# Patient Record
Sex: MALE | Race: WHITE | NOT HISPANIC OR LATINO | URBAN - METROPOLITAN AREA
[De-identification: names, ages, dates, MRNs, and addresses within clinical notes are randomized per-mention and may not be internally consistent; named-entity substitution may affect disease eponyms.]

---

## 2017-01-30 ENCOUNTER — GENERIC CONVERSION - ENCOUNTER (OUTPATIENT)
Dept: OTHER | Facility: OTHER | Age: 67
End: 2017-01-30

## 2017-02-02 ENCOUNTER — LAB CONVERSION - ENCOUNTER (OUTPATIENT)
Dept: OTHER | Facility: OTHER | Age: 67
End: 2017-02-02

## 2017-02-02 ENCOUNTER — GENERIC CONVERSION - ENCOUNTER (OUTPATIENT)
Dept: OTHER | Facility: OTHER | Age: 67
End: 2017-02-02

## 2017-02-02 LAB
A/G RATIO (HISTORICAL): 2 (CALC) (ref 1–2.5)
ALBUMIN SERPL BCP-MCNC: 4.6 G/DL (ref 3.6–5.1)
ALP SERPL-CCNC: 60 U/L (ref 40–115)
ALT SERPL W P-5'-P-CCNC: 31 U/L (ref 9–46)
AST SERPL W P-5'-P-CCNC: 27 U/L (ref 10–35)
BILIRUB SERPL-MCNC: 1.1 MG/DL (ref 0.2–1.2)
BUN SERPL-MCNC: 20 MG/DL (ref 7–25)
BUN/CREA RATIO (HISTORICAL): ABNORMAL (CALC) (ref 6–22)
CALCIUM (ADJUSTED FOR ALBUMIN) (HISTORICAL): 9.2 MG/DL (CALC) (ref 8.6–10.2)
CALCIUM SERPL-MCNC: 9.3 MG/DL (ref 8.6–10.3)
CHLORIDE SERPL-SCNC: 100 MMOL/L (ref 98–110)
CHOLEST SERPL-MCNC: 189 MG/DL (ref 125–200)
CHOLEST/HDLC SERPL: 4.2 (CALC)
CO2 SERPL-SCNC: 27 MMOL/L (ref 20–31)
CREAT SERPL-MCNC: 1.03 MG/DL (ref 0.7–1.25)
EGFR AFRICAN AMERICAN (HISTORICAL): 87 ML/MIN/1.73M2
EGFR-AMERICAN CALC (HISTORICAL): 75 ML/MIN/1.73M2
EST. AVERAGE GLUCOSE BLD GHB EST-MCNC: 12.4 (CALC)
EST. AVERAGE GLUCOSE BLD GHB EST-MCNC: 223 (CALC)
GAMMA GLOBULIN (HISTORICAL): 2.3 G/DL (CALC) (ref 1.9–3.7)
GLUCOSE (HISTORICAL): 135 MG/DL (ref 65–99)
HBA1C MFR BLD HPLC: 9.4 % OF TOTAL HGB
HDLC SERPL-MCNC: 45 MG/DL
LDL CHOLESTEROL (HISTORICAL): 104 MG/DL (CALC)
MAGNESIUM, UR (HISTORICAL): 0.6 MG/DL
NON-HDL-CHOL (CHOL-HDL) (HISTORICAL): 144 MG/DL (CALC)
POTASSIUM SERPL-SCNC: 4.1 MMOL/L (ref 3.5–5.3)
SODIUM SERPL-SCNC: 137 MMOL/L (ref 135–146)
TOTAL PROTEIN (HISTORICAL): 6.9 G/DL (ref 6.1–8.1)
TRIGL SERPL-MCNC: 201 MG/DL

## 2017-02-15 ENCOUNTER — ALLSCRIPTS OFFICE VISIT (OUTPATIENT)
Dept: OTHER | Facility: OTHER | Age: 67
End: 2017-02-15

## 2017-02-25 ENCOUNTER — ALLSCRIPTS OFFICE VISIT (OUTPATIENT)
Dept: OTHER | Facility: OTHER | Age: 67
End: 2017-02-25

## 2017-05-05 ENCOUNTER — ALLSCRIPTS OFFICE VISIT (OUTPATIENT)
Dept: OTHER | Facility: OTHER | Age: 67
End: 2017-05-05

## 2017-06-16 ENCOUNTER — ALLSCRIPTS OFFICE VISIT (OUTPATIENT)
Dept: OTHER | Facility: OTHER | Age: 67
End: 2017-06-16

## 2017-09-06 ENCOUNTER — GENERIC CONVERSION - ENCOUNTER (OUTPATIENT)
Dept: OTHER | Facility: OTHER | Age: 67
End: 2017-09-06

## 2018-01-12 NOTE — RESULT NOTES
Verified Results  (Q) COMPREHENSIVE METABOLIC PNL W/ADJUSTED CALCIUM [id number] 09:02AM Shanthi Gavin     Test Name Result Flag Reference   GLUCOSE 135 mg/dL H 65-99   Fasting reference interval   UREA NITROGEN (BUN) 20 mg/dL  7-25   CREATININE 1 03 mg/dL  0 70-1 25   For patients >52years of age, the reference limit  for Creatinine is approximately 13% higher for people  identified as -American  eGFR NON-AFR  AMERICAN 75 mL/min/1 73m2  > OR = 60   eGFR AFRICAN AMERICAN 87 mL/min/1 73m2  > OR = 60   BUN/CREATININE RATIO   5-75   NOT APPLICABLE (calc)   SODIUM 137 mmol/L  135-146   POTASSIUM 4 1 mmol/L  3 5-5 3   CHLORIDE 100 mmol/L     CARBON DIOXIDE 27 mmol/L  20-31   CALCIUM 9 3 mg/dL  8 6-10 3   CALCIUM (ADJUSTED FOR$ALBUMIN) 9 2 mg/dL (calc)  8 6-10 2   PROTEIN, TOTAL 6 9 g/dL  6 1-8 1   ALBUMIN 4 6 g/dL  3 6-5 1   GLOBULIN 2 3 g/dL (calc)  1 9-3 7   ALBUMIN/GLOBULIN RATIO 2 0 (calc)  1 0-2 5   BILIRUBIN, TOTAL 1 1 mg/dL  0 2-1 2   ALKALINE PHOSPHATASE 60 U/L     AST 27 U/L  10-35   ALT 31 U/L  9-46     (Q) HEMOGLOBIN A1c WITH eAG [id number] 09:02AM Shanthi Gavin   REPORT COMMENT:  FASTING:YES     Test Name Result Flag Reference   HEMOGLOBIN A1c 9 4 % of total Hgb H <5 7   According to ADA guidelines, hemoglobin A1c <7 0%  represents optimal control in non-pregnant diabetic  patients  Different metrics may apply to specific  patient populations  Standards of Medical Care in    Diabetes Care  2013;36:s11-s66     For the purpose of screening for the presence of  diabetes  <5 7%       Consistent with the absence of diabetes  5 7-6 4%    Consistent with increased risk for diabetes              (prediabetes)  >or=6 5%    Consistent with diabetes     This assay result is consistent with diabetes  mellitus  Currently, no consensus exists for use of hemoglobin  A1c for diagnosis of diabetes for children     eAG (mg/dL) 223 (calc)     eAG (mmol/L) 12 4 (calc)       (Q) MICROALBUMIN, RANDOM URINE (W/O CREATININE) [id number] 09:02AM Danutaeleanor Medinaing     Test Name Result Flag Reference   MICROALBUMIN 0 6 mg/dL     Reference Range  Not established   ADY See Below     The ADA defines abnormalities in albumin  excretion as follows:     Category         Result (mcg/mg creatinine)     Normal                    <30  Microalbuminuria            Clinical albuminuria   > OR = 300     The ADA recommends that at least two of three  specimens collected within a 3-6 month period be  abnormal before considering a patient to be  within a diagnostic category  (1) LIPID PANEL, FASTING [id number] 09:02AM [employer]     Test Name Result Flag Reference   CHOLESTEROL, TOTAL 189 mg/dL  125-200   HDL CHOLESTEROL 45 mg/dL  > OR = 40   TRIGLICERIDES 294 mg/dL H <150   LDL-CHOLESTEROL 104 mg/dL (calc)  <130   Desirable range <100 mg/dL for patients with CHD or  diabetes and <70 mg/dL for diabetic patients with  known heart disease  CHOL/HDLC RATIO 4 2 (calc)  < OR = 5 0   NON HDL CHOLESTEROL 144 mg/dL (calc)     Target for non-HDL cholesterol is 30 mg/dL higher than   LDL cholesterol target  Discussion/Summary   sugar is uncontrolled   see me to go over

## 2018-01-13 VITALS
WEIGHT: 247 LBS | TEMPERATURE: 97.3 F | DIASTOLIC BLOOD PRESSURE: 80 MMHG | OXYGEN SATURATION: 94 % | SYSTOLIC BLOOD PRESSURE: 126 MMHG | HEIGHT: 73 IN | HEART RATE: 80 BPM | BODY MASS INDEX: 32.74 KG/M2

## 2018-01-13 VITALS
HEART RATE: 75 BPM | SYSTOLIC BLOOD PRESSURE: 118 MMHG | WEIGHT: 248.5 LBS | DIASTOLIC BLOOD PRESSURE: 80 MMHG | OXYGEN SATURATION: 96 % | BODY MASS INDEX: 32.93 KG/M2 | HEIGHT: 73 IN | TEMPERATURE: 98.3 F

## 2018-01-14 VITALS
WEIGHT: 258.44 LBS | HEART RATE: 84 BPM | BODY MASS INDEX: 34.25 KG/M2 | TEMPERATURE: 98 F | HEIGHT: 73 IN | OXYGEN SATURATION: 96 % | SYSTOLIC BLOOD PRESSURE: 130 MMHG | DIASTOLIC BLOOD PRESSURE: 90 MMHG

## 2018-01-14 VITALS
DIASTOLIC BLOOD PRESSURE: 92 MMHG | BODY MASS INDEX: 34.52 KG/M2 | SYSTOLIC BLOOD PRESSURE: 140 MMHG | HEART RATE: 77 BPM | HEIGHT: 73 IN | WEIGHT: 260.5 LBS | TEMPERATURE: 97 F

## 2018-04-01 DIAGNOSIS — I10 ESSENTIAL HYPERTENSION: Primary | ICD-10-CM

## 2018-04-01 RX ORDER — VALSARTAN AND HYDROCHLOROTHIAZIDE 160; 12.5 MG/1; MG/1
TABLET, FILM COATED ORAL
Qty: 30 TABLET | Refills: 5 | Status: SHIPPED | OUTPATIENT
Start: 2018-04-01 | End: 2018-10-05

## 2018-06-09 RX ORDER — PROMETHAZINE HYDROCHLORIDE AND CODEINE PHOSPHATE 6.25; 1 MG/5ML; MG/5ML
SOLUTION ORAL
Qty: 240 ML | Refills: 1 | OUTPATIENT
Start: 2018-06-09

## 2018-08-03 ENCOUNTER — TELEPHONE (OUTPATIENT)
Dept: FAMILY MEDICINE CLINIC | Facility: CLINIC | Age: 68
End: 2018-08-03

## 2018-08-03 DIAGNOSIS — I10 ESSENTIAL HYPERTENSION: Primary | ICD-10-CM

## 2018-08-03 RX ORDER — OLMESARTAN MEDOXOMIL AND HYDROCHLOROTHIAZIDE 20/12.5 20; 12.5 MG/1; MG/1
1 TABLET ORAL DAILY
Qty: 90 TABLET | Refills: 3 | Status: SHIPPED | OUTPATIENT
Start: 2018-08-03 | End: 2019-08-23 | Stop reason: SDUPTHER

## 2018-10-05 ENCOUNTER — OFFICE VISIT (OUTPATIENT)
Dept: FAMILY MEDICINE CLINIC | Facility: CLINIC | Age: 68
End: 2018-10-05
Payer: COMMERCIAL

## 2018-10-05 VITALS
BODY MASS INDEX: 33.54 KG/M2 | WEIGHT: 254.25 LBS | SYSTOLIC BLOOD PRESSURE: 178 MMHG | TEMPERATURE: 98.8 F | HEART RATE: 76 BPM | OXYGEN SATURATION: 98 % | DIASTOLIC BLOOD PRESSURE: 100 MMHG

## 2018-10-05 DIAGNOSIS — I10 BENIGN ESSENTIAL HYPERTENSION: ICD-10-CM

## 2018-10-05 DIAGNOSIS — E11.65 UNCONTROLLED TYPE 2 DIABETES MELLITUS WITH HYPERGLYCEMIA (HCC): ICD-10-CM

## 2018-10-05 DIAGNOSIS — E78.2 MIXED HYPERLIPIDEMIA: ICD-10-CM

## 2018-10-05 DIAGNOSIS — J20.9 ACUTE BRONCHITIS, UNSPECIFIED ORGANISM: Primary | ICD-10-CM

## 2018-10-05 DIAGNOSIS — Z12.5 SCREENING PSA (PROSTATE SPECIFIC ANTIGEN): ICD-10-CM

## 2018-10-05 PROBLEM — IMO0002 DIABETES MELLITUS TYPE II, UNCONTROLLED: Status: ACTIVE | Noted: 2017-02-15

## 2018-10-05 PROCEDURE — 99214 OFFICE O/P EST MOD 30 MIN: CPT | Performed by: FAMILY MEDICINE

## 2018-10-05 RX ORDER — PROMETHAZINE HYDROCHLORIDE AND CODEINE PHOSPHATE 6.25; 1 MG/5ML; MG/5ML
5 SYRUP ORAL EVERY 4 HOURS PRN
Qty: 120 ML | Refills: 0 | Status: SHIPPED | OUTPATIENT
Start: 2018-10-05 | End: 2019-02-13 | Stop reason: ALTCHOICE

## 2018-10-05 RX ORDER — ALBUTEROL SULFATE 90 UG/1
2 AEROSOL, METERED RESPIRATORY (INHALATION) EVERY 6 HOURS PRN
Qty: 8.5 G | Refills: 0 | Status: SHIPPED | OUTPATIENT
Start: 2018-10-05 | End: 2019-06-05 | Stop reason: ALTCHOICE

## 2018-10-05 RX ORDER — AZITHROMYCIN 250 MG/1
TABLET, FILM COATED ORAL
Qty: 6 TABLET | Refills: 0 | Status: SHIPPED | OUTPATIENT
Start: 2018-10-05 | End: 2018-10-09

## 2018-10-05 NOTE — ASSESSMENT & PLAN NOTE
Severely uncontrolled today but he admits to being under lot of stress and he has been sick without sleep for three days  His prior blood pressures were very well controlled  I advised this is yet another reason for his follow-up to be sure that the blood pressure has come down and that the new medication is working as it should

## 2018-10-05 NOTE — PATIENT INSTRUCTIONS
Acute Bronchitis   WHAT YOU NEED TO KNOW:   Acute bronchitis is swelling and irritation in the air passages of your lungs  This irritation may cause you to cough or have other breathing problems  Acute bronchitis often starts because of another illness, such as a cold or the flu  The illness spreads from your nose and throat to your windpipe and airways  Bronchitis is often called a chest cold  Acute bronchitis lasts about 3 to 6 weeks and is usually not a serious illness  Your cough can last for several weeks  DISCHARGE INSTRUCTIONS:   Return to the emergency department if:   · You cough up blood  · Your lips or fingernails turn blue  · You feel like you are not getting enough air when you breathe  Contact your healthcare provider if:   · You have a fever  · Your breathing problems do not go away or get worse  · Your cough does not get better within 4 weeks  · You have questions or concerns about your condition or care  Self-care:   · Get more rest   Rest helps your body to heal  Slowly start to do more each day  Rest when you feel it is needed  · Avoid irritants in the air  Avoid chemicals, fumes, and dust  Wear a face mask if you must work around dust or fumes  Stay inside on days when air pollution levels are high  If you have allergies, stay inside when pollen counts are high  Do not use aerosol products, such as spray-on deodorant, bug spray, and hair spray  · Do not smoke or be around others who smoke  Nicotine and other chemicals in cigarettes and cigars damages the cilia that move mucus out of your lungs  Ask your healthcare provider for information if you currently smoke and need help to quit  E-cigarettes or smokeless tobacco still contain nicotine  Talk to your healthcare provider before you use these products  · Drink liquids as directed  Liquids help keep your air passages moist and help you cough up mucus   You may need to drink more liquids when you have acute bronchitis  Ask how much liquid to drink each day and which liquids are best for you  · Use a humidifier or vaporizer  Use a cool mist humidifier or a vaporizer to increase air moisture in your home  This may make it easier for you to breathe and help decrease your cough  Decrease risk for acute bronchitis:   · Get the vaccinations you need  Ask your healthcare provider if you should get vaccinated against the flu or pneumonia  · Prevent the spread of germs  You can decrease your risk of acute bronchitis and other illnesses by doing the following:     [facility] your hands often with soap and water  Carry germ-killing hand lotion or gel with you  You can use the lotion or gel to clean your hands when soap and water are not available  ¨ Do not touch your eyes, nose, or mouth unless you have washed your hands first     ¨ Always cover your mouth when you cough to prevent the spread of germs  It is best to cough into a tissue or your shirt sleeve instead of into your hand  Ask those around you cover their mouths when they cough  ¨ Try to avoid people who have a cold or the flu  If you are sick, stay away from others as much as possible  Medicines: Your healthcare provider may  give you any of the following:  · Ibuprofen or acetaminophen  are medicines that help lower your fever  They are available without a doctor's order  Ask your healthcare provider which medicine is right for you  Ask how much to take and how often to take it  Follow directions  These medicines can cause stomach bleeding if not taken correctly  Ibuprofen can cause kidney damage  Do not take ibuprofen if you have kidney disease, an ulcer, or allergies to aspirin  Acetaminophen can cause liver damage  Do not take more than 4,000 milligrams in 24 hours  · Decongestants  help loosen mucus in your lungs and make it easier to cough up  This can help you breathe easier  · Cough suppressants  decrease your urge to cough   If your cough produces mucus, do not take a cough suppressant unless your healthcare provider tells you to  Your healthcare provider may suggest that you take a cough suppressant at night so you can rest     · Inhalers  may be given  Your healthcare provider may give you one or more inhalers to help you breathe easier and cough less  An inhaler gives your medicine to open your airways  Ask your healthcare provider to show you how to use your inhaler correctly  · Take your medicine as directed  Contact your healthcare provider if you think your medicine is not helping or if you have side effects  Tell him of her if you are allergic to any medicine  Keep a list of the medicines, vitamins, and herbs you take  Include the amounts, and when and why you take them  Bring the list or the pill bottles to follow-up visits  Carry your medicine list with you in case of an emergency  Follow up with your healthcare provider as directed:  Write down questions you have so you will remember to ask them during your follow-up visits  © 2017 [address] [employer] Information is for End User's use only and may not be sold, redistributed or otherwise used for commercial purposes  All illustrations and images included in CareNotes® are the copyrighted property of [employer]  or Ady Rodgers  The above information is an  only  It is not intended as medical advice for individual conditions or treatments  Talk to your doctor, nurse or pharmacist before following any medical regimen to see if it is safe and effective for you

## 2018-10-05 NOTE — ASSESSMENT & PLAN NOTE
The patient's last A1c was 9 4 in February of 2017  He has absolutely no care for his diabetes since then  He needs a full set of labs in a diabetes follow-up  He is going to schedule today with Dr Dionna Galindo for the next available appointment and I advised he should have his labs done about a week before that appointment  For now he is taking 2000 mg of metformin daily  He will likely need more medication if the A1c is still high  We briefly discussed this today

## 2018-10-05 NOTE — PROGRESS NOTES
Subjective:   Chief Complaint   Patient presents with    Cough     PT IS HERE FOR BRONCITIS SX'S X 3 NIGHTS   PT UNABLE TO SLEEP WITH COUGHING  RX FOR INHALER  Patient ID: Charlie Arias is a 79 y o  male  The pt is here today with self diagnosed bronchitis  He has been sick for 3 days   He gets this every year  His cough is productive of a lot of phlegm  He has not felt fevers  He denies n/v    He uses a neti pot regularly  He had pneumonia many years ago and ever since gets this bad bronchitis at least once a year    He forgot to take his meds the past 2 days and also found out today that he might be losing his job  He has not yet told his wife this  He has been working from home while sick the past few days  He is under tremendous amount of stress  He notes his blood pressure is elevated  The last 2 times he was here, both over a year ago, though, his blood pressure was normal  His blood pressure medication was recently changed from valsartan-HCTZ to olmesartan-HCTZ because of the recall of valsartan            The following portions of the patient's history were reviewed and updated as appropriate: allergies, current medications, past family history, past medical history, past social history, past surgical history and problem list     Review of Systems      Objective:  Vitals:    10/05/18 1354   BP: (!) 178/100   Pulse: 76   Temp: 98 8 °F (37 1 °C)   SpO2: 98%   Weight: 115 kg (254 lb 4 oz)      Physical Exam   Constitutional: He is oriented to person, place, and time  He appears well-developed and well-nourished  No distress  Pulmonary/Chest: Effort normal and breath sounds normal  No respiratory distress  He has no wheezes  Decreased breath sounds with some rhonchi at the right lung base   Musculoskeletal: Normal range of motion  Neurological: He is alert and oriented to person, place, and time  No cranial nerve deficit  Coordination normal    Skin: Skin is warm and dry  No rash noted   He is not diaphoretic  No erythema  Psychiatric: He has a normal mood and affect  His behavior is normal  Judgment and thought content normal          Assessment/Plan:    Diabetes mellitus type II, uncontrolled ([address] )  The patient's last A1c was 9 4 in February of 2017  He has absolutely no care for his diabetes since then  He needs a full set of labs in a diabetes follow-up  He is going to schedule today with Dr Waqar Delarosa for the next available appointment and I advised he should have his labs done about a week before that appointment  For now he is taking 2000 mg of metformin daily  He will likely need more medication if the A1c is still high  We briefly discussed this today  Benign essential hypertension  Severely uncontrolled today but he admits to being under lot of stress and he has been sick without sleep for three days  His prior blood pressures were very well controlled  I advised this is yet another reason for his follow-up to be sure that the blood pressure has come down and that the new medication is working as it should  Hyperlipidemia  Well controlled in February of 2017 without medication  Will recheck with upcoming labs  Diagnoses and all orders for this visit:    Acute bronchitis, unspecified organism  -     azithromycin (ZITHROMAX) 250 mg tablet; Take 2 tablets today then 1 tablet daily x 4 days  -     promethazine-codeine (PHENERGAN WITH CODEINE) 6 25-10 mg/5 mL syrup; Take 5 mL by mouth every 4 (four) hours as needed for cough  -     albuterol (PROAIR HFA) 90 mcg/act inhaler; Inhale 2 puffs every 6 (six) hours as needed for wheezing  -     Spacer/Aero Chamber Mouthpiece MISC; by Does not apply route as needed (cough/wheeze)    I think the patient may very well have a viral bronchitis but given the findings on his lung exam I am going to put him on a Z-Gurmeet in case he has community-acquired pneumonia      Uncontrolled type 2 diabetes mellitus with hyperglycemia ([facility] [address] )  -     Hemoglobin A1c (w/out EAG); Future  -     Microalbumin,Urine; Future  -     Hemoglobin A1c (w/out EAG)    Mixed hyperlipidemia  -     TSH, 3rd generation with Free T4 reflex; Future  -     Lipid Panel with Direct LDL reflex; Future  -     TSH, 3rd generation with Free T4 reflex  -     Lipid Panel with Direct LDL reflex    Benign essential hypertension  -     Comprehensive metabolic panel; Future  -     CBC and differential; Future  -     Comprehensive metabolic panel  -     CBC and differential    Screening PSA (prostate specific antigen)  -     PSA, ultrasensitive;  Future  -     PSA, ultrasensitive    Other orders  -     METFORMIN HCL PO; Take by mouth

## 2018-11-09 LAB
ALBUMIN SERPL-MCNC: 4.7 G/DL (ref 3.6–5.1)
ALBUMIN/GLOB SERPL: 1.7 (CALC) (ref 1–2.5)
ALP SERPL-CCNC: 66 U/L (ref 40–115)
ALT SERPL-CCNC: 40 U/L (ref 9–46)
AST SERPL-CCNC: 24 U/L (ref 10–35)
BASOPHILS # BLD AUTO: 69 CELLS/UL (ref 0–200)
BASOPHILS NFR BLD AUTO: 1.4 %
BILIRUB SERPL-MCNC: 0.9 MG/DL (ref 0.2–1.2)
BUN SERPL-MCNC: 17 MG/DL (ref 7–25)
BUN/CREAT SERPL: ABNORMAL (CALC) (ref 6–22)
CALCIUM SERPL-MCNC: 9.9 MG/DL (ref 8.6–10.3)
CHLORIDE SERPL-SCNC: 99 MMOL/L (ref 98–110)
CHOLEST SERPL-MCNC: 184 MG/DL
CHOLEST/HDLC SERPL: 4.5 (CALC)
CO2 SERPL-SCNC: 31 MMOL/L (ref 20–32)
CREAT SERPL-MCNC: 1.13 MG/DL (ref 0.7–1.25)
EOSINOPHIL # BLD AUTO: 172 CELLS/UL (ref 15–500)
EOSINOPHIL NFR BLD AUTO: 3.5 %
ERYTHROCYTE [DISTWIDTH] IN BLOOD BY AUTOMATED COUNT: 13.1 % (ref 11–15)
GLOBULIN SER CALC-MCNC: 2.8 G/DL (CALC) (ref 1.9–3.7)
GLUCOSE SERPL-MCNC: 126 MG/DL (ref 65–99)
HBA1C MFR BLD: 6.6 % OF TOTAL HGB
HCT VFR BLD AUTO: 45.6 % (ref 38.5–50)
HDLC SERPL-MCNC: 41 MG/DL
HGB BLD-MCNC: 15.9 G/DL (ref 13.2–17.1)
LDLC SERPL CALC-MCNC: 112 MG/DL (CALC)
LYMPHOCYTES # BLD AUTO: 1372 CELLS/UL (ref 850–3900)
LYMPHOCYTES NFR BLD AUTO: 28 %
MCH RBC QN AUTO: 33.1 PG (ref 27–33)
MCHC RBC AUTO-ENTMCNC: 34.9 G/DL (ref 32–36)
MCV RBC AUTO: 95 FL (ref 80–100)
MONOCYTES # BLD AUTO: 407 CELLS/UL (ref 200–950)
MONOCYTES NFR BLD AUTO: 8.3 %
NEUTROPHILS # BLD AUTO: 2881 CELLS/UL (ref 1500–7800)
NEUTROPHILS NFR BLD AUTO: 58.8 %
NONHDLC SERPL-MCNC: 143 MG/DL (CALC)
PLATELET # BLD AUTO: 147 THOUSAND/UL (ref 140–400)
PMV BLD REES-ECKER: 13.2 FL (ref 7.5–12.5)
POTASSIUM SERPL-SCNC: 4.7 MMOL/L (ref 3.5–5.3)
PROT SERPL-MCNC: 7.5 G/DL (ref 6.1–8.1)
RBC # BLD AUTO: 4.8 MILLION/UL (ref 4.2–5.8)
SL AMB EGFR AFRICAN AMERICAN: 78 ML/MIN/1.73M2
SL AMB EGFR NON AFRICAN AMERICAN: 67 ML/MIN/1.73M2
SODIUM SERPL-SCNC: 138 MMOL/L (ref 135–146)
TRIGL SERPL-MCNC: 190 MG/DL
TSH SERPL-ACNC: 2.16 MIU/L (ref 0.4–4.5)
WBC # BLD AUTO: 4.9 THOUSAND/UL (ref 3.8–10.8)

## 2018-11-09 PROCEDURE — 3044F HG A1C LEVEL LT 7.0%: CPT | Performed by: FAMILY MEDICINE

## 2018-11-11 LAB — PSA SERPL DL<=0.01 NG/ML-MCNC: 2.28 NG/ML

## 2018-11-14 ENCOUNTER — OFFICE VISIT (OUTPATIENT)
Dept: FAMILY MEDICINE CLINIC | Facility: CLINIC | Age: 68
End: 2018-11-14
Payer: COMMERCIAL

## 2018-11-14 VITALS
HEIGHT: 75 IN | SYSTOLIC BLOOD PRESSURE: 120 MMHG | HEART RATE: 82 BPM | OXYGEN SATURATION: 97 % | TEMPERATURE: 98.4 F | WEIGHT: 240 LBS | BODY MASS INDEX: 29.84 KG/M2 | DIASTOLIC BLOOD PRESSURE: 86 MMHG

## 2018-11-14 DIAGNOSIS — E78.2 MIXED HYPERLIPIDEMIA: Primary | ICD-10-CM

## 2018-11-14 DIAGNOSIS — E11.42 DIABETIC POLYNEUROPATHY ASSOCIATED WITH TYPE 2 DIABETES MELLITUS (HCC): ICD-10-CM

## 2018-11-14 DIAGNOSIS — E11.65 UNCONTROLLED TYPE 2 DIABETES MELLITUS WITH HYPERGLYCEMIA (HCC): ICD-10-CM

## 2018-11-14 PROCEDURE — 3008F BODY MASS INDEX DOCD: CPT | Performed by: FAMILY MEDICINE

## 2018-11-14 PROCEDURE — 99213 OFFICE O/P EST LOW 20 MIN: CPT | Performed by: FAMILY MEDICINE

## 2018-11-14 PROCEDURE — 1160F RVW MEDS BY RX/DR IN RCRD: CPT | Performed by: FAMILY MEDICINE

## 2018-11-14 PROCEDURE — 1036F TOBACCO NON-USER: CPT | Performed by: FAMILY MEDICINE

## 2018-11-14 PROCEDURE — 1101F PT FALLS ASSESS-DOCD LE1/YR: CPT | Performed by: FAMILY MEDICINE

## 2018-11-14 RX ORDER — ATORVASTATIN CALCIUM 20 MG/1
20 TABLET, FILM COATED ORAL DAILY
Qty: 90 TABLET | Refills: 1 | Status: SHIPPED | OUTPATIENT
Start: 2018-11-14 | End: 2019-02-13 | Stop reason: SDDI

## 2018-11-14 NOTE — PROGRESS NOTES
Subjective:   Chief Complaint   Patient presents with    Diabetes     pt here for diabetic check up and to go over lab test results, pt advised to schedule for eye exam        Patient ID: Mariely Wayne is a 79 y o  male  Patient is here for follow-up  He was seen several weeks ago with an uncontrolled blood pressure  At the time has not been compliant with his diet or his medications  Since starting his medication and going back on his metformin he is feeling much better  His weight is down  He is beginning to start an exercise program   He had lab work and wants to review that      Diabetes   Pertinent negatives for hypoglycemia include no dizziness, headaches, seizures, speech difficulty or tremors  Pertinent negatives for diabetes include no chest pain, no fatigue, no polydipsia and no polyphagia  The following portions of the patient's history were reviewed and updated as appropriate: allergies, current medications, past family history, past medical history, past social history, past surgical history and problem list     Review of Systems   Constitutional: Negative for activity change, appetite change, chills, diaphoresis, fatigue and unexpected weight change  HENT: Negative for congestion, ear discharge, ear pain, hearing loss, nosebleeds and rhinorrhea  Eyes: Negative for pain, redness, itching and visual disturbance  Respiratory: Negative for cough, choking, chest tightness and shortness of breath  Cardiovascular: Negative for chest pain and leg swelling  Gastrointestinal: Negative for abdominal pain, blood in stool, constipation, diarrhea and nausea  Endocrine: Negative for cold intolerance, polydipsia and polyphagia  Genitourinary: Negative for dysuria, frequency, hematuria and urgency  Musculoskeletal: Negative for arthralgias, back pain, gait problem, joint swelling, neck pain and neck stiffness  Skin: Negative for color change and rash     Allergic/Immunologic: Negative for environmental allergies and food allergies  Neurological: Negative for dizziness, tremors, seizures, speech difficulty, numbness and headaches  Hematological: Negative for adenopathy  Does not bruise/bleed easily  Psychiatric/Behavioral: Negative for behavioral problems, dysphoric mood, hallucinations and self-injury  Objective:  Vitals:    11/14/18 0930   BP: 120/86   Pulse: 82   Temp: 98 4 °F (36 9 °C)   SpO2: 97%   Weight: 109 kg (240 lb)   Height: 6' 3" (1 905 m)      Physical Exam   Constitutional: He is oriented to person, place, and time  He appears well-developed and well-nourished  No distress  HENT:   Head: Normocephalic and atraumatic  Right Ear: External ear normal    Left Ear: External ear normal    Nose: Nose normal    Mouth/Throat: Oropharynx is clear and moist  No oropharyngeal exudate  Eyes: Pupils are equal, round, and reactive to light  Conjunctivae and EOM are normal  Right eye exhibits no discharge  Left eye exhibits no discharge  No scleral icterus  Neck: Normal range of motion  Neck supple  No thyromegaly present  Cardiovascular: Normal rate, regular rhythm and normal heart sounds  Pulses are no weak pulses  No murmur heard  Pulses:       Dorsalis pedis pulses are 1+ on the right side, and 1+ on the left side  Posterior tibial pulses are 1+ on the right side, and 1+ on the left side  Pulmonary/Chest: Effort normal and breath sounds normal  He has no wheezes  He has no rales  Abdominal: Soft  Bowel sounds are normal  He exhibits no mass  There is no tenderness  There is no guarding  Musculoskeletal: Normal range of motion  He exhibits no edema or tenderness  Feet:   Right Foot:   Skin Integrity: Negative for ulcer, skin breakdown, erythema, warmth, callus or dry skin  Left Foot:   Skin Integrity: Negative for ulcer, skin breakdown, erythema, warmth, callus or dry skin  Lymphadenopathy:     He has no cervical adenopathy     Neurological: He is alert and oriented to person, place, and time  He has normal reflexes  Skin: Skin is warm and dry  He is not diaphoretic  Psychiatric: He has a normal mood and affect  Judgment and thought content normal        Patient's shoes and socks removed  Right Foot/Ankle   Right Foot Inspection  Skin Exam: skin normal and skin intact no dry skin, no warmth, no callus, no erythema, no maceration, no abnormal color, no pre-ulcer, no ulcer and no callus                          Toe Exam: ROM and strength within normal limits  Sensory   Vibration: diminished  Proprioception: diminished   Monofilament testing: diminished  Vascular  Capillary refills: < 3 seconds  The right DP pulse is 1+  The right PT pulse is 1+  Left Foot/Ankle  Left Foot Inspection  Skin Exam: skin normal and skin intactno dry skin, no warmth, no erythema, no maceration, normal color, no pre-ulcer, no ulcer and no callus                         Toe Exam: ROM and strength within normal limits                   Sensory   Vibration: diminished  Proprioception: diminished  Monofilament: diminished  Vascular  Capillary refills: < 3 seconds  The left DP pulse is 1+  The left PT pulse is 1+  Assign Risk Category:  No deformity present; Loss of protective sensation; No weak pulses       Risk: 1    Assessment/Plan:    No problem-specific Assessment & Plan notes found for this encounter  Diagnoses and all orders for this visit:    Mixed hyperlipidemia  -     atorvastatin (LIPITOR) 20 mg tablet; Take 1 tablet (20 mg total) by mouth daily    Uncontrolled type 2 diabetes mellitus with hyperglycemia (HCC)        We will begin Lipitor 20 mg after showing the patient the Nikolai risk score which exceeded 25%  We will see him again in 3 months and repeat his lipids and A1c at that time  He should continue his current dose of his other medications  He reminded to see the eye doctor

## 2018-11-14 NOTE — PATIENT INSTRUCTIONS

## 2019-01-21 DIAGNOSIS — E11.9 TYPE 2 DIABETES MELLITUS WITHOUT COMPLICATION, WITHOUT LONG-TERM CURRENT USE OF INSULIN (HCC): Primary | ICD-10-CM

## 2019-02-07 ENCOUNTER — TELEPHONE (OUTPATIENT)
Dept: FAMILY MEDICINE CLINIC | Facility: CLINIC | Age: 69
End: 2019-02-07

## 2019-02-07 DIAGNOSIS — Z13.29 SCREENING FOR THYROID DISORDER: ICD-10-CM

## 2019-02-07 DIAGNOSIS — Z13.29 SCREENING FOR ENDOCRINE DISORDER: ICD-10-CM

## 2019-02-07 DIAGNOSIS — Z13.220 SCREENING FOR LIPID DISORDERS: ICD-10-CM

## 2019-02-07 DIAGNOSIS — Z12.5 SCREENING FOR PROSTATE CANCER: ICD-10-CM

## 2019-02-07 DIAGNOSIS — Z13.0 SCREENING FOR IRON DEFICIENCY ANEMIA: Primary | ICD-10-CM

## 2019-02-09 LAB
ALBUMIN SERPL-MCNC: 4.4 G/DL (ref 3.6–5.1)
ALBUMIN/GLOB SERPL: 1.6 (CALC) (ref 1–2.5)
ALP SERPL-CCNC: 57 U/L (ref 40–115)
ALT SERPL-CCNC: 31 U/L (ref 9–46)
AST SERPL-CCNC: 26 U/L (ref 10–35)
BASOPHILS # BLD AUTO: 80 CELLS/UL (ref 0–200)
BASOPHILS NFR BLD AUTO: 1.5 %
BILIRUB SERPL-MCNC: 0.7 MG/DL (ref 0.2–1.2)
BUN SERPL-MCNC: 16 MG/DL (ref 7–25)
BUN/CREAT SERPL: ABNORMAL (CALC) (ref 6–22)
CALCIUM SERPL-MCNC: 9.9 MG/DL (ref 8.6–10.3)
CHLORIDE SERPL-SCNC: 101 MMOL/L (ref 98–110)
CHOLEST SERPL-MCNC: 188 MG/DL
CHOLEST/HDLC SERPL: 3.7 (CALC)
CO2 SERPL-SCNC: 30 MMOL/L (ref 20–32)
CREAT SERPL-MCNC: 1 MG/DL (ref 0.7–1.25)
EOSINOPHIL # BLD AUTO: 127 CELLS/UL (ref 15–500)
EOSINOPHIL NFR BLD AUTO: 2.4 %
ERYTHROCYTE [DISTWIDTH] IN BLOOD BY AUTOMATED COUNT: 13.2 % (ref 11–15)
GLOBULIN SER CALC-MCNC: 2.7 G/DL (CALC) (ref 1.9–3.7)
GLUCOSE SERPL-MCNC: 117 MG/DL (ref 65–99)
HCT VFR BLD AUTO: 44 % (ref 38.5–50)
HDLC SERPL-MCNC: 51 MG/DL
HGB BLD-MCNC: 15.2 G/DL (ref 13.2–17.1)
LDLC SERPL CALC-MCNC: 109 MG/DL (CALC)
LYMPHOCYTES # BLD AUTO: 1818 CELLS/UL (ref 850–3900)
LYMPHOCYTES NFR BLD AUTO: 34.3 %
MCH RBC QN AUTO: 33.5 PG (ref 27–33)
MCHC RBC AUTO-ENTMCNC: 34.5 G/DL (ref 32–36)
MCV RBC AUTO: 96.9 FL (ref 80–100)
MONOCYTES # BLD AUTO: 429 CELLS/UL (ref 200–950)
MONOCYTES NFR BLD AUTO: 8.1 %
NEUTROPHILS # BLD AUTO: 2846 CELLS/UL (ref 1500–7800)
NEUTROPHILS NFR BLD AUTO: 53.7 %
NONHDLC SERPL-MCNC: 137 MG/DL (CALC)
PLATELET # BLD AUTO: 172 THOUSAND/UL (ref 140–400)
PMV BLD REES-ECKER: 13.1 FL (ref 7.5–12.5)
POTASSIUM SERPL-SCNC: 4.6 MMOL/L (ref 3.5–5.3)
PROT SERPL-MCNC: 7.1 G/DL (ref 6.1–8.1)
PSA SERPL-MCNC: 2 NG/ML
RBC # BLD AUTO: 4.54 MILLION/UL (ref 4.2–5.8)
SL AMB EGFR AFRICAN AMERICAN: 89 ML/MIN/1.73M2
SL AMB EGFR NON AFRICAN AMERICAN: 77 ML/MIN/1.73M2
SODIUM SERPL-SCNC: 139 MMOL/L (ref 135–146)
TRIGL SERPL-MCNC: 166 MG/DL
TSH SERPL-ACNC: 1.69 MIU/L (ref 0.4–4.5)
WBC # BLD AUTO: 5.3 THOUSAND/UL (ref 3.8–10.8)

## 2019-02-13 ENCOUNTER — OFFICE VISIT (OUTPATIENT)
Dept: FAMILY MEDICINE CLINIC | Facility: CLINIC | Age: 69
End: 2019-02-13
Payer: COMMERCIAL

## 2019-02-13 VITALS
DIASTOLIC BLOOD PRESSURE: 100 MMHG | BODY MASS INDEX: 30.15 KG/M2 | OXYGEN SATURATION: 94 % | TEMPERATURE: 97.2 F | HEART RATE: 81 BPM | SYSTOLIC BLOOD PRESSURE: 140 MMHG | HEIGHT: 75 IN | WEIGHT: 242.5 LBS

## 2019-02-13 DIAGNOSIS — E11.40 TYPE 2 DIABETES MELLITUS WITH DIABETIC NEUROPATHY, WITHOUT LONG-TERM CURRENT USE OF INSULIN (HCC): ICD-10-CM

## 2019-02-13 DIAGNOSIS — E11.65 UNCONTROLLED TYPE 2 DIABETES MELLITUS WITH HYPERGLYCEMIA (HCC): Primary | ICD-10-CM

## 2019-02-13 DIAGNOSIS — I10 BENIGN ESSENTIAL HYPERTENSION: ICD-10-CM

## 2019-02-13 DIAGNOSIS — E78.2 MIXED HYPERLIPIDEMIA: ICD-10-CM

## 2019-02-13 DIAGNOSIS — E11.9 TYPE 2 DIABETES MELLITUS WITHOUT COMPLICATION, WITHOUT LONG-TERM CURRENT USE OF INSULIN (HCC): ICD-10-CM

## 2019-02-13 PROCEDURE — 3008F BODY MASS INDEX DOCD: CPT | Performed by: FAMILY MEDICINE

## 2019-02-13 PROCEDURE — 99213 OFFICE O/P EST LOW 20 MIN: CPT | Performed by: FAMILY MEDICINE

## 2019-02-13 PROCEDURE — 1160F RVW MEDS BY RX/DR IN RCRD: CPT | Performed by: FAMILY MEDICINE

## 2019-02-13 PROCEDURE — 1036F TOBACCO NON-USER: CPT | Performed by: FAMILY MEDICINE

## 2019-02-13 NOTE — PROGRESS NOTES
Subjective:   Chief Complaint   Patient presents with    Follow-up     pt here for f/u on blood test results        Patient ID: Humza Barr is a 76 y o  male  Patient is here with his wife to review the results of recent lab work  He has been taking supplements and following a very strict diet  He is happy that he is not taking a statin  He is using and entire list of supplements  He also notes some cracking on his bilateral feet  The following portions of the patient's history were reviewed and updated as appropriate: allergies, current medications, past family history, past medical history, past social history, past surgical history and problem list     Review of Systems   Constitutional: Negative for activity change, appetite change, chills, diaphoresis, fatigue and unexpected weight change  HENT: Negative for congestion, ear discharge, ear pain, hearing loss, nosebleeds and rhinorrhea  Eyes: Negative for pain, redness, itching and visual disturbance  Respiratory: Negative for cough, choking, chest tightness and shortness of breath  Cardiovascular: Negative for chest pain and leg swelling  Gastrointestinal: Negative for abdominal pain, blood in stool, constipation, diarrhea and nausea  Endocrine: Negative for cold intolerance, polydipsia and polyphagia  Genitourinary: Negative for dysuria, frequency, hematuria and urgency  Musculoskeletal: Negative for arthralgias, back pain, gait problem, joint swelling, neck pain and neck stiffness  Skin: Negative for color change and rash  Allergic/Immunologic: Negative for environmental allergies and food allergies  Neurological: Negative for dizziness, tremors, seizures, speech difficulty, numbness and headaches  Hematological: Negative for adenopathy  Does not bruise/bleed easily  Psychiatric/Behavioral: Negative for behavioral problems, dysphoric mood, hallucinations and self-injury           Objective:  Vitals:    02/13/19 0947 BP: 140/100   Pulse: 81   Temp: (!) 97 2 °F (36 2 °C)   SpO2: 94%   Weight: 110 kg (242 lb 8 oz)   Height: 6' 3" (1 905 m)      Physical Exam   Constitutional: He is oriented to person, place, and time  He appears well-developed and well-nourished  No distress  HENT:   Head: Normocephalic and atraumatic  Right Ear: External ear normal    Left Ear: External ear normal    Nose: Nose normal    Mouth/Throat: Oropharynx is clear and moist  No oropharyngeal exudate  Eyes: Pupils are equal, round, and reactive to light  Conjunctivae and EOM are normal  Right eye exhibits no discharge  Left eye exhibits no discharge  No scleral icterus  Neck: Normal range of motion  Neck supple  No thyromegaly present  Cardiovascular: Normal rate, regular rhythm and normal heart sounds  Pulses are no weak pulses  No murmur heard  Pulses:       Dorsalis pedis pulses are 2+ on the right side, and 1+ on the left side  Posterior tibial pulses are 1+ on the right side, and 1+ on the left side  Pulmonary/Chest: Effort normal and breath sounds normal  He has no wheezes  He has no rales  Abdominal: Soft  Bowel sounds are normal  He exhibits no mass  There is no tenderness  There is no guarding  Musculoskeletal: Normal range of motion  He exhibits no edema or tenderness  Feet:   Right Foot:   Skin Integrity: Positive for dry skin  Negative for ulcer, skin breakdown, erythema, warmth or callus  Left Foot:   Skin Integrity: Positive for dry skin  Negative for ulcer, skin breakdown, erythema, warmth or callus  Lymphadenopathy:     He has no cervical adenopathy  Neurological: He is alert and oriented to person, place, and time  He has normal reflexes  Skin: Skin is warm and dry  He is not diaphoretic  Psychiatric: He has a normal mood and affect  Judgment and thought content normal        Patient's shoes and socks removed  Right Foot/Ankle   Right Foot Inspection  Skin Exam: skin normal, skin intact and dry skin no warmth, no callus, no erythema, no maceration, no abnormal color, no pre-ulcer, no ulcer and no callus                          Toe Exam: ROM and strength within normal limits  Sensory   Vibration: diminished  Proprioception: diminished   Monofilament testing: diminished  Vascular  Capillary refills: < 3 seconds  The right DP pulse is 2+  The right PT pulse is 1+  Left Foot/Ankle  Left Foot Inspection  Skin Exam: skin normal, skin intact and dry skinno warmth, no erythema, no maceration, normal color, no pre-ulcer, no ulcer and no callus                         Toe Exam: ROM and strength within normal limits                   Sensory   Vibration: diminished  Proprioception: diminished  Monofilament: diminished  Vascular  Capillary refills: < 3 seconds  The left DP pulse is 1+  The left PT pulse is 1+  Assign Risk Category:  No deformity present; Loss of protective sensation; No weak pulses       Risk: 1     Assessment/Plan:    No problem-specific Assessment & Plan notes found for this encounter  Diagnoses and all orders for this visit:    Uncontrolled type 2 diabetes mellitus with hyperglycemia ([facility] [address] )    Type 2 diabetes mellitus without complication, without long-term current use of insulin ([facility])  -     metFORMIN (GLUCOPHAGE) 500 mg tablet; Take 2 tablets (1,000 mg total) by mouth 2 (two) times a day with meals    Type 2 diabetes mellitus with diabetic neuropathy, without long-term current use of insulin ([facility])    Benign essential hypertension    Mixed hyperlipidemia        Continue the current medical regimen and recheck with me in 3 months  Continue with his supplements at this time

## 2019-02-13 NOTE — PATIENT INSTRUCTIONS
Diabetes and Your Skin   WHAT YOU NEED TO KNOW:   Diabetes can affect every part of your body, including your skin  Diabetes that is not well controlled can damage blood vessels and nerves  Damage to blood vessels can make it hard for blood to flow to tissues and organs  A lack of blood flow to your skin can cause ulcers that are difficult to heal  Skin ulcers are also called sores  People with diabetes can also have more bacterial skin infections than other people  Most skin conditions can be prevented with good blood sugar control  Skin sores can be hard to heal, or become life or limb-threatening, if not treated early  DISCHARGE INSTRUCTIONS:   Contact your healthcare provider if:   · You get a severe burn or cut  · You have a sore that is painful, warm to the touch, or has redness around it  · Your sore does not get better or seems to get worse  · You have a fever  · Your blood sugar levels continue to be higher than they should be  Prevent skin sores:   · Keep your blood sugars within target range  Your healthcare provider will tell you what your blood sugar levels should be  High blood sugar levels increase your risk for skin infections and poor wound healing  · Keep your skin clean  Do not take hot baths or showers  They can cause your skin to get dry  Do not take a bubble bath if you have dry skin  Use moisturizing soaps and lotion after baths or showers  Do not put lotion between your toes  · Keep your skin from becoming too dry,  especially in cold, dry weather  When you scratch dry, itchy skin, you can cause your skin to be open to infection  Bathe less during cold weather and use lotion to moisturize  Use a humidifier to keep air in your home from being dry  · Keep areas where skin touches skin dry  Use talcum powder in areas such as armpits and groin  You may also need it under your breasts, and between your toes  Moisture in these areas can cause a fungal infection  · Treat cuts immediately  Clean minor cuts with soap and water  Cover them with sterile gauze  © 2017 [employer] Information is for End User's use only and may not be sold, redistributed or otherwise used for commercial purposes  All illustrations and images included in CareNotes® are the copyrighted property of [employer]  or Ady Rodgers  The above information is an  only  It is not intended as medical advice for individual conditions or treatments  Talk to your doctor, nurse or pharmacist before following any medical regimen to see if it is safe and effective for you

## 2019-04-03 LAB
LEFT EYE DIABETIC RETINOPATHY: NORMAL
RIGHT EYE DIABETIC RETINOPATHY: NORMAL

## 2019-04-03 PROCEDURE — 3072F LOW RISK FOR RETINOPATHY: CPT | Performed by: FAMILY MEDICINE

## 2019-05-08 DIAGNOSIS — E11.9 TYPE 2 DIABETES MELLITUS WITHOUT COMPLICATION, WITHOUT LONG-TERM CURRENT USE OF INSULIN (HCC): Primary | ICD-10-CM

## 2019-05-09 ENCOUNTER — CLINICAL SUPPORT (OUTPATIENT)
Dept: GASTROENTEROLOGY | Facility: CLINIC | Age: 69
End: 2019-05-09

## 2019-05-09 VITALS — BODY MASS INDEX: 30.21 KG/M2 | HEIGHT: 75 IN | WEIGHT: 243 LBS

## 2019-05-09 DIAGNOSIS — Z86.010 HISTORY OF COLON POLYPS: Primary | ICD-10-CM

## 2019-05-09 RX ORDER — ASCORBIC ACID 500 MG
500 TABLET ORAL DAILY
COMMUNITY

## 2019-05-09 RX ORDER — ATORVASTATIN CALCIUM 10 MG/1
10 TABLET, FILM COATED ORAL DAILY
COMMUNITY
End: 2019-06-05 | Stop reason: ALTCHOICE

## 2019-05-09 RX ORDER — AMPICILLIN TRIHYDRATE 250 MG
CAPSULE ORAL DAILY
COMMUNITY

## 2019-05-13 DIAGNOSIS — E11.65 UNCONTROLLED TYPE 2 DIABETES MELLITUS WITH HYPERGLYCEMIA (HCC): Primary | ICD-10-CM

## 2019-05-24 LAB
ALBUMIN SERPL-MCNC: 4.6 G/DL (ref 3.6–5.1)
ALBUMIN/GLOB SERPL: 1.9 (CALC) (ref 1–2.5)
ALP SERPL-CCNC: 54 U/L (ref 40–115)
ALT SERPL-CCNC: 27 U/L (ref 9–46)
AST SERPL-CCNC: 23 U/L (ref 10–35)
BILIRUB SERPL-MCNC: 0.9 MG/DL (ref 0.2–1.2)
BUN SERPL-MCNC: 18 MG/DL (ref 7–25)
BUN/CREAT SERPL: ABNORMAL (CALC) (ref 6–22)
CALCIUM SERPL-MCNC: 9.7 MG/DL (ref 8.6–10.3)
CHLORIDE SERPL-SCNC: 102 MMOL/L (ref 98–110)
CHOLEST SERPL-MCNC: 136 MG/DL
CHOLEST/HDLC SERPL: 3 (CALC)
CO2 SERPL-SCNC: 31 MMOL/L (ref 20–32)
CREAT SERPL-MCNC: 1.02 MG/DL (ref 0.7–1.25)
GLOBULIN SER CALC-MCNC: 2.4 G/DL (CALC) (ref 1.9–3.7)
GLUCOSE SERPL-MCNC: 109 MG/DL (ref 65–99)
HBA1C MFR BLD: 6.1 % OF TOTAL HGB
HDLC SERPL-MCNC: 45 MG/DL
LDLC SERPL CALC-MCNC: 77 MG/DL (CALC)
NONHDLC SERPL-MCNC: 91 MG/DL (CALC)
POTASSIUM SERPL-SCNC: 4 MMOL/L (ref 3.5–5.3)
PROT SERPL-MCNC: 7 G/DL (ref 6.1–8.1)
SL AMB EGFR AFRICAN AMERICAN: 87 ML/MIN/1.73M2
SL AMB EGFR NON AFRICAN AMERICAN: 75 ML/MIN/1.73M2
SODIUM SERPL-SCNC: 139 MMOL/L (ref 135–146)
TRIGL SERPL-MCNC: 68 MG/DL

## 2019-06-05 ENCOUNTER — OFFICE VISIT (OUTPATIENT)
Dept: FAMILY MEDICINE CLINIC | Facility: CLINIC | Age: 69
End: 2019-06-05
Payer: COMMERCIAL

## 2019-06-05 VITALS
HEART RATE: 74 BPM | SYSTOLIC BLOOD PRESSURE: 138 MMHG | OXYGEN SATURATION: 96 % | DIASTOLIC BLOOD PRESSURE: 92 MMHG | HEIGHT: 75 IN | BODY MASS INDEX: 28.47 KG/M2 | TEMPERATURE: 98.3 F | WEIGHT: 229 LBS

## 2019-06-05 DIAGNOSIS — I10 BENIGN ESSENTIAL HYPERTENSION: ICD-10-CM

## 2019-06-05 DIAGNOSIS — E11.65 UNCONTROLLED TYPE 2 DIABETES MELLITUS WITH HYPERGLYCEMIA (HCC): Primary | ICD-10-CM

## 2019-06-05 DIAGNOSIS — E66.3 OVERWEIGHT (BMI 25.0-29.9): ICD-10-CM

## 2019-06-05 PROCEDURE — 99213 OFFICE O/P EST LOW 20 MIN: CPT | Performed by: FAMILY MEDICINE

## 2019-06-05 PROCEDURE — 3008F BODY MASS INDEX DOCD: CPT | Performed by: FAMILY MEDICINE

## 2019-06-05 PROCEDURE — 1160F RVW MEDS BY RX/DR IN RCRD: CPT | Performed by: FAMILY MEDICINE

## 2019-06-05 PROCEDURE — 1036F TOBACCO NON-USER: CPT | Performed by: FAMILY MEDICINE

## 2019-08-23 DIAGNOSIS — I10 ESSENTIAL HYPERTENSION: ICD-10-CM

## 2019-08-23 RX ORDER — OLMESARTAN MEDOXOMIL AND HYDROCHLOROTHIAZIDE 20/12.5 20; 12.5 MG/1; MG/1
TABLET ORAL
Qty: 90 TABLET | Refills: 0 | Status: SHIPPED | OUTPATIENT
Start: 2019-08-23 | End: 2019-12-23 | Stop reason: SDUPTHER

## 2019-08-26 ENCOUNTER — TELEPHONE (OUTPATIENT)
Dept: FAMILY MEDICINE CLINIC | Facility: CLINIC | Age: 69
End: 2019-08-26

## 2019-08-26 DIAGNOSIS — E11.9 TYPE 2 DIABETES MELLITUS WITHOUT COMPLICATION, WITHOUT LONG-TERM CURRENT USE OF INSULIN (HCC): Primary | ICD-10-CM

## 2019-08-26 NOTE — TELEPHONE ENCOUNTER
HE HAS APPT AT [facility] TOMORROW  CAN YOU PUT THE ORDER IN PLEASE  (fasting blood work)    Call when done      [phone]

## 2019-08-28 LAB — HBA1C MFR BLD: 5.8 % OF TOTAL HGB

## 2019-09-04 ENCOUNTER — OFFICE VISIT (OUTPATIENT)
Dept: FAMILY MEDICINE CLINIC | Facility: CLINIC | Age: 69
End: 2019-09-04
Payer: COMMERCIAL

## 2019-09-04 VITALS
HEIGHT: 75 IN | TEMPERATURE: 97.6 F | DIASTOLIC BLOOD PRESSURE: 90 MMHG | HEART RATE: 67 BPM | BODY MASS INDEX: 28.97 KG/M2 | WEIGHT: 233 LBS | OXYGEN SATURATION: 97 % | SYSTOLIC BLOOD PRESSURE: 122 MMHG

## 2019-09-04 DIAGNOSIS — E11.65 UNCONTROLLED TYPE 2 DIABETES MELLITUS WITH HYPERGLYCEMIA (HCC): Primary | ICD-10-CM

## 2019-09-04 DIAGNOSIS — I10 BENIGN ESSENTIAL HYPERTENSION: ICD-10-CM

## 2019-09-04 PROCEDURE — 99213 OFFICE O/P EST LOW 20 MIN: CPT | Performed by: FAMILY MEDICINE

## 2019-09-04 PROCEDURE — 3008F BODY MASS INDEX DOCD: CPT | Performed by: FAMILY MEDICINE

## 2019-09-04 NOTE — PATIENT INSTRUCTIONS
Diabetes in the Older Adult   WHAT YOU NEED TO KNOW:   Older adults with diabetes are at risk for heart disease, stroke, kidney disease, blindness, and nerve damage  You may also be at risk for any of the following:  · Poor nutrition or low blood sugar levels    · Confusion or problems with memory, attention, or learning new things    · Trouble controlling urination or frequent urinary tract infections    · Trouble with coordination or balance    · Falls and injuries    · Pain    · Depression    · Open sores on your legs or feet  DISCHARGE INSTRUCTIONS:   Call 911 for any of the following:   · You have any of the following signs of a stroke:      ¨ Numbness or drooping on one side of your face     ¨ Weakness in an arm or leg    ¨ Confusion or difficulty speaking    ¨ Dizziness, a severe headache, or vision loss    · You have any of the following signs of a heart attack:      ¨ Squeezing, pressure, or pain in your chest that lasts longer than 5 minutes or returns    ¨ Discomfort or pain in your back, neck, jaw, stomach, or arm     ¨ Trouble breathing    ¨ Nausea or vomiting    ¨ Lightheadedness or a sudden cold sweat, especially with chest pain or trouble breathing  Return to the emergency department if:   · You have severe abdominal pain, or the pain spreads to your back  You may also be vomiting  · You have trouble staying awake or focusing  · You are shaking or sweating  · You have blurred or double vision  · Your breath has a fruity, sweet smell  · Your breathing is deep and labored, or rapid and shallow  · Your heartbeat is fast and weak  · You fall and get hurt  Contact your healthcare provider if:   · You are vomiting or have diarrhea  · You have an upset stomach and cannot eat the foods on your meal plan  · You feel weak or more tired than usual      · You feel dizzy, have headaches, or are easily irritated  · Your skin is red, warm, dry, or swollen       · You have a wound that does not heal      · You have numbness in your arms or legs  · You have trouble coping with your illness, or you feel anxious or depressed  · You have problems with your memory  · You have changes in your vision  · You have questions or concerns about your condition or care  Medicines  may be given to decrease the amount of sugar in your blood  You may also need medicine to lower your blood pressure or cholesterol, or medicine to prevent blood clots  Manage the ABCs and prevent problems caused by diabetes:   · Check your blood sugar levels as directed  Your healthcare provider will tell you when and how often to check during the day  Your healthcare provider will also tell you what your blood sugar levels should be before and after a meal  You may need to check for ketones in your urine or blood if your level is higher than directed  Write down your results and show them to your healthcare provider  Your provider may use the results to make changes to your medicine, food, or exercise schedules  Ask your healthcare provider for more information about how to treat a high or low blood sugar level  · Follow your meal plan as directed  A dietitian will help you make a meal plan to keep your blood sugar level steady and make sure you get enough nutrition  Do not skip meals  Your blood sugar level may drop too low if you have taken diabetes medicine and do not eat  Ask your healthcare provider about programs in your community that can deliver the meals to your home  · Try to be active for 30 to 60 minutes most days of the week  Exercise can help keep your blood sugar level steady, decrease your risk of heart disease, and help you lose weight  It can also help improve your balance and decrease your risk for falls  Work with your healthcare provider to create an exercise plan  Ask a family member or friend to exercise with you   Start slow and exercise for 5 to 10 minutes at a time  Examples of activities include walking or swimming  Include muscle strengthening activities 2 to 3 days each week  Include balance training 2 to 3 times each week  Activities that help increase balance include yoga and stephenie chi      · Maintain a healthy weight  Ask your healthcare provider how much you should weigh  A healthy weight can help you control your diabetes and prevent heart disease  Ask your provider to help you create a weight loss plan if you are overweight  Together you can set manageable weight loss goals  · Do not smoke  Ask your healthcare provider for information if you currently smoke and need help to quit  Do not use e-cigarettes or smokeless tobacco in place of cigarettes or to help you quit  They still contain nicotine  · Manage stress  Stress may increase your blood sugar level  Deep breathing, muscle relaxation, and music may help you relax  Ask your healthcare provider for more information about these practices  Other ways to manage your diabetes:   · Check your feet every day for sores  Look at your whole foot, including the bottom, and between and under your toes  Check for wounds, corns, and calluses  Use a mirror to see the bottom of your feet  The skin on your feet may be shiny, tight, dry, or darker than normal  Your feet may also be cold and pale  Feel your feet by running your hands along the tops, bottoms, sides, and between your toes  Redness, swelling, and warmth are signs of blood flow problems that can lead to a foot ulcer  Do not try to remove corns or calluses yourself  · Wear medical alert identification  Wear medical alert jewelry or carry a card that says you have diabetes  Ask your healthcare provider where to get these items  · Ask about vaccines  You have a higher risk for serious illness if you get the flu, pneumonia, or hepatitis   Ask your healthcare provider if you should get a flu, pneumonia, shingles, or hepatitis B vaccine, and when to get the vaccine  · Keep all appointments  You may need to return to have your A1c checked every 3 months  You will need to return at least once each year to have your feet checked  You will need an eye exam once a year to check for retinopathy  You will also need urine tests every year to check for kidney problems  You may need tests to monitor for heart disease  Write down your questions so you remember to ask them during your visits  · Get help from family and friends  You may need help checking your blood sugar level, giving insulin injections, or preparing your meals  Ask your family and friends to help you with these tasks  Talk to your healthcare provider if you do not have someone at home to help you  A healthcare provider can come to your home to help you with these tasks  Follow up with your healthcare provider as directed: You may need to return to have your A1c checked every 3 months  You will need to return at least once each year to have your feet checked  You will need an eye exam once a year to check for retinopathy  You will also need urine tests every year to check for kidney problems  You may need tests to monitor for heart disease  Write down your questions so you remember to ask them during your visits  © 2017 [employer] Information is for End User's use only and may not be sold, redistributed or otherwise used for commercial purposes  All illustrations and images included in CareNotes® are the copyrighted property of [employer]  or Ady Rodgers  The above information is an  only  It is not intended as medical advice for individual conditions or treatments  Talk to your doctor, nurse or pharmacist before following any medical regimen to see if it is safe and effective for you

## 2019-09-04 NOTE — PROGRESS NOTES
Subjective:   Chief Complaint   Patient presents with    Follow-up     pt here for routine 3 month f/u, pt states he think he has toe fungus on his right foot big toe        Patient ID: Mira Coffey is a 76 y o  male  Patient is here for 3 month follow-up  His hemoglobin A1c today is 5 8  He is congratulated for job well done with managing his weight and his blood sugar  Recently completed his colonoscopy  No polyps  The following portions of the patient's history were reviewed and updated as appropriate: allergies, current medications, past family history, past medical history, past social history, past surgical history and problem list     Review of Systems   Constitutional: Negative for activity change, appetite change, chills, diaphoresis, fatigue and unexpected weight change  HENT: Negative for congestion, ear discharge, ear pain, hearing loss, nosebleeds and rhinorrhea  Eyes: Negative for pain, redness, itching and visual disturbance  Respiratory: Negative for cough, choking, chest tightness and shortness of breath  Cardiovascular: Negative for chest pain and leg swelling  Gastrointestinal: Negative for abdominal pain, blood in stool, constipation, diarrhea and nausea  Endocrine: Negative for cold intolerance, polydipsia and polyphagia  Genitourinary: Negative for dysuria, frequency, hematuria and urgency  Musculoskeletal: Negative for arthralgias, back pain, gait problem, joint swelling, neck pain and neck stiffness  Skin: Negative for color change and rash  Allergic/Immunologic: Negative for environmental allergies and food allergies  Neurological: Negative for dizziness, tremors, seizures, speech difficulty, numbness and headaches  Hematological: Negative for adenopathy  Does not bruise/bleed easily  Psychiatric/Behavioral: Negative for behavioral problems, dysphoric mood, hallucinations and self-injury           Objective:  Vitals:    09/04/19 0833   BP: 122/90 Pulse: 67   Temp: 97 6 °F (36 4 °C)   SpO2: 97%   Weight: 106 kg (233 lb)   Height: 6' 3" (1 905 m)      Physical Exam   Constitutional: He is oriented to person, place, and time  He appears well-developed and well-nourished  No distress  HENT:   Head: Normocephalic and atraumatic  Right Ear: External ear normal    Left Ear: External ear normal    Nose: Nose normal    Mouth/Throat: Oropharynx is clear and moist  No oropharyngeal exudate  Eyes: Pupils are equal, round, and reactive to light  Conjunctivae and EOM are normal  Right eye exhibits no discharge  Left eye exhibits no discharge  No scleral icterus  Neck: Normal range of motion  Neck supple  No thyromegaly present  Cardiovascular: Normal rate, regular rhythm and normal heart sounds  No murmur heard  Pulmonary/Chest: Effort normal and breath sounds normal  He has no wheezes  He has no rales  Abdominal: Soft  Bowel sounds are normal  He exhibits no mass  There is no tenderness  There is no guarding  Musculoskeletal: Normal range of motion  He exhibits no edema or tenderness  Lymphadenopathy:     He has no cervical adenopathy  Neurological: He is alert and oriented to person, place, and time  He has normal reflexes  Skin: Skin is warm and dry  He is not diaphoretic  Psychiatric: He has a normal mood and affect  Judgment and thought content normal          Assessment/Plan:    No problem-specific Assessment & Plan notes found for this encounter  Diagnoses and all orders for this visit:    Uncontrolled type 2 diabetes mellitus with hyperglycemia ([facility] [address] )    Benign essential hypertension        Reduce the dose of metformin to 2 pills per day    Recheck in 3 months

## 2019-12-03 ENCOUNTER — TELEPHONE (OUTPATIENT)
Dept: FAMILY MEDICINE CLINIC | Facility: CLINIC | Age: 69
End: 2019-12-03

## 2019-12-03 NOTE — TELEPHONE ENCOUNTER
He just had blood draw at his home for life insurance (new)    He cancelled his appt with you tomorrow  He wanted to know when do you want to see him again

## 2019-12-23 DIAGNOSIS — I10 ESSENTIAL HYPERTENSION: ICD-10-CM

## 2019-12-23 RX ORDER — OLMESARTAN MEDOXOMIL AND HYDROCHLOROTHIAZIDE 20/12.5 20; 12.5 MG/1; MG/1
TABLET ORAL
Qty: 90 TABLET | Refills: 0 | Status: SHIPPED | OUTPATIENT
Start: 2019-12-23 | End: 2020-03-30

## 2020-02-11 ENCOUNTER — TELEPHONE (OUTPATIENT)
Dept: FAMILY MEDICINE CLINIC | Facility: CLINIC | Age: 70
End: 2020-02-11

## 2020-02-11 DIAGNOSIS — E11.9 TYPE 2 DIABETES MELLITUS WITHOUT COMPLICATION, WITHOUT LONG-TERM CURRENT USE OF INSULIN (HCC): Primary | ICD-10-CM

## 2020-02-11 NOTE — TELEPHONE ENCOUNTER
Patient needs blood work ordered  Leave message on cell phone when done     He is going the [facility]    Cell: [phone]

## 2020-02-26 DIAGNOSIS — E11.9 TYPE 2 DIABETES MELLITUS WITHOUT COMPLICATION, WITHOUT LONG-TERM CURRENT USE OF INSULIN (HCC): ICD-10-CM

## 2020-02-26 NOTE — TELEPHONE ENCOUNTER
Requested medication(s) are due for refill today: Yes  Patient has already received a courtesy refill: No  Other reason request has been forwarded to provider: A1C ABNORMAL    PLEASE REVIEW

## 2020-02-28 LAB
1,5-ANHYDROGLUCITOL SERPL-MCNC: 9.8 MCG/ML (ref 7.3–36.6)
ALBUMIN SERPL-MCNC: 4.4 G/DL (ref 3.6–5.1)
ALBUMIN/GLOB SERPL: 1.8 (CALC) (ref 1–2.5)
ALP SERPL-CCNC: 56 U/L (ref 35–144)
ALT SERPL-CCNC: 37 U/L (ref 9–46)
AST SERPL-CCNC: 27 U/L (ref 10–35)
BILIRUB SERPL-MCNC: 0.9 MG/DL (ref 0.2–1.2)
BUN SERPL-MCNC: 20 MG/DL (ref 7–25)
BUN/CREAT SERPL: ABNORMAL (CALC) (ref 6–22)
CALCIUM SERPL-MCNC: 9.5 MG/DL (ref 8.6–10.3)
CHLORIDE SERPL-SCNC: 106 MMOL/L (ref 98–110)
CHOLEST SERPL-MCNC: 184 MG/DL
CHOLEST/HDLC SERPL: 3.5 (CALC)
CO2 SERPL-SCNC: 28 MMOL/L (ref 20–32)
CREAT SERPL-MCNC: 1.13 MG/DL (ref 0.7–1.25)
GLOBULIN SER CALC-MCNC: 2.5 G/DL (CALC) (ref 1.9–3.7)
GLUCOSE SERPL-MCNC: 129 MG/DL (ref 65–99)
HBA1C MFR BLD: 7 % OF TOTAL HGB
HDLC SERPL-MCNC: 53 MG/DL
LDLC SERPL CALC-MCNC: 102 MG/DL (CALC)
NONHDLC SERPL-MCNC: 131 MG/DL (CALC)
POTASSIUM SERPL-SCNC: 4.6 MMOL/L (ref 3.5–5.3)
PROT SERPL-MCNC: 6.9 G/DL (ref 6.1–8.1)
SL AMB EGFR AFRICAN AMERICAN: 76 ML/MIN/1.73M2
SL AMB EGFR NON AFRICAN AMERICAN: 66 ML/MIN/1.73M2
SODIUM SERPL-SCNC: 144 MMOL/L (ref 135–146)
TRIGL SERPL-MCNC: 170 MG/DL

## 2020-03-04 ENCOUNTER — OFFICE VISIT (OUTPATIENT)
Dept: FAMILY MEDICINE CLINIC | Facility: CLINIC | Age: 70
End: 2020-03-04
Payer: COMMERCIAL

## 2020-03-04 VITALS
SYSTOLIC BLOOD PRESSURE: 118 MMHG | TEMPERATURE: 97.3 F | BODY MASS INDEX: 28.82 KG/M2 | OXYGEN SATURATION: 98 % | HEIGHT: 75 IN | HEART RATE: 72 BPM | WEIGHT: 231.75 LBS | DIASTOLIC BLOOD PRESSURE: 80 MMHG

## 2020-03-04 DIAGNOSIS — M25.561 ACUTE PAIN OF RIGHT KNEE: ICD-10-CM

## 2020-03-04 DIAGNOSIS — E78.2 MIXED HYPERLIPIDEMIA: ICD-10-CM

## 2020-03-04 DIAGNOSIS — I10 BENIGN ESSENTIAL HYPERTENSION: ICD-10-CM

## 2020-03-04 DIAGNOSIS — E11.9 TYPE 2 DIABETES MELLITUS WITHOUT COMPLICATION, WITHOUT LONG-TERM CURRENT USE OF INSULIN (HCC): ICD-10-CM

## 2020-03-04 DIAGNOSIS — E11.40 TYPE 2 DIABETES MELLITUS WITH DIABETIC NEUROPATHY, WITHOUT LONG-TERM CURRENT USE OF INSULIN (HCC): Primary | ICD-10-CM

## 2020-03-04 PROCEDURE — 1036F TOBACCO NON-USER: CPT | Performed by: FAMILY MEDICINE

## 2020-03-04 PROCEDURE — 99214 OFFICE O/P EST MOD 30 MIN: CPT | Performed by: FAMILY MEDICINE

## 2020-03-04 PROCEDURE — 2022F DILAT RTA XM EVC RTNOPTHY: CPT | Performed by: FAMILY MEDICINE

## 2020-03-04 PROCEDURE — 3288F FALL RISK ASSESSMENT DOCD: CPT | Performed by: FAMILY MEDICINE

## 2020-03-04 PROCEDURE — 1160F RVW MEDS BY RX/DR IN RCRD: CPT | Performed by: FAMILY MEDICINE

## 2020-03-04 PROCEDURE — 4040F PNEUMOC VAC/ADMIN/RCVD: CPT | Performed by: FAMILY MEDICINE

## 2020-03-04 PROCEDURE — 3008F BODY MASS INDEX DOCD: CPT | Performed by: FAMILY MEDICINE

## 2020-03-04 PROCEDURE — 1101F PT FALLS ASSESS-DOCD LE1/YR: CPT | Performed by: FAMILY MEDICINE

## 2020-03-04 PROCEDURE — 3079F DIAST BP 80-89 MM HG: CPT | Performed by: FAMILY MEDICINE

## 2020-03-04 PROCEDURE — 3074F SYST BP LT 130 MM HG: CPT | Performed by: FAMILY MEDICINE

## 2020-03-04 NOTE — PATIENT INSTRUCTIONS
Diabetic Neuropathy   WHAT YOU SHOULD KNOW:   Diabetic neuropathy (DN) is a condition caused by nerve damage from long-term high blood sugar levels  DN is a complication of diabetes that is not controlled  The most common nerve damage occurs in the legs, feet, arms, or hands  Nerves in your eyes, heart, digestive system, or sexual organs may also be damaged  CARE AGREEMENT:   You have the right to help plan your care  Learn about your health condition and how it may be treated  Discuss treatment options with your caregivers to decide what care you want to receive  You always have the right to refuse treatment  RISKS:   You may have ulcers on your feet and not notice them  These foot ulcers may become infected and need surgery  Diabetic neuropathy makes it hard for your heart to cope with exercise and for your body to control your temperature  Damaged nerves may prevent you from recognizing that your blood sugar level is too low  The normal warning signs, such as cold sweats and a rapid heartbeat, may not occur  High blood pressure and heart disease are common with diabetic neuropathy  Diabetic neuropathy can also cause problems with vision, digestion, urination, and sexual function  WHILE YOU ARE HERE:   Informed consent  is a legal document that explains the tests, treatments, or procedures that you may need  Informed consent means you understand what will be done and can make decisions about what you want  You give your permission when you sign the consent form  You can have someone sign this form for you if you are not able to sign it  You have the right to understand your medical care in words you know  Before you sign the consent form, understand the risks and benefits of what will be done  Make sure all your questions are answered  Tests: You may need 1 or more of the following:  · Telemetry  is continuous monitoring of your heart rhythm   Sticky pads placed on your skin connect to an EKG machine that records your heart rhythm  · Blood tests: You may need blood taken to give caregivers information about how your body is working  The blood may be taken from your hand, arm, or IV  · An electromyography (EMG)  test measures the electrical activity of your muscles at rest and with movement  · Radioisotope gastric-emptying scan: A special x-ray machine uses a special camera to take pictures of your stomach  You are given food to eat that has a small, safe amount of radioactive material in it before taking the pictures  Caregivers then watch the pictures to see how quickly the food leaves your stomach  People who are allergic to shellfish (crab, lobster, or shrimp) may be allergic to the material  Tell your caregiver if you are allergic to any of these foods  · Urine tests:  A flow rate recording may be done to measure the speed of your urine stream  A catheter may be used to measure how much urine is left in your bladder after you finish urinating  Treatment:  Nutrition changes can decrease problems such as vomiting and constipation  Wounds or injuries that are not healing or are infected may need to be treated with surgery or by other methods  You may be given medicines to ease pain or to treat problems such as erectile dysfunction and gastroparesis  You may need these or other medicines:  · Insulin: This medicine will help to decrease your blood sugar level by moving the sugar into cells so it can be used for energy  · Hypoglycemic medicine: This medicine may be given to decrease the amount of sugar in your blood  Hypoglycemic medicine helps your body move the sugar to your cells, where it is needed for energy  · Antinausea medicine: This medicine may be given to calm your stomach and prevent vomiting  · Motility medicine: This medicine is given to help your stomach muscles move food and liquids out of your stomach faster  This medicine also may help you digest food better      · Pain medicine:    Caregivers may give you medicine to take away or decrease your pain  ¨ Do not wait until the pain is severe to ask for your medicine  Tell caregivers if your pain does not decrease  The medicine may not work as well at controlling your pain if you wait too long to take it  ¨ Pain medicine can make you dizzy or sleepy  Prevent falls by calling a caregiver when you want to get out of bed or if you need help  © 2014 [employer] is for End User's use only and may not be sold, redistributed or otherwise used for commercial purposes  All illustrations and images included in CareNotes® are the copyrighted property of [employer]  or Ady Rodgers  The above information is an  only  It is not intended as medical advice for individual conditions or treatments  Talk to your doctor, nurse or pharmacist before following any medical regimen to see if it is safe and effective for you

## 2020-03-04 NOTE — PROGRESS NOTES
Subjective:   Chief Complaint   Patient presents with    Diabetes     pt here for 3 month f/u        Patient ID: Jocelyn Lara is a 71 y o  male  Here for 3 month checkup  Recently had blood work for an insurance physical   His hemoglobin A1c at that time was 6 2  It is now climbed up to 7  He is back on 4 tablets of metformin a day  He has not been compliant with exercise or diet  The following portions of the patient's history were reviewed and updated as appropriate: allergies, current medications, past family history, past medical history, past social history, past surgical history and problem list     Review of Systems   Constitutional: Negative for activity change, appetite change, chills, diaphoresis, fatigue and unexpected weight change  HENT: Negative for congestion, ear discharge, ear pain, hearing loss, nosebleeds and rhinorrhea  Eyes: Negative for pain, redness, itching and visual disturbance  Respiratory: Negative for cough, choking, chest tightness and shortness of breath  Cardiovascular: Negative for chest pain and leg swelling  Gastrointestinal: Negative for abdominal pain, blood in stool, constipation, diarrhea and nausea  Endocrine: Negative for cold intolerance, polydipsia and polyphagia  Genitourinary: Negative for dysuria, frequency, hematuria and urgency  Musculoskeletal: Negative for arthralgias, back pain, gait problem, joint swelling, neck pain and neck stiffness  Skin: Negative for color change and rash  Allergic/Immunologic: Negative for environmental allergies and food allergies  Neurological: Negative for dizziness, tremors, seizures, speech difficulty, numbness and headaches  Hematological: Negative for adenopathy  Does not bruise/bleed easily  Psychiatric/Behavioral: Positive for dysphoric mood  Negative for behavioral problems, hallucinations and self-injury               Objective:  Vitals:    03/04/20 0839   BP: 118/80   Pulse: 72   Temp: [facility] ) 97 3 °F (36 3 °C)   SpO2: 98%   Weight: 105 kg (231 lb 12 oz)   Height: 6' 3" (1 905 m)      Physical Exam   Constitutional: He is oriented to person, place, and time  He appears well-developed and well-nourished  No distress  HENT:   Head: Normocephalic and atraumatic  Right Ear: External ear normal    Left Ear: External ear normal    Nose: Nose normal    Mouth/Throat: Oropharynx is clear and moist  No oropharyngeal exudate  Eyes: Pupils are equal, round, and reactive to light  Conjunctivae and EOM are normal  Right eye exhibits no discharge  Left eye exhibits no discharge  No scleral icterus  Neck: Normal range of motion  Neck supple  No thyromegaly present  Cardiovascular: Normal rate, regular rhythm and normal heart sounds  Pulses are no weak pulses  No murmur heard  Pulses:       Dorsalis pedis pulses are 1+ on the right side, and 1+ on the left side  Posterior tibial pulses are 1+ on the right side, and 1+ on the left side  Pulmonary/Chest: Effort normal and breath sounds normal  He has no wheezes  He has no rales  Abdominal: Soft  Bowel sounds are normal  He exhibits no mass  There is no tenderness  There is no guarding  Musculoskeletal: Normal range of motion  He exhibits no edema or tenderness  Feet:   Right Foot:   Skin Integrity: Negative for ulcer, skin breakdown, erythema, warmth, callus or dry skin  Left Foot:   Skin Integrity: Negative for ulcer, skin breakdown, erythema, warmth, callus or dry skin  Lymphadenopathy:     He has no cervical adenopathy  Neurological: He is alert and oriented to person, place, and time  He has normal reflexes  Skin: Skin is warm and dry  He is not diaphoretic  Psychiatric: He has a normal mood and affect  Judgment and thought content normal        Patient's shoes and socks removed  Right Foot/Ankle   Right Foot Inspection  Skin Exam: skin normal and skin intact no dry skin, no warmth, no callus, no erythema, no maceration, no abnormal color, no pre-ulcer, no ulcer and no callus                            Sensory   Vibration: diminished  Proprioception: diminished   Monofilament testing: diminished  Vascular  Capillary refills: < 3 seconds  The right DP pulse is 1+  The right PT pulse is 1+  Left Foot/Ankle  Left Foot Inspection  Skin Exam: skin normal and skin intactno dry skin, no warmth, no erythema, no maceration, normal color, no pre-ulcer, no ulcer and no callus                                         Sensory   Vibration: diminished  Proprioception: diminished  Monofilament: diminished  Vascular  Capillary refills: < 3 seconds  The left DP pulse is 1+  The left PT pulse is 1+  Assign Risk Category:  No deformity present; Loss of protective sensation; No weak pulses       Risk: 1    Assessment/Plan:    No problem-specific Assessment & Plan notes found for this encounter  Diagnoses and all orders for this visit:    Type 2 diabetes mellitus with diabetic neuropathy, without long-term current use of insulin ([address] )    Acute pain of right knee  -     Ambulatory referral to Physical Therapy; Future    Mixed hyperlipidemia    Benign essential hypertension    Type 2 diabetes mellitus without complication, without long-term current use of insulin ([facility])  -     metFORMIN (GLUCOPHAGE) 500 mg tablet;  Take 1 tablet (500 mg total) by mouth 2 (two) times a day with meals

## 2020-03-06 ENCOUNTER — EVALUATION (OUTPATIENT)
Dept: PHYSICAL THERAPY | Facility: CLINIC | Age: 70
End: 2020-03-06
Payer: COMMERCIAL

## 2020-03-06 DIAGNOSIS — M25.561 PAIN IN BOTH KNEES, UNSPECIFIED CHRONICITY: Primary | ICD-10-CM

## 2020-03-06 DIAGNOSIS — M25.562 PAIN IN BOTH KNEES, UNSPECIFIED CHRONICITY: Primary | ICD-10-CM

## 2020-03-06 PROCEDURE — 97162 PT EVAL MOD COMPLEX 30 MIN: CPT | Performed by: PHYSICAL THERAPIST

## 2020-03-06 PROCEDURE — 97110 THERAPEUTIC EXERCISES: CPT | Performed by: PHYSICAL THERAPIST

## 2020-03-06 NOTE — PROGRESS NOTES
Physical Therapy Initial Evaluation    Today's date: 3/6/2020  Patient name: Payal Moctezuma  : 1950  MRN: [id number]  Referring provider: Evelyn Ivey DO  Dx:   Encounter Diagnosis     ICD-10-CM    1  Pain in both knees, unspecified chronicity M25 561     M25 562                   Assessment  Assessment details: Payal Moctezuma is a 71 y o  male who presents with pain, decreased strength, decreased ROM, decreased joint mobility and ambulatory dysfunction  Due to these impairments, patient has difficulty performing ADL's, recreational activities, ambulation, stair negotiation, lifting/carrying  Patient's clinical presentation is consistent with their referring diagnosis of pain in both knees, unspecified chronicity  (primary encounter diagnosis)  Patient has been educated in home exercise program and plan of care  Patient would benefit from skilled physical therapy services to address their aforementioned functional limitations and progress towards prior level of function and independence with home exercise program      Impairments: abnormal gait, abnormal or restricted ROM, activity intolerance, impaired physical strength, lacks appropriate home exercise program, pain with function, weight-bearing intolerance, poor posture  and poor body mechanics  Functional limitations: return to fitness, STS, stair negotiation, walking/jogging   Prognosis: good  Prognosis details: + factors: high motivation levels  - factors: age and chronicity of pain    Goals  Short Term Goals to be accomplished in 4 weeks:  STG1: Pt will be I with HEP  STG2: Pt will demo 10 degrees improved ROM to improve stair negotiation  STG3: Pt will demo strength to 4/5 to improve gait and stair negotiation  Long Term Goals to be accomplished in 12 weeks:   LTG1: Pt will demo knee strength WNL to return to PLOF including participating in fitness classes     LTG2: Pt will demo knee AROM WNL to improve ability participate in fitness and exercise activities  LTG3: Pt will return to household ADLs and work duties pain free as per PLOF  Plan  Plan details: HEP development, stretching, strengthening, A/AA/PROM, joint mobilizations, posture education, STM/MI as needed to reduce muscle tension, muscle reeducation, PLOC discussed and agreed upon with patient  Patient would benefit from: PT eval and skilled physical therapy  Planned modality interventions: cryotherapy and thermotherapy: hydrocollator packs  Planned therapy interventions: manual therapy, neuromuscular re-education, self care, therapeutic activities, therapeutic exercise, home exercise program, patient education, stretching, strengthening, flexibility, balance and joint mobilization  Frequency: 2x week  Duration in weeks: 8  Plan of Care beginning date: 3/6/2020  Plan of Care expiration date: 2020  Treatment plan discussed with: patient        Subjective Evaluation    History of Present Illness  Date of onset: 9/10/2019  Mechanism of injury: Pt is a 72 y/o male who presents to PT with chronic b/l knee pain  Notes R knee operation scope 37 yrs ago, 8 years ago meniscal surgery  Had PT 1 year ago which did improve pain, however has not been performing HEP since and pain has returned  At most recent appointment with PCP, he was prescribed PT to address functional limitations     Pain  Current pain ratin  At best pain ratin  At worst pain ratin  Location: R lateral knee pain, L lateral knee pain  Quality: pulling, pressure, sharp and grinding  Aggravating factors: stair climbing, walking and standing    Treatments  Current treatment: physical therapy  Patient Goals  Patient goals for therapy: increased strength, independence with ADLs/IADLs, return to sport/leisure activities, increased motion, improved balance and decreased pain  Patient goal: improved walking/standing times, pain free stair negotiation, fitness EFX/classes        Objective     Tenderness   Left Knee Tenderness in the lateral joint line  Right Knee   Tenderness in the lateral joint line       Additional Tenderness Details  SLS  R: 5 sec significant hip and ankle strategy required  L: 5 sec significant hip and ankle strategy required    U/l HR  R: 5 reps through full ROM with straight knee   L: 5 reps through full ROM with straight knee     Active Range of Motion   Left Knee   Flexion: 135 degrees   Extension: 0 degrees     Right Knee   Flexion: 110 degrees with pain  Extension: 10 degrees with pain    Passive Range of Motion   Left Knee   Flexion: 135 degrees   Extension: 0 degrees     Right Knee   Flexion: 115 degrees   Extension: 5 degrees     Strength/Myotome Testing     Left Knee   Flexion: 4-  Extension: 4-  Quadriceps contraction: fair    Right Knee   Flexion: 3-  Extension: 3-  Quadriceps contraction: fair    Additional Strength Details  Hip abd  R: 3-/5  L: 3-/5             Precautions: standard OA      Manual  3/6            Jt mobs, PROM str                                                                     Exercise Diary  3/6            U/l bridge 10x            S/l hip abd 10x            U/l HR 10x            SLS :10x2            PKFS strap :15x2                                                                                                                                                                                                                   Modalities  3/6            EFX

## 2020-03-09 ENCOUNTER — OFFICE VISIT (OUTPATIENT)
Dept: PHYSICAL THERAPY | Facility: CLINIC | Age: 70
End: 2020-03-09
Payer: COMMERCIAL

## 2020-03-09 DIAGNOSIS — M25.561 PAIN IN BOTH KNEES, UNSPECIFIED CHRONICITY: Primary | ICD-10-CM

## 2020-03-09 DIAGNOSIS — M25.562 PAIN IN BOTH KNEES, UNSPECIFIED CHRONICITY: Primary | ICD-10-CM

## 2020-03-09 PROCEDURE — 97110 THERAPEUTIC EXERCISES: CPT | Performed by: PHYSICAL THERAPIST

## 2020-03-09 PROCEDURE — 97112 NEUROMUSCULAR REEDUCATION: CPT | Performed by: PHYSICAL THERAPIST

## 2020-03-09 NOTE — PROGRESS NOTES
Daily Note     Today's date: 3/9/2020  Patient name: Ashley Davis  : 1950  MRN: [id number]  Referring provider: June Marie DO  Dx:   Encounter Diagnosis     ICD-10-CM    1  Pain in both knees, unspecified chronicity M25 561     M25 562                   Subjective: Pt reported that he has been performing his HEP as prescribed with no provocation of pain  Objective: See treatment diary below      Assessment: Tolerated treatment well  Demonstrated significant improvement with SLS, reflecting effectiveness of HEP  Patient demonstrated fatigue post treatment, exhibited good technique with therapeutic exercises and would benefit from continued PT      Plan: Continue per plan of care        Precautions: standard OA      Manual  3/6 3/9           Jt mobs, PROM str                                                                     Exercise Diary  3/6 3/9           U/l bridge 10x home           S/l hip abd 10x home           U/l HR 10x home           SLS :10x2 :15x5           PKFS strap :15x2 home           PB supine hamstring  3x10           TB side step  G2x10           Bike  8'           S/l clamshell  G 2x10 :03           LP  90/ 3x10                                                                                                                                                 Modalities  3/6 3/9           EFX

## 2020-03-11 ENCOUNTER — OFFICE VISIT (OUTPATIENT)
Dept: PHYSICAL THERAPY | Facility: CLINIC | Age: 70
End: 2020-03-11
Payer: COMMERCIAL

## 2020-03-11 DIAGNOSIS — M25.561 PAIN IN BOTH KNEES, UNSPECIFIED CHRONICITY: Primary | ICD-10-CM

## 2020-03-11 DIAGNOSIS — M25.562 PAIN IN BOTH KNEES, UNSPECIFIED CHRONICITY: Primary | ICD-10-CM

## 2020-03-11 PROCEDURE — 97110 THERAPEUTIC EXERCISES: CPT | Performed by: PHYSICAL THERAPIST

## 2020-03-11 PROCEDURE — 97112 NEUROMUSCULAR REEDUCATION: CPT | Performed by: PHYSICAL THERAPIST

## 2020-03-11 NOTE — PROGRESS NOTES
Daily Note     Today's date: 3/11/2020  Patient name: Payal Moctezuma  : 1950  MRN: [id number]  Referring provider: Evelyn Ivey DO  Dx:   Encounter Diagnosis     ICD-10-CM    1  Pain in both knees, unspecified chronicity M25 561     M25 562                   Subjective: Pt reported, "I felt great after that last session  I am feeling more motion and more strength "      Objective: See treatment diary below      Assessment: Tolerated treatment well  Advanced balance training, which he was able to perform well without provocation of pain  Patient demonstrated fatigue post treatment, exhibited good technique with therapeutic exercises and would benefit from continued PT  Plan: Continue per plan of care        Precautions: standard OA      Manual  3/6 3/9 3/11          Jt mobs, PROM str                                                                     Exercise Diary  3/6 3/9 3/11          U/l bridge 10x home           S/l hip abd 10x home           U/l HR 10x home           SLS :10x2 :15x5           PKFS strap :15x2 home           PB supine hamstring  3x10           TB side step  G2x10           Bike  8'           S/l clamshell  G 2x10 :03           LP  90/ 3x10           Tandem ball toss   R 3x10          2 cone tap   3x10          Lat BOSU stomp   3x10          Heel raise step   3x15          Retro walk    25/ 12x          Biodex balance   10'                                                                  Modalities  3/6 3/9 3/11          EFX

## 2020-03-16 ENCOUNTER — OFFICE VISIT (OUTPATIENT)
Dept: PHYSICAL THERAPY | Facility: CLINIC | Age: 70
End: 2020-03-16
Payer: COMMERCIAL

## 2020-03-16 DIAGNOSIS — M25.561 PAIN IN BOTH KNEES, UNSPECIFIED CHRONICITY: Primary | ICD-10-CM

## 2020-03-16 DIAGNOSIS — M25.562 PAIN IN BOTH KNEES, UNSPECIFIED CHRONICITY: Primary | ICD-10-CM

## 2020-03-16 PROCEDURE — 97110 THERAPEUTIC EXERCISES: CPT | Performed by: PHYSICAL THERAPIST

## 2020-03-16 PROCEDURE — 97112 NEUROMUSCULAR REEDUCATION: CPT | Performed by: PHYSICAL THERAPIST

## 2020-03-16 NOTE — PROGRESS NOTES
Daily Note     Today's date: 3/16/2020  Patient name: Patricia Cedeno  : 1950  MRN: [id number]  Referring provider: El Saleh DO  Dx:   Encounter Diagnosis     ICD-10-CM    1  Pain in both knees, unspecified chronicity M25 561     M25 562                   Subjective: Pt reported that "I have been practicing my balance at home and I really am getting better with it "     Objective: See treatment diary below      Assessment: Tolerated treatment well  Patient demonstrated fatigue post treatment, exhibited good technique with therapeutic exercises and would benefit from continued PT  Plan: Continue per plan of care        Precautions: standard OA      Manual  3/6 3/9 3/11          Jt mobs, PROM str                                                                     Exercise Diary  3/6 3/9 3/11          U/l bridge 10x home           S/l hip abd 10x home           U/l HR 10x home           SLS :10x2 :15x5           PKFS strap :15x2 home           PB supine hamstring  3x10           TB side step  G2x10           Bike  8'           S/l clamshell  G 2x10 :03           LP  90/ 3x10           Tandem ball toss   R 3x10 R 3x10         2 cone tap   3x10 3x10         Lat BOSU stomp   3x10 3x10         Heel raise step   3x15          Retro walk    25/ 12x          Biodex balance   10'          Tandem foam    G 3x:15                                                    Modalities  3/6 3/9 3/11 3/16         EFX

## 2020-03-18 ENCOUNTER — TELEPHONE (OUTPATIENT)
Dept: FAMILY MEDICINE CLINIC | Facility: CLINIC | Age: 70
End: 2020-03-18

## 2020-03-18 ENCOUNTER — OFFICE VISIT (OUTPATIENT)
Dept: PHYSICAL THERAPY | Facility: CLINIC | Age: 70
End: 2020-03-18
Payer: COMMERCIAL

## 2020-03-18 DIAGNOSIS — M75.102 LEFT ROTATOR CUFF TEAR ARTHROPATHY: Primary | ICD-10-CM

## 2020-03-18 DIAGNOSIS — M25.562 PAIN IN BOTH KNEES, UNSPECIFIED CHRONICITY: Primary | ICD-10-CM

## 2020-03-18 DIAGNOSIS — M25.561 PAIN IN BOTH KNEES, UNSPECIFIED CHRONICITY: Primary | ICD-10-CM

## 2020-03-18 DIAGNOSIS — M12.812 LEFT ROTATOR CUFF TEAR ARTHROPATHY: Primary | ICD-10-CM

## 2020-03-18 PROCEDURE — 97112 NEUROMUSCULAR REEDUCATION: CPT | Performed by: PHYSICAL THERAPIST

## 2020-03-18 PROCEDURE — 97110 THERAPEUTIC EXERCISES: CPT | Performed by: PHYSICAL THERAPIST

## 2020-03-18 NOTE — PROGRESS NOTES
Daily Note     Today's date: 3/18/2020  Patient name: Simba Gordon  : 1950  MRN: [id number]  Referring provider: Joanna Barron DO  Dx:   Encounter Diagnosis     ICD-10-CM    1  Pain in both knees, unspecified chronicity M25 561     M25 562                   Subjective: Pt reported that "I felt sore after the last workout, but in a good way "      Objective: See treatment diary below      Assessment: Tolerated treatment well  In order to address LE stiffness initiated prone quad stretch and calf stretch on step, which he was able to perform well iwthout provocation of pain  Patient demonstrated fatigue post treatment, exhibited good technique with therapeutic exercises and would benefit from continued PT  Plan: Continue per plan of care        Precautions: standard OA      Manual  3/6 3/9 3/11 3/16 3/18        Jt mobs, PROM str                                                                     Exercise Diary  3/6 3/9 3/11 3/16 3/18        U/l bridge 10x home           S/l hip abd 10x home           U/l HR 10x home           SLS :10x2 :15x5   Ball dribble 3x10        PKFS strap :15x2 home           PB supine hamstring  3x10           TB side step  G2x10           Bike  8'   5'        S/l clamshell  G 2x10 :03           LP  90/ 3x10           Tandem ball toss   R 3x10 R 3x10 R 3x10ea        2 cone tap   3x10 3x10         Lat BOSU stomp   3x10 3x10         Heel raise step   3x15          Retro walk    25/ 12x          Biodex balance   10'          Tandem foam    G 3x:15 G 3x :15        Lat step down     6" 3x10ea        PKFS     :15x5ea        SLS step over     3x10ea            Modalities  3/6 3/9 3/11 3/16 3/18        EFX

## 2020-03-18 NOTE — TELEPHONE ENCOUNTER
Pt wants to have pt on his shoulder (left)  Can you write the order for PT  [place] is going next door  PT sent him over here to get the order

## 2020-03-23 ENCOUNTER — OFFICE VISIT (OUTPATIENT)
Dept: PHYSICAL THERAPY | Facility: CLINIC | Age: 70
End: 2020-03-23
Payer: COMMERCIAL

## 2020-03-23 DIAGNOSIS — M25.562 PAIN IN BOTH KNEES, UNSPECIFIED CHRONICITY: Primary | ICD-10-CM

## 2020-03-23 DIAGNOSIS — M25.561 PAIN IN BOTH KNEES, UNSPECIFIED CHRONICITY: Primary | ICD-10-CM

## 2020-03-23 PROCEDURE — 97112 NEUROMUSCULAR REEDUCATION: CPT | Performed by: PHYSICAL THERAPIST

## 2020-03-23 PROCEDURE — 97110 THERAPEUTIC EXERCISES: CPT | Performed by: PHYSICAL THERAPIST

## 2020-03-23 NOTE — PROGRESS NOTES
Daily Note     Today's date: 3/23/2020  Patient name: Jami Rob  : 1950  MRN: [id number]  Referring provider: Beatrice Pereira DO  Dx:   Encounter Diagnosis     ICD-10-CM    1  Pain in both knees, unspecified chronicity M25 561     M25 562                   Subjective: Pt reported that he is gaining balance as the sessions continue, however stated that he received a prescription to begin PT for his shoulder  Stated that he performed his HEP right before coming to PT, "and I'm feeling pretty tired from it "     Objective: See treatment diary below      Assessment: Tolerated treatment well  Due to increased fatigue in LE, emphasized stretches and light strengthening, which he reported improved his symptoms  Patient demonstrated fatigue post treatment, exhibited good technique with therapeutic exercises and would benefit from continued PT  Plan: Continue per plan of care        Precautions: standard OA      Manual  3/6 3/9 3/11 3/16 3/18 3/23       Jt mobs, PROM str                                                                     Exercise Diary  3/6 3/9 3/11 3/16 3/18 3/23       U/l bridge 10x home   seated ITB stretch :15x3       S/l hip abd 10x home   Seated Hamstr str :15x3       U/l HR 10x home           SLS :10x2 :15x5   Ball dribble 3x10 3x10       PKFS strap :15x2 home           PB supine hamstring  3x10           TB side step  G2x10           Bike  8'   5' 5'        S/l clamshell  G 2x10 :03   Calf press 115/ 3x10       LP  90/ 3x10    115/3x10       Tandem ball toss   R 3x10 R 3x10 R 3x10ea R 3x10 ea       2 cone tap   3x10 3x10  3x10       Lat BOSU stomp   3x10 3x10         Heel raise step   3x15          Retro walk    25/ 12x  Squats: handle assist 3x10       Biodex balance   10'          Tandem foam    G 3x:15 G 3x :15 BK 3x:15       Lat step down     6" 3x10ea        PKFS     :15x5ea :15x5       SLS step over     3x10ea            Modalities  3/6 3/9 3/11 3/16 3/18 3/23       EFX

## 2020-03-25 ENCOUNTER — EVALUATION (OUTPATIENT)
Dept: PHYSICAL THERAPY | Facility: CLINIC | Age: 70
End: 2020-03-25
Payer: COMMERCIAL

## 2020-03-25 DIAGNOSIS — M75.102 LEFT ROTATOR CUFF TEAR ARTHROPATHY: Primary | ICD-10-CM

## 2020-03-25 DIAGNOSIS — M25.562 PAIN IN BOTH KNEES, UNSPECIFIED CHRONICITY: ICD-10-CM

## 2020-03-25 DIAGNOSIS — M12.812 LEFT ROTATOR CUFF TEAR ARTHROPATHY: Primary | ICD-10-CM

## 2020-03-25 DIAGNOSIS — M25.561 PAIN IN BOTH KNEES, UNSPECIFIED CHRONICITY: ICD-10-CM

## 2020-03-25 PROCEDURE — 97112 NEUROMUSCULAR REEDUCATION: CPT | Performed by: PHYSICAL THERAPIST

## 2020-03-25 PROCEDURE — 97110 THERAPEUTIC EXERCISES: CPT | Performed by: PHYSICAL THERAPIST

## 2020-03-25 PROCEDURE — 97164 PT RE-EVAL EST PLAN CARE: CPT | Performed by: PHYSICAL THERAPIST

## 2020-03-25 NOTE — PROGRESS NOTES
Physical Therapy Initial Evaluation    Today's date: 3/25/2020  Patient name: Jami Rob  : 1950  MRN: [id number]  Referring provider: Beatrice Pereira DO  Dx:   Encounter Diagnosis     ICD-10-CM    1  Left rotator cuff tear arthropathy M75 102     M12 812    2  Pain in both knees, unspecified chronicity M25 561     M25 562                   Assessment  Assessment details: Jami Rob is a 71 y o  male who presents with pain, decreased strength, decreased ROM, decreased joint mobility and ambulatory dysfunction  Due to these impairments, patient has difficulty performing ADL's, recreational activities, ambulation, stair negotiation, lifting/carrying  Patient's clinical presentation is consistent with their referring diagnosis of pain in both knees, unspecified chronicity  (primary encounter diagnosis), Left rotator cuff tear arthropathy  Patient has been educated in home exercise program and plan of care  Patient would benefit from skilled physical therapy services to address their aforementioned functional limitations and progress towards prior level of function and independence with home exercise program      Impairments: abnormal gait, abnormal or restricted ROM, activity intolerance, impaired physical strength, lacks appropriate home exercise program, pain with function, weight-bearing intolerance, poor posture  and poor body mechanics  Functional limitations: return to fitness, STS, stair negotiation, walking/jogging, reaching overhead, lifting overhead    Prognosis: good  Prognosis details: + factors: high motivation levels  - factors: age and chronicity of pain    Goals  Short Term Goals to be accomplished in 4 weeks:  STG1: Pt will be I with HEP  STG2: Pt will demo 10 degrees improved ROM to improve stair negotiation and overhead reaching  STG3: Pt will demo strength to 4/5 to improve gait and stair negotiation and overhead reaching/lifting       Long Term Goals to be accomplished in 12 weeks:   LTG1: Pt will demo knee and shoulder strength WNL to return to PLOF including participating in fitness classes  LTG2: Pt will demo knee and shoulder AROM WNL to improve ability participate in fitness and exercise activities  LTG3: Pt will return to household ADLs and work duties pain free as per PLOF  Plan  Plan details: HEP development, stretching, strengthening, A/AA/PROM, joint mobilizations, posture education, STM/MI as needed to reduce muscle tension, muscle reeducation, PLOC discussed and agreed upon with patient  Patient would benefit from: PT eval and skilled physical therapy  Planned modality interventions: cryotherapy and thermotherapy: hydrocollator packs  Planned therapy interventions: manual therapy, neuromuscular re-education, self care, therapeutic activities, therapeutic exercise, home exercise program, patient education, stretching, strengthening, flexibility, balance and joint mobilization  Frequency: 2x week  Duration in weeks: 8  Plan of Care beginning date: 3/6/2020  Plan of Care expiration date: 2020  Treatment plan discussed with: patient        Subjective Evaluation    History of Present Illness  Date of onset: 9/10/2019  Mechanism of injury: Pt is a 70 y/o male who presents to PT with chronic b/l knee pain  Notes R knee operation scope 37 yrs ago, 8 years ago meniscal surgery  Had PT 1 year ago which did improve pain, however has not been performing HEP since and pain has returned  At most recent appointment with PCP, he was prescribed PT to address functional limitations  L shoulder: 30 year history of left shoulder pain  Stopped playing golf and performing throwing activities  Physician ordered PT to address functional restrictions     Pain  Current pain ratin  At best pain ratin  At worst pain ratin  Location: R lateral knee pain, L lateral knee pain  Quality: pulling, pressure, sharp and grinding  Aggravating factors: stair climbing, walking, standing, overhead activity and lifting    Treatments  Current treatment: physical therapy  Patient Goals  Patient goals for therapy: increased strength, independence with ADLs/IADLs, return to sport/leisure activities, increased motion, improved balance and decreased pain  Patient goal: improved walking/standing times, pain free stair negotiation, fitness EFX/classes, throwing, golf, reaching/lifting overhead         Objective     Tenderness     Left Shoulder   Tenderness in the infraspinatus tendon and supraspinatus tendon  Left Knee   Tenderness in the lateral joint line  Right Knee   Tenderness in the lateral joint line  Additional Tenderness Details  SLS  R: 5 sec significant hip and ankle strategy required  L: 5 sec significant hip and ankle strategy required    U/l HR  R: 5 reps through full ROM with straight knee   L: 5 reps through full ROM with straight knee     Active Range of Motion   Left Shoulder   Flexion: 140 degrees   Abduction: 110 degrees   External rotation 0°: 50 degrees     Right Shoulder   Flexion: 160 degrees   Abduction: 150 degrees   External rotation 0°: 65 degrees   Left Knee   Flexion: 135 degrees   Extension: 0 degrees     Right Knee   Flexion: 110 degrees with pain  Extension: 10 degrees with pain    Passive Range of Motion   Left Knee   Flexion: 135 degrees   Extension: 0 degrees     Right Knee   Flexion: 115 degrees   Extension: 5 degrees     Strength/Myotome Testing     Left Shoulder     Planes of Motion   Flexion: 3-   Abduction: 3-   External rotation at 0°: 3-     Right Shoulder     Planes of Motion   Flexion: 4   Abduction: 4   External rotation at 0°: 4     Left Knee   Flexion: 4-  Extension: 4-  Quadriceps contraction: fair    Right Knee   Flexion: 3-  Extension: 3-  Quadriceps contraction: fair    Additional Strength Details  Hip abd  R: 3-/5  L: 3-/5    Tests     Left Shoulder   Positive empty can, Hawkin's and painful arc                Precautions: standard OA    Manual  3/6 3/9 3/11 3/16 3/18 3/23  3/25         Jt mobs, PROM str                                                                                                                             Exercise Diary  3/6 3/9 3/11 3/16 3/18 3/23  3/25         U/l bridge 10x home     seated ITB stretch :15x3           S/l hip abd 10x home     Seated Hamstr str :15x3           U/l HR 10x home                   SLS :10x2 :15x5     Ball dribble 3x10 3x10           PKFS strap :15x2 home        mid doorway stret :15x3ea         PB supine hamstring   3x10        Post caps stretc :15x3ea         TB side step   G2x10        90-90 doorway stre :15x3ea         Bike   8'     5' 5'            S/l clamshell   G 2x10 :03     Calf press 115/ 3x10 115/ 3x10         LP   90/ 3x10       115/3x10 115/ 3x10         Tandem ball toss     R 3x10 R 3x10 R 3x10ea R 3x10 ea  R 3x10 ea         2 cone tap     3x10 3x10   3x10           Lat BOSU stomp     3x10 3x10               Heel raise step     3x15                 Retro walk      25/ 12x   Squats: handle assist 3x10  3x10         Biodex balance     10'                 Tandem foam       G 3x:15 G 3x :15 BK 3x:15 BK 3x:15        Lat step down         6" 3x10ea             PKFS         :15x5ea :15x5  home         SLS step over         3x10ea  SLS layup  :05x 10              Modalities  3/6 3/9 3/11 3/16 3/18 3/23  3/25         EFX

## 2020-03-27 DIAGNOSIS — I10 ESSENTIAL HYPERTENSION: ICD-10-CM

## 2020-03-30 ENCOUNTER — APPOINTMENT (OUTPATIENT)
Dept: PHYSICAL THERAPY | Facility: CLINIC | Age: 70
End: 2020-03-30
Payer: COMMERCIAL

## 2020-03-30 RX ORDER — OLMESARTAN MEDOXOMIL AND HYDROCHLOROTHIAZIDE 20/12.5 20; 12.5 MG/1; MG/1
TABLET ORAL
Qty: 90 TABLET | Refills: 0 | Status: SHIPPED | OUTPATIENT
Start: 2020-03-30 | End: 2020-06-30

## 2020-03-31 ENCOUNTER — OFFICE VISIT (OUTPATIENT)
Dept: PHYSICAL THERAPY | Facility: CLINIC | Age: 70
End: 2020-03-31
Payer: COMMERCIAL

## 2020-03-31 DIAGNOSIS — M12.812 LEFT ROTATOR CUFF TEAR ARTHROPATHY: Primary | ICD-10-CM

## 2020-03-31 DIAGNOSIS — M25.562 PAIN IN BOTH KNEES, UNSPECIFIED CHRONICITY: ICD-10-CM

## 2020-03-31 DIAGNOSIS — M25.561 PAIN IN BOTH KNEES, UNSPECIFIED CHRONICITY: ICD-10-CM

## 2020-03-31 DIAGNOSIS — M75.102 LEFT ROTATOR CUFF TEAR ARTHROPATHY: Primary | ICD-10-CM

## 2020-03-31 PROCEDURE — 97110 THERAPEUTIC EXERCISES: CPT | Performed by: PHYSICAL THERAPIST

## 2020-03-31 PROCEDURE — 97140 MANUAL THERAPY 1/> REGIONS: CPT | Performed by: PHYSICAL THERAPIST

## 2020-03-31 PROCEDURE — 97112 NEUROMUSCULAR REEDUCATION: CPT | Performed by: PHYSICAL THERAPIST

## 2020-03-31 NOTE — PROGRESS NOTES
Daily Note     Today's date: 3/31/2020  Patient name: Johny Calhoun  : 1950  MRN: [id number]  Referring provider: Ivette Leblanc DO  Dx:   Encounter Diagnosis     ICD-10-CM    1  Left rotator cuff tear arthropathy M75 102     M12 812    2  Pain in both knees, unspecified chronicity M25 561     M25 562                   Subjective: Pt reported that "I am feeling a little stiff this morning  I think its because of the rain "       Objective: See treatment diary below      Assessment: Tolerated treatment well  In order to address functional reaching and lifting advanced UE stretches and strengthening, which he was able to perform without provocation of pain  Patient demonstrated fatigue post treatment, exhibited good technique with therapeutic exercises and would benefit from continued PT  Plan: Continue per plan of care        Precautions: standard OA    Manual  3/6 3/9 3/11 3/16 3/18 3/23  3/25  3/31       Jt mobs, PROM str             JZ  JZ                                                                                                             Exercise Diary  3/6 3/9 3/11 3/16 3/18 3/23  3/25  3/31       U/l bridge 10x home     seated ITB stretch :15x3           S/l hip abd 10x home     Seated Hamstr str :15x3    :15x3       U/l HR 10x home          Row 20/ 3x10       SLS :10x2 :15x5     Ball dribble 3x10 3x10           PKFS strap :15x2 home        mid doorway stret :15x3ea  :15x3       PB supine hamstring   3x10        Post caps stretc :15x3ea  :15x3       TB side step   G2x10        90-90 doorway stre :15x3ea  :15x3       Bike   8'     5' 5'            S/l clamshell   G 2x10 :03     Calf press 115/ 3x10 115/ 3x10         LP   90/ 3x10       115/3x10 115/ 3x10         Tandem ball toss     R 3x10 R 3x10 R 3x10ea R 3x10 ea  R 3x10 ea         2 cone tap     3x10 3x10   3x10           Lat BOSU stomp     3x10 3x10      bowling stre 4#2x15       Heel raise step     3x15        pitch stretc 4#2x 15     Retro walk      25/ 12x   Squats: handle assist 3x10  3x10         Biodex balance     10'       Horiz abd 2 3# 3x10       Tandem foam       G 3x:15 G 3x :15 BK 3x:15 BK 3x:15 BK 3x :15       Lat step down         6" 3x10ea             PKFS         :15x5ea :15x5  home         SLS step over         3x10ea  SLS layup  :05x 10              Modalities  3/6 3/9 3/11 3/16 3/18 3/23  3/25  3/31       EFX

## 2020-04-01 ENCOUNTER — OFFICE VISIT (OUTPATIENT)
Dept: PHYSICAL THERAPY | Facility: CLINIC | Age: 70
End: 2020-04-01
Payer: COMMERCIAL

## 2020-04-01 DIAGNOSIS — M75.102 LEFT ROTATOR CUFF TEAR ARTHROPATHY: ICD-10-CM

## 2020-04-01 DIAGNOSIS — M25.562 PAIN IN BOTH KNEES, UNSPECIFIED CHRONICITY: Primary | ICD-10-CM

## 2020-04-01 DIAGNOSIS — M25.561 PAIN IN BOTH KNEES, UNSPECIFIED CHRONICITY: Primary | ICD-10-CM

## 2020-04-01 DIAGNOSIS — M12.812 LEFT ROTATOR CUFF TEAR ARTHROPATHY: ICD-10-CM

## 2020-04-01 PROCEDURE — 97110 THERAPEUTIC EXERCISES: CPT | Performed by: PHYSICAL THERAPIST

## 2020-04-01 PROCEDURE — 97140 MANUAL THERAPY 1/> REGIONS: CPT | Performed by: PHYSICAL THERAPIST

## 2020-04-06 ENCOUNTER — OFFICE VISIT (OUTPATIENT)
Dept: PHYSICAL THERAPY | Facility: CLINIC | Age: 70
End: 2020-04-06
Payer: COMMERCIAL

## 2020-04-06 DIAGNOSIS — M12.812 LEFT ROTATOR CUFF TEAR ARTHROPATHY: ICD-10-CM

## 2020-04-06 DIAGNOSIS — M75.102 LEFT ROTATOR CUFF TEAR ARTHROPATHY: ICD-10-CM

## 2020-04-06 DIAGNOSIS — M25.562 PAIN IN BOTH KNEES, UNSPECIFIED CHRONICITY: Primary | ICD-10-CM

## 2020-04-06 DIAGNOSIS — M25.561 PAIN IN BOTH KNEES, UNSPECIFIED CHRONICITY: Primary | ICD-10-CM

## 2020-04-06 PROCEDURE — 97110 THERAPEUTIC EXERCISES: CPT | Performed by: PHYSICAL THERAPIST

## 2020-04-06 PROCEDURE — 97112 NEUROMUSCULAR REEDUCATION: CPT | Performed by: PHYSICAL THERAPIST

## 2020-04-07 ENCOUNTER — APPOINTMENT (OUTPATIENT)
Dept: PHYSICAL THERAPY | Facility: CLINIC | Age: 70
End: 2020-04-07
Payer: COMMERCIAL

## 2020-04-09 ENCOUNTER — OFFICE VISIT (OUTPATIENT)
Dept: PHYSICAL THERAPY | Facility: CLINIC | Age: 70
End: 2020-04-09
Payer: COMMERCIAL

## 2020-04-09 DIAGNOSIS — M25.562 PAIN IN BOTH KNEES, UNSPECIFIED CHRONICITY: Primary | ICD-10-CM

## 2020-04-09 DIAGNOSIS — M12.812 LEFT ROTATOR CUFF TEAR ARTHROPATHY: ICD-10-CM

## 2020-04-09 DIAGNOSIS — M25.561 PAIN IN BOTH KNEES, UNSPECIFIED CHRONICITY: Primary | ICD-10-CM

## 2020-04-09 DIAGNOSIS — M75.102 LEFT ROTATOR CUFF TEAR ARTHROPATHY: ICD-10-CM

## 2020-04-09 PROCEDURE — 97112 NEUROMUSCULAR REEDUCATION: CPT | Performed by: PHYSICAL THERAPIST

## 2020-04-09 PROCEDURE — 97110 THERAPEUTIC EXERCISES: CPT | Performed by: PHYSICAL THERAPIST

## 2020-04-14 ENCOUNTER — OFFICE VISIT (OUTPATIENT)
Dept: PHYSICAL THERAPY | Facility: CLINIC | Age: 70
End: 2020-04-14
Payer: COMMERCIAL

## 2020-04-14 DIAGNOSIS — M75.102 LEFT ROTATOR CUFF TEAR ARTHROPATHY: ICD-10-CM

## 2020-04-14 DIAGNOSIS — M25.562 PAIN IN BOTH KNEES, UNSPECIFIED CHRONICITY: Primary | ICD-10-CM

## 2020-04-14 DIAGNOSIS — M25.561 PAIN IN BOTH KNEES, UNSPECIFIED CHRONICITY: Primary | ICD-10-CM

## 2020-04-14 DIAGNOSIS — M12.812 LEFT ROTATOR CUFF TEAR ARTHROPATHY: ICD-10-CM

## 2020-04-14 PROCEDURE — 97112 NEUROMUSCULAR REEDUCATION: CPT | Performed by: PHYSICAL THERAPIST

## 2020-04-14 PROCEDURE — 97110 THERAPEUTIC EXERCISES: CPT | Performed by: PHYSICAL THERAPIST

## 2020-04-14 PROCEDURE — 97530 THERAPEUTIC ACTIVITIES: CPT | Performed by: PHYSICAL THERAPIST

## 2020-04-14 PROCEDURE — 97140 MANUAL THERAPY 1/> REGIONS: CPT | Performed by: PHYSICAL THERAPIST

## 2020-04-16 ENCOUNTER — OFFICE VISIT (OUTPATIENT)
Dept: PHYSICAL THERAPY | Facility: CLINIC | Age: 70
End: 2020-04-16
Payer: COMMERCIAL

## 2020-04-16 DIAGNOSIS — M12.812 LEFT ROTATOR CUFF TEAR ARTHROPATHY: ICD-10-CM

## 2020-04-16 DIAGNOSIS — M75.102 LEFT ROTATOR CUFF TEAR ARTHROPATHY: ICD-10-CM

## 2020-04-16 DIAGNOSIS — M25.561 PAIN IN BOTH KNEES, UNSPECIFIED CHRONICITY: Primary | ICD-10-CM

## 2020-04-16 DIAGNOSIS — M25.562 PAIN IN BOTH KNEES, UNSPECIFIED CHRONICITY: Primary | ICD-10-CM

## 2020-04-16 PROCEDURE — 97112 NEUROMUSCULAR REEDUCATION: CPT | Performed by: PHYSICAL THERAPIST

## 2020-04-16 PROCEDURE — 97110 THERAPEUTIC EXERCISES: CPT | Performed by: PHYSICAL THERAPIST

## 2020-04-16 PROCEDURE — 97140 MANUAL THERAPY 1/> REGIONS: CPT | Performed by: PHYSICAL THERAPIST

## 2020-04-20 ENCOUNTER — APPOINTMENT (OUTPATIENT)
Dept: PHYSICAL THERAPY | Facility: CLINIC | Age: 70
End: 2020-04-20
Payer: COMMERCIAL

## 2020-04-23 ENCOUNTER — APPOINTMENT (OUTPATIENT)
Dept: PHYSICAL THERAPY | Facility: CLINIC | Age: 70
End: 2020-04-23
Payer: COMMERCIAL

## 2020-04-23 ENCOUNTER — OFFICE VISIT (OUTPATIENT)
Dept: PHYSICAL THERAPY | Facility: CLINIC | Age: 70
End: 2020-04-23
Payer: COMMERCIAL

## 2020-04-23 DIAGNOSIS — M75.102 LEFT ROTATOR CUFF TEAR ARTHROPATHY: ICD-10-CM

## 2020-04-23 DIAGNOSIS — M12.812 LEFT ROTATOR CUFF TEAR ARTHROPATHY: ICD-10-CM

## 2020-04-23 DIAGNOSIS — M25.562 PAIN IN BOTH KNEES, UNSPECIFIED CHRONICITY: Primary | ICD-10-CM

## 2020-04-23 DIAGNOSIS — M25.561 PAIN IN BOTH KNEES, UNSPECIFIED CHRONICITY: Primary | ICD-10-CM

## 2020-04-23 PROCEDURE — 97112 NEUROMUSCULAR REEDUCATION: CPT | Performed by: PHYSICAL THERAPIST

## 2020-04-23 PROCEDURE — 97110 THERAPEUTIC EXERCISES: CPT | Performed by: PHYSICAL THERAPIST

## 2020-04-24 ENCOUNTER — EVALUATION (OUTPATIENT)
Dept: PHYSICAL THERAPY | Facility: CLINIC | Age: 70
End: 2020-04-24
Payer: COMMERCIAL

## 2020-04-24 DIAGNOSIS — M12.812 LEFT ROTATOR CUFF TEAR ARTHROPATHY: ICD-10-CM

## 2020-04-24 DIAGNOSIS — M75.102 LEFT ROTATOR CUFF TEAR ARTHROPATHY: ICD-10-CM

## 2020-04-24 DIAGNOSIS — M25.561 PAIN IN BOTH KNEES, UNSPECIFIED CHRONICITY: Primary | ICD-10-CM

## 2020-04-24 DIAGNOSIS — M25.562 PAIN IN BOTH KNEES, UNSPECIFIED CHRONICITY: Primary | ICD-10-CM

## 2020-04-24 PROCEDURE — 97140 MANUAL THERAPY 1/> REGIONS: CPT | Performed by: PHYSICAL THERAPIST

## 2020-04-24 PROCEDURE — 97110 THERAPEUTIC EXERCISES: CPT | Performed by: PHYSICAL THERAPIST

## 2020-04-27 ENCOUNTER — OFFICE VISIT (OUTPATIENT)
Dept: PHYSICAL THERAPY | Facility: CLINIC | Age: 70
End: 2020-04-27
Payer: COMMERCIAL

## 2020-04-27 DIAGNOSIS — M25.562 PAIN IN BOTH KNEES, UNSPECIFIED CHRONICITY: Primary | ICD-10-CM

## 2020-04-27 DIAGNOSIS — M12.812 LEFT ROTATOR CUFF TEAR ARTHROPATHY: ICD-10-CM

## 2020-04-27 DIAGNOSIS — M75.102 LEFT ROTATOR CUFF TEAR ARTHROPATHY: ICD-10-CM

## 2020-04-27 DIAGNOSIS — M25.561 PAIN IN BOTH KNEES, UNSPECIFIED CHRONICITY: Primary | ICD-10-CM

## 2020-04-27 PROCEDURE — 97140 MANUAL THERAPY 1/> REGIONS: CPT | Performed by: PHYSICAL THERAPIST

## 2020-04-27 PROCEDURE — 97110 THERAPEUTIC EXERCISES: CPT | Performed by: PHYSICAL THERAPIST

## 2020-04-27 PROCEDURE — 97112 NEUROMUSCULAR REEDUCATION: CPT | Performed by: PHYSICAL THERAPIST

## 2020-04-30 ENCOUNTER — OFFICE VISIT (OUTPATIENT)
Dept: PHYSICAL THERAPY | Facility: CLINIC | Age: 70
End: 2020-04-30
Payer: COMMERCIAL

## 2020-04-30 DIAGNOSIS — M12.812 LEFT ROTATOR CUFF TEAR ARTHROPATHY: ICD-10-CM

## 2020-04-30 DIAGNOSIS — M75.102 LEFT ROTATOR CUFF TEAR ARTHROPATHY: ICD-10-CM

## 2020-04-30 DIAGNOSIS — M25.562 PAIN IN BOTH KNEES, UNSPECIFIED CHRONICITY: Primary | ICD-10-CM

## 2020-04-30 DIAGNOSIS — M25.561 PAIN IN BOTH KNEES, UNSPECIFIED CHRONICITY: Primary | ICD-10-CM

## 2020-04-30 PROCEDURE — 97140 MANUAL THERAPY 1/> REGIONS: CPT | Performed by: PHYSICAL THERAPIST

## 2020-04-30 PROCEDURE — 97112 NEUROMUSCULAR REEDUCATION: CPT | Performed by: PHYSICAL THERAPIST

## 2020-04-30 PROCEDURE — 97110 THERAPEUTIC EXERCISES: CPT | Performed by: PHYSICAL THERAPIST

## 2020-04-30 PROCEDURE — 97016 VASOPNEUMATIC DEVICE THERAPY: CPT | Performed by: PHYSICAL THERAPIST

## 2020-05-05 ENCOUNTER — OFFICE VISIT (OUTPATIENT)
Dept: PHYSICAL THERAPY | Facility: CLINIC | Age: 70
End: 2020-05-05
Payer: COMMERCIAL

## 2020-05-05 DIAGNOSIS — M12.812 LEFT ROTATOR CUFF TEAR ARTHROPATHY: ICD-10-CM

## 2020-05-05 DIAGNOSIS — M75.102 LEFT ROTATOR CUFF TEAR ARTHROPATHY: ICD-10-CM

## 2020-05-05 DIAGNOSIS — M25.562 PAIN IN BOTH KNEES, UNSPECIFIED CHRONICITY: Primary | ICD-10-CM

## 2020-05-05 DIAGNOSIS — M25.561 PAIN IN BOTH KNEES, UNSPECIFIED CHRONICITY: Primary | ICD-10-CM

## 2020-05-05 PROCEDURE — 97140 MANUAL THERAPY 1/> REGIONS: CPT | Performed by: PHYSICAL THERAPIST

## 2020-05-05 PROCEDURE — 97112 NEUROMUSCULAR REEDUCATION: CPT | Performed by: PHYSICAL THERAPIST

## 2020-05-05 PROCEDURE — 97110 THERAPEUTIC EXERCISES: CPT | Performed by: PHYSICAL THERAPIST

## 2020-05-08 ENCOUNTER — TELEPHONE (OUTPATIENT)
Dept: FAMILY MEDICINE CLINIC | Facility: CLINIC | Age: 70
End: 2020-05-08

## 2020-05-08 ENCOUNTER — OFFICE VISIT (OUTPATIENT)
Dept: PHYSICAL THERAPY | Facility: CLINIC | Age: 70
End: 2020-05-08
Payer: COMMERCIAL

## 2020-05-08 DIAGNOSIS — M47.816 LUMBAR SPONDYLOSIS: Primary | ICD-10-CM

## 2020-05-08 DIAGNOSIS — M25.561 PAIN IN BOTH KNEES, UNSPECIFIED CHRONICITY: ICD-10-CM

## 2020-05-08 DIAGNOSIS — M12.812 LEFT ROTATOR CUFF TEAR ARTHROPATHY: Primary | ICD-10-CM

## 2020-05-08 DIAGNOSIS — M75.102 LEFT ROTATOR CUFF TEAR ARTHROPATHY: Primary | ICD-10-CM

## 2020-05-08 DIAGNOSIS — M25.562 PAIN IN BOTH KNEES, UNSPECIFIED CHRONICITY: ICD-10-CM

## 2020-05-08 PROCEDURE — 97140 MANUAL THERAPY 1/> REGIONS: CPT | Performed by: PHYSICAL THERAPIST

## 2020-05-08 PROCEDURE — 97110 THERAPEUTIC EXERCISES: CPT | Performed by: PHYSICAL THERAPIST

## 2020-05-08 PROCEDURE — 97112 NEUROMUSCULAR REEDUCATION: CPT | Performed by: PHYSICAL THERAPIST

## 2020-05-08 PROCEDURE — 97016 VASOPNEUMATIC DEVICE THERAPY: CPT | Performed by: PHYSICAL THERAPIST

## 2020-05-11 ENCOUNTER — EVALUATION (OUTPATIENT)
Dept: PHYSICAL THERAPY | Facility: CLINIC | Age: 70
End: 2020-05-11
Payer: COMMERCIAL

## 2020-05-11 DIAGNOSIS — M75.102 LEFT ROTATOR CUFF TEAR ARTHROPATHY: Primary | ICD-10-CM

## 2020-05-11 DIAGNOSIS — M25.561 PAIN IN BOTH KNEES, UNSPECIFIED CHRONICITY: ICD-10-CM

## 2020-05-11 DIAGNOSIS — M25.562 PAIN IN BOTH KNEES, UNSPECIFIED CHRONICITY: ICD-10-CM

## 2020-05-11 DIAGNOSIS — M12.812 LEFT ROTATOR CUFF TEAR ARTHROPATHY: Primary | ICD-10-CM

## 2020-05-11 PROCEDURE — 97016 VASOPNEUMATIC DEVICE THERAPY: CPT | Performed by: PHYSICAL THERAPIST

## 2020-05-11 PROCEDURE — 97140 MANUAL THERAPY 1/> REGIONS: CPT | Performed by: PHYSICAL THERAPIST

## 2020-05-11 PROCEDURE — 97112 NEUROMUSCULAR REEDUCATION: CPT | Performed by: PHYSICAL THERAPIST

## 2020-05-11 PROCEDURE — 97110 THERAPEUTIC EXERCISES: CPT | Performed by: PHYSICAL THERAPIST

## 2020-05-13 ENCOUNTER — OFFICE VISIT (OUTPATIENT)
Dept: PHYSICAL THERAPY | Facility: CLINIC | Age: 70
End: 2020-05-13
Payer: COMMERCIAL

## 2020-05-13 DIAGNOSIS — M75.102 LEFT ROTATOR CUFF TEAR ARTHROPATHY: Primary | ICD-10-CM

## 2020-05-13 DIAGNOSIS — M12.812 LEFT ROTATOR CUFF TEAR ARTHROPATHY: Primary | ICD-10-CM

## 2020-05-13 DIAGNOSIS — M25.561 PAIN IN BOTH KNEES, UNSPECIFIED CHRONICITY: ICD-10-CM

## 2020-05-13 DIAGNOSIS — M25.562 PAIN IN BOTH KNEES, UNSPECIFIED CHRONICITY: ICD-10-CM

## 2020-05-13 PROCEDURE — 97140 MANUAL THERAPY 1/> REGIONS: CPT | Performed by: PHYSICAL THERAPIST

## 2020-05-13 PROCEDURE — 97112 NEUROMUSCULAR REEDUCATION: CPT | Performed by: PHYSICAL THERAPIST

## 2020-05-13 PROCEDURE — 97110 THERAPEUTIC EXERCISES: CPT | Performed by: PHYSICAL THERAPIST

## 2020-05-14 ENCOUNTER — EVALUATION (OUTPATIENT)
Dept: PHYSICAL THERAPY | Facility: CLINIC | Age: 70
End: 2020-05-14
Payer: COMMERCIAL

## 2020-05-14 DIAGNOSIS — M75.102 LEFT ROTATOR CUFF TEAR ARTHROPATHY: Primary | ICD-10-CM

## 2020-05-14 DIAGNOSIS — M25.562 PAIN IN BOTH KNEES, UNSPECIFIED CHRONICITY: ICD-10-CM

## 2020-05-14 DIAGNOSIS — M12.812 LEFT ROTATOR CUFF TEAR ARTHROPATHY: Primary | ICD-10-CM

## 2020-05-14 DIAGNOSIS — M47.816 LUMBAR SPONDYLOSIS: ICD-10-CM

## 2020-05-14 DIAGNOSIS — M25.561 PAIN IN BOTH KNEES, UNSPECIFIED CHRONICITY: ICD-10-CM

## 2020-05-14 PROCEDURE — 97110 THERAPEUTIC EXERCISES: CPT | Performed by: PHYSICAL THERAPIST

## 2020-05-14 PROCEDURE — 97164 PT RE-EVAL EST PLAN CARE: CPT | Performed by: PHYSICAL THERAPIST

## 2020-05-18 ENCOUNTER — APPOINTMENT (OUTPATIENT)
Dept: PHYSICAL THERAPY | Facility: CLINIC | Age: 70
End: 2020-05-18
Payer: COMMERCIAL

## 2020-05-19 ENCOUNTER — OFFICE VISIT (OUTPATIENT)
Dept: PHYSICAL THERAPY | Facility: CLINIC | Age: 70
End: 2020-05-19
Payer: COMMERCIAL

## 2020-05-19 DIAGNOSIS — M75.102 LEFT ROTATOR CUFF TEAR ARTHROPATHY: Primary | ICD-10-CM

## 2020-05-19 DIAGNOSIS — M12.812 LEFT ROTATOR CUFF TEAR ARTHROPATHY: Primary | ICD-10-CM

## 2020-05-19 DIAGNOSIS — M25.562 PAIN IN BOTH KNEES, UNSPECIFIED CHRONICITY: ICD-10-CM

## 2020-05-19 DIAGNOSIS — M47.816 LUMBAR SPONDYLOSIS: ICD-10-CM

## 2020-05-19 DIAGNOSIS — M25.561 PAIN IN BOTH KNEES, UNSPECIFIED CHRONICITY: ICD-10-CM

## 2020-05-19 PROCEDURE — 97112 NEUROMUSCULAR REEDUCATION: CPT | Performed by: PHYSICAL THERAPIST

## 2020-05-19 PROCEDURE — 97110 THERAPEUTIC EXERCISES: CPT | Performed by: PHYSICAL THERAPIST

## 2020-05-19 PROCEDURE — 97140 MANUAL THERAPY 1/> REGIONS: CPT | Performed by: PHYSICAL THERAPIST

## 2020-05-20 ENCOUNTER — APPOINTMENT (OUTPATIENT)
Dept: PHYSICAL THERAPY | Facility: CLINIC | Age: 70
End: 2020-05-20
Payer: COMMERCIAL

## 2020-05-21 ENCOUNTER — APPOINTMENT (OUTPATIENT)
Dept: PHYSICAL THERAPY | Facility: CLINIC | Age: 70
End: 2020-05-21
Payer: COMMERCIAL

## 2020-05-21 ENCOUNTER — OFFICE VISIT (OUTPATIENT)
Dept: PHYSICAL THERAPY | Facility: CLINIC | Age: 70
End: 2020-05-21
Payer: COMMERCIAL

## 2020-05-21 DIAGNOSIS — M12.812 LEFT ROTATOR CUFF TEAR ARTHROPATHY: Primary | ICD-10-CM

## 2020-05-21 DIAGNOSIS — M75.102 LEFT ROTATOR CUFF TEAR ARTHROPATHY: Primary | ICD-10-CM

## 2020-05-21 DIAGNOSIS — M47.816 LUMBAR SPONDYLOSIS: ICD-10-CM

## 2020-05-21 DIAGNOSIS — M25.561 PAIN IN BOTH KNEES, UNSPECIFIED CHRONICITY: ICD-10-CM

## 2020-05-21 DIAGNOSIS — M25.562 PAIN IN BOTH KNEES, UNSPECIFIED CHRONICITY: ICD-10-CM

## 2020-05-21 PROCEDURE — 97140 MANUAL THERAPY 1/> REGIONS: CPT | Performed by: PHYSICAL THERAPIST

## 2020-05-21 PROCEDURE — 97110 THERAPEUTIC EXERCISES: CPT | Performed by: PHYSICAL THERAPIST

## 2020-05-21 PROCEDURE — 97112 NEUROMUSCULAR REEDUCATION: CPT | Performed by: PHYSICAL THERAPIST

## 2020-05-22 ENCOUNTER — OFFICE VISIT (OUTPATIENT)
Dept: PHYSICAL THERAPY | Facility: CLINIC | Age: 70
End: 2020-05-22
Payer: COMMERCIAL

## 2020-05-22 DIAGNOSIS — M75.102 LEFT ROTATOR CUFF TEAR ARTHROPATHY: Primary | ICD-10-CM

## 2020-05-22 DIAGNOSIS — M25.561 PAIN IN BOTH KNEES, UNSPECIFIED CHRONICITY: ICD-10-CM

## 2020-05-22 DIAGNOSIS — M12.812 LEFT ROTATOR CUFF TEAR ARTHROPATHY: Primary | ICD-10-CM

## 2020-05-22 DIAGNOSIS — M25.562 PAIN IN BOTH KNEES, UNSPECIFIED CHRONICITY: ICD-10-CM

## 2020-05-22 DIAGNOSIS — M47.816 LUMBAR SPONDYLOSIS: ICD-10-CM

## 2020-05-22 PROCEDURE — 97140 MANUAL THERAPY 1/> REGIONS: CPT | Performed by: PHYSICAL THERAPIST

## 2020-05-22 PROCEDURE — 97110 THERAPEUTIC EXERCISES: CPT | Performed by: PHYSICAL THERAPIST

## 2020-05-22 PROCEDURE — 97112 NEUROMUSCULAR REEDUCATION: CPT | Performed by: PHYSICAL THERAPIST

## 2020-05-26 ENCOUNTER — OFFICE VISIT (OUTPATIENT)
Dept: PHYSICAL THERAPY | Facility: CLINIC | Age: 70
End: 2020-05-26
Payer: COMMERCIAL

## 2020-05-26 DIAGNOSIS — M47.816 LUMBAR SPONDYLOSIS: ICD-10-CM

## 2020-05-26 DIAGNOSIS — M75.102 LEFT ROTATOR CUFF TEAR ARTHROPATHY: Primary | ICD-10-CM

## 2020-05-26 DIAGNOSIS — M25.561 PAIN IN BOTH KNEES, UNSPECIFIED CHRONICITY: ICD-10-CM

## 2020-05-26 DIAGNOSIS — M12.812 LEFT ROTATOR CUFF TEAR ARTHROPATHY: Primary | ICD-10-CM

## 2020-05-26 DIAGNOSIS — M25.562 PAIN IN BOTH KNEES, UNSPECIFIED CHRONICITY: ICD-10-CM

## 2020-05-26 PROCEDURE — 97140 MANUAL THERAPY 1/> REGIONS: CPT | Performed by: PHYSICAL THERAPIST

## 2020-05-26 PROCEDURE — 97112 NEUROMUSCULAR REEDUCATION: CPT | Performed by: PHYSICAL THERAPIST

## 2020-05-26 PROCEDURE — 97110 THERAPEUTIC EXERCISES: CPT | Performed by: PHYSICAL THERAPIST

## 2020-05-27 ENCOUNTER — TELEPHONE (OUTPATIENT)
Dept: FAMILY MEDICINE CLINIC | Facility: CLINIC | Age: 70
End: 2020-05-27

## 2020-05-27 DIAGNOSIS — E11.9 TYPE 2 DIABETES MELLITUS WITHOUT COMPLICATION, WITHOUT LONG-TERM CURRENT USE OF INSULIN (HCC): Primary | ICD-10-CM

## 2020-05-28 ENCOUNTER — OFFICE VISIT (OUTPATIENT)
Dept: PHYSICAL THERAPY | Facility: CLINIC | Age: 70
End: 2020-05-28
Payer: COMMERCIAL

## 2020-05-28 DIAGNOSIS — M75.102 LEFT ROTATOR CUFF TEAR ARTHROPATHY: Primary | ICD-10-CM

## 2020-05-28 DIAGNOSIS — M12.812 LEFT ROTATOR CUFF TEAR ARTHROPATHY: Primary | ICD-10-CM

## 2020-05-28 DIAGNOSIS — M47.816 LUMBAR SPONDYLOSIS: ICD-10-CM

## 2020-05-28 DIAGNOSIS — M25.561 PAIN IN BOTH KNEES, UNSPECIFIED CHRONICITY: ICD-10-CM

## 2020-05-28 DIAGNOSIS — M25.562 PAIN IN BOTH KNEES, UNSPECIFIED CHRONICITY: ICD-10-CM

## 2020-05-28 PROCEDURE — 97112 NEUROMUSCULAR REEDUCATION: CPT | Performed by: PHYSICAL THERAPIST

## 2020-05-28 PROCEDURE — 97140 MANUAL THERAPY 1/> REGIONS: CPT | Performed by: PHYSICAL THERAPIST

## 2020-05-28 PROCEDURE — 97110 THERAPEUTIC EXERCISES: CPT | Performed by: PHYSICAL THERAPIST

## 2020-05-29 ENCOUNTER — OFFICE VISIT (OUTPATIENT)
Dept: PHYSICAL THERAPY | Facility: CLINIC | Age: 70
End: 2020-05-29
Payer: COMMERCIAL

## 2020-05-29 DIAGNOSIS — M12.812 LEFT ROTATOR CUFF TEAR ARTHROPATHY: Primary | ICD-10-CM

## 2020-05-29 DIAGNOSIS — M25.562 PAIN IN BOTH KNEES, UNSPECIFIED CHRONICITY: ICD-10-CM

## 2020-05-29 DIAGNOSIS — M47.816 LUMBAR SPONDYLOSIS: ICD-10-CM

## 2020-05-29 DIAGNOSIS — M25.561 PAIN IN BOTH KNEES, UNSPECIFIED CHRONICITY: ICD-10-CM

## 2020-05-29 DIAGNOSIS — M75.102 LEFT ROTATOR CUFF TEAR ARTHROPATHY: Primary | ICD-10-CM

## 2020-05-29 LAB
ALBUMIN SERPL-MCNC: 4.3 G/DL (ref 3.6–5.1)
ALBUMIN/GLOB SERPL: 1.8 (CALC) (ref 1–2.5)
ALP SERPL-CCNC: 54 U/L (ref 35–144)
ALT SERPL-CCNC: 22 U/L (ref 9–46)
AST SERPL-CCNC: 21 U/L (ref 10–35)
BILIRUB SERPL-MCNC: 0.6 MG/DL (ref 0.2–1.2)
BUN SERPL-MCNC: 17 MG/DL (ref 7–25)
BUN/CREAT SERPL: ABNORMAL (CALC) (ref 6–22)
CALCIUM SERPL-MCNC: 9.5 MG/DL (ref 8.6–10.3)
CHLORIDE SERPL-SCNC: 105 MMOL/L (ref 98–110)
CHOLEST SERPL-MCNC: 145 MG/DL
CHOLEST/HDLC SERPL: 3.5 (CALC)
CO2 SERPL-SCNC: 28 MMOL/L (ref 20–32)
CREAT SERPL-MCNC: 1.03 MG/DL (ref 0.7–1.25)
GLOBULIN SER CALC-MCNC: 2.4 G/DL (CALC) (ref 1.9–3.7)
GLUCOSE SERPL-MCNC: 108 MG/DL (ref 65–99)
HBA1C MFR BLD: 6.4 % OF TOTAL HGB
HDLC SERPL-MCNC: 41 MG/DL
LDLC SERPL CALC-MCNC: 82 MG/DL (CALC)
NONHDLC SERPL-MCNC: 104 MG/DL (CALC)
POTASSIUM SERPL-SCNC: 4.8 MMOL/L (ref 3.5–5.3)
PROT SERPL-MCNC: 6.7 G/DL (ref 6.1–8.1)
SL AMB EGFR AFRICAN AMERICAN: 85 ML/MIN/1.73M2
SL AMB EGFR NON AFRICAN AMERICAN: 74 ML/MIN/1.73M2
SODIUM SERPL-SCNC: 141 MMOL/L (ref 135–146)
TRIGL SERPL-MCNC: 127 MG/DL

## 2020-05-29 PROCEDURE — 97112 NEUROMUSCULAR REEDUCATION: CPT | Performed by: PHYSICAL THERAPIST

## 2020-05-29 PROCEDURE — 97110 THERAPEUTIC EXERCISES: CPT | Performed by: PHYSICAL THERAPIST

## 2020-05-29 PROCEDURE — 97140 MANUAL THERAPY 1/> REGIONS: CPT | Performed by: PHYSICAL THERAPIST

## 2020-06-01 ENCOUNTER — APPOINTMENT (OUTPATIENT)
Dept: PHYSICAL THERAPY | Facility: CLINIC | Age: 70
End: 2020-06-01
Payer: COMMERCIAL

## 2020-06-02 ENCOUNTER — OFFICE VISIT (OUTPATIENT)
Dept: PHYSICAL THERAPY | Facility: CLINIC | Age: 70
End: 2020-06-02
Payer: COMMERCIAL

## 2020-06-02 DIAGNOSIS — M75.102 LEFT ROTATOR CUFF TEAR ARTHROPATHY: Primary | ICD-10-CM

## 2020-06-02 DIAGNOSIS — M25.562 PAIN IN BOTH KNEES, UNSPECIFIED CHRONICITY: ICD-10-CM

## 2020-06-02 DIAGNOSIS — M12.812 LEFT ROTATOR CUFF TEAR ARTHROPATHY: Primary | ICD-10-CM

## 2020-06-02 DIAGNOSIS — M47.816 LUMBAR SPONDYLOSIS: ICD-10-CM

## 2020-06-02 DIAGNOSIS — M25.561 PAIN IN BOTH KNEES, UNSPECIFIED CHRONICITY: ICD-10-CM

## 2020-06-02 PROCEDURE — 97110 THERAPEUTIC EXERCISES: CPT | Performed by: PHYSICAL THERAPIST

## 2020-06-02 PROCEDURE — 97112 NEUROMUSCULAR REEDUCATION: CPT | Performed by: PHYSICAL THERAPIST

## 2020-06-02 PROCEDURE — 97140 MANUAL THERAPY 1/> REGIONS: CPT | Performed by: PHYSICAL THERAPIST

## 2020-06-03 ENCOUNTER — OFFICE VISIT (OUTPATIENT)
Dept: PHYSICAL THERAPY | Facility: CLINIC | Age: 70
End: 2020-06-03
Payer: COMMERCIAL

## 2020-06-03 DIAGNOSIS — M25.561 PAIN IN BOTH KNEES, UNSPECIFIED CHRONICITY: ICD-10-CM

## 2020-06-03 DIAGNOSIS — M12.812 LEFT ROTATOR CUFF TEAR ARTHROPATHY: Primary | ICD-10-CM

## 2020-06-03 DIAGNOSIS — M47.816 LUMBAR SPONDYLOSIS: ICD-10-CM

## 2020-06-03 DIAGNOSIS — M25.562 PAIN IN BOTH KNEES, UNSPECIFIED CHRONICITY: ICD-10-CM

## 2020-06-03 DIAGNOSIS — M75.102 LEFT ROTATOR CUFF TEAR ARTHROPATHY: Primary | ICD-10-CM

## 2020-06-03 PROCEDURE — 97112 NEUROMUSCULAR REEDUCATION: CPT | Performed by: PHYSICAL THERAPIST

## 2020-06-03 PROCEDURE — 97110 THERAPEUTIC EXERCISES: CPT | Performed by: PHYSICAL THERAPIST

## 2020-06-03 PROCEDURE — 97140 MANUAL THERAPY 1/> REGIONS: CPT | Performed by: PHYSICAL THERAPIST

## 2020-06-05 ENCOUNTER — OFFICE VISIT (OUTPATIENT)
Dept: PHYSICAL THERAPY | Facility: CLINIC | Age: 70
End: 2020-06-05
Payer: COMMERCIAL

## 2020-06-05 DIAGNOSIS — M47.816 LUMBAR SPONDYLOSIS: ICD-10-CM

## 2020-06-05 DIAGNOSIS — M75.102 LEFT ROTATOR CUFF TEAR ARTHROPATHY: Primary | ICD-10-CM

## 2020-06-05 DIAGNOSIS — M25.561 PAIN IN BOTH KNEES, UNSPECIFIED CHRONICITY: ICD-10-CM

## 2020-06-05 DIAGNOSIS — M12.812 LEFT ROTATOR CUFF TEAR ARTHROPATHY: Primary | ICD-10-CM

## 2020-06-05 DIAGNOSIS — M25.562 PAIN IN BOTH KNEES, UNSPECIFIED CHRONICITY: ICD-10-CM

## 2020-06-05 PROCEDURE — 97140 MANUAL THERAPY 1/> REGIONS: CPT | Performed by: PHYSICAL THERAPIST

## 2020-06-05 PROCEDURE — 97112 NEUROMUSCULAR REEDUCATION: CPT | Performed by: PHYSICAL THERAPIST

## 2020-06-05 PROCEDURE — 97110 THERAPEUTIC EXERCISES: CPT | Performed by: PHYSICAL THERAPIST

## 2020-06-08 ENCOUNTER — OFFICE VISIT (OUTPATIENT)
Dept: PHYSICAL THERAPY | Facility: CLINIC | Age: 70
End: 2020-06-08
Payer: COMMERCIAL

## 2020-06-08 DIAGNOSIS — M25.562 PAIN IN BOTH KNEES, UNSPECIFIED CHRONICITY: ICD-10-CM

## 2020-06-08 DIAGNOSIS — M25.561 PAIN IN BOTH KNEES, UNSPECIFIED CHRONICITY: ICD-10-CM

## 2020-06-08 DIAGNOSIS — M12.812 LEFT ROTATOR CUFF TEAR ARTHROPATHY: Primary | ICD-10-CM

## 2020-06-08 DIAGNOSIS — M75.102 LEFT ROTATOR CUFF TEAR ARTHROPATHY: Primary | ICD-10-CM

## 2020-06-08 DIAGNOSIS — M47.816 LUMBAR SPONDYLOSIS: ICD-10-CM

## 2020-06-08 PROCEDURE — 97112 NEUROMUSCULAR REEDUCATION: CPT | Performed by: PHYSICAL THERAPIST

## 2020-06-08 PROCEDURE — 97110 THERAPEUTIC EXERCISES: CPT | Performed by: PHYSICAL THERAPIST

## 2020-06-08 PROCEDURE — 97140 MANUAL THERAPY 1/> REGIONS: CPT | Performed by: PHYSICAL THERAPIST

## 2020-06-10 ENCOUNTER — OFFICE VISIT (OUTPATIENT)
Dept: PHYSICAL THERAPY | Facility: CLINIC | Age: 70
End: 2020-06-10
Payer: COMMERCIAL

## 2020-06-10 ENCOUNTER — OFFICE VISIT (OUTPATIENT)
Dept: FAMILY MEDICINE CLINIC | Facility: CLINIC | Age: 70
End: 2020-06-10
Payer: COMMERCIAL

## 2020-06-10 VITALS
BODY MASS INDEX: 28.6 KG/M2 | WEIGHT: 230.03 LBS | HEIGHT: 75 IN | DIASTOLIC BLOOD PRESSURE: 90 MMHG | TEMPERATURE: 97.4 F | HEART RATE: 63 BPM | SYSTOLIC BLOOD PRESSURE: 142 MMHG

## 2020-06-10 DIAGNOSIS — E11.65 UNCONTROLLED TYPE 2 DIABETES MELLITUS WITH HYPERGLYCEMIA (HCC): Primary | ICD-10-CM

## 2020-06-10 DIAGNOSIS — M25.561 PAIN IN BOTH KNEES, UNSPECIFIED CHRONICITY: ICD-10-CM

## 2020-06-10 DIAGNOSIS — M25.562 PAIN IN BOTH KNEES, UNSPECIFIED CHRONICITY: ICD-10-CM

## 2020-06-10 DIAGNOSIS — M75.102 LEFT ROTATOR CUFF TEAR ARTHROPATHY: Primary | ICD-10-CM

## 2020-06-10 DIAGNOSIS — M12.812 LEFT ROTATOR CUFF TEAR ARTHROPATHY: Primary | ICD-10-CM

## 2020-06-10 DIAGNOSIS — E11.40 TYPE 2 DIABETES MELLITUS WITH DIABETIC NEUROPATHY, WITHOUT LONG-TERM CURRENT USE OF INSULIN (HCC): ICD-10-CM

## 2020-06-10 DIAGNOSIS — I10 BENIGN ESSENTIAL HYPERTENSION: ICD-10-CM

## 2020-06-10 DIAGNOSIS — E78.2 MIXED HYPERLIPIDEMIA: ICD-10-CM

## 2020-06-10 DIAGNOSIS — M47.816 LUMBAR SPONDYLOSIS: ICD-10-CM

## 2020-06-10 LAB — SL AMB POCT HEMOGLOBIN AIC: 6 (ref ?–6.5)

## 2020-06-10 PROCEDURE — 97112 NEUROMUSCULAR REEDUCATION: CPT | Performed by: PHYSICAL THERAPIST

## 2020-06-10 PROCEDURE — 99213 OFFICE O/P EST LOW 20 MIN: CPT | Performed by: FAMILY MEDICINE

## 2020-06-10 PROCEDURE — 1036F TOBACCO NON-USER: CPT | Performed by: FAMILY MEDICINE

## 2020-06-10 PROCEDURE — 4040F PNEUMOC VAC/ADMIN/RCVD: CPT | Performed by: FAMILY MEDICINE

## 2020-06-10 PROCEDURE — 97110 THERAPEUTIC EXERCISES: CPT | Performed by: PHYSICAL THERAPIST

## 2020-06-10 PROCEDURE — 3008F BODY MASS INDEX DOCD: CPT | Performed by: FAMILY MEDICINE

## 2020-06-10 PROCEDURE — 97140 MANUAL THERAPY 1/> REGIONS: CPT | Performed by: PHYSICAL THERAPIST

## 2020-06-10 PROCEDURE — 3077F SYST BP >= 140 MM HG: CPT | Performed by: FAMILY MEDICINE

## 2020-06-10 PROCEDURE — 83036 HEMOGLOBIN GLYCOSYLATED A1C: CPT | Performed by: FAMILY MEDICINE

## 2020-06-10 PROCEDURE — 3044F HG A1C LEVEL LT 7.0%: CPT | Performed by: FAMILY MEDICINE

## 2020-06-10 PROCEDURE — 3080F DIAST BP >= 90 MM HG: CPT | Performed by: FAMILY MEDICINE

## 2020-06-10 PROCEDURE — 1160F RVW MEDS BY RX/DR IN RCRD: CPT | Performed by: FAMILY MEDICINE

## 2020-06-10 PROCEDURE — 2022F DILAT RTA XM EVC RTNOPTHY: CPT | Performed by: FAMILY MEDICINE

## 2020-06-12 ENCOUNTER — OFFICE VISIT (OUTPATIENT)
Dept: PHYSICAL THERAPY | Facility: CLINIC | Age: 70
End: 2020-06-12
Payer: COMMERCIAL

## 2020-06-12 DIAGNOSIS — M25.562 PAIN IN BOTH KNEES, UNSPECIFIED CHRONICITY: ICD-10-CM

## 2020-06-12 DIAGNOSIS — M47.816 LUMBAR SPONDYLOSIS: ICD-10-CM

## 2020-06-12 DIAGNOSIS — M25.561 PAIN IN BOTH KNEES, UNSPECIFIED CHRONICITY: ICD-10-CM

## 2020-06-12 DIAGNOSIS — M75.102 LEFT ROTATOR CUFF TEAR ARTHROPATHY: Primary | ICD-10-CM

## 2020-06-12 DIAGNOSIS — M12.812 LEFT ROTATOR CUFF TEAR ARTHROPATHY: Primary | ICD-10-CM

## 2020-06-12 PROCEDURE — 97110 THERAPEUTIC EXERCISES: CPT | Performed by: PHYSICAL THERAPIST

## 2020-06-12 PROCEDURE — 97112 NEUROMUSCULAR REEDUCATION: CPT | Performed by: PHYSICAL THERAPIST

## 2020-06-12 PROCEDURE — 97140 MANUAL THERAPY 1/> REGIONS: CPT | Performed by: PHYSICAL THERAPIST

## 2020-06-15 ENCOUNTER — OFFICE VISIT (OUTPATIENT)
Dept: PHYSICAL THERAPY | Facility: CLINIC | Age: 70
End: 2020-06-15
Payer: COMMERCIAL

## 2020-06-15 DIAGNOSIS — M47.816 LUMBAR SPONDYLOSIS: ICD-10-CM

## 2020-06-15 DIAGNOSIS — M25.561 PAIN IN BOTH KNEES, UNSPECIFIED CHRONICITY: ICD-10-CM

## 2020-06-15 DIAGNOSIS — M12.812 LEFT ROTATOR CUFF TEAR ARTHROPATHY: Primary | ICD-10-CM

## 2020-06-15 DIAGNOSIS — M75.102 LEFT ROTATOR CUFF TEAR ARTHROPATHY: Primary | ICD-10-CM

## 2020-06-15 DIAGNOSIS — M25.562 PAIN IN BOTH KNEES, UNSPECIFIED CHRONICITY: ICD-10-CM

## 2020-06-15 PROCEDURE — 97112 NEUROMUSCULAR REEDUCATION: CPT | Performed by: PHYSICAL THERAPIST

## 2020-06-15 PROCEDURE — 97110 THERAPEUTIC EXERCISES: CPT | Performed by: PHYSICAL THERAPIST

## 2020-06-17 ENCOUNTER — OFFICE VISIT (OUTPATIENT)
Dept: PHYSICAL THERAPY | Facility: CLINIC | Age: 70
End: 2020-06-17
Payer: COMMERCIAL

## 2020-06-17 DIAGNOSIS — M25.562 PAIN IN BOTH KNEES, UNSPECIFIED CHRONICITY: ICD-10-CM

## 2020-06-17 DIAGNOSIS — M75.102 LEFT ROTATOR CUFF TEAR ARTHROPATHY: Primary | ICD-10-CM

## 2020-06-17 DIAGNOSIS — M25.561 PAIN IN BOTH KNEES, UNSPECIFIED CHRONICITY: ICD-10-CM

## 2020-06-17 DIAGNOSIS — M12.812 LEFT ROTATOR CUFF TEAR ARTHROPATHY: Primary | ICD-10-CM

## 2020-06-17 DIAGNOSIS — M47.816 LUMBAR SPONDYLOSIS: ICD-10-CM

## 2020-06-17 PROCEDURE — 97112 NEUROMUSCULAR REEDUCATION: CPT | Performed by: PHYSICAL THERAPIST

## 2020-06-17 PROCEDURE — 97110 THERAPEUTIC EXERCISES: CPT | Performed by: PHYSICAL THERAPIST

## 2020-06-17 PROCEDURE — 97140 MANUAL THERAPY 1/> REGIONS: CPT | Performed by: PHYSICAL THERAPIST

## 2020-06-19 ENCOUNTER — OFFICE VISIT (OUTPATIENT)
Dept: PHYSICAL THERAPY | Facility: CLINIC | Age: 70
End: 2020-06-19
Payer: COMMERCIAL

## 2020-06-19 DIAGNOSIS — M25.562 PAIN IN BOTH KNEES, UNSPECIFIED CHRONICITY: ICD-10-CM

## 2020-06-19 DIAGNOSIS — M12.812 LEFT ROTATOR CUFF TEAR ARTHROPATHY: Primary | ICD-10-CM

## 2020-06-19 DIAGNOSIS — M75.102 LEFT ROTATOR CUFF TEAR ARTHROPATHY: Primary | ICD-10-CM

## 2020-06-19 DIAGNOSIS — M47.816 LUMBAR SPONDYLOSIS: ICD-10-CM

## 2020-06-19 DIAGNOSIS — M25.561 PAIN IN BOTH KNEES, UNSPECIFIED CHRONICITY: ICD-10-CM

## 2020-06-19 PROCEDURE — 97112 NEUROMUSCULAR REEDUCATION: CPT | Performed by: PHYSICAL THERAPIST

## 2020-06-19 PROCEDURE — 97140 MANUAL THERAPY 1/> REGIONS: CPT | Performed by: PHYSICAL THERAPIST

## 2020-06-19 PROCEDURE — 97110 THERAPEUTIC EXERCISES: CPT | Performed by: PHYSICAL THERAPIST

## 2020-06-22 ENCOUNTER — OFFICE VISIT (OUTPATIENT)
Dept: PHYSICAL THERAPY | Facility: CLINIC | Age: 70
End: 2020-06-22
Payer: COMMERCIAL

## 2020-06-22 DIAGNOSIS — M75.102 LEFT ROTATOR CUFF TEAR ARTHROPATHY: Primary | ICD-10-CM

## 2020-06-22 DIAGNOSIS — M25.562 PAIN IN BOTH KNEES, UNSPECIFIED CHRONICITY: ICD-10-CM

## 2020-06-22 DIAGNOSIS — M25.561 PAIN IN BOTH KNEES, UNSPECIFIED CHRONICITY: ICD-10-CM

## 2020-06-22 DIAGNOSIS — M12.812 LEFT ROTATOR CUFF TEAR ARTHROPATHY: Primary | ICD-10-CM

## 2020-06-22 DIAGNOSIS — M47.816 LUMBAR SPONDYLOSIS: ICD-10-CM

## 2020-06-22 PROCEDURE — 97110 THERAPEUTIC EXERCISES: CPT

## 2020-06-22 PROCEDURE — 97112 NEUROMUSCULAR REEDUCATION: CPT

## 2020-06-24 ENCOUNTER — OFFICE VISIT (OUTPATIENT)
Dept: PHYSICAL THERAPY | Facility: CLINIC | Age: 70
End: 2020-06-24
Payer: COMMERCIAL

## 2020-06-24 DIAGNOSIS — M25.562 PAIN IN BOTH KNEES, UNSPECIFIED CHRONICITY: ICD-10-CM

## 2020-06-24 DIAGNOSIS — M75.102 LEFT ROTATOR CUFF TEAR ARTHROPATHY: Primary | ICD-10-CM

## 2020-06-24 DIAGNOSIS — M12.812 LEFT ROTATOR CUFF TEAR ARTHROPATHY: Primary | ICD-10-CM

## 2020-06-24 DIAGNOSIS — M25.561 PAIN IN BOTH KNEES, UNSPECIFIED CHRONICITY: ICD-10-CM

## 2020-06-24 DIAGNOSIS — M47.816 LUMBAR SPONDYLOSIS: ICD-10-CM

## 2020-06-24 PROCEDURE — 97110 THERAPEUTIC EXERCISES: CPT

## 2020-06-24 PROCEDURE — 97112 NEUROMUSCULAR REEDUCATION: CPT

## 2020-06-25 ENCOUNTER — OFFICE VISIT (OUTPATIENT)
Dept: PHYSICAL THERAPY | Facility: CLINIC | Age: 70
End: 2020-06-25
Payer: COMMERCIAL

## 2020-06-25 DIAGNOSIS — M12.812 LEFT ROTATOR CUFF TEAR ARTHROPATHY: Primary | ICD-10-CM

## 2020-06-25 DIAGNOSIS — M75.102 LEFT ROTATOR CUFF TEAR ARTHROPATHY: Primary | ICD-10-CM

## 2020-06-25 DIAGNOSIS — M47.816 LUMBAR SPONDYLOSIS: ICD-10-CM

## 2020-06-25 DIAGNOSIS — M25.562 PAIN IN BOTH KNEES, UNSPECIFIED CHRONICITY: ICD-10-CM

## 2020-06-25 DIAGNOSIS — M25.561 PAIN IN BOTH KNEES, UNSPECIFIED CHRONICITY: ICD-10-CM

## 2020-06-25 PROCEDURE — 97110 THERAPEUTIC EXERCISES: CPT | Performed by: PHYSICAL THERAPIST

## 2020-06-25 PROCEDURE — 97112 NEUROMUSCULAR REEDUCATION: CPT | Performed by: PHYSICAL THERAPIST

## 2020-06-29 ENCOUNTER — OFFICE VISIT (OUTPATIENT)
Dept: PHYSICAL THERAPY | Facility: CLINIC | Age: 70
End: 2020-06-29
Payer: COMMERCIAL

## 2020-06-29 DIAGNOSIS — M25.562 PAIN IN BOTH KNEES, UNSPECIFIED CHRONICITY: ICD-10-CM

## 2020-06-29 DIAGNOSIS — M75.102 LEFT ROTATOR CUFF TEAR ARTHROPATHY: Primary | ICD-10-CM

## 2020-06-29 DIAGNOSIS — M47.816 LUMBAR SPONDYLOSIS: ICD-10-CM

## 2020-06-29 DIAGNOSIS — M12.812 LEFT ROTATOR CUFF TEAR ARTHROPATHY: Primary | ICD-10-CM

## 2020-06-29 DIAGNOSIS — M25.561 PAIN IN BOTH KNEES, UNSPECIFIED CHRONICITY: ICD-10-CM

## 2020-06-29 PROCEDURE — 97112 NEUROMUSCULAR REEDUCATION: CPT | Performed by: PHYSICAL THERAPIST

## 2020-06-29 PROCEDURE — 97110 THERAPEUTIC EXERCISES: CPT | Performed by: PHYSICAL THERAPIST

## 2020-06-30 DIAGNOSIS — I10 ESSENTIAL HYPERTENSION: ICD-10-CM

## 2020-06-30 RX ORDER — OLMESARTAN MEDOXOMIL AND HYDROCHLOROTHIAZIDE 20/12.5 20; 12.5 MG/1; MG/1
TABLET ORAL
Qty: 90 TABLET | Refills: 0 | Status: SHIPPED | OUTPATIENT
Start: 2020-06-30 | End: 2020-10-15

## 2020-07-01 ENCOUNTER — OFFICE VISIT (OUTPATIENT)
Dept: PHYSICAL THERAPY | Facility: CLINIC | Age: 70
End: 2020-07-01
Payer: COMMERCIAL

## 2020-07-01 DIAGNOSIS — M12.812 LEFT ROTATOR CUFF TEAR ARTHROPATHY: Primary | ICD-10-CM

## 2020-07-01 DIAGNOSIS — M25.561 PAIN IN BOTH KNEES, UNSPECIFIED CHRONICITY: ICD-10-CM

## 2020-07-01 DIAGNOSIS — M75.102 LEFT ROTATOR CUFF TEAR ARTHROPATHY: Primary | ICD-10-CM

## 2020-07-01 DIAGNOSIS — M25.562 PAIN IN BOTH KNEES, UNSPECIFIED CHRONICITY: ICD-10-CM

## 2020-07-01 DIAGNOSIS — M47.816 LUMBAR SPONDYLOSIS: ICD-10-CM

## 2020-07-01 PROCEDURE — 97140 MANUAL THERAPY 1/> REGIONS: CPT | Performed by: PHYSICAL THERAPIST

## 2020-07-01 PROCEDURE — 97110 THERAPEUTIC EXERCISES: CPT | Performed by: PHYSICAL THERAPIST

## 2020-07-01 NOTE — PROGRESS NOTES
Daily Note     Today's date: 2020  Patient name: Kindra Glaser  : 1950  MRN: [id number]  Referring provider: Kyaw Meza DO  Dx:   Encounter Diagnosis     ICD-10-CM    1  Left rotator cuff tear arthropathy M75 102     M12 812    2  Lumbar spondylosis M47 816    3  Pain in both knees, unspecified chronicity M25 561     M25 562                   Subjective: Prashant Carreon reported that he was doing yard work yesterday, including sawing branches off of a tree, which "really made my shoulders hurt " Reports 5/10 pain in b/l shoulders upon arriving to PT  Objective: See treatment diary below      Assessment: Tolerated treatment well  In order to address shoulder pain and stiffness, initiated lat stretch, which he performed well without provocation of pain and noted improved flexibility  Added head turns to shoulder 90-90 stretch, which improved flexibility  Also initiated lumbar stretches due to reports of lumbar pain, which improved flexibility and pain  Patient demonstrated fatigue post treatment, exhibited good technique with therapeutic exercises and would benefit from continued PT  Plan: Continue per plan of care        Precautions: standard OA    Manual  6/12 6/15 6/17 6/19 6/22 6/24 6/25 6/29 7/1    Jt mobs, PROM str JZ held JZ JZ np np np  JZ                                                                               Exercise Diary  6/12 6/15 6/17 6/19 6/22 6/24 6/25 6/29 7/1    Pallof w/ rotation 15/   high velocity reps 15/ 3x10  high velocity reps 15/ 3x10  high velocity reps 15/ 3x10 high velocity reps 15/ 3x10     Sh exten 25/   U/l high velocity reps 11/ 3x10 U/l high velocity reps 20/ 3x10    U/l high velocity reps 20/ 3x10  U/l high velocity reps 20/ 3x10 U/l high velocity reps 20/ 3x10     Row  40/  U/l high velocity reps 25/ 3x10    U/l high velocity reps 40/ 3x10 U/l high velocity reps 40/ 3x10  U/l high velocity reps 40/ 3x10 U/l high velocity reps 40/ 3x10     SLS soccer ball tramp R 2x10       R 2x15     REIL 3x15            Incline table push up 3x15 3x12   3x12  2x15 (on table w/ extra incline)      Shallow crunch 3x15             oblique crunch  3x15       3x12        Curl press  10# 3x10 10# 3x10 10# 3x10  10# 3x10   10# 3x10     /   High velocity hop 70/ 3x10   High velocity hop 70/ 3x10  170/ 3x10     Calf press  170/             2 cone tap                    Horiz abd 13/ 3x10     13/ 2x10 ea  13/ 2x15ea 13/ 2x15       Horiz add 5/ 3x10    10/  2x10 ea  10/ 2x15ea 10/ 2x15ea      Retro walk   40/ 50/ 2x10 50/ 2x10 50/ 3x10  50/  3x10  50/  3x10 50/ 3x10    overhead tricep  13/ 3x10            Agility ladder  15'  5'   5'       TB side step     O 30' x5 G 30'  3 laps  G 30'   3 laps         u/l HR 3x10             Stool pull  5x 30'            Bicep curl 10# 3x10 15# 3x10 10# 3x10  15# 2x10  10# 3x10 10# 3x10     PKFS  :15x5 :15x5 :15x5  20"x4 20"x4   :15x5    Pronation  bicep curl 10# 10# 3x10   10# 3x10  10# 3x10      Shallow hop with STS 2 foam  3x10  3x10  3x10       Lat step down 8"              6'' core  :20x3            Prone opp UE/LE raises 3x10   3x10 3x10 3x10       Oblique dumbbell 25# 25# 3x10  25# 3x10 25# 3x10 25# 3x10  25# 3x10     Lat stretch         15"x5ea    90-90 wall stretch         15"x5ea     Elliptical      5 min 5 min 5 min 5'    Reverse Crunch 3x15   3x15  3x12       PETRA at counter         3x10    S/l thoracic rotation         3x10    Seated trunk flexion/rotat stretch         :05x10    ITB stretch strap         :15x5                       Modalities  6/12 6/15 6/17 6/19 6/22 6/24 6/29 7/1     EFX               Vasocompression

## 2020-07-02 ENCOUNTER — APPOINTMENT (OUTPATIENT)
Dept: PHYSICAL THERAPY | Facility: CLINIC | Age: 70
End: 2020-07-02
Payer: COMMERCIAL

## 2020-07-03 ENCOUNTER — APPOINTMENT (OUTPATIENT)
Dept: PHYSICAL THERAPY | Facility: CLINIC | Age: 70
End: 2020-07-03
Payer: COMMERCIAL

## 2020-07-06 ENCOUNTER — OFFICE VISIT (OUTPATIENT)
Dept: PHYSICAL THERAPY | Facility: CLINIC | Age: 70
End: 2020-07-06
Payer: COMMERCIAL

## 2020-07-06 DIAGNOSIS — M25.562 PAIN IN BOTH KNEES, UNSPECIFIED CHRONICITY: ICD-10-CM

## 2020-07-06 DIAGNOSIS — M12.812 LEFT ROTATOR CUFF TEAR ARTHROPATHY: Primary | ICD-10-CM

## 2020-07-06 DIAGNOSIS — M25.561 PAIN IN BOTH KNEES, UNSPECIFIED CHRONICITY: ICD-10-CM

## 2020-07-06 DIAGNOSIS — M75.102 LEFT ROTATOR CUFF TEAR ARTHROPATHY: Primary | ICD-10-CM

## 2020-07-06 DIAGNOSIS — M47.816 LUMBAR SPONDYLOSIS: ICD-10-CM

## 2020-07-06 PROCEDURE — 97110 THERAPEUTIC EXERCISES: CPT | Performed by: PHYSICAL THERAPIST

## 2020-07-06 PROCEDURE — 97140 MANUAL THERAPY 1/> REGIONS: CPT | Performed by: PHYSICAL THERAPIST

## 2020-07-06 PROCEDURE — 97112 NEUROMUSCULAR REEDUCATION: CPT | Performed by: PHYSICAL THERAPIST

## 2020-07-06 NOTE — PROGRESS NOTES
Daily Note     Today's date: 2020  Patient name: Blue Pittman  : 1950  MRN: [id number]  Referring provider: Kam Flores DO  Dx:   Encounter Diagnosis     ICD-10-CM    1  Left rotator cuff tear arthropathy M75 102     M12 812    2  Lumbar spondylosis M47 816    3  Pain in both knees, unspecified chronicity M25 561     M25 562                   Subjective: Pt reported that he performed yard work activities over the weekend, including chain sawing a fallen tree, resulted in increased knee, shoulder, and back pain/stiffness  Noted that HEP did improve symptoms  Objective: See treatment diary below      Assessment: Tolerated treatment well  Required rest breaks and frequent stretches due to tightness from weekend  Patient demonstrated fatigue post treatment, exhibited good technique with therapeutic exercises and would benefit from continued PT  Plan: Continue per plan of care        Precautions: standard OA    Manual  6/12 6/15 6/17 6/19 6/22 6/24 6/25 6/29 7/1 7/6   Jt mobs, PROM str JZ held JZ JZ np np np  JZ JZ                                                                              Exercise Diary  6/12 6/15 6/17 6/19 6/22 6/24 6/25 6/29 7/1 7/6   Pallof w/ rotation 15/   high velocity reps 15/ 3x10  high velocity reps 15/ 3x10  high velocity reps 15/ 3x10 high velocity reps 15/ 3x10     Sh exten 25/   U/l high velocity reps 11/ 3x10 U/l high velocity reps 20/ 3x10    U/l high velocity reps 20/ 3x10  U/l high velocity reps 20/ 3x10 U/l high velocity reps 20/ 3x10  20/ 3x10   Row  40/  U/l high velocity reps 25/ 3x10    U/l high velocity reps 40/ 3x10 U/l high velocity reps 40/ 3x10  U/l high velocity reps 40/ 3x10 U/l high velocity reps 40/ 3x10  50/ 3x10   SLS soccer ball tramp  R 2x10       R 2x15  R 3x15   REIL 3x15            Incline table push up 3x15 3x12   3x12  2x15 (on table w/ extra incline)   3x15   Shallow crunch 3x15             oblique crunch  3x15       3x12        Curl press  10# 3x10 10# 3x10 10# 3x10  10# 3x10   10# 3x10  10# 3x10   /   High velocity hop 70/ 3x10   High velocity hop 70/ 3x10  170/ 3x10     Calf press  170/             2 cone tap                    Horiz abd 13/ 3x10     13/ 2x10 ea  13/ 2x15ea 13/ 2x15       Horiz add 5/ 3x10    10/  2x10 ea  10/ 2x15ea 10/ 2x15ea      Retro walk   40/ 50/ 2x10 50/ 2x10 50/ 3x10  50/  3x10  50/  3x10 50/ 3x10 60/ 3x10   overhead tricep  13/ 3x10            Agility ladder  15'  5'   5'       TB side step     O 30' x5 G 30'  3 laps  G 30'   3 laps      B 30' x5   u/l HR 3x10             Stool pull  5x 30'            Bicep curl 10# 3x10 15# 3x10 10# 3x10  15# 2x10  10# 3x10 10# 3x10     PKFS  :15x5 :15x5 :15x5  20"x4 20"x4   :15x5 :15x5   Pronation  bicep curl 10# 10# 3x10   10# 3x10  10# 3x10      Shallow hop with STS 2 foam  3x10  3x10  3x10       Lat step down 8"              6'' core  :20x3            Prone opp UE/LE raises 3x10   3x10 3x10 3x10       Oblique dumbbell 25# 25# 3x10  25# 3x10 25# 3x10 25# 3x10  25# 3x10     Lat stretch         15"x5ea :15x5ea   90-90 wall stretch         15"x5ea  :15x5   Elliptical      5 min 5 min 5 min 5' 5'    Reverse Crunch 3x15   3x15  3x12       PETRA at counter         3x10 3x10   S/l thoracic rotation         3x10    Seated trunk flexion/rotat stretch         :05x10 :05x10   ITB stretch strap         :15x5                       Modalities  6/12 6/15 6/17 6/19 6/22 6/24 6/29 7/1 7/6    EFX               Vasocompression

## 2020-07-08 ENCOUNTER — OFFICE VISIT (OUTPATIENT)
Dept: PHYSICAL THERAPY | Facility: CLINIC | Age: 70
End: 2020-07-08
Payer: COMMERCIAL

## 2020-07-08 DIAGNOSIS — M25.562 PAIN IN BOTH KNEES, UNSPECIFIED CHRONICITY: ICD-10-CM

## 2020-07-08 DIAGNOSIS — M12.812 LEFT ROTATOR CUFF TEAR ARTHROPATHY: Primary | ICD-10-CM

## 2020-07-08 DIAGNOSIS — M25.561 PAIN IN BOTH KNEES, UNSPECIFIED CHRONICITY: ICD-10-CM

## 2020-07-08 DIAGNOSIS — M47.816 LUMBAR SPONDYLOSIS: ICD-10-CM

## 2020-07-08 DIAGNOSIS — M75.102 LEFT ROTATOR CUFF TEAR ARTHROPATHY: Primary | ICD-10-CM

## 2020-07-08 PROCEDURE — 97112 NEUROMUSCULAR REEDUCATION: CPT | Performed by: PHYSICAL THERAPIST

## 2020-07-08 PROCEDURE — 97140 MANUAL THERAPY 1/> REGIONS: CPT | Performed by: PHYSICAL THERAPIST

## 2020-07-08 PROCEDURE — 97110 THERAPEUTIC EXERCISES: CPT | Performed by: PHYSICAL THERAPIST

## 2020-07-08 NOTE — PROGRESS NOTES
Daily Note     Today's date: 2020  Patient name: Mary Jane Alaniz  : 1950  MRN: [id number]  Referring provider: Tremayne Leger DO  Dx:   Encounter Diagnosis     ICD-10-CM    1  Left rotator cuff tear arthropathy M75 102     M12 812    2  Lumbar spondylosis M47 816    3  Pain in both knees, unspecified chronicity M25 561     M25 562                   Subjective: Pt reported that he is "feeling looser from doing the chain saw work over the weekend " Notes that pain levels have decreased down to 0 at rest        Objective: See treatment diary below      Assessment: Tolerated treatment well  Patient demonstrated fatigue post treatment, exhibited good technique with therapeutic exercises and would benefit from continued PT  Plan: Continue per plan of care        Precautions: standard OA    Manual     Jt mobs, PROM str JZ         JZ                                                                              Exercise Diary     Pallof w/ rotation       high velocity reps 15/ 3x10 high velocity reps 15/ 3x10     Sh exten 20/       U/l high velocity reps 20/ 3x10 U/l high velocity reps 20/ 3x10  20/ 3x10   Row  50/       U/l high velocity reps 40/ 3x10 U/l high velocity reps 40/ 3x10  50/ 3x10   SLS soccer ball tramp  R 3x15       R 2x15  R 3x15   REIL             Incline table push up       2x15 (on table w/ extra incline)   3x15   Shallow crunch              oblique crunch              Curl press 10#       10# 3x10  10# 3x10   /        170/ 3x10     Calf press              2 cone tap                Horiz abd 13/ 3x10      13/ 2x15ea 13/ 2x15       Horiz add 10/ 3x10       10/ 2x15ea 10/ 2x15ea      Retro walk  60/        50/  3x10 50/ 3x10 60/ 3x10   overhead tricep 12/             Agility ladder 10'            TB side step B      G 30'   3 laps      B 30' x5   u/l HR              Stool pull             Fransisco punch 20/ 3x10            PKFS         :15x5 :15x5 Pronation  bicep curl       10# 3x10      Shallow hop with STS 2 foam 3x15            Lat step down 8"              6'' core              Prone opp UE/LE raises             Oblique dumbbell        25# 3x10     Lat stretch         15"x5ea :15x5ea   90-90 wall stretch         15"x5ea  :15x5   Elliptical 5'      5 min 5 min 5' 5'    Reverse Crunch             PETRA at counter         3x10 3x10   S/l thoracic rotation         3x10    Seated trunk flexion/rotat stretch         :05x10 :05x10   ITB stretch strap         :15x5    Step ups 8" 15# 3x10                  Modalities  7/8        7/6    EFX               Vasocompression

## 2020-07-10 ENCOUNTER — OFFICE VISIT (OUTPATIENT)
Dept: PHYSICAL THERAPY | Facility: CLINIC | Age: 70
End: 2020-07-10
Payer: COMMERCIAL

## 2020-07-10 DIAGNOSIS — M75.102 LEFT ROTATOR CUFF TEAR ARTHROPATHY: Primary | ICD-10-CM

## 2020-07-10 DIAGNOSIS — M25.562 PAIN IN BOTH KNEES, UNSPECIFIED CHRONICITY: ICD-10-CM

## 2020-07-10 DIAGNOSIS — M47.816 LUMBAR SPONDYLOSIS: ICD-10-CM

## 2020-07-10 DIAGNOSIS — M12.812 LEFT ROTATOR CUFF TEAR ARTHROPATHY: Primary | ICD-10-CM

## 2020-07-10 DIAGNOSIS — M25.561 PAIN IN BOTH KNEES, UNSPECIFIED CHRONICITY: ICD-10-CM

## 2020-07-10 PROCEDURE — 97140 MANUAL THERAPY 1/> REGIONS: CPT

## 2020-07-10 PROCEDURE — 97112 NEUROMUSCULAR REEDUCATION: CPT

## 2020-07-10 PROCEDURE — 97110 THERAPEUTIC EXERCISES: CPT

## 2020-07-10 NOTE — PROGRESS NOTES
Daily Note     Today's date: 7/10/2020  Patient name: Taryn Hemphill  : 1950  MRN: [id number]  Referring provider: Ally Ponce DO  Dx:   Encounter Diagnosis     ICD-10-CM    1  Left rotator cuff tear arthropathy M75 102     M12 812    2  Lumbar spondylosis M47 816    3  Pain in both knees, unspecified chronicity M25 561     M25 562                   Subjective: Pt reports feeling good with general achiness prior to start of session  Likely d/t the weather  Objective: See treatment diary below      Assessment: Tolerated treatment well   Patient demonstrated fatigue post treatment      Plan: {PLAN:[id number]}     Precautions: standard OA    Manual     Jt mobs, PROM str JZ         JZ                                                                              Exercise Diary     Pallof w/ rotation       high velocity reps 15/ 3x10 high velocity reps 15/ 3x10     Sh exten 20/  20/ 3x10     U/l high velocity reps 20/ 3x10 U/l high velocity reps 20/ 3x10  20/ 3x10   Row  50/  50/ 3x10     U/l high velocity reps 40/ 3x10 U/l high velocity reps 40/ 3x10  50/ 3x10   SLS soccer ball tramp  R 3x15       R 2x15  R 3x15   REIL             Incline table push up       2x15 (on table w/ extra incline)   3x15   Shallow crunch              oblique crunch              Curl press 10#       10# 3x10  10# 3x10   /  170/ 3x10      170/ 3x10     Calf press              2 cone tap                Horiz abd 13/ 3x10      13/ 2x15ea 13/ 2x15       Horiz add 10/ 3x10       10/ 2x15ea 10/ 2x15ea      Retro walk  60/   60/ 3x10      50/  3x10 50/ 3x10 60/ 3x10   overhead tricep 12/  12/  3x10           Agility ladder 10'            TB side step B B 30' x5     G 30'   3 laps      B 30' x5   u/l HR              Stool pull             Fransisco punch 20/ 3x10            PKFS  :15x5       :15x5 :15x5   Pronation  bicep curl       10# 3x10      Shallow hop with STS 2 foam 3x15 Lat step down 8"              6'' core              Prone opp UE/LE raises             Oblique dumbbell        25# 3x10     Lat stretch         15"x5ea :15x5ea   90-90 wall stretch         15"x5ea  :15x5   Elliptical 5' 5'     5 min 5 min 5' 5'    Reverse Crunch             PETRA at counter  3x10       3x10 3x10   S/l thoracic rotation         3x10    Seated trunk flexion/rotat stretch         :05x10 :05x10   ITB stretch strap         :15x5    Step ups 8" 15# 3x10                  Modalities  7/8 7/6    EFX               Vasocompression

## 2020-07-10 NOTE — PROGRESS NOTES
Daily Note     Today's date: 7/10/2020  Patient name: Adama Robles  : 1950  MRN: [id number]  Referring provider: Roverto Dennison DO  Dx:   Encounter Diagnosis     ICD-10-CM    1  Left rotator cuff tear arthropathy M75 102     M12 812    2  Lumbar spondylosis M47 816    3  Pain in both knees, unspecified chronicity M25 561     M25 562        Start Time: 0900  Stop Time: 1000  Total time in clinic (min): 60 minutes    Subjective: Pt reports feeling good with general achiness prior to start of session  Likely d/t the weather  Objective: See treatment diary below      Assessment: Tolerated treatment well  Fatigued quickly today during retro walking and was challenged with remainder of program this visit  Responded well to manual stretching performed to b/l hip add, HS, and piriformis at end of session  Patient would benefit from continued PT to further improve strength and maximize function  Plan: Continue per plan of care  Progress as able        Precautions: standard OA    Manual  7/8 7/10        7/6   Jt mobs, PROM str JZ AFB  BLE str        JZ                                                                              Exercise Diary  7/8 7/10     6/25 6/29 7/1 7/6   Pallof w/ rotation       high velocity reps 15/ 3x10 high velocity reps 15/ 3x10     Sh exten 20/  20/ 3x10     U/l high velocity reps 20/ 3x10 U/l high velocity reps 20/ 3x10  20/ 3x10   Row  50/  50/ 3x10     U/l high velocity reps 40/ 3x10 U/l high velocity reps 40/ 3x10  50/ 3x10   SLS soccer ball tramp  R 3x15       R 2x15  R 3x15   REIL             Incline table push up       2x15 (on table w/ extra incline)   3x15   Shallow crunch              oblique crunch              Curl press 10#       10# 3x10  10# 3x10   /  170/ 3x10      170/ 3x10     Calf press              2 cone tap                Horiz abd 13/ 3x10      13/ 2x15ea 13/ 2x15       Horiz add 10/ 3x10       10/ 2x15ea 10/ 2x15ea      Retro walk  60/   60/ 3x10 50/  3x10 50/ 3x10 60/ 3x10   overhead tricep 12/  12/  3x10           Agility ladder 10'            TB side step B B 30' x5     G 30'   3 laps      B 30' x5   u/l HR              Stool pull             [place] punch 20/ 3x10            PKFS  :15x5       :15x5 :15x5   Pronation  bicep curl       10# 3x10      Shallow hop with STS 2 foam 3x15            Lat step down 8"              6'' core              Prone opp UE/LE raises             Oblique dumbbell        25# 3x10     Lat stretch         15"x5ea :15x5ea   90-90 wall stretch         15"x5ea  :15x5   Elliptical 5' 5'     5 min 5 min 5' 5'    Reverse Crunch             PETRA at counter  3x10       3x10 3x10   S/l thoracic rotation         3x10    Seated trunk flexion/rotat stretch         :05x10 :05x10   ITB stretch strap         :15x5    Step ups 8" 15# 3x10                  Modalities  7/8 7/10       7/6    EFX               Vasocompression

## 2020-07-13 ENCOUNTER — OFFICE VISIT (OUTPATIENT)
Dept: PHYSICAL THERAPY | Facility: CLINIC | Age: 70
End: 2020-07-13
Payer: COMMERCIAL

## 2020-07-13 DIAGNOSIS — M75.102 LEFT ROTATOR CUFF TEAR ARTHROPATHY: Primary | ICD-10-CM

## 2020-07-13 DIAGNOSIS — M25.561 PAIN IN BOTH KNEES, UNSPECIFIED CHRONICITY: ICD-10-CM

## 2020-07-13 DIAGNOSIS — M12.812 LEFT ROTATOR CUFF TEAR ARTHROPATHY: Primary | ICD-10-CM

## 2020-07-13 DIAGNOSIS — M47.816 LUMBAR SPONDYLOSIS: ICD-10-CM

## 2020-07-13 DIAGNOSIS — M25.562 PAIN IN BOTH KNEES, UNSPECIFIED CHRONICITY: ICD-10-CM

## 2020-07-13 PROCEDURE — 97140 MANUAL THERAPY 1/> REGIONS: CPT | Performed by: PHYSICAL THERAPIST

## 2020-07-13 PROCEDURE — 97110 THERAPEUTIC EXERCISES: CPT | Performed by: PHYSICAL THERAPIST

## 2020-07-13 NOTE — PROGRESS NOTES
Daily Note     Today's date: 2020  Patient name: Ellie Morocho  : 1950  MRN: [id number]  Referring provider: oLly Venegas DO  Dx:   Encounter Diagnosis     ICD-10-CM    1  Left rotator cuff tear arthropathy M75 102     M12 812    2  Lumbar spondylosis M47 816    3  Pain in both knees, unspecified chronicity M25 561     M25 562                   Subjective: Pt reported that he is improving as the sessions continue, however notes lumbar stiffness from yard work over the weekend  Objective: See treatment diary below      Assessment: Tolerated treatment well  Patient demonstrated fatigue post treatment, exhibited good technique with therapeutic exercises and would benefit from continued PT  Plan: Continue per plan of care  Progress treatment as tolerated         Precautions: standard OA    Manual  7/8 7/10 7/13       7/6   Jt mobs, PROM str JZ [employer]  BLE str JZ       JZ                                                                              Exercise Diary  7/8 7/10 7/13    6/25 6/29 7/1 7/6   Pallof w/ rotation       high velocity reps 15/ 3x10 high velocity reps 15/ 3x10     Sh exten 20/  20/ 3x10     U/l high velocity reps 20/ 3x10 U/l high velocity reps 20/ 3x10  20/ 3x10   Row  50/  50/ 3x10     U/l high velocity reps 40/ 3x10 U/l high velocity reps 40/ 3x10  50/ 3x10   SLS soccer ball tramp  R 3x15  R 3x15     R 2x15  R 3x15   REIL   3x10          Incline table push up       2x15 (on table w/ extra incline)   3x15   Shallow crunch              oblique crunch              Curl press 10#  10#     10# 3x10  10# 3x10   /  170/ 3x10 170/     170/ 3x10     Calf press              2 cone tap                Horiz abd 13/ 3x10  13/     13/ 2x15ea 13/ 2x15       Horiz add 10/ 3x10   10/    10/ 2x15ea 10/ 2x15ea      Retro walk  60/   60/ 3x10 60/     50/  3x10 50/ 3x10 60/ 3x10   overhead tricep 12/  12/  3x10 12/           Agility ladder 10'            TB side step B B 30' x5 B 30' 5x G 30'   3 laps      B 30' x5   u/l HR              Stool pull             [place] punch 20/ 3x10            PKFS  :15x5       :15x5 :15x5   Pronation  bicep curl       10# 3x10      Shallow hop with STS 2 foam 3x15            Lat step down 8"              6'' core              Prone opp UE/LE raises             Oblique dumbbell   25# 3x10     25# 3x10     Lat stretch         15"x5ea :15x5ea   90-90 wall stretch         15"x5ea  :15x5   Elliptical 5' 5' 5'     5 min 5 min 5' 5'    Reverse Crunch             PETRA at counter  3x10 3x10      3x10 3x10   S/l thoracic rotation         3x10    Seated trunk flexion/rotat stretch   :15x5      :05x10 :05x10   ITB stretch strap         :15x5    Step ups 8" 15# 3x10  15# 3x10                Modalities  7/8 7/10 7/13      7/6    EFX               Vasocompression

## 2020-07-15 ENCOUNTER — OFFICE VISIT (OUTPATIENT)
Dept: PHYSICAL THERAPY | Facility: CLINIC | Age: 70
End: 2020-07-15
Payer: COMMERCIAL

## 2020-07-15 DIAGNOSIS — M12.812 LEFT ROTATOR CUFF TEAR ARTHROPATHY: Primary | ICD-10-CM

## 2020-07-15 DIAGNOSIS — M47.816 LUMBAR SPONDYLOSIS: ICD-10-CM

## 2020-07-15 DIAGNOSIS — M25.562 PAIN IN BOTH KNEES, UNSPECIFIED CHRONICITY: ICD-10-CM

## 2020-07-15 DIAGNOSIS — M75.102 LEFT ROTATOR CUFF TEAR ARTHROPATHY: Primary | ICD-10-CM

## 2020-07-15 DIAGNOSIS — M25.561 PAIN IN BOTH KNEES, UNSPECIFIED CHRONICITY: ICD-10-CM

## 2020-07-15 PROCEDURE — 97140 MANUAL THERAPY 1/> REGIONS: CPT | Performed by: PHYSICAL THERAPIST

## 2020-07-15 PROCEDURE — 97110 THERAPEUTIC EXERCISES: CPT | Performed by: PHYSICAL THERAPIST

## 2020-07-15 PROCEDURE — 97112 NEUROMUSCULAR REEDUCATION: CPT | Performed by: PHYSICAL THERAPIST

## 2020-07-15 NOTE — PROGRESS NOTES
Daily Note     Today's date: 7/15/2020  Patient name: Ridge Freire  : 1950  MRN: [id number]  Referring provider: Dell Buenrostro DO  Dx:   Encounter Diagnosis     ICD-10-CM    1  Left rotator cuff tear arthropathy M75 102     M12 812    2  Lumbar spondylosis M47 816    3  Pain in both knees, unspecified chronicity M25 561     M25 562                   Subjective: Pt reported that he is gaining strength with his ability to negotiate up and down stairs, and also stated that his core strength is improving so he can  some heavier objects    Objective: See treatment diary below      Assessment: Tolerated treatment well  Advanced core stabilization to include prone hip extension with multifidus activation, which he performed well without provocation of pain  Patient demonstrated fatigue post treatment, exhibited good technique with therapeutic exercises and would benefit from continued PT  Plan: Continue per plan of care        Precautions: standard OA    Manual  7/8 7/10 7/13 7/15      7/6   Jt mobs, PROM str JZ AFB  BLE str JZ JZ      JZ                                                                              Exercise Diary  7/8 7/10 7/13 7/15         Pallof w/ rotation             Sh exten 20/  20/ 3x10           Row  50/  50/ 3x10           SLS soccer ball tramp  R 3x15  R 3x15     R 2x15  R 3x15   REIL   3x10          Incline table push up    3x15         Shallow crunch              oblique crunch    3x15          Curl press 10#  10#     10# 3x10  10# 3x10   /  170/ 3x10 170/     170/ 3x10     Calf press              2 cone tap                Horiz abd 13/ 3x10  13/     13/ 2x15ea 13/ 2x15       Horiz add 10/ 3x10   10/    10/ 2x15ea 10/ 2x15ea      Retro walk  60/   60/ 3x10 60/ 60/ 3x10    50/  3x10 50/ 3x10 60/ 3x10   overhead tricep 12/  12/  3x10 12/           Agility ladder 10'            TB side step B B 30' x5 B 30' 5x    G 30'   3 laps      B 30' x5   u/l HR              Stool pull             [place] punch 20/ 3x10            PKFS  :15x5  :15x5     :15x5 :15x5   Pronation  bicep curl       10# 3x10      Shallow hop with STS 2 foam 3x15            Prone hip ext    3x15          6'' core              Prone opp UE/LE raises    3x15         Oblique dumbbell   25# 3x10 25# 3x10    25# 3x10     Lat stretch         15"x5ea :15x5ea   90-90 wall stretch         15"x5ea  :15x5   Elliptical 5' 5' 5'  5'   5 min 5 min 5' 5'    Reverse Crunch    3x15         PETRA at counter  3x10 3x10 3x10     3x10 3x10   S/l thoracic rotation         3x10    Seated trunk flexion/rotat stretch   :15x5      :05x10 :05x10            :15x5    Step ups 8" 15# 3x10  15# 3x10 15# 3x10               Modalities  7/8 7/10 7/13 7/15     7/6    EFX               Vasocompression

## 2020-07-17 ENCOUNTER — EVALUATION (OUTPATIENT)
Dept: PHYSICAL THERAPY | Facility: CLINIC | Age: 70
End: 2020-07-17
Payer: COMMERCIAL

## 2020-07-17 DIAGNOSIS — M25.561 PAIN IN BOTH KNEES, UNSPECIFIED CHRONICITY: ICD-10-CM

## 2020-07-17 DIAGNOSIS — M75.102 LEFT ROTATOR CUFF TEAR ARTHROPATHY: Primary | ICD-10-CM

## 2020-07-17 DIAGNOSIS — M47.816 LUMBAR SPONDYLOSIS: ICD-10-CM

## 2020-07-17 DIAGNOSIS — M25.562 PAIN IN BOTH KNEES, UNSPECIFIED CHRONICITY: ICD-10-CM

## 2020-07-17 DIAGNOSIS — M12.812 LEFT ROTATOR CUFF TEAR ARTHROPATHY: Primary | ICD-10-CM

## 2020-07-17 PROCEDURE — 97140 MANUAL THERAPY 1/> REGIONS: CPT | Performed by: PHYSICAL THERAPIST

## 2020-07-17 PROCEDURE — 97110 THERAPEUTIC EXERCISES: CPT | Performed by: PHYSICAL THERAPIST

## 2020-07-21 ENCOUNTER — OFFICE VISIT (OUTPATIENT)
Dept: PHYSICAL THERAPY | Facility: CLINIC | Age: 70
End: 2020-07-21
Payer: COMMERCIAL

## 2020-07-21 DIAGNOSIS — M25.562 PAIN IN BOTH KNEES, UNSPECIFIED CHRONICITY: ICD-10-CM

## 2020-07-21 DIAGNOSIS — M12.812 LEFT ROTATOR CUFF TEAR ARTHROPATHY: Primary | ICD-10-CM

## 2020-07-21 DIAGNOSIS — M75.102 LEFT ROTATOR CUFF TEAR ARTHROPATHY: Primary | ICD-10-CM

## 2020-07-21 DIAGNOSIS — M25.561 PAIN IN BOTH KNEES, UNSPECIFIED CHRONICITY: ICD-10-CM

## 2020-07-21 DIAGNOSIS — M47.816 LUMBAR SPONDYLOSIS: ICD-10-CM

## 2020-07-21 PROCEDURE — 97140 MANUAL THERAPY 1/> REGIONS: CPT | Performed by: PHYSICAL THERAPIST

## 2020-07-21 PROCEDURE — 97112 NEUROMUSCULAR REEDUCATION: CPT | Performed by: PHYSICAL THERAPIST

## 2020-07-21 PROCEDURE — 97110 THERAPEUTIC EXERCISES: CPT | Performed by: PHYSICAL THERAPIST

## 2020-07-21 NOTE — PROGRESS NOTES
Daily Note     Today's date: 2020  Patient name: Lucas Deras  : 1950  MRN: [id number]  Referring provider: Marixa Luna DO  Dx:   Encounter Diagnosis     ICD-10-CM    1  Left rotator cuff tear arthropathy M75 102     M12 812    2  Lumbar spondylosis M47 816    3  Pain in both knees, unspecified chronicity M25 561     M25 562                   Subjective: Pt reported that he is still experiencing R knee pain since the last session  Stated that he is having difficulty with single leg activities  Objective: See treatment diary below      Assessment: Tolerated treatment well  Modified [place] resistance exercises which resulted in improved knee pain and flexibility  Attempted to initiate standing [place] hip abduction, however was unable to complete set due to pain  Patient demonstrated fatigue post treatment, exhibited good technique with therapeutic exercises and would benefit from continued PT  Plan: Continue per plan of care        Precautions: standard OA    Manual  7/8 7/10 7/13 7/15  7/17  7/21          Jt mobs, PROM str JZ [employer]  BLE str JZ JZ JZ  JZ                                                                                                                Exercise Diary  7/8 7/10 7/13 7/15  7/17  7/21           Pallof w/ rotation                       Sh exten 20/  20/ 3x10        20/ 3x10           Row  50/  50/ 3x10        45/ 3x10           SLS soccer ball tramp  R 3x15   R 3x15    R 3x15          REIL     3x10      3x15        Incline table push up       3x15  3x15  3x15        Shallow crunch                     oblique crunch       3x15            Curl press 10#   10#      10# 3x15        /  170/ 3x10 170/              Calf press                     2 cone tap                    Horiz abd 13/ 3x10   13/               Horiz add 10/ 3x10    10/             Retro walk  60/   60/ 3x10 60/ 60/ 3x10           overhead tricep 12/  12/  3x10 12/              Agility ladder 10'                 TB side step B B 30' x5 B 30' 5x             u/l HR                    Stool pull                   [place] punch 20/ 3x10                 PKFS   :15x5   :15x5    :15x5       Pronation  bicep curl                   Shallow hop with STS 2 foam 3x15                 Prone hip ext       3x15  3x15            6'' core                       Prone opp UE/LE raises       3x15  3x15            Oblique dumbbell     25# 3x10 25# 3x10      25# 3x10       Lat stretch                15"x5ea :15x5ea   90-90 wall stretch                15"x5ea  :15x5   Elliptical 5' 5' 5'  5'  5'   5'  5 min 5' 5'    Reverse Crunch       3x15              PETRA at counter   3x10 3x10 3x10  3x10  3x10    3x10 3x10   S/l thoracic rotation                3x10     Seated trunk flexion/rotat stretch     :15x5    :15x5      :05x10 :05x10                             Forward kicks [place]     10/ 3x10 5/ 3x10        kick backs [place]         10/ 3x10  10/ 3x10    :15x5     Step ups 8" 15# 3x10   15# 3x10 15# 3x10                     Modalities  7/8 7/10 7/13 7/15  7/17       7/6     EFX                        Vasocompression

## 2020-07-22 ENCOUNTER — OFFICE VISIT (OUTPATIENT)
Dept: PHYSICAL THERAPY | Facility: CLINIC | Age: 70
End: 2020-07-22
Payer: COMMERCIAL

## 2020-07-22 DIAGNOSIS — M25.562 PAIN IN BOTH KNEES, UNSPECIFIED CHRONICITY: ICD-10-CM

## 2020-07-22 DIAGNOSIS — M25.561 PAIN IN BOTH KNEES, UNSPECIFIED CHRONICITY: ICD-10-CM

## 2020-07-22 DIAGNOSIS — M12.812 LEFT ROTATOR CUFF TEAR ARTHROPATHY: Primary | ICD-10-CM

## 2020-07-22 DIAGNOSIS — M47.816 LUMBAR SPONDYLOSIS: ICD-10-CM

## 2020-07-22 DIAGNOSIS — M75.102 LEFT ROTATOR CUFF TEAR ARTHROPATHY: Primary | ICD-10-CM

## 2020-07-22 PROCEDURE — 97140 MANUAL THERAPY 1/> REGIONS: CPT | Performed by: PHYSICAL THERAPIST

## 2020-07-22 PROCEDURE — 97112 NEUROMUSCULAR REEDUCATION: CPT | Performed by: PHYSICAL THERAPIST

## 2020-07-22 PROCEDURE — 97110 THERAPEUTIC EXERCISES: CPT | Performed by: PHYSICAL THERAPIST

## 2020-07-22 NOTE — PROGRESS NOTES
Daily Note     Today's date: 2020  Patient name: Gilma Moreno  : 1950  MRN: [id number]  Referring provider: Chelsey Tom DO  Dx:   Encounter Diagnosis     ICD-10-CM    1  Left rotator cuff tear arthropathy M75 102     M12 812    2  Pain in both knees, unspecified chronicity M25 561     M25 562    3  Lumbar spondylosis M47 816                   Subjective: Pt reported that he might try to bring his knee brace per next session and try to perform [place] kicking exercises  Objective: See treatment diary below      Assessment: Tolerated treatment well  In order to address serratus anterior weakness, initiated [place] punches, which he performed well without provocation of pain  Significant weakness and stiffness still present in R knee since recent flare up, initiated standing knee flexion and extension stretches, which did improve symptoms  Patient demonstrated fatigue post treatment, exhibited good technique with therapeutic exercises and would benefit from continued PT  Plan: Continue per plan of care        Precautions: standard OA    Manual  7/8 7/10 7/13 7/15  7/17  7/21  7/22        Jt mobs, PROM str,  JZ [employer]  BLE str JZ JZ JZ  JZ  JZ-IASTM                                                                                                              Exercise Diary  7/8 7/10 7/13 7/15  7/17  7/21  7/22         Pallof w/ rotation              20/ 3x10ea         Sh exten 20/  20/ 3x10        20/ 3x10           Row  50/  50/ 3x10        45/ 3x10           SLS soccer ball tramp  R 3x15   R 3x15    R 3x15    R 3x15      REIL     3x10      3x15        Incline table push up       3x15  3x15  3x15        Shallow crunch                     oblique crunch       3x15            Curl press 10#   10#      10# 3x15        /  170/ 3x10 170/        u/l 70/ 3x10      Calf press                                  Horiz abd 13/ 3x10   13/               Horiz add 10/ 3x10    10/             Retro walk 60/   60/ 3x10 60/ 60/ 3x10     65/ 3x10      overhead tricep 12/  12/  3x10 12/       12/ 3x10       Agility ladder 10'                 TB side step B B 30' x5 B 30' 5x             u/l HR                   Stand knee flex/ext str            :15x5      [place] punch 20/ 3x10                 PKFS   :15x5   :15x5    :15x5       Pronation  bicep curl                   Shallow hop with STS 2 foam 3x15                 Prone hip ext       3x15  3x15            6'' core                       Prone opp UE/LE raises       3x15  3x15            Oblique dumbbell     25# 3x10 25# 3x10     25# 3x10      Lat stretch                   90-90 wall stretch                   Elliptical 5' 5' 5'  5'  5'   5' 5'      Reverse Crunch       3x15           PETRA at counter   3x10 3x10 3x10  3x10  3x10       S/l thoracic rotation                   Seated trunk flexion/rotat stretch     :15x5    :15x5         [place] punches       25/ 3x10                   Forward kicks [place]     10/ 3x10 5/ 3x10        kick backs [place]         10/ 3x10  10/ 3x10       Step ups 8" 15# 3x10   15# 3x10 15# 3x10      15#            Modalities  7/8 7/10 7/13 7/15  7/17  7/21  7/22       EFX                        Vasocompression

## 2020-07-24 ENCOUNTER — OFFICE VISIT (OUTPATIENT)
Dept: PHYSICAL THERAPY | Facility: CLINIC | Age: 70
End: 2020-07-24
Payer: COMMERCIAL

## 2020-07-24 DIAGNOSIS — M12.812 LEFT ROTATOR CUFF TEAR ARTHROPATHY: Primary | ICD-10-CM

## 2020-07-24 DIAGNOSIS — M47.816 LUMBAR SPONDYLOSIS: ICD-10-CM

## 2020-07-24 DIAGNOSIS — M25.561 PAIN IN BOTH KNEES, UNSPECIFIED CHRONICITY: ICD-10-CM

## 2020-07-24 DIAGNOSIS — M25.562 PAIN IN BOTH KNEES, UNSPECIFIED CHRONICITY: ICD-10-CM

## 2020-07-24 DIAGNOSIS — M75.102 LEFT ROTATOR CUFF TEAR ARTHROPATHY: Primary | ICD-10-CM

## 2020-07-24 PROCEDURE — 97110 THERAPEUTIC EXERCISES: CPT | Performed by: PHYSICAL THERAPIST

## 2020-07-24 PROCEDURE — 97112 NEUROMUSCULAR REEDUCATION: CPT | Performed by: PHYSICAL THERAPIST

## 2020-07-24 NOTE — PROGRESS NOTES
Daily Note     Today's date: 2020  Patient name: Jocelyn Flores  : 1950  MRN: [id number]  Referring provider: Reshma Yadav DO  Dx:   Encounter Diagnosis     ICD-10-CM    1  Left rotator cuff tear arthropathy M75 102     M12 812    2  Pain in both knees, unspecified chronicity M25 561     M25 562    3  Lumbar spondylosis M47 816                   Subjective: Pt reported that he has been trying to wear a knee brace because of a recent increase in knee pain  Stated that he has had a hard time figuring out how to use it right  Objective: See treatment diary below      Assessment: Tolerated treatment well  Adjusted placement of knee brace which resulted in significant improvement of knee stability and allowed for performance of standing exercises without provocation of pain, however he did require a decrease in resistance with retro resisted walk due to pain  Patient demonstrated fatigue post treatment, exhibited good technique with therapeutic exercises and would benefit from continued PT  Plan: Continue per plan of care        Precautions: standard OA    Manual  7/8 7/10 7/13 7/15  7/17  7/21  7/22  7/24      Jt mobs, PROM str,  JZ [employer]  BLE str JZ JZ JZ  JZ  JZ-IASTM  JZ-IASTM                                                                                                            Exercise Diary  7/8 7/10 7/13 7/15  7/17  7/21  7/22  7/24       Pallof w/ rotation              20/ 3x10ea         Sh exten 20/  20/ 3x10        20/ 3x10           Row  50/  50/ 3x10        45/ 3x10    45/ 3x10       SLS soccer ball tramp  R 3x15   R 3x15    R 3x15    R 3x15      REIL     3x10      3x15        Incline table push up       3x15  3x15  3x15        Shallow crunch                     oblique crunch       3x15            Curl press 10#   10#      10# 3x15   15# 3x10     /  170/ 3x10 170/        u/l 70/ 3x10      Calf press                                  Horiz abd 13/ 3x10   13/              [employer] add 10/ 3x10    10/             Retro walk  60/   60/ 3x10 60/ 60/ 3x10     65/ 3x10 50/ 3x10     overhead tricep 12/  12/  3x10 12/       12/ 3x10       Agility ladder 10'                 TB side step B B 30' x5 B 30' 5x             u/l HR                   Stand knee flex/ext str            :15x5      [place] punch 20/ 3x10                 PKFS   :15x5   :15x5    :15x5       Pronation  bicep curl                   Shallow hop with STS 2 foam 3x15                 Prone hip ext       3x15  3x15            6'' core                       Prone opp UE/LE raises       3x15  3x15            Oblique dumbbell     25# 3x10 25# 3x10     25# 3x10      Lat stretch                   90-90 wall stretch                   Elliptical 5' 5' 5'  5'  5'   5' 5' 5'     Reverse Crunch       3x15           PETRA at counter   3x10 3x10 3x10  3x10  3x10  3x15     S/l thoracic rotation                   Seated trunk flexion/rotat stretch     :15x5    :15x5         [place] punches       25/ 3x10      [place] baseball swing hip abd        20/ 3x10     Forward kicks [place]     10/ 3x10 5/ 3x10  10/ 3x10      kick backs [place]         10/ 3x10  10/ 3x10  10/ 3x10     Step ups 8" 15# 3x10   15# 3x10 15# 3x10      15#            Modalities  7/8 7/10 7/13 7/15  7/17  7/21  7/22 7/24      EFX                        Vasocompression

## 2020-07-27 ENCOUNTER — OFFICE VISIT (OUTPATIENT)
Dept: PHYSICAL THERAPY | Facility: CLINIC | Age: 70
End: 2020-07-27
Payer: COMMERCIAL

## 2020-07-27 DIAGNOSIS — M12.812 LEFT ROTATOR CUFF TEAR ARTHROPATHY: Primary | ICD-10-CM

## 2020-07-27 DIAGNOSIS — M25.561 PAIN IN BOTH KNEES, UNSPECIFIED CHRONICITY: ICD-10-CM

## 2020-07-27 DIAGNOSIS — M47.816 LUMBAR SPONDYLOSIS: ICD-10-CM

## 2020-07-27 DIAGNOSIS — M25.562 PAIN IN BOTH KNEES, UNSPECIFIED CHRONICITY: ICD-10-CM

## 2020-07-27 DIAGNOSIS — M75.102 LEFT ROTATOR CUFF TEAR ARTHROPATHY: Primary | ICD-10-CM

## 2020-07-27 PROCEDURE — 97110 THERAPEUTIC EXERCISES: CPT | Performed by: PHYSICAL THERAPIST

## 2020-07-27 PROCEDURE — 97016 VASOPNEUMATIC DEVICE THERAPY: CPT | Performed by: PHYSICAL THERAPIST

## 2020-07-27 PROCEDURE — 97140 MANUAL THERAPY 1/> REGIONS: CPT | Performed by: PHYSICAL THERAPIST

## 2020-07-27 NOTE — PROGRESS NOTES
Daily Note     Today's date: 2020  Patient name: Adama Robles  : 1950  MRN: [id number]  Referring provider: Roverto Dennison DO  Dx:   Encounter Diagnosis     ICD-10-CM    1  Left rotator cuff tear arthropathy M75 102     M12 812    2  Pain in both knees, unspecified chronicity M25 561     M25 562    3  Lumbar spondylosis M47 816                   Subjective: Pt reported that he did experience some knee pain and soreness following the last session  Noted frustration with regression on R knee progress  Objective: See treatment diary below      Assessment: Tolerated treatment well  Modified exercises and manual treatment to address R  Lateral knee pain, which he noted improved symptoms  Patient demonstrated fatigue post treatment, exhibited good technique with therapeutic exercises and would benefit from continued PT  Plan: Continue per plan of care        Precautions: standard OA    Manual  7/8 7/10 7/13 7/15  7/17  7/21  7/22  7/24  7/27    Jt mobs, PROM str,  JZ AFB  BLE str JZ JZ JZ  JZ  JZ-IASTM  JZ-IASTM  JZ-IASTM                                                                                                          Exercise Diary  7/8 7/10 7/13 7/15  7/17  7/21  7/22  7/24  7/27     Pallof w/ rotation              20/ 3x10ea         Sh exten 20/  20/ 3x10        20/ 3x10      20/ 3x10     Row  50/  50/ 3x10        45/ 3x10    45/ 3x10  45/ 3x10     SLS soccer ball tramp  R 3x15   R 3x15    R 3x15    R 3x15      REIL     3x10      3x15        Incline table push up       3x15  3x15  3x15        Shallow crunch                     oblique crunch       3x15            Curl press 10#   10#      10# 3x15   15# 3x10 15# 3x10    /  170/ 3x10 170/        u/l 70/ 3x10      Calf press                                  Horiz abd 13/ 3x10   13/               Horiz add 10/ 3x10    10/             Retro walk  60/   60/ 3x10 60/ 60/ 3x10     65/ 3x10 50/ 3x10 45/ 3x10    overhead tricep 12/ 12/  3x10 12/       12/ 3x10       Agility ladder 10'                 TB side step B B 30' x5 B 30' 5x             u/l HR                   Stand knee flex/ext str            :15x5  :15x5    [place] punch 20/ 3x10                 PKFS   :15x5   :15x5    :15x5   :15x5    Pronation  bicep curl                   Shallow hop with STS 2 foam 3x15                 Prone hip ext       3x15  3x15            ITB stretch strap                :15x5     Prone opp UE/LE raises       3x15  3x15            Oblique dumbbell     25# 3x10 25# 3x10     25# 3x10  30# 3x10    Lat stretch                   90-90 wall stretch                   Elliptical 5' 5' 5'  5'  5'   5' 5' 5' 5'    Reverse Crunch       3x15           PETRA at counter   3x10 3x10 3x10  3x10  3x10  3x15 3x15    S/l thoracic rotation                   Seated trunk flexion/rotat stretch     :15x5    :15x5         [place] punches       25/ 3x10      [place] baseball swing hip abd        20/ 3x10     Forward kicks [place]     10/ 3x10 5/ 3x10  10/ 3x10      kick backs [place]         10/ 3x10  10/ 3x10  10/ 3x10     Step ups 8" 15# 3x10   15# 3x10 15# 3x10      15#            Modalities  7/8 7/10 7/13 7/15  7/17  7/21  7/22 7/24 7/27     EFX                        Vasocompression                  10

## 2020-07-28 ENCOUNTER — APPOINTMENT (OUTPATIENT)
Dept: PHYSICAL THERAPY | Facility: CLINIC | Age: 70
End: 2020-07-28
Payer: COMMERCIAL

## 2020-07-29 ENCOUNTER — OFFICE VISIT (OUTPATIENT)
Dept: PHYSICAL THERAPY | Facility: CLINIC | Age: 70
End: 2020-07-29
Payer: COMMERCIAL

## 2020-07-29 DIAGNOSIS — M47.816 LUMBAR SPONDYLOSIS: ICD-10-CM

## 2020-07-29 DIAGNOSIS — M25.562 PAIN IN BOTH KNEES, UNSPECIFIED CHRONICITY: ICD-10-CM

## 2020-07-29 DIAGNOSIS — M75.102 LEFT ROTATOR CUFF TEAR ARTHROPATHY: Primary | ICD-10-CM

## 2020-07-29 DIAGNOSIS — M12.812 LEFT ROTATOR CUFF TEAR ARTHROPATHY: Primary | ICD-10-CM

## 2020-07-29 DIAGNOSIS — M25.561 PAIN IN BOTH KNEES, UNSPECIFIED CHRONICITY: ICD-10-CM

## 2020-07-29 PROCEDURE — 97112 NEUROMUSCULAR REEDUCATION: CPT | Performed by: PHYSICAL THERAPIST

## 2020-07-29 PROCEDURE — 97140 MANUAL THERAPY 1/> REGIONS: CPT | Performed by: PHYSICAL THERAPIST

## 2020-07-29 PROCEDURE — 97110 THERAPEUTIC EXERCISES: CPT | Performed by: PHYSICAL THERAPIST

## 2020-07-29 NOTE — PROGRESS NOTES
Daily Note     Today's date: 2020  Patient name: Rosalio Lafleur  : 1950  MRN: [id number]  Referring provider: Zion Roman DO  Dx:   Encounter Diagnosis     ICD-10-CM    1  Left rotator cuff tear arthropathy M75 102     M12 812    2  Pain in both knees, unspecified chronicity M25 561     M25 562    3  Lumbar spondylosis M47 816                   Subjective: Pt reported that his knee stability and pain has been improving since beginning IASTM to R lateral knee  Stated that none the less he is still limited by knee pain and apprehension/instability  Objective: See treatment diary below      Assessment: Tolerated treatment well  Performed SLS and retro walk out exercise for LE's without any provocation of R knee pain, reflecting restoration to previous level prior to flare up  Patient demonstrated fatigue post treatment, exhibited good technique with therapeutic exercises and would benefit from continued PT  Plan: Continue per plan of care        Precautions: standard OA    Manual  7/8 7/10 7/13 7/15  7/17  7/21  7/22  7/24  7/27 7/29   Jt mobs, PROM str,  JZ AF[employer]  BLE str JZ JZ JZ  JZ  JZ-IASTM  JZ-IASTM  JZ-IASTM JZ-IASTM                                                                                                         Exercise Diary  7/8 7/10 7/13 7/15  7/17  7/21  7/22  7/24  7/27  7/29   Pallof w/ rotation              20/ 3x10ea         Sh exten 20/  20/ 3x10        20/ 3x10      20/ 3x10  25/3x10   Row  50/  50/ 3x10        45/ 3x10    45/ 3x10  45/ 3x10  50/3x10   SLS soccer ball tramp  R 3x15   R 3x15    R 3x15    R 3x15   R 3x15   REIL     3x10      3x15     3x15   Incline table push up       3x15  3x15  3x15        Shallow crunch                 3x15    oblique crunch       3x15         3x15   Curl press 10#   10#      10# 3x15   15# 3x10 15# 3x10 15# 3x10   /  170/ 3x10 170/        u/l 70/ 3x10      Calf press                                  Horiz abd 13/ 3x10   13/               Horiz add 10/ 3x10    10/             Retro walk  60/   60/ 3x10 60/ 60/ 3x10     65/ 3x10 50/ 3x10 45/ 3x10 45/ 3x10   overhead tricep 12/  12/  3x10 12/       12/ 3x10       Agility ladder 10'                 TB side step B B 30' x5 B 30' 5x             u/l HR                   Stand knee flex/ext str            :15x5  :15x5    [place] punch 20/ 3x10                 PKFS   :15x5   :15x5    :15x5   :15x5 :15x5   Pronation  bicep curl                   Shallow hop with STS 2 foam 3x15                 Prone hip ext       3x15  3x15            ITB stretch strap                :15x5  :15x5   Prone opp UE/LE raises       3x15  3x15         3x10ea   Oblique dumbbell     25# 3x10 25# 3x10     25# 3x10  30# 3x10    Lat stretch                   90-90 wall stretch                   Elliptical 5' 5' 5'  5'  5'   5' 5' 5' 5' 5'   Reverse Crunch       3x15        3x15   PETRA at counter   3x10 3x10 3x10  3x10  3x10  3x15 3x15    S/l thoracic rotation                   Seated trunk flexion/rotat stretch     :15x5    :15x5         [place] punches       25/ 3x10      [place] baseball swing hip abd        20/ 3x10     Forward kicks [place]     10/ 3x10 5/ 3x10  10/ 3x10      kick backs [place]         10/ 3x10  10/ 3x10  10/ 3x10     Step ups 8" 15# 3x10   15# 3x10 15# 3x10      15#            Modalities  7/8 7/10 7/13 7/15  7/17  7/21  7/22 7/24 7/27  7/29   EFX                        Vasocompression                  10

## 2020-07-31 ENCOUNTER — OFFICE VISIT (OUTPATIENT)
Dept: PHYSICAL THERAPY | Facility: CLINIC | Age: 70
End: 2020-07-31
Payer: COMMERCIAL

## 2020-07-31 DIAGNOSIS — M47.816 LUMBAR SPONDYLOSIS: ICD-10-CM

## 2020-07-31 DIAGNOSIS — M25.562 PAIN IN BOTH KNEES, UNSPECIFIED CHRONICITY: ICD-10-CM

## 2020-07-31 DIAGNOSIS — M12.812 LEFT ROTATOR CUFF TEAR ARTHROPATHY: Primary | ICD-10-CM

## 2020-07-31 DIAGNOSIS — M25.561 PAIN IN BOTH KNEES, UNSPECIFIED CHRONICITY: ICD-10-CM

## 2020-07-31 DIAGNOSIS — M75.102 LEFT ROTATOR CUFF TEAR ARTHROPATHY: Primary | ICD-10-CM

## 2020-07-31 PROCEDURE — 97110 THERAPEUTIC EXERCISES: CPT | Performed by: PHYSICAL THERAPIST

## 2020-07-31 PROCEDURE — 97140 MANUAL THERAPY 1/> REGIONS: CPT | Performed by: PHYSICAL THERAPIST

## 2020-07-31 NOTE — PROGRESS NOTES
Daily Note     Today's date: 2020  Patient name: Ladonna Crawley  : 1950  MRN: [id number]  Referring provider: Bg Hernandes DO  Dx:   Encounter Diagnosis     ICD-10-CM    1  Left rotator cuff tear arthropathy M75 102     M12 812    2  Pain in both knees, unspecified chronicity M25 561     M25 562    3  Lumbar spondylosis M47 816                   Subjective: Pt reported that he is happy that his right knee pain is continuing to decrease and his confidence is being restored  Noted that he is still not fully confident with descending stairs  Objective: See treatment diary below      Assessment: Tolerated treatment well  Performed [place] punches with increased resistance and no pain reflecting improved strength and endurance of core and shoulder  Patient demonstrated fatigue post treatment, exhibited good technique with therapeutic exercises and would benefit from continued PT  Plan: Continue per plan of care        Precautions: standard OA    Manual     Jt mobs, PROM str,  JZ-IASTM         JZ-IASTM                                                                     Exercise Diary     Pallof w/ rotation 30/ x30ea       20/ 3x10ea         Sh exten 25/           20/ 3x10  25/3x10   Row  50/         45/ 3x10  45/ 3x10  50/3x10   SLS soccer ball tramp  R 3x15       R 3x15   R 3x15   REIL           3x15   Incline table push up              Shallow crunch           3x15    oblique crunch           3x15   Curl press 15#        15# 3x10 15# 3x10 15# 3x10   LP u/l 90/ 3x10       u/l 70/ 3x10      Calf press                            Horiz abd              Horiz add             Retro walk  45/      65/ 3x10 50/ 3x10 45/ 3x10 45/ 3x10   overhead tricep 13/            Agility ladder             TB side step bicep curl B 3x 10#            u/l HR             Stand knee flex/ext str       :15x5  :15x5    [place] punch 30/ 3x10            PKFS         :15x5 :15x5   Pronation  bicep curl             Shallow hop with STS 2 foam             Prone hip ext                ITB stretch strap          :15x5  :15x5   Prone opp UE/LE raises             3x10ea   Oblique dumbbell 30#      25# 3x10  30# 3x10    Lat stretch             90-90 wall stretch             Elliptical       5' 5' 5' 5'   Reverse Crunch          3x15   PETRA at counter        3x15 3x15    [place]one plank :40x3            Seated trunk flexion/rotat stretch             [place] punches 25/ 3x10      25/ 3x10      fr[place] baseball swing hip abd 20/       20/ 3x10     Forward kicks [place] 10/       10/ 3x10      kick backs [place] 10/       10/ 3x10     Step ups 8" 15#        15#            Modalities  7/31 7/29   EFX               Vasocompression

## 2020-08-03 DIAGNOSIS — E11.9 TYPE 2 DIABETES MELLITUS WITHOUT COMPLICATION, WITHOUT LONG-TERM CURRENT USE OF INSULIN (HCC): ICD-10-CM

## 2020-08-03 NOTE — TELEPHONE ENCOUNTER
We need to call in a new RX for him he picked up his medication and thinks it was thrown away  Can we call in a new medication?

## 2020-08-04 ENCOUNTER — OFFICE VISIT (OUTPATIENT)
Dept: PHYSICAL THERAPY | Facility: CLINIC | Age: 70
End: 2020-08-04
Payer: COMMERCIAL

## 2020-08-04 DIAGNOSIS — M12.812 LEFT ROTATOR CUFF TEAR ARTHROPATHY: Primary | ICD-10-CM

## 2020-08-04 DIAGNOSIS — M47.816 LUMBAR SPONDYLOSIS: ICD-10-CM

## 2020-08-04 DIAGNOSIS — M75.102 LEFT ROTATOR CUFF TEAR ARTHROPATHY: Primary | ICD-10-CM

## 2020-08-04 DIAGNOSIS — M25.561 PAIN IN BOTH KNEES, UNSPECIFIED CHRONICITY: ICD-10-CM

## 2020-08-04 DIAGNOSIS — M25.562 PAIN IN BOTH KNEES, UNSPECIFIED CHRONICITY: ICD-10-CM

## 2020-08-04 PROCEDURE — 97110 THERAPEUTIC EXERCISES: CPT | Performed by: PHYSICAL THERAPIST

## 2020-08-04 PROCEDURE — 97140 MANUAL THERAPY 1/> REGIONS: CPT | Performed by: PHYSICAL THERAPIST

## 2020-08-04 PROCEDURE — 97112 NEUROMUSCULAR REEDUCATION: CPT | Performed by: PHYSICAL THERAPIST

## 2020-08-04 NOTE — PROGRESS NOTES
Daily Note     Today's date: 2020  Patient name: Jorge Coleman  : 1950  MRN: [id number]  Referring provider: Laura Ferrer DO  Dx:   Encounter Diagnosis     ICD-10-CM    1  Left rotator cuff tear arthropathy  M75 102     M12 812    2  Pain in both knees, unspecified chronicity  M25 561     M25 562    3  Lumbar spondylosis  M47 816                   Subjective: Pt reported that he is still expereincing lateral knee pain with deep squatting, however none the less the symptoms are improving significantly  Objective: See treatment diary below      Assessment: Tolerated treatment well  Demontrated improved core strength and endurance through decreased rest breaks required in between sets of crunches  Demonstrated significant improvement in lumbothoracic motion with repeated extension exercises  Patient demonstrated fatigue post treatment, exhibited good technique with therapeutic exercises and would benefit from continued PT  Plan: Continue per plan of care        Precautions: standard OA    Manual     Jt mobs, PROM str,  JZ-IASTM JZ-IASTM        JZ-IASTM                                                                     Exercise Diary     Pallof w/ rotation 30/ x30ea       20/ 3x10ea         Sh exten 25/ 25/ 3x10          20/ 3x10  25/3x10   Row  50/ 50/ 3x10        45/ 3x10  45/ 3x10  50/3x10   SLS soccer ball tramp  R 3x15       R 3x15   R 3x15   REIL  3x15         3x15   Incline table push up  3x15            Shallow crunch  3x15         3x15    oblique crunch  3x15         3x15   Curl press 15#        15# 3x10 15# 3x10 15# 3x10   LP u/l 90/ 3x10       u/l 70/ 3x10      Calf press                            Horiz abd              Horiz add             Retro walk  45/ 50/ 3x10     65/ 3x10 50/ 3x10 45/ 3x10 45/ 3x10   overhead tricep 13/            Agility ladder             TB side step bicep curl B 3x 10#            u/l HR Stand knee flex/ext str       :15x5  :15x5    [place] punch 30/ 3x10            PKFS         :15x5 :15x5   Pronation  bicep curl             Shallow hop with STS 2 foam             Prone hip ext                ITB stretch strap  :15x5        :15x5  :15x5   Prone opp UE/LE raises             3x10ea   Oblique dumbbell 30# 30# 3x10     25# 3x10  30# 3x10    Lat stretch             90-90 wall stretch             Elliptical       5' 5' 5' 5'   Reverse Crunch  3x15        3x15   PETRA at counter  3x15      3x15 3x15    orone plank :40x3 :40x3           Seated trunk flexion/rotat stretch             [place] punches 25/ 3x10      25/ 3x10      fr[place] baseball swing hip abd 20/       20/ 3x10     Forward kicks [place] 10/       10/ 3x10      kick backs [place] 10/       10/ 3x10     Step ups 8" 15#        15#            Modalities  7/31 8/4 7/29   EFX               Vasocompression

## 2020-08-06 ENCOUNTER — OFFICE VISIT (OUTPATIENT)
Dept: PHYSICAL THERAPY | Facility: CLINIC | Age: 70
End: 2020-08-06
Payer: COMMERCIAL

## 2020-08-06 DIAGNOSIS — M75.102 LEFT ROTATOR CUFF TEAR ARTHROPATHY: Primary | ICD-10-CM

## 2020-08-06 DIAGNOSIS — M25.562 PAIN IN BOTH KNEES, UNSPECIFIED CHRONICITY: ICD-10-CM

## 2020-08-06 DIAGNOSIS — M25.561 PAIN IN BOTH KNEES, UNSPECIFIED CHRONICITY: ICD-10-CM

## 2020-08-06 DIAGNOSIS — M47.816 LUMBAR SPONDYLOSIS: ICD-10-CM

## 2020-08-06 DIAGNOSIS — M12.812 LEFT ROTATOR CUFF TEAR ARTHROPATHY: Primary | ICD-10-CM

## 2020-08-06 PROCEDURE — 97140 MANUAL THERAPY 1/> REGIONS: CPT | Performed by: PHYSICAL THERAPIST

## 2020-08-06 PROCEDURE — 97110 THERAPEUTIC EXERCISES: CPT | Performed by: PHYSICAL THERAPIST
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
## 2020-08-07 ENCOUNTER — OFFICE VISIT (OUTPATIENT)
Dept: PHYSICAL THERAPY | Facility: CLINIC | Age: 70
End: 2020-08-07
Payer: COMMERCIAL

## 2020-08-07 DIAGNOSIS — M25.561 PAIN IN BOTH KNEES, UNSPECIFIED CHRONICITY: ICD-10-CM

## 2020-08-07 DIAGNOSIS — M25.562 PAIN IN BOTH KNEES, UNSPECIFIED CHRONICITY: ICD-10-CM

## 2020-08-07 DIAGNOSIS — M75.102 LEFT ROTATOR CUFF TEAR ARTHROPATHY: Primary | ICD-10-CM

## 2020-08-07 DIAGNOSIS — M47.816 LUMBAR SPONDYLOSIS: ICD-10-CM

## 2020-08-07 DIAGNOSIS — M12.812 LEFT ROTATOR CUFF TEAR ARTHROPATHY: Primary | ICD-10-CM

## 2020-08-07 PROCEDURE — 97140 MANUAL THERAPY 1/> REGIONS: CPT | Performed by: PHYSICAL THERAPIST

## 2020-08-07 PROCEDURE — 97112 NEUROMUSCULAR REEDUCATION: CPT | Performed by: PHYSICAL THERAPIST

## 2020-08-07 PROCEDURE — 97110 THERAPEUTIC EXERCISES: CPT | Performed by: PHYSICAL THERAPIST

## 2020-08-07 NOTE — PROGRESS NOTES
Daily Note     Today's date: 2020  Patient name: Kit Vela  : 1950  MRN: [id number]  Referring provider: Faheem Sun DO  Dx:   Encounter Diagnosis     ICD-10-CM    1  Left rotator cuff tear arthropathy  M75 102     M12 812    2  Pain in both knees, unspecified chronicity  M25 561     M25 562    3  Lumbar spondylosis  M47 816                   Subjective: Pt reported that he is feeling well after last session  Stated that his lumbar flexibility "felt good" after the last session  Objective: See treatment diary below      Assessment: Tolerated treatment well  Performed extension based lumbar strength exercises well without provocation of pain  Patient demonstrated fatigue post treatment, exhibited good technique with therapeutic exercises and would benefit from continued PT  Plan: Continue per plan of care        Precautions: standard OA    Manual     Jt mobs, PROM str,  JZ-IASTM JZ-IASTM JZ-IASTM JZ-IASTM      JZ-IASTM                                                                     Exercise Diary     Pallof w/ rotation 30/ x30ea   30/ 3x10    20/ 3x10ea         Sh exten 25/ 25/ 3x10          20/ 3x10  25/3x10   Row  50/ 50/ 3x10  50/ 3x10      45/ 3x10  45/ 3x10  50/3x10   SLS soccer ball tramp  R 3x15   R 3x10    R 3x15   R 3x15   REIL  3x15 3x15 3x15       3x15   Incline table push up  3x15  3x10 R and L          Shallow crunch  3x15         3x15    oblique crunch  3x15         3x15   Curl press 15#   15# 3X10     15# 3x10 15# 3x10 15# 3x10   LP u/l 90/ 3x10       u/l 70/ 3x10      Calf press                            Horiz abd              Horiz add             Retro walk  45/ 50/ 3x10     65/ 3x10 50/ 3x10 45/ 3x10 45/ 3x10   overhead tricep 13/   13/ 3x10         Agility ladder             TB side step bicep curl B 3x 10#            u/l HR             Stand knee flex/ext str       :15x5  :15x5    [place] punch 30/ 3x10            PKFS         :15x5 :15x5   Pronation  bicep curl             Shallow hop with STS 2 foam             Prone hip ext                ITB stretch strap  :15x5 :15x5       :15x5  :15x5   Prone opp UE/LE raises    3x10         3x10ea   Oblique dumbbell 30# 30# 3x10     25# 3x10  30# 3x10    Lat stretch             90-90 wall stretch             Elliptical   5' 5'    5' 5' 5' 5'   Reverse Crunch  3x15        3x15   PETRA at counter  3x15 3x15 3x15    3x15 3x15    prone plank :40x3 :40x3           Slider lunge   3x10          [place] punches 25/ 3x10      25/ 3x10      fransisco baseball swing hip abd 20/  11/ 3x10     20/ 3x10     Forward kicks [place] 10/  12/ 3x15     10/ 3x10      kick backs [place] 10/  15/ 3x15     10/ 3x10     Step ups 8" 15#        15#            Modalities  7/31 8/4 8/6 8/7 7/29   EFX               Vasocompression

## 2020-08-10 ENCOUNTER — OFFICE VISIT (OUTPATIENT)
Dept: PHYSICAL THERAPY | Facility: CLINIC | Age: 70
End: 2020-08-10
Payer: COMMERCIAL

## 2020-08-10 DIAGNOSIS — M25.562 PAIN IN BOTH KNEES, UNSPECIFIED CHRONICITY: ICD-10-CM

## 2020-08-10 DIAGNOSIS — M47.816 LUMBAR SPONDYLOSIS: ICD-10-CM

## 2020-08-10 DIAGNOSIS — M12.812 LEFT ROTATOR CUFF TEAR ARTHROPATHY: Primary | ICD-10-CM

## 2020-08-10 DIAGNOSIS — M25.561 PAIN IN BOTH KNEES, UNSPECIFIED CHRONICITY: ICD-10-CM

## 2020-08-10 DIAGNOSIS — M75.102 LEFT ROTATOR CUFF TEAR ARTHROPATHY: Primary | ICD-10-CM

## 2020-08-10 PROCEDURE — 97112 NEUROMUSCULAR REEDUCATION: CPT | Performed by: PHYSICAL THERAPIST

## 2020-08-10 PROCEDURE — 97140 MANUAL THERAPY 1/> REGIONS: CPT | Performed by: PHYSICAL THERAPIST

## 2020-08-10 PROCEDURE — 97110 THERAPEUTIC EXERCISES: CPT | Performed by: PHYSICAL THERAPIST
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
## 2020-08-12 ENCOUNTER — OFFICE VISIT (OUTPATIENT)
Dept: PHYSICAL THERAPY | Facility: CLINIC | Age: 70
End: 2020-08-12
Payer: COMMERCIAL

## 2020-08-12 DIAGNOSIS — M12.812 LEFT ROTATOR CUFF TEAR ARTHROPATHY: Primary | ICD-10-CM

## 2020-08-12 DIAGNOSIS — M75.102 LEFT ROTATOR CUFF TEAR ARTHROPATHY: Primary | ICD-10-CM

## 2020-08-12 DIAGNOSIS — M47.816 LUMBAR SPONDYLOSIS: ICD-10-CM

## 2020-08-12 DIAGNOSIS — M25.561 PAIN IN BOTH KNEES, UNSPECIFIED CHRONICITY: ICD-10-CM

## 2020-08-12 DIAGNOSIS — M25.562 PAIN IN BOTH KNEES, UNSPECIFIED CHRONICITY: ICD-10-CM

## 2020-08-12 PROCEDURE — 97112 NEUROMUSCULAR REEDUCATION: CPT | Performed by: PHYSICAL THERAPIST

## 2020-08-12 PROCEDURE — 97140 MANUAL THERAPY 1/> REGIONS: CPT | Performed by: PHYSICAL THERAPIST

## 2020-08-12 PROCEDURE — 97110 THERAPEUTIC EXERCISES: CPT | Performed by: PHYSICAL THERAPIST

## 2020-08-12 NOTE — PROGRESS NOTES
Daily Note     Today's date: 2020  Patient name: Gilma Moreno  : 1950  MRN: [id number]  Referring provider: Chelsey Tom DO  Dx:   Encounter Diagnosis     ICD-10-CM    1  Left rotator cuff tear arthropathy  M75 102     M12 812    2  Pain in both knees, unspecified chronicity  M25 561     M25 562    3  Lumbar spondylosis  M47 816                   Subjective: Pt reported that he is performing his HEP as prescribed  Noted that his knee was feeling some significant knee soreness this morning when he woke up, which he stated was significantly improved with performing his stretches  Objective: See treatment diary below      Assessment: Tolerated treatment well  Performed standing [place] front and back kicks well without provocation of pain, demonstrating improving responses to closed chain exercise since flare up of R knee  Patient demonstrated fatigue post treatment, exhibited good technique with therapeutic exercises and would benefit from continued PT  Plan: Continue per plan of care        Precautions: standard OA    Manual  7/31 8/4 8/6 8/7 8/10 8/12       Jt mobs, PROM str,  JZ-IASTM JZ-IASTM JZ-IASTM JZ-IASTM JZ-IASTM JZ-IASTM                                                                         Exercise Diary  7/31 8/4 8/6 8/7 8/10 8/12       Pallof w/ rotation 30/ x30ea   30/ 3x10 30/ 3x10        Sh exten 25/ 25/ 3x10           Row  50/ 50/ 3x10  50/ 3x10  50/ 3x10       SLS soccer ball tramp  R 3x15   R 3x10 R 3x10        REIL  3x15 3x15 3x15  3x15       Incline table push up  3x15  3x10 R and L  3x10       Shallow crunch  3x15    3x15        oblique crunch  3x15    3x15       Curl press 15#   15# 3X10 15# 3x10        LP u/l 90/ 3x10            Calf press                           Horiz abd              Horiz add             Retro walk  45/ 50/ 3x10   50/ 3x10 50/ 5x       overhead tricep 13/   13/ 3x10         Agility ladder             TB side step bicep curl B 3x 10# u/l HR                          [place] punch 30/ 3x10    30/ 3x10        PKFS             Pronation  bicep curl             Reverse crunch lift      3x15       Prone hip ext             ITB stretch strap  :15x5 :15x5   :15x5       Prone opp UE/LE raises    3x10 3x10        Oblique dumbbell 30# 30# 3x10   30# 3x10        Lat stretch             90-90 wall stretch             Elliptical   5' 5'  5'        Reverse Crunch  3x15    3x15       PETRA at counter  3x15 3x15 3x15 3x15        prone plank :40x3 :40x3    :45x3       Slider lunge   3x10          [place] punches 25/ 3x10            [place] baseball swing hip abd 20/  11/ 3x10          Forward kicks [place] 10/  12/ 3x15   15/ 3x10        kick backs [place] 10/  15/ 3x15   15/ 3x10       Step ups 8" 15#                   Modalities  7/31 8/4 8/6 8/7 8/10 8/12     7/29   EFX               Vasocompression

## 2020-08-13 ENCOUNTER — APPOINTMENT (OUTPATIENT)
Dept: PHYSICAL THERAPY | Facility: CLINIC | Age: 70
End: 2020-08-13
Payer: COMMERCIAL

## 2020-08-13 ENCOUNTER — OFFICE VISIT (OUTPATIENT)
Dept: PHYSICAL THERAPY | Facility: CLINIC | Age: 70
End: 2020-08-13
Payer: COMMERCIAL

## 2020-08-13 DIAGNOSIS — M75.102 LEFT ROTATOR CUFF TEAR ARTHROPATHY: Primary | ICD-10-CM

## 2020-08-13 DIAGNOSIS — M25.562 PAIN IN BOTH KNEES, UNSPECIFIED CHRONICITY: ICD-10-CM

## 2020-08-13 DIAGNOSIS — M25.561 PAIN IN BOTH KNEES, UNSPECIFIED CHRONICITY: ICD-10-CM

## 2020-08-13 DIAGNOSIS — M47.816 LUMBAR SPONDYLOSIS: ICD-10-CM

## 2020-08-13 DIAGNOSIS — M12.812 LEFT ROTATOR CUFF TEAR ARTHROPATHY: Primary | ICD-10-CM

## 2020-08-13 PROCEDURE — 97140 MANUAL THERAPY 1/> REGIONS: CPT | Performed by: PHYSICAL THERAPIST

## 2020-08-13 PROCEDURE — 97530 THERAPEUTIC ACTIVITIES: CPT | Performed by: PHYSICAL THERAPIST

## 2020-08-13 PROCEDURE — 97110 THERAPEUTIC EXERCISES: CPT | Performed by: PHYSICAL THERAPIST

## 2020-08-13 NOTE — PROGRESS NOTES
Daily Note     Today's date: 2020  Patient name: Kindra Glaser  : 1950  MRN: [id number]  Referring provider: Kyaw Meza DO  Dx:   Encounter Diagnosis     ICD-10-CM    1  Left rotator cuff tear arthropathy  M75 102     M12 812    2  Pain in both knees, unspecified chronicity  M25 561     M25 562    3  Lumbar spondylosis  M47 816      a             Subjective: Pt reported that he has been discouraged about his regression about his R knee, however stated that his pain has been improving significantly with his back  Notes no functional deficits due to back pain  Objective: See treatment diary below      Assessment: Tolerated treatment well  Due to persisting pain and weakness from recent knee flare up, decreased weight with u/l LP and decreased height of lateral step down, which he performed well without provocation of pain  Patient demonstrated fatigue post treatment, exhibited good technique with therapeutic exercises and would benefit from continued PT  Plan: Continue per plan of care        Precautions: standard OA    Manual  7/31 8/4 8/6 8/7 8/10 8/12 8/13      Jt mobs, PROM str,  JZ-IASTM JZ-IASTM JZ-IASTM JZ-IASTM JZ-IASTM JZ-IASTM JZ-IASTM                                                                        Exercise Diary  7/31 8/4 8/6 8/7 8/10 8/12 8/13      Pallof w/ rotation 30/ x30ea   30/ 3x10 30/ 3x10        Sh exten 25/ 25/ 3x10           Row  50/ 50/ 3x10  50/ 3x10  50/ 3x10       SLS soccer ball tramp  R 3x15   R 3x10 R 3x10        REIL  3x15 3x15 3x15  3x15 3x15      Incline table push up  3x15  3x10 R and L  3x10       Shallow crunch  3x15    3x15 3x15       oblique crunch  3x15    3x15 3x15      Curl press 15#   15# 3X10 15# 3x10        LP u/l 90/ 3x10      70/ 3x10      Calf press                           Horiz abd             Lateral step down       4" 3x10      Retro walk  45/ 50/ 3x10   50/ 3x10 50/ 5x       overhead tricep / 3x10         Agility ladder TB side step bicep curl B 3x 10#            u/l HR                          [place] punch 30/ 3x10    30/ 3x10        PKFS             Pronation  bicep curl             Reverse crunch lift      3x15 3x15      Prone hip ext             ITB stretch strap  :15x5 :15x5   :15x5 :15x5      Prone opp UE/LE raises    3x10 3x10        Oblique dumbbell 30# 30# 3x10   30# 3x10        Lat stretch             90-90 wall stretch             Elliptical   5' 5'  5'        Reverse Crunch  3x15    3x15 3x15      PETRA at counter  3x15 3x15 3x15 3x15  3x15      prone plank :40x3 :40x3    :45x3 :45x3      Slider lunge   3x10          [place] punches 25/ 3x10            [place] baseball swing hip abd 20/  11/ 3x10          Forward kicks [place] 10/  12/ 3x15   15/ 3x10        kick backs [place] 10/  15/ 3x15   15/ 3x10       Step ups 8" 15#                   Modalities  7/31 8/4 8/6 8/7 8/10 8/12 8/13    7/29   EFX               Vasocompression

## 2020-08-14 ENCOUNTER — APPOINTMENT (OUTPATIENT)
Dept: PHYSICAL THERAPY | Facility: CLINIC | Age: 70
End: 2020-08-14
Payer: COMMERCIAL

## 2020-08-18 ENCOUNTER — OFFICE VISIT (OUTPATIENT)
Dept: PHYSICAL THERAPY | Facility: CLINIC | Age: 70
End: 2020-08-18
Payer: COMMERCIAL

## 2020-08-18 DIAGNOSIS — M12.812 LEFT ROTATOR CUFF TEAR ARTHROPATHY: Primary | ICD-10-CM

## 2020-08-18 DIAGNOSIS — M75.102 LEFT ROTATOR CUFF TEAR ARTHROPATHY: Primary | ICD-10-CM

## 2020-08-18 DIAGNOSIS — M25.561 PAIN IN BOTH KNEES, UNSPECIFIED CHRONICITY: ICD-10-CM

## 2020-08-18 DIAGNOSIS — M25.562 PAIN IN BOTH KNEES, UNSPECIFIED CHRONICITY: ICD-10-CM

## 2020-08-18 DIAGNOSIS — M47.816 LUMBAR SPONDYLOSIS: ICD-10-CM

## 2020-08-18 PROCEDURE — 97110 THERAPEUTIC EXERCISES: CPT | Performed by: PHYSICAL THERAPIST

## 2020-08-18 PROCEDURE — 97140 MANUAL THERAPY 1/> REGIONS: CPT | Performed by: PHYSICAL THERAPIST

## 2020-08-18 PROCEDURE — 97112 NEUROMUSCULAR REEDUCATION: CPT | Performed by: PHYSICAL THERAPIST

## 2020-08-18 NOTE — PROGRESS NOTES
Daily Note     Today's date: 2020  Patient name: Cecily Welch  : 1950  MRN: [id number]  Referring provider: Aries Ulloa DO  Dx:   Encounter Diagnosis     ICD-10-CM    1  Left rotator cuff tear arthropathy  M75 102     M12 812    2  Pain in both knees, unspecified chronicity  M25 561     M25 562    3  Lumbar spondylosis  M47 816                   Subjective: Pt reported that he felt significant soreness in his R knee following the last session      Objective: See treatment diary below      Assessment: Tolerated treatment well  Performed stretches well without provocation of pain  Patient demonstrated fatigue post treatment, exhibited good technique with therapeutic exercises and would benefit from continued PT  Plan: Continue per plan of care        Precautions: standard OA    Manual  7/31 8/4 8/6 8/7 8/10 8/12 8/13 8/18     Jt mobs, PROM str,  JZ-IASTM JZ-IASTM JZ-IASTM JZ-IASTM JZ-IASTM JZ-IASTM JZ-IASTM JZ-IASTM                                                                       Exercise Diary  7/31 8/4 8/6 8/7 8/10 8/12 8/13 8/18     Pallof w/ rotation 30/ x30ea   30/ 3x10 30/ 3x10        Sh exten 25/ 25/ 3x10      12/ 3x10     Row  50/ 50/ 3x10  50/ 3x10  50/ 3x10  U/l 40/ 3x10     SLS soccer ball tramp  R 3x15   R 3x10 R 3x10        REIL  3x15 3x15 3x15  3x15 3x15      Incline table push up  3x15  3x10 R and L  3x10       Shallow crunch  3x15    3x15 3x15       oblique crunch  3x15    3x15 3x15      Curl press 15#   15# 3X10 15# 3x10        LP u/l 90/ 3x10      70/ 3x10 70/ 3x10     Calf press                           Horiz abd             Lateral step down       4" 3x10      Retro walk  45/ 50/ 3x10   50/ 3x10 50/ 5x       overhead tricep 13/   13/ 3x10    15/ 3x10     Agility ladder             TB side step bicep curl B 3x 10#       B3x     u/l HR             Knee extension stretch        :15x5     [place] punch 30/ 3x10    30/ 3x10        PKFS        :15x5     Pronation  bicep curl             Reverse crunch lift      3x15 3x15      Prone hip ext             ITB stretch strap  :15x5 :15x5   :15x5 :15x5 :15x5     Prone opp UE/LE raises    3x10 3x10        Oblique dumbbell 30# 30# 3x10   30# 3x10        Lat stretch             90-90 wall stretch             Elliptical   5' 5'  5'        Reverse Crunch  3x15    3x15 3x15      PETRA at counter  3x15 3x15 3x15 3x15  3x15 3x15     prone plank :40x3 :40x3    :45x3 :45x3      Slider lunge   3x10          [place] punches 25/ 3x10       25/ 3x10     [place] baseball swing hip abd 20/  11/ 3x10          Forward kicks [place] 10/  12/ 3x15   15/ 3x10        kick backs [place] 10/  15/ 3x15   15/ 3x10       Step ups 8" 15#                   Modalities  7/31 8/4 8/6 8/7 8/10 8/12 8/13    7/29   EFX               Vasocompression

## 2020-08-20 ENCOUNTER — EVALUATION (OUTPATIENT)
Dept: PHYSICAL THERAPY | Facility: CLINIC | Age: 70
End: 2020-08-20
Payer: COMMERCIAL

## 2020-08-20 DIAGNOSIS — M47.816 LUMBAR SPONDYLOSIS: ICD-10-CM

## 2020-08-20 DIAGNOSIS — M75.102 LEFT ROTATOR CUFF TEAR ARTHROPATHY: Primary | ICD-10-CM

## 2020-08-20 DIAGNOSIS — M25.561 PAIN IN BOTH KNEES, UNSPECIFIED CHRONICITY: ICD-10-CM

## 2020-08-20 DIAGNOSIS — M25.562 PAIN IN BOTH KNEES, UNSPECIFIED CHRONICITY: ICD-10-CM

## 2020-08-20 DIAGNOSIS — M12.812 LEFT ROTATOR CUFF TEAR ARTHROPATHY: Primary | ICD-10-CM

## 2020-08-20 PROCEDURE — 97530 THERAPEUTIC ACTIVITIES: CPT | Performed by: PHYSICAL THERAPIST

## 2020-08-20 PROCEDURE — 97110 THERAPEUTIC EXERCISES: CPT | Performed by: PHYSICAL THERAPIST

## 2020-08-20 PROCEDURE — 97140 MANUAL THERAPY 1/> REGIONS: CPT | Performed by: PHYSICAL THERAPIST

## 2020-08-20 NOTE — PROGRESS NOTES
Daily Note     Today's date: 2020  Patient name: Adama Robles  : 1950  MRN: [id number]  Referring provider: Roverto Dennison DO  Dx:   Encounter Diagnosis     ICD-10-CM    1  Left rotator cuff tear arthropathy  M75 102     M12 812    2  Pain in both knees, unspecified chronicity  M25 561     M25 562    3  Lumbar spondylosis  M47 816                   Subjective: Pt reported that he is still frustrated with his set back of pain with his R knee  Stated that he is improving significantly with shoulder and lumbar spine  Objective: See treatment diary below      Assessment: Tolerated treatment well  Had significant reduction in symptoms following cupping treatment to lateral knee  Able to return to performing step ups and lateral step downs without provocation of pain, demonstrating significant improvement in functional strength  Patient demonstrated fatigue post treatment, exhibited good technique with therapeutic exercises and would benefit from continued PT  Plan: Continue per plan of care        Precautions: standard OA    Manual  7/31 8/4 8/6 8/7 8/10 8/12 8/13 8/18 8/20    Jt mobs, PROM str,  JZ-IASTM JZ-IASTM JZ-IASTM JZ-IASTM JZ-IASTM JZ-IASTM JZ-IASTM JZ-IASTM JZ     Cupping R lateral knee         JZ                                                        Exercise Diary  7/31 8/4 8/6 8/7 8/10 8/12 8/13 8/18 8/20    Pallof w/ rotation 30/ x30ea   30/ 3x10 30/ 3x10        Sh exten 25/ 25/ 3x10      12/ 3x10     Row  50/ 50/ 3x10  50/ 3x10  50/ 3x10  U/l 40/ 3x10     SLS soccer ball tramp  R 3x15   R 3x10 R 3x10        REIL  3x15 3x15 3x15  3x15 3x15      Incline table push up  3x15  3x10 R and L  3x10       Shallow crunch  3x15    3x15 3x15       oblique crunch  3x15    3x15 3x15      Curl press 15#   15# 3X10 15# 3x10        LP u/l 90/ 3x10      70/ 3x10 70/ 3x10     Calf press              Step ups         R 6" 3x10  L 8" 3x10    Lateral step down       4" 3x10  6" 3x10    Retro walk  45/ 50/ 3x10   50/ 3x10 50/ 5x       overhead tricep 13/   13/ 3x10    15/ 3x10     Agility ladder             TB side step bicep curl B 3x 10#       B3x     u/l HR             Knee extension stretch        :15x5 :15x5    [place] punch 30/ 3x10    30/ 3x10        PKFS        :15x5 :15x5    Pronation  bicep curl             Reverse crunch lift      3x15 3x15  3x15    Prone hip ext             ITB stretch strap  :15x5 :15x5   :15x5 :15x5 :15x5 :15x5    Prone opp UE/LE raises    3x10 3x10        Oblique dumbbell 30# 30# 3x10   30# 3x10        Lat stretch             90-90 wall stretch             Elliptical   5' 5'  5'        Reverse Crunch  3x15    3x15 3x15  3x15    PETRA at counter  3x15 3x15 3x15 3x15  3x15 3x15 3x15    prone plank :40x3 :40x3    :45x3 :45x3      Slider lunge   3x10          [place] punches 25/ 3x10       25/ 3x10     [place] baseball swing hip abd 20/  11/ 3x10          Forward kicks [place] 10/  12/ 3x15   15/ 3x10        kick backs [place] 10/  15/ 3x15   15/ 3x10             Modalities  7/31 8/4 8/6 8/7 8/10 8/12 8/13    7/29   EFX               Vasocompression

## 2020-08-21 ENCOUNTER — OFFICE VISIT (OUTPATIENT)
Dept: PHYSICAL THERAPY | Facility: CLINIC | Age: 70
End: 2020-08-21
Payer: COMMERCIAL

## 2020-08-21 DIAGNOSIS — M25.561 PAIN IN BOTH KNEES, UNSPECIFIED CHRONICITY: ICD-10-CM

## 2020-08-21 DIAGNOSIS — M25.562 PAIN IN BOTH KNEES, UNSPECIFIED CHRONICITY: ICD-10-CM

## 2020-08-21 DIAGNOSIS — M75.102 LEFT ROTATOR CUFF TEAR ARTHROPATHY: Primary | ICD-10-CM

## 2020-08-21 DIAGNOSIS — M47.816 LUMBAR SPONDYLOSIS: ICD-10-CM

## 2020-08-21 DIAGNOSIS — M12.812 LEFT ROTATOR CUFF TEAR ARTHROPATHY: Primary | ICD-10-CM

## 2020-08-21 PROCEDURE — 97110 THERAPEUTIC EXERCISES: CPT | Performed by: PHYSICAL THERAPIST

## 2020-08-21 PROCEDURE — 97140 MANUAL THERAPY 1/> REGIONS: CPT | Performed by: PHYSICAL THERAPIST

## 2020-08-21 NOTE — PROGRESS NOTES
Daily Note     Today's date: 2020  Patient name: Cecily Wlech  : 1950  MRN: [id number]  Referring provider: Aries Ulloa DO  Dx:   Encounter Diagnosis     ICD-10-CM    1  Left rotator cuff tear arthropathy  M75 102     M12 812    2  Pain in both knees, unspecified chronicity  M25 561     M25 562    3  Lumbar spondylosis  M47 816                   Subjective: Pt reported that he felt a significant improvement in pain following the initiation of cupping last session, however noted that the pain did return upon waking up this morning  Objective: See treatment diary below      Assessment: Tolerated treatment well  Held step ups and downs due to muscle soreness following last session  Performed UE resistance training well without provocation of pain  Patient demonstrated fatigue post treatment, exhibited good technique with therapeutic exercises and would benefit from continued PT  Plan: Continue per plan of care        Precautions: standard OA    Manual  7/31 8/4 8/6 8/7 8/10 8/12 8/13 8/18 8/20 8/21   Jt mobs, PROM str,  JZ-IASTM JZ-IASTM JZ-IASTM JZ-IASTM JZ-IASTM JZ-IASTM JZ-IASTM JZ-IASTM JZ JZ    Cupping R lateral knee         JZ JZ                                                      Exercise Diary  7/31 8/4 8/6 8/7 8/10 8/12 8/13 8/18 8/20 8/21   Pallof w/ rotation 30/ x30ea   30/ 3x10 30/ 3x10        Sh exten 25/ 25/ 3x10      12/ 3x10  15/ 3x10   Row  50/ 50/ 3x10  50/ 3x10  50/ 3x10  U/l 40/ 3x10  U/l 40/ 3x10   SLS soccer ball tramp  R 3x15   R 3x10 R 3x10     R 3x10   REIL  3x15 3x15 3x15  3x15 3x15      Incline table push up  3x15  3x10 R and L  3x10       Shallow crunch  3x15    3x15 3x15       oblique crunch  3x15    3x15 3x15      Curl press 15#   15# 3X10 15# 3x10        LP u/l 90/ 3x10      70/ 3x10 70/ 3x10     Calf press              Step ups         R 6" 3x10  L 8" 3x10    Lateral step down       4" 3x10  6" 3x10    Retro walk  45/ 50/ 3x10   50/ 3x10 50/ 5x overhead tricep 13/   13/ 3x10    15/ 3x10  15/ 3x10   Agility ladder             TB side step bicep curl B 3x 10#       B3x     u/l HR             Knee extension stretch        :15x5 :15x5 :15x5   [place] punch 30/ 3x10    30/ 3x10        PKFS        :15x5 :15x5 :15x5   Pronation  bicep curl             Reverse crunch lift      3x15 3x15  3x15    Prone hip ext             ITB stretch strap  :15x5 :15x5   :15x5 :15x5 :15x5 :15x5 :15x5   Prone opp UE/LE raises    3x10 3x10        Oblique dumbbell 30# 30# 3x10   30# 3x10        Lat stretch             90-90 wall stretch             Elliptical   5' 5'  5'        Reverse Crunch  3x15    3x15 3x15  3x15    PETRA at counter  3x15 3x15 3x15 3x15  3x15 3x15 3x15    prone plank :40x3 :40x3    :45x3 :45x3      Slider lunge   3x10          [place] punches 25/ 3x10       25/ 3x10  25/ 3x10   [place] baseball swing hip abd 20/  11/ 3x10          Forward kicks [place] 10/  12/ 3x15   15/ 3x10        kick backs [place] 10/  15/ 3x15   15/ 3x10             Modalities  7/31 8/4 8/6 8/7 8/10 8/12 8/13 8/21   EFX              Vasocompression

## 2020-08-24 ENCOUNTER — OFFICE VISIT (OUTPATIENT)
Dept: PHYSICAL THERAPY | Facility: CLINIC | Age: 70
End: 2020-08-24
Payer: COMMERCIAL

## 2020-08-24 ENCOUNTER — TELEPHONE (OUTPATIENT)
Dept: FAMILY MEDICINE CLINIC | Facility: CLINIC | Age: 70
End: 2020-08-24

## 2020-08-24 DIAGNOSIS — M25.562 PAIN IN BOTH KNEES, UNSPECIFIED CHRONICITY: ICD-10-CM

## 2020-08-24 DIAGNOSIS — M47.816 LUMBAR SPONDYLOSIS: ICD-10-CM

## 2020-08-24 DIAGNOSIS — M75.102 LEFT ROTATOR CUFF TEAR ARTHROPATHY: Primary | ICD-10-CM

## 2020-08-24 DIAGNOSIS — M25.561 PAIN IN BOTH KNEES, UNSPECIFIED CHRONICITY: ICD-10-CM

## 2020-08-24 DIAGNOSIS — M12.812 LEFT ROTATOR CUFF TEAR ARTHROPATHY: Primary | ICD-10-CM

## 2020-08-24 PROCEDURE — 97110 THERAPEUTIC EXERCISES: CPT | Performed by: PHYSICAL THERAPIST

## 2020-08-24 PROCEDURE — 97140 MANUAL THERAPY 1/> REGIONS: CPT | Performed by: PHYSICAL THERAPIST

## 2020-08-24 NOTE — PROGRESS NOTES
Daily Note     Today's date: 2020  Patient name: Estefania Jose  : 1950  MRN: [id number]  Referring provider: Leigh Moore DO  Dx:   Encounter Diagnosis     ICD-10-CM    1  Left rotator cuff tear arthropathy  M75 102     M12 812    2  Pain in both knees, unspecified chronicity  M25 561     M25 562    3  Lumbar spondylosis  M47 816                   Subjective: Pt reported that he is improving with his knee, however still is having pain  Objective: See treatment diary below      Assessment: Tolerated treatment well  Noted improved flexibility and decreased pain with cupping  Able to perform step ups and leg press well without provocation of pain  Patient demonstrated fatigue post treatment, exhibited good technique with therapeutic exercises and would benefit from continued PT  Plan: Continue per plan of care        Precautions: standard OA    Manual              Jt mobs, PROM str,               Cupping R lateral knee JZ                                                            Exercise Diary     Pallof w/ rotation             Sh exten u/l 15/ 3x10         15/ 3x10   Row u/l 40/ 3x10            SLS soccer ball tramp  R 3x10         R 3x10   REIL             Incline table push up             Shallow crunch              oblique crunch             Curl press 15# 3x10            LP u/l 70/ 3x10            Calf press  70/ 3x10            Step ups R 6" 3x10  L 8" 3x10            Lateral step down         6" 3x10    Retro walk  50/ 2x10            overhead tricep 15/ 3x10         15/ 3x10   Agility ladder             TB side step bicep curl             u/l HR             Knee extension stretch         :15x5 :15x5   [place] punch 15/             PKFS         :15x5 :15x5   Pronation  bicep curl             Reverse crunch lift         3x15    Prone hip ext             ITB stretch strap         :15x5 :15x5   Prone opp UE/LE raises             Oblique dumbbell             Lat stretch             90-90 wall stretch             Elliptical             Reverse Crunch         3x15    PETRA at counter         3x15    prone plank             Slider lunge             [place] punches          25/ 3x10   [place] baseball swing hip abd             Forward kicks [employer] backs [place]                   Modalities  8/24            EFX              Vasocompression

## 2020-08-24 NOTE — TELEPHONE ENCOUNTER
Lad order needed for upcoming appointment 09/16/2020  Please review order for [facility]   Thanks

## 2020-08-25 DIAGNOSIS — E11.9 TYPE 2 DIABETES MELLITUS WITHOUT COMPLICATION, WITHOUT LONG-TERM CURRENT USE OF INSULIN (HCC): Primary | ICD-10-CM

## 2020-08-26 ENCOUNTER — OFFICE VISIT (OUTPATIENT)
Dept: PHYSICAL THERAPY | Facility: CLINIC | Age: 70
End: 2020-08-26
Payer: COMMERCIAL

## 2020-08-26 DIAGNOSIS — M25.561 PAIN IN BOTH KNEES, UNSPECIFIED CHRONICITY: ICD-10-CM

## 2020-08-26 DIAGNOSIS — M47.816 LUMBAR SPONDYLOSIS: ICD-10-CM

## 2020-08-26 DIAGNOSIS — M75.102 LEFT ROTATOR CUFF TEAR ARTHROPATHY: Primary | ICD-10-CM

## 2020-08-26 DIAGNOSIS — M25.562 PAIN IN BOTH KNEES, UNSPECIFIED CHRONICITY: ICD-10-CM

## 2020-08-26 DIAGNOSIS — M12.812 LEFT ROTATOR CUFF TEAR ARTHROPATHY: Primary | ICD-10-CM

## 2020-08-26 PROCEDURE — 97112 NEUROMUSCULAR REEDUCATION: CPT | Performed by: PHYSICAL THERAPIST

## 2020-08-26 PROCEDURE — 97140 MANUAL THERAPY 1/> REGIONS: CPT | Performed by: PHYSICAL THERAPIST

## 2020-08-26 PROCEDURE — 97110 THERAPEUTIC EXERCISES: CPT | Performed by: PHYSICAL THERAPIST

## 2020-08-26 NOTE — PROGRESS NOTES
Daily Note     Today's date: 2020  Patient name: Ridge Freire  : 1950  MRN: [id number]  Referring provider: Dell Buenrostro DO  Dx:   Encounter Diagnosis     ICD-10-CM    1  Left rotator cuff tear arthropathy  M75 102     M12 812    2  Pain in both knees, unspecified chronicity  M25 561     M25 562    3  Lumbar spondylosis  M47 816                   Subjective: Pt reported that yesterday he had to sit for about 12 hours because he had to work on computer issues in his home  Noted that due to the increased sitting times "everything tightened up " Reported his knee extension ROM has been lacking since waking up this morning, causing him to limp  Objective: See treatment diary below      Assessment: Tolerated treatment well  Emphasized knee extension ROM with mobs and PROM stretches, which did improve flexibility  Demonstrated improved lumbar flexibility with prone press ups and standing extensions, reflecting effectiveness of treatment  Continues to be limited due to knee pain  Patient demonstrated fatigue post treatment, exhibited good technique with therapeutic exercises and would benefit from continued PT      Plan: Continue per plan of care        Precautions: standard OA    Manual             Jt mobs, PROM str,   JZ- knee extension             Cupping R lateral knee JZ                                                            Exercise Diary     Pallof w/ rotation             Sh exten u/l 15/ 3x10         15/ 3x10   Row u/l 40/ 3x10            SLS soccer ball tramp  R 3x10 R 3x10        R 3x10   REIL  3x15           Incline table push up             Shallow crunch  3x15            oblique crunch  3x15           Curl press 15# 3x10            LP u/l 70/ 3x10            Calf press  70/ 3x10            Step ups R 6" 3x10  L 8" 3x10 6" 3x10           Lateral step down         6" 3x10    Retro walk  50/ 2x10            overhead tricep 15/ 3x10         15/ 3x10 Agility ladder             TB side step bicep curl             u/l HR  3x10           Knee extension stretch         :15x5 :15x5   [place] punch 15/             PKFS  :15x5       :15x5 :15x5   Pronation  bicep curl             Reverse crunch lift  3x15       3x15    Prone hip ext             ITB stretch strap  :15x5       :15x5 :15x5   Prone opp UE/LE raises             Oblique dumbbell  30# 3x10           Lat stretch             90-90 wall stretch  :15x5           Elliptical                      3x15    PETRA at counter  3x15       3x15    prone plank             Slider lunge             [place] punches          25/ 3x10   [place] baseball swing hip abd             Forward kicks Ellie Abler backs [place]                   Modalities  8/24 8/26           EFX              Vasocompression

## 2020-08-28 ENCOUNTER — OFFICE VISIT (OUTPATIENT)
Dept: PHYSICAL THERAPY | Facility: CLINIC | Age: 70
End: 2020-08-28
Payer: COMMERCIAL

## 2020-08-28 DIAGNOSIS — M75.102 LEFT ROTATOR CUFF TEAR ARTHROPATHY: Primary | ICD-10-CM

## 2020-08-28 DIAGNOSIS — M12.812 LEFT ROTATOR CUFF TEAR ARTHROPATHY: Primary | ICD-10-CM

## 2020-08-28 DIAGNOSIS — M47.816 LUMBAR SPONDYLOSIS: ICD-10-CM

## 2020-08-28 DIAGNOSIS — M25.562 PAIN IN BOTH KNEES, UNSPECIFIED CHRONICITY: ICD-10-CM

## 2020-08-28 DIAGNOSIS — M25.561 PAIN IN BOTH KNEES, UNSPECIFIED CHRONICITY: ICD-10-CM

## 2020-08-28 PROCEDURE — 97110 THERAPEUTIC EXERCISES: CPT | Performed by: PHYSICAL THERAPIST

## 2020-08-28 PROCEDURE — 97112 NEUROMUSCULAR REEDUCATION: CPT | Performed by: PHYSICAL THERAPIST

## 2020-08-28 PROCEDURE — 97140 MANUAL THERAPY 1/> REGIONS: CPT | Performed by: PHYSICAL THERAPIST

## 2020-08-28 PROCEDURE — 97530 THERAPEUTIC ACTIVITIES: CPT | Performed by: PHYSICAL THERAPIST

## 2020-08-28 NOTE — PROGRESS NOTES
Daily Note     Today's date: 2020  Patient name: Corrie Martinez  : 1950  MRN: [id number]  Referring provider: Alexandra Park DO  Dx:   Encounter Diagnosis     ICD-10-CM    1  Left rotator cuff tear arthropathy  M75 102     M12 812    2  Pain in both knees, unspecified chronicity  M25 561     M25 562    3  Lumbar spondylosis  M47 816                   Subjective: Pt reported that he is improving with knee pain, however continues to have difficulty with descending and ascending stairs  Objective: See treatment diary below      Assessment: Tolerated treatment well  Noted improved pain with cupping and manual knee extension stretches  In order to address glute med weakness, initiated s/l hip abduction with back to wall, which he performed well without pain  Patient demonstrated fatigue post treatment, exhibited good technique with therapeutic exercises and would benefit from continued PT  Plan: Continue per plan of care        Precautions: standard OA    Manual            Jt mobs, PROM str,   JZ- knee extension  JZ- knee extension            Cupping R lateral knee JZ  JZ                                                          Exercise Diary     Pallof w/ rotation             Sh exten u/l 15/ 3x10  20/ 3x10       15/ 3x10   Row u/l 40/ 3x10  50/ 3x10          SLS soccer ball tramp  R 3x10 R 3x10 R 3x10       R 3x10   REIL  3x15 3x15          Incline table push up             Shallow crunch  3x15            oblique crunch  3x15           Curl press 15# 3x10  15# 3x10          LP u/l 70/ 3x10  70/3x10          Calf press  70/ 3x10            Step ups R 6" 3x10  L 8" 3x10 6" 3x10 6" 3x10          Lateral step down         6" 3x10    Retro walk  50/ 2x10  60/ 3x10          overhead tricep 15/ 3x10         15/ 3x10   S/l hip abd   3x10ea          TB side step bicep curl             u/l HR  3x10 3x10          Knee extension stretch         :15x5 :15x5   [place] punch 15/             PKFS  :15x5       :15x5 :15x5   Pronation  bicep curl             Reverse crunch lift  3x15 3x15      3x15    Prone hip ext             ITB stretch strap  :15x5 :15x5      :15x5 :15x5   Prone opp UE/LE raises             Oblique dumbbell  30# 3x10           Lat stretch             90-90 wall stretch  :15x5 :15x5          Elliptical                      3x15    PETRA at counter  3x15 3x15      3x15    prone plank             Slider lunge             [place] punches          25/ 3x10   [place] baseball swing hip abd             Forward kicks Lenoria Asp backs [place]                   Modalities  8/24 8/26 8/28          EFX              Vasocompression

## 2020-08-31 ENCOUNTER — OFFICE VISIT (OUTPATIENT)
Dept: PHYSICAL THERAPY | Facility: CLINIC | Age: 70
End: 2020-08-31
Payer: COMMERCIAL

## 2020-08-31 DIAGNOSIS — M75.102 LEFT ROTATOR CUFF TEAR ARTHROPATHY: Primary | ICD-10-CM

## 2020-08-31 DIAGNOSIS — M25.562 PAIN IN BOTH KNEES, UNSPECIFIED CHRONICITY: ICD-10-CM

## 2020-08-31 DIAGNOSIS — M47.816 LUMBAR SPONDYLOSIS: ICD-10-CM

## 2020-08-31 DIAGNOSIS — M25.561 PAIN IN BOTH KNEES, UNSPECIFIED CHRONICITY: ICD-10-CM

## 2020-08-31 DIAGNOSIS — M12.812 LEFT ROTATOR CUFF TEAR ARTHROPATHY: Primary | ICD-10-CM

## 2020-08-31 PROCEDURE — 97110 THERAPEUTIC EXERCISES: CPT | Performed by: PHYSICAL THERAPIST

## 2020-08-31 PROCEDURE — 97140 MANUAL THERAPY 1/> REGIONS: CPT | Performed by: PHYSICAL THERAPIST

## 2020-08-31 NOTE — PROGRESS NOTES
Daily Note     Today's date: 2020  Patient name: Porfirio Gitelman  : 1950  MRN: [id number]  Referring provider: Omer Guerrero DO  Dx:   Encounter Diagnosis     ICD-10-CM    1  Left rotator cuff tear arthropathy  M75 102     M12 812    2  Pain in both knees, unspecified chronicity  M25 561     M25 562    3  Lumbar spondylosis  M47 816                   Subjective: Pt reported that he would be comfortable switching to PT 2x weekly instead of 3, since he is growing in comfort and ability with his HEP  Objective: See treatment diary below      Assessment: Tolerated treatment well  Pt performed s/l hip abduction with back to wall with improved rep height, reflecting improved strength compared to first set  Patient demonstrated fatigue post treatment, exhibited good technique with therapeutic exercises and would benefit from continued PT  Plan: Continue per plan of care        Precautions: standard OA    Manual           Jt mobs, PROM str,   JZ- knee extension  JZ- knee extension  JZ- knee extension           Cupping R lateral knee JZ  JZ                                                          Exercise Diary     Pallof w/ rotation             Sh exten u/l 15/ 3x10  20/ 3x10       15/ 3x10   Row u/l 40/ 3x10  50/ 3x10 40/ 3x10         SLS soccer ball tramp  R 3x10 R 3x10 R 3x10       R 3x10   REIL  3x15 3x15 3x15         Incline table push up             Shallow crunch  3x15            oblique crunch  3x15           Curl press 15# 3x10  15# 3x10 15# 3x10         LP u/l 70/ 3x10  70/3x10          Calf press  70/ 3x10            Step ups R 6" 3x10  L 8" 3x10 6" 3x10 6" 3x10          Lateral step down         6" 3x10    Retro walk  50/ 2x10  60/ 3x10          overhead tricep 15/ 3x10   15/ 3x10      15/ 3x10   S/l hip abd   3x10ea 3x10         TB side step bicep curl             u/l HR  3x10 3x10 3x10         Knee extension stretch         :15x5 :15x5 [place] punch 15/             PKFS  :15x5  :15x5     :15x5 :15x5   Pronation  bicep curl             Reverse crunch lift  3x15 3x15      3x15    Prone hip ext             ITB stretch strap  :15x5 :15x5      :15x5 :15x5   Prone opp UE/LE raises             Oblique dumbbell  30# 3x10 30# 3x10 30# 3x10         Lat stretch             90-90 wall stretch  :15x5 :15x5          Elliptical                      3x15    PETRA at counter  3x15 3x15      3x15    prone plank    :45x3         Slider lunge             [place] punches          25/ 3x10   [place] baseball swing hip abd             Forward kicks Chrystine Lever backs [place]                   Modalities  8/24 8/26 8/28 8/31         EFX              Vasocompression

## 2020-09-02 ENCOUNTER — OFFICE VISIT (OUTPATIENT)
Dept: PHYSICAL THERAPY | Facility: CLINIC | Age: 70
End: 2020-09-02
Payer: COMMERCIAL

## 2020-09-02 DIAGNOSIS — M25.562 PAIN IN BOTH KNEES, UNSPECIFIED CHRONICITY: ICD-10-CM

## 2020-09-02 DIAGNOSIS — M47.816 LUMBAR SPONDYLOSIS: ICD-10-CM

## 2020-09-02 DIAGNOSIS — M75.102 LEFT ROTATOR CUFF TEAR ARTHROPATHY: Primary | ICD-10-CM

## 2020-09-02 DIAGNOSIS — M12.812 LEFT ROTATOR CUFF TEAR ARTHROPATHY: Primary | ICD-10-CM

## 2020-09-02 DIAGNOSIS — M25.561 PAIN IN BOTH KNEES, UNSPECIFIED CHRONICITY: ICD-10-CM

## 2020-09-02 PROCEDURE — 97110 THERAPEUTIC EXERCISES: CPT | Performed by: PHYSICAL THERAPIST

## 2020-09-02 PROCEDURE — 97140 MANUAL THERAPY 1/> REGIONS: CPT | Performed by: PHYSICAL THERAPIST

## 2020-09-02 NOTE — PROGRESS NOTES
Daily Note     Today's date: 2020  Patient name: Porfirio Gitelman  : 1950  MRN: [id number]  Referring provider: Omer Guerrero DO  Dx:   Encounter Diagnosis     ICD-10-CM    1  Left rotator cuff tear arthropathy  M75 102     M12 812    2  Pain in both knees, unspecified chronicity  M25 561     M25 562    3  Lumbar spondylosis  M47 816        Start Time: 1000  Stop Time: 1045  Total time in clinic (min): 45 minutes    Subjective: Pt reported that he is feeling improved flexibility in his knee because he has been focusing on HEP  Objective: See treatment diary below      Assessment: Tolerated treatment well  Patient demonstrated fatigue post treatment, exhibited good technique with therapeutic exercises and would benefit from continued PT  Plan: Continue per plan of care        Precautions: standard OA    Manual          Jt mobs, PROM str,   JZ- knee extension  JZ- knee extension  JZ- knee extension  JZ         Cupping R lateral knee JZ  JZ  JZ                                                        Exercise Diary   9   Stand hip abd     3x10        Sh exten u/l 15/ 3x10  20/ 3x10       15/ 3x10   Row u/l 40/ 3x10  50/ 3x10 40/ 3x10         SLS soccer ball tramp  R 3x10 R 3x10 R 3x10       R 3x10   REIL  3x15 3x15 3x15 3x15        Incline table push up             Shallow crunch  3x15   3x15         oblique crunch  3x15   3x15        Curl press 15# 3x10  15# 3x10 15# 3x10         LP u/l 70/ 3x10  70/3x10          Calf press  70/ 3x10            Step ups R 6" 3x10  L 8" 3x10 6" 3x10 6" 3x10          Lateral step down         6" 3x10    Retro walk  50/ 2x10  60/ 3x10          overhead tricep 15/ 3x10   15/ 3x10      15/ 3x10   S/l hip abd   3x10ea 3x10 3x10        TB side step bicep curl             u/l HR  3x10 3x10 3x10 3x10        Knee extension stretch         :15x5 :15x5   [place] punch 15/             PKFS  :15x5  :15x5 :15x5    :15x5 :15x5 Pronation  bicep curl             Reverse crunch lift  3x15 3x15  3x15    3x15    Prone hip ext             ITB stretch strap  :15x5 :15x5  :15x5    :15x5 :15x5   Prone opp UE/LE raises             Oblique dumbbell  30# 3x10 30# 3x10 30# 3x10         Lat stretch             90-90 wall stretch  :15x5 :15x5  :15x5        Elliptical                      3x15    PETRA at counter  3x15 3x15      3x15    prone plank    :45x3         Slider lunge             [place] punches          25/ 3x10   [place] baseball swing hip abd             Forward kicks Ladoris Tucker backs [place]                   Modalities  8/24 8/26 8/28 8/31         EFX              Vasocompression

## 2020-09-04 ENCOUNTER — OFFICE VISIT (OUTPATIENT)
Dept: PHYSICAL THERAPY | Facility: CLINIC | Age: 70
End: 2020-09-04
Payer: COMMERCIAL

## 2020-09-04 DIAGNOSIS — M25.562 PAIN IN BOTH KNEES, UNSPECIFIED CHRONICITY: ICD-10-CM

## 2020-09-04 DIAGNOSIS — M75.102 LEFT ROTATOR CUFF TEAR ARTHROPATHY: Primary | ICD-10-CM

## 2020-09-04 DIAGNOSIS — M47.816 LUMBAR SPONDYLOSIS: ICD-10-CM

## 2020-09-04 DIAGNOSIS — M25.561 PAIN IN BOTH KNEES, UNSPECIFIED CHRONICITY: ICD-10-CM

## 2020-09-04 DIAGNOSIS — M12.812 LEFT ROTATOR CUFF TEAR ARTHROPATHY: Primary | ICD-10-CM

## 2020-09-04 PROCEDURE — 97110 THERAPEUTIC EXERCISES: CPT

## 2020-09-04 PROCEDURE — 97140 MANUAL THERAPY 1/> REGIONS: CPT

## 2020-09-04 NOTE — PROGRESS NOTES
Daily Note     Today's date: 2020  Patient name: Porfirio Gitelman  : 1950  MRN: [id number]  Referring provider: Omer Guerrero DO  Dx:   Encounter Diagnosis     ICD-10-CM    1  Left rotator cuff tear arthropathy  M75 102     M12 812    2  Pain in both knees, unspecified chronicity  M25 561     M25 562    3  Lumbar spondylosis  M47 816                   Subjective: Pt reports he continues to have knee discomfort with transitioning from prolonged seated position to standing or knee extension  He is noticing an improvement in overall mobility  Objective: See treatment diary below      Assessment: Gamaliel Rosales continues to progress well with current POC  Pt appropriately challenged with exercises  Hip musculature weakness evident with s/l abduction R>L  Good core strength observed with crunches  Pt would benefit from continued PT in effort to further improve knee ROM and maximize function with reduced pain/frequency of symptoms  Plan: Continue per plan of care        Precautions: standard OA    Manual         Jt mobs, PROM str,   JZ- knee extension  JZ- knee extension  JZ- knee extension  JZ JW        Cupping R lateral knee JZ  JZ  JZ JW                                                       Exercise Diary     Stand hip abd     3x10 3x10       Sh exten u/l 15/ 3x10  20/ 3x10       15/ 3x10   Row u/l 40/ 3x10  50/ 3x10 40/ 3x10         SLS soccer ball tramp  R 3x10 R 3x10 R 3x10       R 3x10   REIL  3x15 3x15 3x15 3x15 3x15       Incline table push up             Shallow crunch  3x15   3x15 3x15        oblique crunch  3x15   3x15 3x15       Curl press 15# 3x10  15# 3x10 15# 3x10         LP u/l 70/ 3x10  70/3x10          Calf press  70/ 3x10            Step ups R 6" 3x10  L 8" 3x10 6" 3x10 6" 3x10          Lateral step down         6" 3x10    Retro walk  50/ 2x10  60/ 3x10          overhead tricep 15/ 3x10   15/ 3x10      15/ 3x10   S/l hip abd 3x10ea 3x10 3x10 3x10       TB side step bicep curl             u/l HR  3x10 3x10 3x10 3x10        Knee extension stretch         :15x5 :15x5   [place] punch 15/             PKFS  :15x5  :15x5 :15x5 :15x5   :15x5 :15x5   Pronation  bicep curl             Reverse crunch lift  3x15 3x15  3x15 3x15   3x15    Prone hip ext             ITB stretch strap  :15x5 :15x5  :15x5 :15x5   :15x5 :15x5   Prone opp UE/LE raises             Oblique dumbbell  30# 3x10 30# 3x10 30# 3x10         Lat stretch             90-90 wall stretch  :15x5 :15x5  :15x5 np       Elliptical                      3x15    PETRA at counter  3x15 3x15      3x15    prone plank    :45x3  :45x3       Slider lunge             [place] punches          25/ 3x10   elida baseball swing hip abd             Forward kicks Raulito Reusing backs [place]                   Modalities  8/24 8/26 8/28 8/31 9/4        EFX              Vasocompression

## 2020-09-08 ENCOUNTER — APPOINTMENT (OUTPATIENT)
Dept: PHYSICAL THERAPY | Facility: CLINIC | Age: 70
End: 2020-09-08
Payer: COMMERCIAL

## 2020-09-09 ENCOUNTER — OFFICE VISIT (OUTPATIENT)
Dept: PHYSICAL THERAPY | Facility: CLINIC | Age: 70
End: 2020-09-09
Payer: COMMERCIAL

## 2020-09-09 DIAGNOSIS — M47.816 LUMBAR SPONDYLOSIS: ICD-10-CM

## 2020-09-09 DIAGNOSIS — M75.102 LEFT ROTATOR CUFF TEAR ARTHROPATHY: Primary | ICD-10-CM

## 2020-09-09 DIAGNOSIS — M12.812 LEFT ROTATOR CUFF TEAR ARTHROPATHY: Primary | ICD-10-CM

## 2020-09-09 DIAGNOSIS — M25.561 PAIN IN BOTH KNEES, UNSPECIFIED CHRONICITY: ICD-10-CM

## 2020-09-09 DIAGNOSIS — M25.562 PAIN IN BOTH KNEES, UNSPECIFIED CHRONICITY: ICD-10-CM

## 2020-09-09 LAB — HBA1C MFR BLD: 5.7 % OF TOTAL HGB

## 2020-09-09 PROCEDURE — 3044F HG A1C LEVEL LT 7.0%: CPT | Performed by: ORTHOPAEDIC SURGERY

## 2020-09-09 PROCEDURE — 97110 THERAPEUTIC EXERCISES: CPT | Performed by: PHYSICAL THERAPIST

## 2020-09-09 PROCEDURE — 97140 MANUAL THERAPY 1/> REGIONS: CPT | Performed by: PHYSICAL THERAPIST

## 2020-09-09 NOTE — PROGRESS NOTES
Daily Note     Today's date: 2020  Patient name: Porfirio Gitelman  : 1950  MRN: [id number]  Referring provider: Omer Guerrero DO  Dx:   Encounter Diagnosis     ICD-10-CM    1  Left rotator cuff tear arthropathy  M75 102     M12 812    2  Pain in both knees, unspecified chronicity  M25 561     M25 562    3  Lumbar spondylosis  M47 816                   Subjective: Pt reported that he is having "a bad day today," stated that he has been driving all morning and just had an eye exam and is having some difficulty with seeing  Objective: See treatment diary below      Assessment: Tolerated treatment well  Patient demonstrated fatigue post treatment, exhibited good technique with therapeutic exercises and would benefit from continued PT  Plan: Continue per plan of care        Precautions: standard OA    Manual        Jt mobs, PROM str,   JZ- knee extension  JZ- knee extension  JZ- knee extension  JZ JW JZ       Cupping R lateral knee JZ  JZ  JZ JW JZ                                                      Exercise Diary     Stand hip abd     3x10 3x10 3x10      Sh exten u/l 15/ 3x10  20/ 3x10    20/ 3x10   15/ 3x10   Row u/l 40/ 3x10  50/ 3x10 40/ 3x10   45/ 3x10      SLS soccer ball tramp  R 3x10 R 3x10 R 3x10       R 3x10   REIL  3x15 3x15 3x15 3x15 3x15       Incline table push up             Shallow crunch  3x15   3x15 3x15        oblique crunch  3x15   3x15 3x15       Curl press 15# 3x10  15# 3x10 15# 3x10   15# 3x10      LP u/l 70/ 3x10  70/3x10          Calf press  70/ 3x10            Step ups R 6" 3x10  L 8" 3x10 6" 3x10 6" 3x10          Lateral step down         6" 3x10    Retro walk  50/ 2x10  60/ 3x10          overhead tricep 15/ 3x10   15/ 3x10   15/ 3x10   15/ 3x10   S/l hip abd   3x10ea 3x10 3x10 3x10 3x10      TB side step bicep curl             u/l HR  3x10 3x10 3x10 3x10  3x10      Knee extension stretch :15x5 :15x5   [place] punch 15/             PKFS  :15x5  :15x5 :15x5 :15x5   :15x5 :15x5   Pronation  bicep curl             Reverse crunch lift  3x15 3x15  3x15 3x15   3x15    Prone hip ext             ITB stretch strap  :15x5 :15x5  :15x5 :15x5   :15x5 :15x5   Prone opp UE/LE raises             Oblique dumbbell  30# 3x10 30# 3x10 30# 3x10         Lat stretch             90-90 wall stretch  :15x5 :15x5  :15x5 np       Elliptical                      3x15    PETRA at counter  3x15 3x15    3x15  3x15    prone plank    :45x3  :45x3       Slider lunge             [place] punches          25/ 3x10   [place] baseball swing hip abd             Forward kicks [place]              kick backs [place]                   Modalities  8/24 8/26 8/28 8/31 9/4 9/9       EFX              Vasocompression

## 2020-09-10 ENCOUNTER — APPOINTMENT (OUTPATIENT)
Dept: PHYSICAL THERAPY | Facility: CLINIC | Age: 70
End: 2020-09-10
Payer: COMMERCIAL

## 2020-09-11 ENCOUNTER — OFFICE VISIT (OUTPATIENT)
Dept: PHYSICAL THERAPY | Facility: CLINIC | Age: 70
End: 2020-09-11
Payer: COMMERCIAL

## 2020-09-11 DIAGNOSIS — M25.561 PAIN IN BOTH KNEES, UNSPECIFIED CHRONICITY: ICD-10-CM

## 2020-09-11 DIAGNOSIS — M25.562 PAIN IN BOTH KNEES, UNSPECIFIED CHRONICITY: ICD-10-CM

## 2020-09-11 DIAGNOSIS — M47.816 LUMBAR SPONDYLOSIS: ICD-10-CM

## 2020-09-11 DIAGNOSIS — M75.102 LEFT ROTATOR CUFF TEAR ARTHROPATHY: Primary | ICD-10-CM

## 2020-09-11 DIAGNOSIS — M12.812 LEFT ROTATOR CUFF TEAR ARTHROPATHY: Primary | ICD-10-CM

## 2020-09-11 PROCEDURE — 97112 NEUROMUSCULAR REEDUCATION: CPT | Performed by: PHYSICAL THERAPIST

## 2020-09-11 PROCEDURE — 97110 THERAPEUTIC EXERCISES: CPT | Performed by: PHYSICAL THERAPIST

## 2020-09-11 PROCEDURE — 97140 MANUAL THERAPY 1/> REGIONS: CPT | Performed by: PHYSICAL THERAPIST

## 2020-09-11 NOTE — PROGRESS NOTES
Daily Note     Today's date: 2020  Patient name: Blue Pittman  : 1950  MRN: [id number]  Referring provider: Kam Flores DO  Dx:   Encounter Diagnosis     ICD-10-CM    1  Left rotator cuff tear arthropathy  M75 102     M12 812    2  Pain in both knees, unspecified chronicity  M25 561     M25 562    3  Lumbar spondylosis  M47 816                   Subjective: Pt reported that he has been performing PKFS with strap at home 2-3x a day and has noted an improvement in flexibility and decreased pain  Objective: See treatment diary below      Assessment: Tolerated treatment well  Patient demonstrated fatigue post treatment, exhibited good technique with therapeutic exercises and would benefit from continued PT  Plan: Continue per plan of care        Precautions: standard OA    Manual       Jt mobs, PROM str,   JZ- knee extension  JZ- knee extension  JZ- knee extension  JZ JW JZ JZ      Cupping R lateral knee JZ  JZ  JZ JW JZ JZ                                                     Exercise Diary       Stand hip abd     3x10 3x10 3x10 3x10     Sh exten u/l 15/ 3x10  20/ 3x10    20/ 3x10      Row u/l 40/ 3x10  50/ 3x10 40/ 3x10   45/ 3x10      SLS soccer ball tramp  R 3x10 R 3x10 R 3x10     R 3x10     REIL  3x15 3x15 3x15 3x15 3x15       Incline table push up             Shallow crunch  3x15   3x15 3x15        oblique crunch  3x15   3x15 3x15       Curl press 15# 3x10  15# 3x10 15# 3x10   15# 3x10      LP u/l 70/ 3x10  70/3x10     150/ 3x10     Calf press  70/ 3x10       150/ 3x10     Step ups R 6" 3x10  L 8" 3x10 6" 3x10 6" 3x10     6" 3x10     Lateral step down             Retro walk  50/ 2x10  60/ 3x10     45/ 30x     overhead tricep 15/ 3x10   15/ 3x10   15/ 3x10      S/l hip abd   3x10ea 3x10 3x10 3x10 3x10 3x10     TB side step bicep curl             u/l HR  3x10 3x10 3x10 3x10  3x10      Knee extension stretch [place] punch 15/             PKFS  :15x5  :15x5 :15x5 :15x5  :15x3     Pronation  bicep curl             Reverse crunch lift  3x15 3x15  3x15 3x15       Prone hip ext             ITB stretch strap  :15x5 :15x5  :15x5 :15x5       Prone opp UE/LE raises             Oblique dumbbell  30# 3x10 30# 3x10 30# 3x10         Lat stretch             90-90 wall stretch  :15x5 :15x5  :15x5 np       Elliptical                          PETRA at counter  3x15 3x15    3x15      prone plank    :45x3  :45x3       Slider lunge             [place] punches        25/ 3x10      [place] baseball swing hip abd             Forward kicks [place]              kick backs [place]                   Modalities  8/24 8/26 8/28 8/31 9/4 9/9 9/11      EFX              Vasocompression

## 2020-09-16 ENCOUNTER — OFFICE VISIT (OUTPATIENT)
Dept: PHYSICAL THERAPY | Facility: CLINIC | Age: 70
End: 2020-09-16
Payer: COMMERCIAL

## 2020-09-16 ENCOUNTER — OFFICE VISIT (OUTPATIENT)
Dept: FAMILY MEDICINE CLINIC | Facility: CLINIC | Age: 70
End: 2020-09-16
Payer: COMMERCIAL

## 2020-09-16 VITALS
OXYGEN SATURATION: 95 % | TEMPERATURE: 97.9 F | HEIGHT: 75 IN | DIASTOLIC BLOOD PRESSURE: 88 MMHG | WEIGHT: 216.25 LBS | SYSTOLIC BLOOD PRESSURE: 110 MMHG | HEART RATE: 65 BPM | BODY MASS INDEX: 26.89 KG/M2

## 2020-09-16 DIAGNOSIS — M75.102 LEFT ROTATOR CUFF TEAR ARTHROPATHY: Primary | ICD-10-CM

## 2020-09-16 DIAGNOSIS — M12.812 LEFT ROTATOR CUFF TEAR ARTHROPATHY: Primary | ICD-10-CM

## 2020-09-16 DIAGNOSIS — E11.40 TYPE 2 DIABETES MELLITUS WITH DIABETIC NEUROPATHY, WITHOUT LONG-TERM CURRENT USE OF INSULIN (HCC): Primary | ICD-10-CM

## 2020-09-16 DIAGNOSIS — M25.562 PAIN IN BOTH KNEES, UNSPECIFIED CHRONICITY: ICD-10-CM

## 2020-09-16 DIAGNOSIS — M47.816 LUMBAR SPONDYLOSIS: ICD-10-CM

## 2020-09-16 DIAGNOSIS — M17.11 PRIMARY OSTEOARTHRITIS OF RIGHT KNEE: ICD-10-CM

## 2020-09-16 DIAGNOSIS — M25.561 PAIN IN BOTH KNEES, UNSPECIFIED CHRONICITY: ICD-10-CM

## 2020-09-16 DIAGNOSIS — I10 BENIGN ESSENTIAL HYPERTENSION: ICD-10-CM

## 2020-09-16 LAB
LEFT EYE DIABETIC RETINOPATHY: NORMAL
RIGHT EYE DIABETIC RETINOPATHY: NORMAL

## 2020-09-16 PROCEDURE — 3079F DIAST BP 80-89 MM HG: CPT | Performed by: FAMILY MEDICINE

## 2020-09-16 PROCEDURE — 97110 THERAPEUTIC EXERCISES: CPT | Performed by: PHYSICAL THERAPIST

## 2020-09-16 PROCEDURE — 99213 OFFICE O/P EST LOW 20 MIN: CPT | Performed by: FAMILY MEDICINE

## 2020-09-16 PROCEDURE — 97112 NEUROMUSCULAR REEDUCATION: CPT | Performed by: PHYSICAL THERAPIST

## 2020-09-16 NOTE — PROGRESS NOTES
Subjective:   Chief Complaint   Patient presents with    Follow-up     pt here for 3 month f/u        Patient ID: Ana Beasley is a 71 y o  male  Patient is here for follow-up regarding his labs  His most recent hemoglobin A1c came back at 5 7  He has been going to the gym and working out regularly  He has pain in his right knee and has had previous surgeries there  He would like a referral to orthopedics to have an evaluation for joint replacement  The following portions of the patient's history were reviewed and updated as appropriate: allergies, current medications, past family history, past medical history, past social history, past surgical history and problem list     Review of Systems   Constitutional: Negative for activity change, appetite change, chills, diaphoresis, fatigue and unexpected weight change  HENT: Negative for congestion, ear discharge, ear pain, hearing loss, nosebleeds and rhinorrhea  Eyes: Negative for pain, redness, itching and visual disturbance  Respiratory: Negative for cough, choking, chest tightness and shortness of breath  Cardiovascular: Negative for chest pain and leg swelling  Gastrointestinal: Negative for abdominal pain, blood in stool, constipation, diarrhea and nausea  Endocrine: Negative for cold intolerance, polydipsia and polyphagia  Genitourinary: Negative for dysuria, frequency, hematuria and urgency  Musculoskeletal: Negative for arthralgias, back pain, gait problem, joint swelling, neck pain and neck stiffness  Skin: Negative for color change and rash  Allergic/Immunologic: Negative for environmental allergies and food allergies  Neurological: Negative for dizziness, tremors, seizures, speech difficulty, numbness and headaches  Hematological: Negative for adenopathy  Does not bruise/bleed easily  Psychiatric/Behavioral: Negative for behavioral problems, dysphoric mood, hallucinations and self-injury  Objective:  Vitals:    09/16/20 0952   BP: 110/88   Pulse: 65   Temp: 97 9 °F (36 6 °C)   SpO2: 95%   Weight: 98 1 kg (216 lb 4 oz)   Height: 6' 3" (1 905 m)      Physical Exam  Constitutional:       General: He is not in acute distress  Appearance: He is well-developed  He is not diaphoretic  HENT:      Head: Normocephalic and atraumatic  Right Ear: External ear normal       Left Ear: External ear normal       Nose: Nose normal       Mouth/Throat:      Pharynx: No oropharyngeal exudate  Eyes:      General: No scleral icterus  Right eye: No discharge  Left eye: No discharge  Conjunctiva/sclera: Conjunctivae normal       Pupils: Pupils are equal, round, and reactive to light  Neck:      Musculoskeletal: Normal range of motion and neck supple  Thyroid: No thyromegaly  Cardiovascular:      Rate and Rhythm: Normal rate and regular rhythm  Heart sounds: Normal heart sounds  No murmur  Pulmonary:      Effort: Pulmonary effort is normal       Breath sounds: Normal breath sounds  No wheezing or rales  Abdominal:      General: Bowel sounds are normal       Palpations: Abdomen is soft  There is no mass  Tenderness: There is no abdominal tenderness  There is no guarding  Musculoskeletal: Normal range of motion  General: No tenderness  Lymphadenopathy:      Cervical: No cervical adenopathy  Skin:     General: Skin is warm and dry  Neurological:      Mental Status: He is alert and oriented to person, place, and time  Deep Tendon Reflexes: Reflexes are normal and symmetric  Psychiatric:         Thought Content: Thought content normal          Judgment: Judgment normal            Assessment/Plan:    No problem-specific Assessment & Plan notes found for this encounter         Diagnoses and all orders for this visit:    Type 2 diabetes mellitus with diabetic neuropathy, without long-term current use of insulin (HCC)    Benign essential hypertension    Primary osteoarthritis of right knee  -     Ambulatory referral to Orthopedic Surgery; Future      continue the current Rx    See ortho regarding right knee replacement

## 2020-09-16 NOTE — PROGRESS NOTES
Daily Note     Today's date: 2020  Patient name: Ellie Morocho  : 1950  MRN: [id number]  Referring provider: Loly Venegas DO  Dx:   Encounter Diagnosis     ICD-10-CM    1  Left rotator cuff tear arthropathy  M75 102     M12 812    2  Pain in both knees, unspecified chronicity  M25 561     M25 562    3  Lumbar spondylosis  M47 816                   Subjective: Pt reported that he has been continuing to perform prone knee flexion stretch with strap 4x a day  Noted that it has been improving his flexibility  Stated that he will likely be receiving a referral for ortho today, where he is seeking a consult  Objective: See treatment diary below      Assessment: Tolerated treatment well  Performed all resistance training well without provocation of pain  Patient demonstrated fatigue post treatment, exhibited good technique with therapeutic exercises and would benefit from continued PT  Plan: Continue per plan of care        Precautions: standard OA    Manual      Jt mobs, PROM str,   JZ- knee extension  JZ- knee extension  JZ- knee extension  JZ JW JZ JZ      Cupping R lateral knee JZ  JZ  JZ JW JZ JZ                                                     Exercise Diary      Stand hip abd     3x10 3x10 3x10 3x10 3x15    Sh exten u/l 15/ 3x10  20/ 3x10    20/ 3x10  20/ 3x10    Row u/l 40/ 3x10  50/ 3x10 40/ 3x10   45/ 3x10  50/ 3x10    SLS soccer ball tramp  R 3x10 R 3x10 R 3x10     R 3x10 R 3x10    REIL  3x15 3x15 3x15 3x15 3x15       Incline table push up             Shallow crunch  3x15   3x15 3x15        oblique crunch  3x15   3x15 3x15       Curl press 15# 3x10  15# 3x10 15# 3x10   15# 3x10  15# 3x10    LP u/l 70/ 3x10  70/3x10     150/ 3x10 150/ 3x10    Calf press  70/ 3x10       150/ 3x10 150/ 3x10    Step ups R 6" 3x10  L 8" 3x10 6" 3x10 6" 3x10     6" 3x10     Lateral step down             Retro walk  50/ 2x10 60/ 3x10     45/ 30x 45/ 30x    overhead tricep 15/ 3x10   15/ 3x10   15/ 3x10      S/l hip abd   3x10ea 3x10 3x10 3x10 3x10 3x10     TB side step bicep curl             u/l HR  3x10 3x10 3x10 3x10  3x10      Knee extension stretch             [place] punch 15/             PKFS  :15x5  :15x5 :15x5 :15x5  :15x3     Pronation  bicep curl             Reverse crunch lift  3x15 3x15  3x15 3x15       Prone hip ext             ITB stretch strap  :15x5 :15x5  :15x5 :15x5       Prone opp UE/LE raises             Oblique dumbbell  30# 3x10 30# 3x10 30# 3x10         Lat stretch             90-90 wall stretch  :15x5 :15x5  :15x5 np       Elliptical                          PETRA at counter  3x15 3x15    3x15      prone plank    :45x3  :45x3       Slider lunge             [place] punches        25/ 3x10      [place] baseball swing hip abd             Forward kicks [place]              kick backs [place]                   Modalities  8/24 8/26 8/28 8/31 9/4 9/9 9/11      EFX              Vasocompression

## 2020-09-18 ENCOUNTER — OFFICE VISIT (OUTPATIENT)
Dept: PHYSICAL THERAPY | Facility: CLINIC | Age: 70
End: 2020-09-18
Payer: COMMERCIAL

## 2020-09-18 ENCOUNTER — CONSULT (OUTPATIENT)
Dept: OBGYN CLINIC | Facility: CLINIC | Age: 70
End: 2020-09-18
Payer: COMMERCIAL

## 2020-09-18 ENCOUNTER — APPOINTMENT (OUTPATIENT)
Dept: RADIOLOGY | Facility: CLINIC | Age: 70
End: 2020-09-18
Payer: COMMERCIAL

## 2020-09-18 VITALS — TEMPERATURE: 97.1 F | BODY MASS INDEX: 27.9 KG/M2 | WEIGHT: 224.4 LBS | HEIGHT: 75 IN

## 2020-09-18 DIAGNOSIS — M25.561 RIGHT KNEE PAIN, UNSPECIFIED CHRONICITY: ICD-10-CM

## 2020-09-18 DIAGNOSIS — M25.562 PAIN IN BOTH KNEES, UNSPECIFIED CHRONICITY: ICD-10-CM

## 2020-09-18 DIAGNOSIS — M25.561 PAIN IN BOTH KNEES, UNSPECIFIED CHRONICITY: ICD-10-CM

## 2020-09-18 DIAGNOSIS — M47.816 LUMBAR SPONDYLOSIS: ICD-10-CM

## 2020-09-18 DIAGNOSIS — M17.11 PRIMARY OSTEOARTHRITIS OF RIGHT KNEE: Primary | ICD-10-CM

## 2020-09-18 DIAGNOSIS — M75.102 LEFT ROTATOR CUFF TEAR ARTHROPATHY: Primary | ICD-10-CM

## 2020-09-18 DIAGNOSIS — M12.812 LEFT ROTATOR CUFF TEAR ARTHROPATHY: Primary | ICD-10-CM

## 2020-09-18 PROCEDURE — 97112 NEUROMUSCULAR REEDUCATION: CPT | Performed by: PHYSICAL THERAPIST

## 2020-09-18 PROCEDURE — 1036F TOBACCO NON-USER: CPT | Performed by: ORTHOPAEDIC SURGERY

## 2020-09-18 PROCEDURE — 73562 X-RAY EXAM OF KNEE 3: CPT

## 2020-09-18 PROCEDURE — 1160F RVW MEDS BY RX/DR IN RCRD: CPT | Performed by: ORTHOPAEDIC SURGERY

## 2020-09-18 PROCEDURE — 97140 MANUAL THERAPY 1/> REGIONS: CPT | Performed by: PHYSICAL THERAPIST

## 2020-09-18 PROCEDURE — 77073 BONE LENGTH STUDIES: CPT

## 2020-09-18 PROCEDURE — 20610 DRAIN/INJ JOINT/BURSA W/O US: CPT | Performed by: ORTHOPAEDIC SURGERY

## 2020-09-18 PROCEDURE — 97110 THERAPEUTIC EXERCISES: CPT | Performed by: PHYSICAL THERAPIST

## 2020-09-18 PROCEDURE — 99203 OFFICE O/P NEW LOW 30 MIN: CPT | Performed by: ORTHOPAEDIC SURGERY

## 2020-09-18 RX ORDER — METHYLPREDNISOLONE ACETATE 40 MG/ML
1 INJECTION, SUSPENSION INTRA-ARTICULAR; INTRALESIONAL; INTRAMUSCULAR; SOFT TISSUE
Status: COMPLETED | OUTPATIENT
Start: 2020-09-18 | End: 2020-09-18

## 2020-09-18 RX ORDER — BUPIVACAINE HYDROCHLORIDE 2.5 MG/ML
4 INJECTION, SOLUTION INFILTRATION; PERINEURAL
Status: COMPLETED | OUTPATIENT
Start: 2020-09-18 | End: 2020-09-18

## 2020-09-18 RX ADMIN — BUPIVACAINE HYDROCHLORIDE 4 ML: 2.5 INJECTION, SOLUTION INFILTRATION; PERINEURAL at 11:13

## 2020-09-18 RX ADMIN — METHYLPREDNISOLONE ACETATE 1 ML: 40 INJECTION, SUSPENSION INTRA-ARTICULAR; INTRALESIONAL; INTRAMUSCULAR; SOFT TISSUE at 11:13

## 2020-09-18 NOTE — PROGRESS NOTES
Daily Note     Today's date: 2020  Patient name: Abby Morales  : 1950  MRN: [id number]  Referring provider: Sue Zuñiga DO  Dx:   Encounter Diagnosis     ICD-10-CM    1  Left rotator cuff tear arthropathy  M75 102     M12 812    2  Pain in both knees, unspecified chronicity  M25 561     M25 562    3  Lumbar spondylosis  M47 816                   Subjective: Pt reported that he has not been performing his knee flexion stretch due to busyness at home  Stated that he has an appointment with ortho today after PT for his knee  Objective: See treatment diary below      Assessment: Tolerated treatment well  Patient demonstrated fatigue post treatment, exhibited good technique with therapeutic exercises and would benefit from continued PT  Plan: Continue per plan of care        Precautions: standard OA    Manual     Jt mobs, PROM str,   JZ- knee extension  JZ- knee extension  JZ- knee extension  JZ JW JZ JZ  JZ    Cupping R lateral knee JZ  JZ  JZ JW JZ JZ  JZ                                                   Exercise Diary     Stand hip abd     3x10 3x10 3x10 3x10 3x15 3x15   Sh exten u/l 15/ 3x10  20/ 3x10    20/ 3x10  20/ 3x10 20/ 3x10   Row u/l 40/ 3x10  50/ 3x10 40/ 3x10   45/ 3x10  50/ 3x10 50/ 3x10   SLS soccer ball tramp  R 3x10 R 3x10 R 3x10     R 3x10 R 3x10 R 3x10   REIL  3x15 3x15 3x15 3x15 3x15       Incline table push up             Shallow crunch  3x15   3x15 3x15        oblique crunch  3x15   3x15 3x15       Curl press 15# 3x10  15# 3x10 15# 3x10   15# 3x10  15# 3x10 15# 3x10   LP u/l 70/ 3x10  70/3x10     150/ 3x10 150/ 3x10 170/ 3x10   Calf press  70/ 3x10       150/ 3x10 150/ 3x10 170/ 3x10   Step ups R 6" 3x10  L 8" 3x10 6" 3x10 6" 3x10     6" 3x10     Lateral step down             Retro walk  50/ 2x10  60/ 3x10     45/ 30x 45/ 30x 45/ 30x   overhead tricep 15/ 3x10   15/ 3x10   15/ 3x10      S/l hip abd   3x10ea 3x10 3x10 3x10 3x10 3x10  3x10   TB side step bicep curl             u/l HR  3x10 3x10 3x10 3x10  3x10      Knee extension stretch             [place] punch 15/             PKFS  :15x5  :15x5 :15x5 :15x5  :15x3  :15x3   Pronation  bicep curl             Reverse crunch lift  3x15 3x15  3x15 3x15       Prone hip ext             ITB stretch strap  :15x5 :15x5  :15x5 :15x5       Kettle Bell squat swing          20/ 3x10   Oblique dumbbell  30# 3x10 30# 3x10 30# 3x10         Lat stretch             90-90 wall stretch  :15x5 :15x5  :15x5 np       Elliptical                          PETRA at counter  3x15 3x15    3x15      prone plank    :45x3  :45x3       Slider lunge             [place] punches        25/ 3x10  30/ 3x10    [place] baseball swing hip abd             Forward kicks [place]              kick backs [place]                   Modalities  8/24 8/26 8/28 8/31 9/4 9/9 9/11      EFX              Vasocompression

## 2020-09-18 NOTE — PROGRESS NOTES
Orthopaedic Surgery Note    CC: Right Knee Pain      HPI:  Chyna Pulido is a 71 y o male with a history of right knee pain  Reports surgery in [address] [place] [address] some 30+ years ago  He describes the pain as aching, stiffness, instability, and clicking  He describes the pain as being mild-moderate and is unable to provide a numerical rating for the pain  He describes the pain as improving over time  It is interfering with his hobbies  He has not taken any NSAIDs or tylenol for this or previously had any injections  He is working with PT and feels this has helped some  ALLERGIES:  No Known Allergies    CURRENT MEDICATIONS:  Current Outpatient Medications   Medication Sig Dispense Refill    ascorbic acid (VITAMIN C) 500 mg tablet Take 500 mg by mouth daily      metFORMIN (GLUCOPHAGE) 500 mg tablet Take 1 tablet (500 mg total) by mouth 2 (two) times a day with meals (Patient taking differently: Take 500 mg by mouth 2 (two) times a day with meals ) 360 tablet 3    olmesartan-hydrochlorothiazide (BENICAR HCT) 20-12 5 MG per tablet take 1 tablet by mouth once daily 90 tablet 0    PREBIOTIC PRODUCT PO Take by mouth daily      Red Yeast Rice 600 MG CAPS Take by mouth daily       No current facility-administered medications for this visit  PAST MEDICAL HISTORY  Past Medical History:   Diagnosis Date    Hyperplastic colon polyp     last assessed 4/26/14    Sleep apnea     last assessed 4/13/13       SURGICAL HISTORY  History reviewed  No pertinent surgical history      FAMILY HISTORY  Family History   Problem Relation Age of Onset    No Known Problems Father        SOCIAL HISTORY  Social History     Socioeconomic History    Marital status: /Civil Union     Spouse name: Not on file    Number of children: Not on file    Years of education: Not on file    Highest education level: Not on file   Occupational History    Not on file   Social Needs    Financial resource strain: Not on file   [place] insecurity     Worry: Not on file     Inability: Not on file    Transportation needs     Medical: Not on file     Non-medical: Not on file   Tobacco Use    Smoking status: Never Smoker    Smokeless tobacco: Never Used   Substance and Sexual Activity    Alcohol use: No    Drug use: No    Sexual activity: Not on file   Lifestyle    Physical activity     Days per week: Not on file     Minutes per session: Not on file    Stress: Not on file   Relationships    Social connections     Talks on phone: Not on file     Gets together: Not on file     Attends Sikhism service: Not on file     Active member of club or organization: Not on file     Attends meetings of clubs or organizations: Not on file     Relationship status: Not on file    Intimate partner violence     Fear of current or ex partner: Not on file     Emotionally abused: Not on file     Physically abused: Not on file     Forced sexual activity: Not on file   Other Topics Concern    Not on file   Social History Narrative    Not on file         Review of Systems     Patient indicated positive for back pain, DM 2  Otherwise negative except per above and HPI  Physical Exam    Vitals  Vitals:    09/18/20 1014   Temp: (!) 97 1 °F (36 2 °C)       BMI  Body mass index is 28 05 kg/m²  GENERAL: No acute distress  Alert and oriented  Well nourished and well hydrated  Appears stated age  HEENT : Normocephalic, atraumatic  Extraocular movements intact  Mask in place  NECK: Supple, trachea midline  LUNGS: Adequate and symmetric respiratory effort  No intercostal retractions or accessory muscle use  HEART: Extremities warm and perfused  ABDOMEN: Nondistended  SKIN: Warm and dry, no rash  Right Knee   Inspection/Appearance:       Swelling: No      Patella is midline  Well healed curvilinear incision overlying lateral joint line  Alignment:  Knee is in mild varus  Palpation - Soft Tissue: normal without effusion     ROM:       Extension - 5 Flexion - 120      extensor lag: no    Stability:  demonstrates no varus, valgus, anterior drawer or posterior drawer  Patella: stable, tracks normally  Sensation Intact to Light Touch in Sural, Saphenous, Tibial, Superficial Peroneal, and Deep Peroneal Nerve Distribution  Motor function 5 out of 5 strength in Tibialis Anterior, Gastrocnemius, Soleus, Extensor Hallucis Longus, and Flexor Hallucis Longus Muscles  Extremity Warm and Well Perfused  Brisk Capillary Refill in Toes  Imaging  A) Imaging modality available  Radiographs: yes  MRI scan: no  CT scan: no    B) Imaging findings  Subchondral cysts: no  Subchondral sclerosis: yes  Periarticular osteophytes: yes  Joint subluxation: no  Joint space narrowing: yes  Bone-on-bone articulations: yes  Avascular necrosis: no    Lateral plate and screw construct in place  Assessment and Plan  Right Knee Arthritis    - discussed nonop tx algorithm as below  Recommend PT and CSI today, can take NSAIDs and tylenol for pain flareups  We discussed that TKA would be the definitive mgmt of his knee arthritis and this would require removal of at least some of the hardware  Knee Pain / Arthritis Treatment Recommendations    Strengthening Exercises  10 repetitions each leg Monday, Wednesday, Friday OR Tuesday, Thursday, Saturday  Increase 10 repetitions per week  1  Straight leg raises (SLR)  2  VMO Modification SLR  (Rotate hip out externally) Do if SLR becomes easy    Exercise  5 minutes per day Monday, Wednesday, Friday OR Tuesday, Thursday, Saturday  Increase 5 minutes per day per week  May use bike (non-impact) or walk (impact) or swim or alternate  Goal is to get up to at least 30 minutes per day  1  Bicycle  2  Walking    Weight loss   Goal to lose 1 pound per week  Portion control, limit sweets, limit carbs    Medications  Anti-inflammatories (NSAIDs)  1   Ibuprofen (Motrin or Advil) 600 mg (3 pills) with food 3 times a day for 3  7 days  OR  2  Naprosyn (Aleve) 1  2 pills twice a day with food for 3  7 days  Stop if you develop upset stomach or bleeding occurs  We discussed that scheduled NSAIDs for greater than 1 week should be discussed with PCP to monitor for potential side effects  Pain reliever  1  Acetaminophen (Tylenol) 2 pills (regular or extra-strength) 3 times a day for 3  7 days    Taken together (Acetaminophen with one of the NSAIDs (ibuprofen OR naprosyn)), the combination may work better than either one alone for more acute pain    Other  Braces, wraps, ice, heat, topical ointments may all be used/tried if they help pain/symptoms      Large joint arthrocentesis: R knee  Date/Time: 9/18/2020 11:13 AM  Consent given by: patient  Site marked: site marked  Timeout: Immediately prior to procedure a time out was called to verify the correct patient, procedure, equipment, support staff and site/side marked as required   Supporting Documentation  Indications: pain   Procedure Details  Location: knee - R knee  Needle size: 20 G  Approach: anterior  Medications administered: 1 mL methylPREDNISolone acetate 40 mg/mL; 4 mL bupivacaine 0 25 %    Patient tolerance: patient tolerated the procedure well with no immediate complications  Dressing:  Sterile dressing applied                Hung Dixon MD  Adult Reconstruction Surgery  Department of [address] [place] [address]  11:11 AM

## 2020-09-22 ENCOUNTER — OFFICE VISIT (OUTPATIENT)
Dept: PHYSICAL THERAPY | Facility: CLINIC | Age: 70
End: 2020-09-22
Payer: COMMERCIAL

## 2020-09-22 DIAGNOSIS — M25.562 PAIN IN BOTH KNEES, UNSPECIFIED CHRONICITY: ICD-10-CM

## 2020-09-22 DIAGNOSIS — M75.102 LEFT ROTATOR CUFF TEAR ARTHROPATHY: Primary | ICD-10-CM

## 2020-09-22 DIAGNOSIS — M25.561 PAIN IN BOTH KNEES, UNSPECIFIED CHRONICITY: ICD-10-CM

## 2020-09-22 DIAGNOSIS — M47.816 LUMBAR SPONDYLOSIS: ICD-10-CM

## 2020-09-22 DIAGNOSIS — M12.812 LEFT ROTATOR CUFF TEAR ARTHROPATHY: Primary | ICD-10-CM

## 2020-09-22 PROCEDURE — 97140 MANUAL THERAPY 1/> REGIONS: CPT | Performed by: PHYSICAL THERAPIST

## 2020-09-22 PROCEDURE — 97530 THERAPEUTIC ACTIVITIES: CPT | Performed by: PHYSICAL THERAPIST

## 2020-09-22 PROCEDURE — 97110 THERAPEUTIC EXERCISES: CPT | Performed by: PHYSICAL THERAPIST

## 2020-09-22 NOTE — PROGRESS NOTES
Daily Note     Today's date: 2020  Patient name: Beau Paz  : 1950  MRN: [id number]  Referring provider: Jane Solorzano DO  Dx:   Encounter Diagnosis     ICD-10-CM    1  Left rotator cuff tear arthropathy  M75 102     M12 812    2  Pain in both knees, unspecified chronicity  M25 561     M25 562    3  Lumbar spondylosis  M47 816                   Subjective: Pt reported that he had an appointment with ortho on Friday, where he was given R knee cortisone injection  Reported no significant changes in symptoms since injection  Objective: See treatment diary below      Assessment: Tolerated treatment well  Performed core stabilization exercises well without provocation of pain  Increased resistance with Kettle bell squat swings, which he performed well without provocation of pain  Patient demonstrated fatigue post treatment, exhibited good technique with therapeutic exercises and would benefit from continued PT      Plan: Continue per plan of care        Precautions: standard OA    Manual     Jt mobs, PROM str,  JZ         JZ    Cupping R lateral knee          JZ                                                   Exercise Diary     Stand hip abd        3x10 3x15 3x15   Sh exten u/l 20/        20/ 3x10 20/ 3x10   Row u/l 50/        50/ 3x10 50/ 3x10   SLS soccer ball tramp  R       R 3x10 R 3x10 R 3x10   REIL 3x15            Incline table push up             Shallow crunch 3x15             oblique crunch 3x15            Curl press 15# 3x10        15# 3x10 15# 3x10   LP u/l 170/ 3x10       150/ 3x10 150/ 3x10 170/ 3x10   Calf press  170/ 3x10       150/ 3x10 150/ 3x10 170/ 3x10   Step ups 6"       6" 3x10     Lateral step down 6"            Retro walk  45/ 3x10       45/ 30x 45/ 30x 45/ 30x   overhead tricep 10/            S/l hip abd 3x10       3x10  3x10   TB side step bicep curl             u/l HR             Knee extension stretch             [place] punch 30/ 3x10            PKFS :15x5       :15x3  :15x3   Pronation  bicep curl             Reverse crunch lift 3x15            Prone hip ext             ITB stretch strap             Kettle [place]gson squat swing 25/ 3x10         20/ 3x10   Oblique dumbbell             Lat stretch             90-90 wall stretch             Elliptical                          PETRA at counter             prone plank :45x3            Slider lunge             [place] punches 30/ 3x10       25/ 3x10  30/ 3x10    fransisco baseball swing hip abd             Forward kicks [employer] backs [place]                   Modalities  9/22            EFX              Vasocompression

## 2020-09-24 ENCOUNTER — OFFICE VISIT (OUTPATIENT)
Dept: PHYSICAL THERAPY | Facility: CLINIC | Age: 70
End: 2020-09-24
Payer: COMMERCIAL

## 2020-09-24 DIAGNOSIS — M25.561 PAIN IN BOTH KNEES, UNSPECIFIED CHRONICITY: ICD-10-CM

## 2020-09-24 DIAGNOSIS — M75.102 LEFT ROTATOR CUFF TEAR ARTHROPATHY: Primary | ICD-10-CM

## 2020-09-24 DIAGNOSIS — M12.812 LEFT ROTATOR CUFF TEAR ARTHROPATHY: Primary | ICD-10-CM

## 2020-09-24 DIAGNOSIS — M47.816 LUMBAR SPONDYLOSIS: ICD-10-CM

## 2020-09-24 DIAGNOSIS — M25.562 PAIN IN BOTH KNEES, UNSPECIFIED CHRONICITY: ICD-10-CM

## 2020-09-24 PROCEDURE — 97110 THERAPEUTIC EXERCISES: CPT | Performed by: PHYSICAL THERAPIST

## 2020-09-24 PROCEDURE — 97112 NEUROMUSCULAR REEDUCATION: CPT | Performed by: PHYSICAL THERAPIST

## 2020-09-24 NOTE — PROGRESS NOTES
Daily Note     Today's date: 2020  Patient name: Ghazal Burrows  : 1950  MRN: [id number]  Referring provider: Héctor Sahni DO  Dx:   Encounter Diagnosis     ICD-10-CM    1  Left rotator cuff tear arthropathy  M75 102     M12 812    2  Pain in both knees, unspecified chronicity  M25 561     M25 562    3  Lumbar spondylosis  M47 816                   Subjective: Pt reported that he felt muscle soreness after the last workout  Stated that "I think it was because of the kettle bell squat swings "      Objective: See treatment diary below      Assessment: Tolerated treatment well  Increased resistance with Kettle bell squat swings, which he performed well without provocation of pain  Patient demonstrated fatigue post treatment, exhibited good technique with therapeutic exercises and would benefit from continued PT  Plan: Continue per plan of care        Precautions: standard OA    Manual     Jt mobs, PROM str,  JZ JZ        JZ    Cupping R lateral knee          JZ                                                   Exercise Diary     Stand hip abd        3x10 3x15 3x15   Sh exten u/l 20/        20/ 3x10 20/ 3x10   Row u/l 50/ 40/ 3x10       50/ 3x10 50/ 3x10   SLS soccer ball tramp  R R 3x10      R 3x10 R 3x10 R 3x10   REIL 3x15            Incline table push up             Shallow crunch 3x15             oblique crunch 3x15            Curl press 15# 3x10        15# 3x10 15# 3x10   LP u/l 170/ 3x10 170/ 3x10      150/ 3x10 150/ 3x10 170/ 3x10   Calf press  170/ 3x10 170/ 3x10      150/ 3x10 150/ 3x10 170/ 3x10   Step ups 6"       6" 3x10     Lateral step down 6"            Retro walk  45/ 3x10       45/ 30x 45/ 30x 45/ 30x   overhead tricep 10/            S/l hip abd 3x10 3x10      3x10  3x10   TB side step bicep curl             u/l HR             Knee extension stretch             [place] punch 30/ 3x10 30/ 3x10           PKFS :15x5       :15x3  :15x3 Pronation  bicep curl             Reverse crunch lift 3x15            Prone hip ext             ITB stretch strap             Kettle Hodgson squat swing 25/ 3x10 30/3x10        20/ 3x10   Oblique dumbbell  30# 3x10           Lat stretch             90-90 wall stretch             Elliptical                          PETRA at counter             prone plank :45x3 :45x3           Slider lunge             [place] punches 30/ 3x10 30/ 3x10      25/ 3x10  30/ 3x10    fransisco baseball swing hip abd             Forward kicks [place]             SLS soccer tap  3x10                 Modalities  9/22 9/24           EFX              Vasocompression

## 2020-09-28 ENCOUNTER — OFFICE VISIT (OUTPATIENT)
Dept: PHYSICAL THERAPY | Facility: CLINIC | Age: 70
End: 2020-09-28
Payer: COMMERCIAL

## 2020-09-28 DIAGNOSIS — M47.816 LUMBAR SPONDYLOSIS: ICD-10-CM

## 2020-09-28 DIAGNOSIS — M25.562 PAIN IN BOTH KNEES, UNSPECIFIED CHRONICITY: ICD-10-CM

## 2020-09-28 DIAGNOSIS — M12.812 LEFT ROTATOR CUFF TEAR ARTHROPATHY: Primary | ICD-10-CM

## 2020-09-28 DIAGNOSIS — M75.102 LEFT ROTATOR CUFF TEAR ARTHROPATHY: Primary | ICD-10-CM

## 2020-09-28 DIAGNOSIS — M25.561 PAIN IN BOTH KNEES, UNSPECIFIED CHRONICITY: ICD-10-CM

## 2020-09-28 PROCEDURE — 97140 MANUAL THERAPY 1/> REGIONS: CPT | Performed by: PHYSICAL THERAPIST

## 2020-09-28 PROCEDURE — 97112 NEUROMUSCULAR REEDUCATION: CPT | Performed by: PHYSICAL THERAPIST

## 2020-09-28 PROCEDURE — 97110 THERAPEUTIC EXERCISES: CPT | Performed by: PHYSICAL THERAPIST

## 2020-09-28 NOTE — PROGRESS NOTES
Daily Note     Today's date: 2020  Patient name: Rene Joe  : 1950  MRN: [id number]  Referring provider: Josey Garnica DO  Dx:   Encounter Diagnosis     ICD-10-CM    1  Left rotator cuff tear arthropathy  M75 102     M12 812    2  Pain in both knees, unspecified chronicity  M25 561     M25 562    3  Lumbar spondylosis  M47 816                   Subjective: Pt reported that he is feeling soreness today, which he stated was because he did not perform his HEP this weekend  Objective: See treatment diary below      Assessment: Tolerated treatment well  In order to address core weakness initiated table slide incline plank, which he performed well, however required frequent verbal and tactile cueing for form due to tendency to bend at hips and lose proper plank  Patient demonstrated fatigue post treatment, exhibited good technique with therapeutic exercises and would benefit from continued PT  Plan: Continue per plan of care        Precautions: standard OA    Manual     Jt mobs, PROM str,  JZ JZ JZ       JZ    Cupping R lateral knee          JZ                                                   Exercise Diary     Stand hip abd        3x10 3x15 3x15   Sh exten u/l 20/        20/ 3x10 20/ 3x10   Row u/l 50/ 40/ 3x10       50/ 3x10 50/ 3x10   SLS soccer ball tramp  R R 3x10      R 3x10 R 3x10 R 3x10   REIL 3x15  3x10          Incline table push up             Shallow crunch 3x15  3x15           oblique crunch 3x15            Curl press 15# 3x10        15# 3x10 15# 3x10   LP  170/ 3x10 170/ 3x10 170/      150/ 3x10 150/ 3x10 170/ 3x10   Calf press  170/ 3x10 170/ 3x10      150/ 3x10 150/ 3x10 170/ 3x10   Step ups 6"       6" 3x10     Lateral step down 6"            Retro walk  45/ 3x10       45/ 30x 45/ 30x 45/ 30x   overhead tricep 10/            S/l hip abd 3x10 3x10      3x10  3x10   TB side step bicep curl             u/l HR Knee extension stretch             [place] punch 30/ 3x10 30/ 3x10           PKFS :15x5  :15x5     :15x3  :15x3   Table slide incline plank   3x10          Reverse crunch lift 3x15  3x15          Prone hip ext             ITB stretch strap             Kettle Hodgson squat swing 25/ 3x10 30/3x10 30# 3x15       20/ 3x10   Oblique dumbbell  30# 3x10 30# 3x10ea          Lat stretch             90-90 wall stretch             Elliptical                          PETRA at counter             prone plank :45x3 :45x3           Slider lunge             [place]isco punches 30/ 3x10 30/ 3x10      25/ 3x10  30/ 3x10    fransisco baseball swing hip abd             Forward kicks [place]             SLS soccer tap  3x10                 Modalities  9/22 9/24 9/28          EFX              Vasocompression

## 2020-10-01 ENCOUNTER — OFFICE VISIT (OUTPATIENT)
Dept: PHYSICAL THERAPY | Facility: CLINIC | Age: 70
End: 2020-10-01
Payer: COMMERCIAL

## 2020-10-01 DIAGNOSIS — M75.102 LEFT ROTATOR CUFF TEAR ARTHROPATHY: Primary | ICD-10-CM

## 2020-10-01 DIAGNOSIS — M25.561 PAIN IN BOTH KNEES, UNSPECIFIED CHRONICITY: ICD-10-CM

## 2020-10-01 DIAGNOSIS — M47.816 LUMBAR SPONDYLOSIS: ICD-10-CM

## 2020-10-01 DIAGNOSIS — M12.812 LEFT ROTATOR CUFF TEAR ARTHROPATHY: Primary | ICD-10-CM

## 2020-10-01 DIAGNOSIS — M25.562 PAIN IN BOTH KNEES, UNSPECIFIED CHRONICITY: ICD-10-CM

## 2020-10-01 PROCEDURE — 97110 THERAPEUTIC EXERCISES: CPT | Performed by: PHYSICAL THERAPIST

## 2020-10-01 PROCEDURE — 97140 MANUAL THERAPY 1/> REGIONS: CPT | Performed by: PHYSICAL THERAPIST

## 2020-10-05 ENCOUNTER — OFFICE VISIT (OUTPATIENT)
Dept: PHYSICAL THERAPY | Facility: CLINIC | Age: 70
End: 2020-10-05
Payer: COMMERCIAL

## 2020-10-05 DIAGNOSIS — M25.561 PAIN IN BOTH KNEES, UNSPECIFIED CHRONICITY: ICD-10-CM

## 2020-10-05 DIAGNOSIS — M75.102 LEFT ROTATOR CUFF TEAR ARTHROPATHY: Primary | ICD-10-CM

## 2020-10-05 DIAGNOSIS — M25.562 PAIN IN BOTH KNEES, UNSPECIFIED CHRONICITY: ICD-10-CM

## 2020-10-05 DIAGNOSIS — M12.812 LEFT ROTATOR CUFF TEAR ARTHROPATHY: Primary | ICD-10-CM

## 2020-10-05 DIAGNOSIS — M47.816 LUMBAR SPONDYLOSIS: ICD-10-CM

## 2020-10-05 PROCEDURE — 97140 MANUAL THERAPY 1/> REGIONS: CPT | Performed by: PHYSICAL THERAPIST

## 2020-10-05 PROCEDURE — 97110 THERAPEUTIC EXERCISES: CPT | Performed by: PHYSICAL THERAPIST

## 2020-10-08 ENCOUNTER — OFFICE VISIT (OUTPATIENT)
Dept: PHYSICAL THERAPY | Facility: CLINIC | Age: 70
End: 2020-10-08
Payer: COMMERCIAL

## 2020-10-08 DIAGNOSIS — M47.816 LUMBAR SPONDYLOSIS: ICD-10-CM

## 2020-10-08 DIAGNOSIS — M75.102 LEFT ROTATOR CUFF TEAR ARTHROPATHY: Primary | ICD-10-CM

## 2020-10-08 DIAGNOSIS — M25.562 PAIN IN BOTH KNEES, UNSPECIFIED CHRONICITY: ICD-10-CM

## 2020-10-08 DIAGNOSIS — M12.812 LEFT ROTATOR CUFF TEAR ARTHROPATHY: Primary | ICD-10-CM

## 2020-10-08 DIAGNOSIS — M25.561 PAIN IN BOTH KNEES, UNSPECIFIED CHRONICITY: ICD-10-CM

## 2020-10-08 PROCEDURE — 97140 MANUAL THERAPY 1/> REGIONS: CPT | Performed by: PHYSICAL THERAPIST

## 2020-10-08 PROCEDURE — 97112 NEUROMUSCULAR REEDUCATION: CPT | Performed by: PHYSICAL THERAPIST

## 2020-10-08 PROCEDURE — 97110 THERAPEUTIC EXERCISES: CPT | Performed by: PHYSICAL THERAPIST

## 2020-10-12 ENCOUNTER — OFFICE VISIT (OUTPATIENT)
Dept: PHYSICAL THERAPY | Facility: CLINIC | Age: 70
End: 2020-10-12
Payer: COMMERCIAL

## 2020-10-12 DIAGNOSIS — M47.816 LUMBAR SPONDYLOSIS: ICD-10-CM

## 2020-10-12 DIAGNOSIS — M25.561 PAIN IN BOTH KNEES, UNSPECIFIED CHRONICITY: ICD-10-CM

## 2020-10-12 DIAGNOSIS — M25.562 PAIN IN BOTH KNEES, UNSPECIFIED CHRONICITY: ICD-10-CM

## 2020-10-12 DIAGNOSIS — M75.102 LEFT ROTATOR CUFF TEAR ARTHROPATHY: Primary | ICD-10-CM

## 2020-10-12 DIAGNOSIS — M12.812 LEFT ROTATOR CUFF TEAR ARTHROPATHY: Primary | ICD-10-CM

## 2020-10-12 PROCEDURE — 97110 THERAPEUTIC EXERCISES: CPT | Performed by: PHYSICAL THERAPIST

## 2020-10-15 ENCOUNTER — OFFICE VISIT (OUTPATIENT)
Dept: PHYSICAL THERAPY | Facility: CLINIC | Age: 70
End: 2020-10-15
Payer: COMMERCIAL

## 2020-10-15 DIAGNOSIS — M25.561 PAIN IN BOTH KNEES, UNSPECIFIED CHRONICITY: ICD-10-CM

## 2020-10-15 DIAGNOSIS — M47.816 LUMBAR SPONDYLOSIS: ICD-10-CM

## 2020-10-15 DIAGNOSIS — M25.562 PAIN IN BOTH KNEES, UNSPECIFIED CHRONICITY: ICD-10-CM

## 2020-10-15 DIAGNOSIS — M75.102 LEFT ROTATOR CUFF TEAR ARTHROPATHY: Primary | ICD-10-CM

## 2020-10-15 DIAGNOSIS — M12.812 LEFT ROTATOR CUFF TEAR ARTHROPATHY: Primary | ICD-10-CM

## 2020-10-15 DIAGNOSIS — I10 ESSENTIAL HYPERTENSION: ICD-10-CM

## 2020-10-15 PROCEDURE — 97112 NEUROMUSCULAR REEDUCATION: CPT | Performed by: PHYSICAL THERAPIST

## 2020-10-15 PROCEDURE — 97140 MANUAL THERAPY 1/> REGIONS: CPT | Performed by: PHYSICAL THERAPIST

## 2020-10-15 PROCEDURE — 97110 THERAPEUTIC EXERCISES: CPT | Performed by: PHYSICAL THERAPIST

## 2020-10-15 RX ORDER — OLMESARTAN MEDOXOMIL AND HYDROCHLOROTHIAZIDE 20/12.5 20; 12.5 MG/1; MG/1
TABLET ORAL
Qty: 90 TABLET | Refills: 0 | Status: SHIPPED | OUTPATIENT
Start: 2020-10-15 | End: 2021-01-15

## 2020-10-19 ENCOUNTER — OFFICE VISIT (OUTPATIENT)
Dept: PHYSICAL THERAPY | Facility: CLINIC | Age: 70
End: 2020-10-19
Payer: COMMERCIAL

## 2020-10-19 DIAGNOSIS — M25.561 PAIN IN BOTH KNEES, UNSPECIFIED CHRONICITY: ICD-10-CM

## 2020-10-19 DIAGNOSIS — M12.812 LEFT ROTATOR CUFF TEAR ARTHROPATHY: Primary | ICD-10-CM

## 2020-10-19 DIAGNOSIS — M47.816 LUMBAR SPONDYLOSIS: ICD-10-CM

## 2020-10-19 DIAGNOSIS — M75.102 LEFT ROTATOR CUFF TEAR ARTHROPATHY: Primary | ICD-10-CM

## 2020-10-19 DIAGNOSIS — M25.562 PAIN IN BOTH KNEES, UNSPECIFIED CHRONICITY: ICD-10-CM

## 2020-10-19 PROCEDURE — 97112 NEUROMUSCULAR REEDUCATION: CPT | Performed by: PHYSICAL THERAPIST

## 2020-10-19 PROCEDURE — 97110 THERAPEUTIC EXERCISES: CPT | Performed by: PHYSICAL THERAPIST

## 2020-10-22 ENCOUNTER — OFFICE VISIT (OUTPATIENT)
Dept: PHYSICAL THERAPY | Facility: CLINIC | Age: 70
End: 2020-10-22
Payer: COMMERCIAL

## 2020-10-22 DIAGNOSIS — M12.812 LEFT ROTATOR CUFF TEAR ARTHROPATHY: Primary | ICD-10-CM

## 2020-10-22 DIAGNOSIS — M75.102 LEFT ROTATOR CUFF TEAR ARTHROPATHY: Primary | ICD-10-CM

## 2020-10-22 DIAGNOSIS — M47.816 LUMBAR SPONDYLOSIS: ICD-10-CM

## 2020-10-22 DIAGNOSIS — M25.562 PAIN IN BOTH KNEES, UNSPECIFIED CHRONICITY: ICD-10-CM

## 2020-10-22 DIAGNOSIS — M25.561 PAIN IN BOTH KNEES, UNSPECIFIED CHRONICITY: ICD-10-CM

## 2020-10-22 PROCEDURE — 97110 THERAPEUTIC EXERCISES: CPT | Performed by: PHYSICAL THERAPIST

## 2020-10-22 PROCEDURE — 97140 MANUAL THERAPY 1/> REGIONS: CPT | Performed by: PHYSICAL THERAPIST

## 2020-10-24 ENCOUNTER — IMMUNIZATIONS (OUTPATIENT)
Dept: FAMILY MEDICINE CLINIC | Facility: CLINIC | Age: 70
End: 2020-10-24
Payer: COMMERCIAL

## 2020-10-24 DIAGNOSIS — Z23 NEED FOR VACCINATION: Primary | ICD-10-CM

## 2020-10-24 PROCEDURE — 90471 IMMUNIZATION ADMIN: CPT

## 2020-10-24 PROCEDURE — 90662 IIV NO PRSV INCREASED AG IM: CPT

## 2020-10-26 ENCOUNTER — APPOINTMENT (OUTPATIENT)
Dept: PHYSICAL THERAPY | Facility: CLINIC | Age: 70
End: 2020-10-26
Payer: COMMERCIAL

## 2020-10-28 ENCOUNTER — OFFICE VISIT (OUTPATIENT)
Dept: PHYSICAL THERAPY | Facility: CLINIC | Age: 70
End: 2020-10-28
Payer: COMMERCIAL

## 2020-10-28 DIAGNOSIS — M75.102 LEFT ROTATOR CUFF TEAR ARTHROPATHY: Primary | ICD-10-CM

## 2020-10-28 DIAGNOSIS — M25.561 PAIN IN BOTH KNEES, UNSPECIFIED CHRONICITY: ICD-10-CM

## 2020-10-28 DIAGNOSIS — M47.816 LUMBAR SPONDYLOSIS: ICD-10-CM

## 2020-10-28 DIAGNOSIS — M25.562 PAIN IN BOTH KNEES, UNSPECIFIED CHRONICITY: ICD-10-CM

## 2020-10-28 DIAGNOSIS — M12.812 LEFT ROTATOR CUFF TEAR ARTHROPATHY: Primary | ICD-10-CM

## 2020-10-28 PROCEDURE — 97530 THERAPEUTIC ACTIVITIES: CPT | Performed by: PHYSICAL THERAPIST

## 2020-10-28 PROCEDURE — 97112 NEUROMUSCULAR REEDUCATION: CPT | Performed by: PHYSICAL THERAPIST

## 2020-10-28 PROCEDURE — 97110 THERAPEUTIC EXERCISES: CPT | Performed by: PHYSICAL THERAPIST

## 2020-11-03 ENCOUNTER — OFFICE VISIT (OUTPATIENT)
Dept: PHYSICAL THERAPY | Facility: CLINIC | Age: 70
End: 2020-11-03
Payer: COMMERCIAL

## 2020-11-03 DIAGNOSIS — M25.562 PAIN IN BOTH KNEES, UNSPECIFIED CHRONICITY: ICD-10-CM

## 2020-11-03 DIAGNOSIS — M12.812 LEFT ROTATOR CUFF TEAR ARTHROPATHY: Primary | ICD-10-CM

## 2020-11-03 DIAGNOSIS — M25.561 PAIN IN BOTH KNEES, UNSPECIFIED CHRONICITY: ICD-10-CM

## 2020-11-03 DIAGNOSIS — M47.816 LUMBAR SPONDYLOSIS: ICD-10-CM

## 2020-11-03 DIAGNOSIS — M75.102 LEFT ROTATOR CUFF TEAR ARTHROPATHY: Primary | ICD-10-CM

## 2020-11-03 PROCEDURE — 97110 THERAPEUTIC EXERCISES: CPT | Performed by: PHYSICAL THERAPIST

## 2020-11-03 PROCEDURE — 97530 THERAPEUTIC ACTIVITIES: CPT | Performed by: PHYSICAL THERAPIST

## 2020-11-05 ENCOUNTER — OFFICE VISIT (OUTPATIENT)
Dept: PHYSICAL THERAPY | Facility: CLINIC | Age: 70
End: 2020-11-05
Payer: COMMERCIAL

## 2020-11-05 DIAGNOSIS — M12.812 LEFT ROTATOR CUFF TEAR ARTHROPATHY: Primary | ICD-10-CM

## 2020-11-05 DIAGNOSIS — M47.816 LUMBAR SPONDYLOSIS: ICD-10-CM

## 2020-11-05 DIAGNOSIS — M75.102 LEFT ROTATOR CUFF TEAR ARTHROPATHY: Primary | ICD-10-CM

## 2020-11-05 DIAGNOSIS — M25.561 PAIN IN BOTH KNEES, UNSPECIFIED CHRONICITY: ICD-10-CM

## 2020-11-05 DIAGNOSIS — M25.562 PAIN IN BOTH KNEES, UNSPECIFIED CHRONICITY: ICD-10-CM

## 2020-11-05 PROCEDURE — 97110 THERAPEUTIC EXERCISES: CPT | Performed by: PHYSICAL THERAPIST

## 2020-11-05 PROCEDURE — 97140 MANUAL THERAPY 1/> REGIONS: CPT | Performed by: PHYSICAL THERAPIST

## 2020-11-10 ENCOUNTER — APPOINTMENT (OUTPATIENT)
Dept: PHYSICAL THERAPY | Facility: CLINIC | Age: 70
End: 2020-11-10
Payer: COMMERCIAL

## 2020-11-12 ENCOUNTER — OFFICE VISIT (OUTPATIENT)
Dept: PHYSICAL THERAPY | Facility: CLINIC | Age: 70
End: 2020-11-12
Payer: COMMERCIAL

## 2020-11-12 DIAGNOSIS — M25.561 PAIN IN BOTH KNEES, UNSPECIFIED CHRONICITY: ICD-10-CM

## 2020-11-12 DIAGNOSIS — M75.102 LEFT ROTATOR CUFF TEAR ARTHROPATHY: Primary | ICD-10-CM

## 2020-11-12 DIAGNOSIS — M12.812 LEFT ROTATOR CUFF TEAR ARTHROPATHY: Primary | ICD-10-CM

## 2020-11-12 DIAGNOSIS — M47.816 LUMBAR SPONDYLOSIS: ICD-10-CM

## 2020-11-12 DIAGNOSIS — M25.562 PAIN IN BOTH KNEES, UNSPECIFIED CHRONICITY: ICD-10-CM

## 2020-11-12 PROCEDURE — 97140 MANUAL THERAPY 1/> REGIONS: CPT | Performed by: PHYSICAL THERAPIST

## 2020-11-12 PROCEDURE — 97110 THERAPEUTIC EXERCISES: CPT | Performed by: PHYSICAL THERAPIST

## 2020-11-13 ENCOUNTER — OFFICE VISIT (OUTPATIENT)
Dept: PHYSICAL THERAPY | Facility: CLINIC | Age: 70
End: 2020-11-13
Payer: COMMERCIAL

## 2020-11-13 DIAGNOSIS — M25.561 PAIN IN BOTH KNEES, UNSPECIFIED CHRONICITY: ICD-10-CM

## 2020-11-13 DIAGNOSIS — M75.102 LEFT ROTATOR CUFF TEAR ARTHROPATHY: Primary | ICD-10-CM

## 2020-11-13 DIAGNOSIS — M25.562 PAIN IN BOTH KNEES, UNSPECIFIED CHRONICITY: ICD-10-CM

## 2020-11-13 DIAGNOSIS — M12.812 LEFT ROTATOR CUFF TEAR ARTHROPATHY: Primary | ICD-10-CM

## 2020-11-13 DIAGNOSIS — M47.816 LUMBAR SPONDYLOSIS: ICD-10-CM

## 2020-11-13 PROCEDURE — 97110 THERAPEUTIC EXERCISES: CPT | Performed by: PHYSICAL THERAPIST

## 2020-11-13 PROCEDURE — 97530 THERAPEUTIC ACTIVITIES: CPT | Performed by: PHYSICAL THERAPIST

## 2020-11-17 ENCOUNTER — OFFICE VISIT (OUTPATIENT)
Dept: PHYSICAL THERAPY | Facility: CLINIC | Age: 70
End: 2020-11-17
Payer: COMMERCIAL

## 2020-11-17 DIAGNOSIS — M25.561 PAIN IN BOTH KNEES, UNSPECIFIED CHRONICITY: ICD-10-CM

## 2020-11-17 DIAGNOSIS — M25.562 PAIN IN BOTH KNEES, UNSPECIFIED CHRONICITY: ICD-10-CM

## 2020-11-17 DIAGNOSIS — M75.102 LEFT ROTATOR CUFF TEAR ARTHROPATHY: Primary | ICD-10-CM

## 2020-11-17 DIAGNOSIS — M12.812 LEFT ROTATOR CUFF TEAR ARTHROPATHY: Primary | ICD-10-CM

## 2020-11-17 DIAGNOSIS — M47.816 LUMBAR SPONDYLOSIS: ICD-10-CM

## 2020-11-17 PROCEDURE — 97110 THERAPEUTIC EXERCISES: CPT | Performed by: PHYSICAL THERAPIST

## 2020-11-17 PROCEDURE — 97140 MANUAL THERAPY 1/> REGIONS: CPT | Performed by: PHYSICAL THERAPIST

## 2020-11-20 ENCOUNTER — OFFICE VISIT (OUTPATIENT)
Dept: PHYSICAL THERAPY | Facility: CLINIC | Age: 70
End: 2020-11-20
Payer: COMMERCIAL

## 2020-11-20 DIAGNOSIS — M25.562 PAIN IN BOTH KNEES, UNSPECIFIED CHRONICITY: ICD-10-CM

## 2020-11-20 DIAGNOSIS — M75.102 LEFT ROTATOR CUFF TEAR ARTHROPATHY: Primary | ICD-10-CM

## 2020-11-20 DIAGNOSIS — M25.561 PAIN IN BOTH KNEES, UNSPECIFIED CHRONICITY: ICD-10-CM

## 2020-11-20 DIAGNOSIS — M12.812 LEFT ROTATOR CUFF TEAR ARTHROPATHY: Primary | ICD-10-CM

## 2020-11-20 DIAGNOSIS — M47.816 LUMBAR SPONDYLOSIS: ICD-10-CM

## 2020-11-20 PROCEDURE — 97110 THERAPEUTIC EXERCISES: CPT | Performed by: PHYSICAL THERAPIST

## 2020-11-20 PROCEDURE — 97140 MANUAL THERAPY 1/> REGIONS: CPT | Performed by: PHYSICAL THERAPIST

## 2020-11-24 ENCOUNTER — OFFICE VISIT (OUTPATIENT)
Dept: PHYSICAL THERAPY | Facility: CLINIC | Age: 70
End: 2020-11-24
Payer: COMMERCIAL

## 2020-11-24 DIAGNOSIS — M47.816 LUMBAR SPONDYLOSIS: ICD-10-CM

## 2020-11-24 DIAGNOSIS — M25.562 PAIN IN BOTH KNEES, UNSPECIFIED CHRONICITY: ICD-10-CM

## 2020-11-24 DIAGNOSIS — M75.102 LEFT ROTATOR CUFF TEAR ARTHROPATHY: Primary | ICD-10-CM

## 2020-11-24 DIAGNOSIS — M12.812 LEFT ROTATOR CUFF TEAR ARTHROPATHY: Primary | ICD-10-CM

## 2020-11-24 DIAGNOSIS — M25.561 PAIN IN BOTH KNEES, UNSPECIFIED CHRONICITY: ICD-10-CM

## 2020-11-24 PROCEDURE — 97140 MANUAL THERAPY 1/> REGIONS: CPT | Performed by: PHYSICAL THERAPIST

## 2020-11-24 PROCEDURE — 97110 THERAPEUTIC EXERCISES: CPT | Performed by: PHYSICAL THERAPIST

## 2020-11-24 PROCEDURE — 97530 THERAPEUTIC ACTIVITIES: CPT | Performed by: PHYSICAL THERAPIST

## 2020-11-25 ENCOUNTER — OFFICE VISIT (OUTPATIENT)
Dept: PHYSICAL THERAPY | Facility: CLINIC | Age: 70
End: 2020-11-25
Payer: COMMERCIAL

## 2020-11-25 DIAGNOSIS — M25.562 PAIN IN BOTH KNEES, UNSPECIFIED CHRONICITY: ICD-10-CM

## 2020-11-25 DIAGNOSIS — M75.102 LEFT ROTATOR CUFF TEAR ARTHROPATHY: Primary | ICD-10-CM

## 2020-11-25 DIAGNOSIS — M12.812 LEFT ROTATOR CUFF TEAR ARTHROPATHY: Primary | ICD-10-CM

## 2020-11-25 DIAGNOSIS — M47.816 LUMBAR SPONDYLOSIS: ICD-10-CM

## 2020-11-25 DIAGNOSIS — M25.561 PAIN IN BOTH KNEES, UNSPECIFIED CHRONICITY: ICD-10-CM

## 2020-11-25 PROCEDURE — 97112 NEUROMUSCULAR REEDUCATION: CPT

## 2020-11-25 PROCEDURE — 97110 THERAPEUTIC EXERCISES: CPT

## 2020-12-01 ENCOUNTER — OFFICE VISIT (OUTPATIENT)
Dept: PHYSICAL THERAPY | Facility: CLINIC | Age: 70
End: 2020-12-01
Payer: COMMERCIAL

## 2020-12-01 DIAGNOSIS — M47.816 LUMBAR SPONDYLOSIS: ICD-10-CM

## 2020-12-01 DIAGNOSIS — M25.562 PAIN IN BOTH KNEES, UNSPECIFIED CHRONICITY: Primary | ICD-10-CM

## 2020-12-01 DIAGNOSIS — M25.561 PAIN IN BOTH KNEES, UNSPECIFIED CHRONICITY: Primary | ICD-10-CM

## 2020-12-01 DIAGNOSIS — M75.102 LEFT ROTATOR CUFF TEAR ARTHROPATHY: ICD-10-CM

## 2020-12-01 DIAGNOSIS — M12.812 LEFT ROTATOR CUFF TEAR ARTHROPATHY: ICD-10-CM

## 2020-12-01 PROCEDURE — 97110 THERAPEUTIC EXERCISES: CPT | Performed by: PHYSICAL THERAPIST

## 2020-12-01 PROCEDURE — 97530 THERAPEUTIC ACTIVITIES: CPT | Performed by: PHYSICAL THERAPIST

## 2020-12-02 ENCOUNTER — TELEPHONE (OUTPATIENT)
Dept: FAMILY MEDICINE CLINIC | Facility: CLINIC | Age: 70
End: 2020-12-02

## 2020-12-02 DIAGNOSIS — E11.9 TYPE 2 DIABETES MELLITUS WITHOUT COMPLICATION, WITHOUT LONG-TERM CURRENT USE OF INSULIN (HCC): Primary | ICD-10-CM

## 2020-12-03 ENCOUNTER — OFFICE VISIT (OUTPATIENT)
Dept: PHYSICAL THERAPY | Facility: CLINIC | Age: 70
End: 2020-12-03
Payer: COMMERCIAL

## 2020-12-03 DIAGNOSIS — M25.562 PAIN IN BOTH KNEES, UNSPECIFIED CHRONICITY: Primary | ICD-10-CM

## 2020-12-03 DIAGNOSIS — M25.561 PAIN IN BOTH KNEES, UNSPECIFIED CHRONICITY: Primary | ICD-10-CM

## 2020-12-03 PROCEDURE — 97140 MANUAL THERAPY 1/> REGIONS: CPT | Performed by: PHYSICAL THERAPIST

## 2020-12-03 PROCEDURE — 97110 THERAPEUTIC EXERCISES: CPT | Performed by: PHYSICAL THERAPIST

## 2020-12-07 ENCOUNTER — OFFICE VISIT (OUTPATIENT)
Dept: PHYSICAL THERAPY | Facility: CLINIC | Age: 70
End: 2020-12-07
Payer: COMMERCIAL

## 2020-12-07 DIAGNOSIS — M25.561 PAIN IN BOTH KNEES, UNSPECIFIED CHRONICITY: Primary | ICD-10-CM

## 2020-12-07 DIAGNOSIS — M25.562 PAIN IN BOTH KNEES, UNSPECIFIED CHRONICITY: Primary | ICD-10-CM

## 2020-12-07 PROCEDURE — 97110 THERAPEUTIC EXERCISES: CPT | Performed by: PHYSICAL THERAPIST

## 2020-12-07 PROCEDURE — 97530 THERAPEUTIC ACTIVITIES: CPT | Performed by: PHYSICAL THERAPIST

## 2020-12-10 ENCOUNTER — TELEPHONE (OUTPATIENT)
Dept: FAMILY MEDICINE CLINIC | Facility: CLINIC | Age: 70
End: 2020-12-10

## 2020-12-10 LAB — HBA1C MFR BLD: 5.7 % OF TOTAL HGB

## 2020-12-10 PROCEDURE — 3044F HG A1C LEVEL LT 7.0%: CPT | Performed by: FAMILY MEDICINE

## 2020-12-11 ENCOUNTER — OFFICE VISIT (OUTPATIENT)
Dept: PHYSICAL THERAPY | Facility: CLINIC | Age: 70
End: 2020-12-11
Payer: COMMERCIAL

## 2020-12-11 DIAGNOSIS — M12.812 LEFT ROTATOR CUFF TEAR ARTHROPATHY: ICD-10-CM

## 2020-12-11 DIAGNOSIS — M47.816 LUMBAR SPONDYLOSIS: ICD-10-CM

## 2020-12-11 DIAGNOSIS — M25.562 PAIN IN BOTH KNEES, UNSPECIFIED CHRONICITY: Primary | ICD-10-CM

## 2020-12-11 DIAGNOSIS — M75.102 LEFT ROTATOR CUFF TEAR ARTHROPATHY: ICD-10-CM

## 2020-12-11 DIAGNOSIS — M25.561 PAIN IN BOTH KNEES, UNSPECIFIED CHRONICITY: Primary | ICD-10-CM

## 2020-12-11 PROCEDURE — 97530 THERAPEUTIC ACTIVITIES: CPT | Performed by: PHYSICAL THERAPIST

## 2020-12-11 PROCEDURE — 97112 NEUROMUSCULAR REEDUCATION: CPT | Performed by: PHYSICAL THERAPIST

## 2020-12-11 PROCEDURE — 97110 THERAPEUTIC EXERCISES: CPT | Performed by: PHYSICAL THERAPIST

## 2020-12-14 ENCOUNTER — OFFICE VISIT (OUTPATIENT)
Dept: PHYSICAL THERAPY | Facility: CLINIC | Age: 70
End: 2020-12-14
Payer: COMMERCIAL

## 2020-12-14 DIAGNOSIS — M12.812 LEFT ROTATOR CUFF TEAR ARTHROPATHY: ICD-10-CM

## 2020-12-14 DIAGNOSIS — M47.816 LUMBAR SPONDYLOSIS: ICD-10-CM

## 2020-12-14 DIAGNOSIS — M25.561 PAIN IN BOTH KNEES, UNSPECIFIED CHRONICITY: Primary | ICD-10-CM

## 2020-12-14 DIAGNOSIS — M75.102 LEFT ROTATOR CUFF TEAR ARTHROPATHY: ICD-10-CM

## 2020-12-14 DIAGNOSIS — M25.562 PAIN IN BOTH KNEES, UNSPECIFIED CHRONICITY: Primary | ICD-10-CM

## 2020-12-14 PROCEDURE — 97140 MANUAL THERAPY 1/> REGIONS: CPT

## 2020-12-14 PROCEDURE — 97112 NEUROMUSCULAR REEDUCATION: CPT

## 2020-12-14 PROCEDURE — 97110 THERAPEUTIC EXERCISES: CPT

## 2020-12-16 ENCOUNTER — OFFICE VISIT (OUTPATIENT)
Dept: FAMILY MEDICINE CLINIC | Facility: CLINIC | Age: 70
End: 2020-12-16
Payer: COMMERCIAL

## 2020-12-16 VITALS
HEIGHT: 75 IN | WEIGHT: 226 LBS | BODY MASS INDEX: 28.1 KG/M2 | OXYGEN SATURATION: 96 % | HEART RATE: 77 BPM | TEMPERATURE: 97.8 F | SYSTOLIC BLOOD PRESSURE: 138 MMHG | DIASTOLIC BLOOD PRESSURE: 90 MMHG

## 2020-12-16 DIAGNOSIS — E78.2 MIXED HYPERLIPIDEMIA: ICD-10-CM

## 2020-12-16 DIAGNOSIS — E11.9 TYPE 2 DIABETES MELLITUS WITHOUT COMPLICATION, WITHOUT LONG-TERM CURRENT USE OF INSULIN (HCC): ICD-10-CM

## 2020-12-16 DIAGNOSIS — I10 BENIGN ESSENTIAL HYPERTENSION: Primary | ICD-10-CM

## 2020-12-16 PROCEDURE — 1036F TOBACCO NON-USER: CPT | Performed by: FAMILY MEDICINE

## 2020-12-16 PROCEDURE — 99213 OFFICE O/P EST LOW 20 MIN: CPT | Performed by: FAMILY MEDICINE

## 2020-12-16 PROCEDURE — 1160F RVW MEDS BY RX/DR IN RCRD: CPT | Performed by: FAMILY MEDICINE

## 2020-12-17 ENCOUNTER — OFFICE VISIT (OUTPATIENT)
Dept: OBGYN CLINIC | Facility: CLINIC | Age: 70
End: 2020-12-17
Payer: COMMERCIAL

## 2020-12-17 VITALS
WEIGHT: 226 LBS | HEIGHT: 75 IN | TEMPERATURE: 97.5 F | HEART RATE: 74 BPM | BODY MASS INDEX: 28.1 KG/M2 | SYSTOLIC BLOOD PRESSURE: 124 MMHG | DIASTOLIC BLOOD PRESSURE: 84 MMHG

## 2020-12-17 DIAGNOSIS — M17.11 PRIMARY OSTEOARTHRITIS OF RIGHT KNEE: Primary | ICD-10-CM

## 2020-12-17 PROCEDURE — 3008F BODY MASS INDEX DOCD: CPT | Performed by: FAMILY MEDICINE

## 2020-12-17 PROCEDURE — 20610 DRAIN/INJ JOINT/BURSA W/O US: CPT | Performed by: ORTHOPAEDIC SURGERY

## 2020-12-17 RX ORDER — METHYLPREDNISOLONE ACETATE 40 MG/ML
1 INJECTION, SUSPENSION INTRA-ARTICULAR; INTRALESIONAL; INTRAMUSCULAR; SOFT TISSUE
Status: COMPLETED | OUTPATIENT
Start: 2020-12-17 | End: 2020-12-17

## 2020-12-17 RX ORDER — BUPIVACAINE HYDROCHLORIDE 2.5 MG/ML
4 INJECTION, SOLUTION INFILTRATION; PERINEURAL
Status: COMPLETED | OUTPATIENT
Start: 2020-12-17 | End: 2020-12-17

## 2020-12-17 RX ADMIN — METHYLPREDNISOLONE ACETATE 1 ML: 40 INJECTION, SUSPENSION INTRA-ARTICULAR; INTRALESIONAL; INTRAMUSCULAR; SOFT TISSUE at 14:02

## 2020-12-17 RX ADMIN — BUPIVACAINE HYDROCHLORIDE 4 ML: 2.5 INJECTION, SOLUTION INFILTRATION; PERINEURAL at 14:02

## 2020-12-18 ENCOUNTER — OFFICE VISIT (OUTPATIENT)
Dept: PHYSICAL THERAPY | Facility: CLINIC | Age: 70
End: 2020-12-18
Payer: COMMERCIAL

## 2020-12-18 DIAGNOSIS — M12.812 LEFT ROTATOR CUFF TEAR ARTHROPATHY: ICD-10-CM

## 2020-12-18 DIAGNOSIS — M25.562 PAIN IN BOTH KNEES, UNSPECIFIED CHRONICITY: Primary | ICD-10-CM

## 2020-12-18 DIAGNOSIS — M25.561 PAIN IN BOTH KNEES, UNSPECIFIED CHRONICITY: Primary | ICD-10-CM

## 2020-12-18 DIAGNOSIS — M75.102 LEFT ROTATOR CUFF TEAR ARTHROPATHY: ICD-10-CM

## 2020-12-18 DIAGNOSIS — M47.816 LUMBAR SPONDYLOSIS: ICD-10-CM

## 2020-12-18 PROCEDURE — 97016 VASOPNEUMATIC DEVICE THERAPY: CPT | Performed by: PHYSICAL THERAPIST

## 2020-12-18 PROCEDURE — 97110 THERAPEUTIC EXERCISES: CPT | Performed by: PHYSICAL THERAPIST

## 2020-12-18 PROCEDURE — 97140 MANUAL THERAPY 1/> REGIONS: CPT | Performed by: PHYSICAL THERAPIST

## 2020-12-21 ENCOUNTER — APPOINTMENT (OUTPATIENT)
Dept: PHYSICAL THERAPY | Facility: CLINIC | Age: 70
End: 2020-12-21
Payer: COMMERCIAL

## 2020-12-23 ENCOUNTER — EVALUATION (OUTPATIENT)
Dept: PHYSICAL THERAPY | Facility: CLINIC | Age: 70
End: 2020-12-23
Payer: COMMERCIAL

## 2020-12-23 DIAGNOSIS — M25.572 ACUTE BILATERAL ANKLE PAIN: ICD-10-CM

## 2020-12-23 DIAGNOSIS — M25.571 ACUTE BILATERAL ANKLE PAIN: ICD-10-CM

## 2020-12-23 DIAGNOSIS — M25.561 PAIN IN BOTH KNEES, UNSPECIFIED CHRONICITY: ICD-10-CM

## 2020-12-23 DIAGNOSIS — M17.0 BILATERAL PRIMARY OSTEOARTHRITIS OF KNEE: Primary | ICD-10-CM

## 2020-12-23 DIAGNOSIS — M25.562 PAIN IN BOTH KNEES, UNSPECIFIED CHRONICITY: ICD-10-CM

## 2020-12-23 DIAGNOSIS — R29.898 ANKLE WEAKNESS: ICD-10-CM

## 2020-12-23 DIAGNOSIS — G56.02 PARTIAL THENAR ATROPHY, LEFT: ICD-10-CM

## 2020-12-23 DIAGNOSIS — R26.89 BALANCE PROBLEM: ICD-10-CM

## 2020-12-23 DIAGNOSIS — G56.01 PARTIAL THENAR ATROPHY, RIGHT: ICD-10-CM

## 2020-12-23 PROCEDURE — 97110 THERAPEUTIC EXERCISES: CPT | Performed by: PHYSICAL THERAPIST

## 2020-12-23 PROCEDURE — 97164 PT RE-EVAL EST PLAN CARE: CPT | Performed by: PHYSICAL THERAPIST

## 2020-12-28 ENCOUNTER — OFFICE VISIT (OUTPATIENT)
Dept: PHYSICAL THERAPY | Facility: CLINIC | Age: 70
End: 2020-12-28
Payer: COMMERCIAL

## 2020-12-28 DIAGNOSIS — M75.102 LEFT ROTATOR CUFF TEAR ARTHROPATHY: ICD-10-CM

## 2020-12-28 DIAGNOSIS — G56.02 PARTIAL THENAR ATROPHY, LEFT: ICD-10-CM

## 2020-12-28 DIAGNOSIS — M17.0 BILATERAL PRIMARY OSTEOARTHRITIS OF KNEE: ICD-10-CM

## 2020-12-28 DIAGNOSIS — M47.816 LUMBAR SPONDYLOSIS: ICD-10-CM

## 2020-12-28 DIAGNOSIS — R26.89 BALANCE PROBLEM: ICD-10-CM

## 2020-12-28 DIAGNOSIS — M25.571 ACUTE BILATERAL ANKLE PAIN: ICD-10-CM

## 2020-12-28 DIAGNOSIS — M12.812 LEFT ROTATOR CUFF TEAR ARTHROPATHY: ICD-10-CM

## 2020-12-28 DIAGNOSIS — M25.562 PAIN IN BOTH KNEES, UNSPECIFIED CHRONICITY: Primary | ICD-10-CM

## 2020-12-28 DIAGNOSIS — R29.898 ANKLE WEAKNESS: ICD-10-CM

## 2020-12-28 DIAGNOSIS — G56.01 PARTIAL THENAR ATROPHY, RIGHT: ICD-10-CM

## 2020-12-28 DIAGNOSIS — M25.572 ACUTE BILATERAL ANKLE PAIN: ICD-10-CM

## 2020-12-28 DIAGNOSIS — M25.561 PAIN IN BOTH KNEES, UNSPECIFIED CHRONICITY: Primary | ICD-10-CM

## 2020-12-28 PROCEDURE — 97110 THERAPEUTIC EXERCISES: CPT

## 2020-12-28 PROCEDURE — 97112 NEUROMUSCULAR REEDUCATION: CPT

## 2020-12-28 PROCEDURE — 97530 THERAPEUTIC ACTIVITIES: CPT

## 2020-12-30 ENCOUNTER — OFFICE VISIT (OUTPATIENT)
Dept: PHYSICAL THERAPY | Facility: CLINIC | Age: 70
End: 2020-12-30
Payer: COMMERCIAL

## 2020-12-30 DIAGNOSIS — R26.89 BALANCE PROBLEM: ICD-10-CM

## 2020-12-30 DIAGNOSIS — M12.812 LEFT ROTATOR CUFF TEAR ARTHROPATHY: ICD-10-CM

## 2020-12-30 DIAGNOSIS — M47.816 LUMBAR SPONDYLOSIS: ICD-10-CM

## 2020-12-30 DIAGNOSIS — M17.0 BILATERAL PRIMARY OSTEOARTHRITIS OF KNEE: ICD-10-CM

## 2020-12-30 DIAGNOSIS — R29.898 ANKLE WEAKNESS: ICD-10-CM

## 2020-12-30 DIAGNOSIS — M25.562 PAIN IN BOTH KNEES, UNSPECIFIED CHRONICITY: Primary | ICD-10-CM

## 2020-12-30 DIAGNOSIS — M75.102 LEFT ROTATOR CUFF TEAR ARTHROPATHY: ICD-10-CM

## 2020-12-30 DIAGNOSIS — M25.561 PAIN IN BOTH KNEES, UNSPECIFIED CHRONICITY: Primary | ICD-10-CM

## 2020-12-30 DIAGNOSIS — G56.02 PARTIAL THENAR ATROPHY, LEFT: ICD-10-CM

## 2020-12-30 DIAGNOSIS — G56.01 PARTIAL THENAR ATROPHY, RIGHT: ICD-10-CM

## 2020-12-30 DIAGNOSIS — M25.572 ACUTE BILATERAL ANKLE PAIN: ICD-10-CM

## 2020-12-30 DIAGNOSIS — M25.571 ACUTE BILATERAL ANKLE PAIN: ICD-10-CM

## 2020-12-30 PROCEDURE — 97112 NEUROMUSCULAR REEDUCATION: CPT

## 2020-12-30 PROCEDURE — 97110 THERAPEUTIC EXERCISES: CPT

## 2021-01-05 ENCOUNTER — OFFICE VISIT (OUTPATIENT)
Dept: PHYSICAL THERAPY | Facility: CLINIC | Age: 71
End: 2021-01-05
Payer: COMMERCIAL

## 2021-01-05 DIAGNOSIS — R26.89 BALANCE PROBLEM: ICD-10-CM

## 2021-01-05 DIAGNOSIS — R29.898 ANKLE WEAKNESS: Primary | ICD-10-CM

## 2021-01-05 DIAGNOSIS — G56.01 PARTIAL THENAR ATROPHY, RIGHT: ICD-10-CM

## 2021-01-05 DIAGNOSIS — M17.0 BILATERAL PRIMARY OSTEOARTHRITIS OF KNEE: ICD-10-CM

## 2021-01-05 DIAGNOSIS — G56.02 PARTIAL THENAR ATROPHY, LEFT: ICD-10-CM

## 2021-01-05 DIAGNOSIS — M25.572 ACUTE BILATERAL ANKLE PAIN: ICD-10-CM

## 2021-01-05 DIAGNOSIS — M25.571 ACUTE BILATERAL ANKLE PAIN: ICD-10-CM

## 2021-01-05 PROCEDURE — 97112 NEUROMUSCULAR REEDUCATION: CPT | Performed by: PHYSICAL THERAPIST

## 2021-01-05 PROCEDURE — 97110 THERAPEUTIC EXERCISES: CPT | Performed by: PHYSICAL THERAPIST

## 2021-01-05 NOTE — PROGRESS NOTES
Daily Note     Today's date: 2021  Patient name: Edvin Jensen  : 1950  MRN: [id number]  Referring provider: Maico Arriola DO  Dx:   Encounter Diagnosis     ICD-10-CM    1  Ankle weakness  R29 898    2  Bilateral primary osteoarthritis of knee  M17 0    3  Acute bilateral ankle pain  M25 571     M25 572    4  Partial thenar atrophy, right  G56 01    5  Partial thenar atrophy, left  G56 02    6  Balance problem  R26 89                   Subjective: Pt reported that he has been performing his HEP more consistently recently because he recently got a fitbit and it has helped motivate him to perform exercises  Objective: See treatment diary below      Assessment: Tolerated treatment well  Patient demonstrated fatigue post treatment, exhibited good technique with therapeutic exercises and would benefit from continued PT  Plan: Continue per plan of care        Precautions: standard OA    Manual  12/3 12/7 12/11 12/14 12/18           R knee STM, PROM JZ       JZ                 Exercise Diary  12/3 12/7 12/11 12/14 12/18  12/23  12/28 12/30 1/5   Soleus stretch   :15x5 :15x5 15x5           Calf stretch     :15x5 15x5           Stand hip abd TB G 3x10ea                 LP low in / 3x10   110/ 3x10 110/ 3x10 110/ 3x10  110/ 3x10  110/ 3x10  110/ 3x15 170/ 3x10   LP mid in PF      110/ 3x10 110/ 3x10 110/ 3x15 170/ 3x10   LP high in PF      110/ 3x10 110/ 3x10 110/ 3x15 170/ 3x10   LP u/l high in PF     50/ 3x10 50/ 3x10 ea 50/ 3x10  50/   50/  3x10 50/  3x10 50/ 3x10   LP u/l mid in PF      50/  50/  3x10 50/  3x10 50/ 3x10   LP u/l low in PF      50/  50/  3x10 50/  3x10 50/ 3x10   /grasp         3x10   Calf press high      110/ 3x10 110/ 3x10 110/ 3x15    Calf press mid      110/ 3x10 110/ 3x10 110/ 3x15    Calf press Low      110/ 3x10 110/3x10 110/  3x15 210/ 3x10   U/l HR iso high K mid A     1 min ea 50/ 1 min ea    1 min ea  1 min ea 1 min  ea 1 min ea   U/l HR iso mid K mid A     1 min ea 1 min ea     1 min ea  1 min ea 1 min   ea 1 min ea   u/l HR 3x15   3x15 3x15 3x15 3x15  3x15 3x15 3x15   B/l HR     45x 45x 45x  45x  45x 45x  45x   3 way HS stretch   :15x5               Elliptical 5'              10'   TM                    Putty       P 2'  2' ea    Clip pinch       x2 x2 2x10   Flexbar       G x15 ea Green  x20 ea G 3x10 ea                CP               10' R knee with putty

## 2021-01-08 ENCOUNTER — OFFICE VISIT (OUTPATIENT)
Dept: PHYSICAL THERAPY | Facility: CLINIC | Age: 71
End: 2021-01-08
Payer: COMMERCIAL

## 2021-01-08 DIAGNOSIS — M25.561 PAIN IN BOTH KNEES, UNSPECIFIED CHRONICITY: ICD-10-CM

## 2021-01-08 DIAGNOSIS — G56.01 PARTIAL THENAR ATROPHY, RIGHT: ICD-10-CM

## 2021-01-08 DIAGNOSIS — M25.571 ACUTE BILATERAL ANKLE PAIN: ICD-10-CM

## 2021-01-08 DIAGNOSIS — G56.02 PARTIAL THENAR ATROPHY, LEFT: Primary | ICD-10-CM

## 2021-01-08 DIAGNOSIS — M25.562 PAIN IN BOTH KNEES, UNSPECIFIED CHRONICITY: ICD-10-CM

## 2021-01-08 DIAGNOSIS — R26.89 BALANCE PROBLEM: ICD-10-CM

## 2021-01-08 DIAGNOSIS — M17.0 BILATERAL PRIMARY OSTEOARTHRITIS OF KNEE: ICD-10-CM

## 2021-01-08 DIAGNOSIS — M25.572 ACUTE BILATERAL ANKLE PAIN: ICD-10-CM

## 2021-01-08 DIAGNOSIS — R29.898 ANKLE WEAKNESS: ICD-10-CM

## 2021-01-08 PROCEDURE — 97110 THERAPEUTIC EXERCISES: CPT | Performed by: PHYSICAL THERAPIST

## 2021-01-08 PROCEDURE — 97530 THERAPEUTIC ACTIVITIES: CPT | Performed by: PHYSICAL THERAPIST

## 2021-01-08 NOTE — PROGRESS NOTES
Daily Note     Today's date: 2021  Patient name: Emily Daurte  : 1950  MRN: [id number]  Referring provider: Irina Duque DO  Dx:   Encounter Diagnosis     ICD-10-CM    1  Partial thenar atrophy, left  G56 02    2  Ankle weakness  R29 898    3  Bilateral primary osteoarthritis of knee  M17 0    4  Balance problem  R26 89    5  Pain in both knees, unspecified chronicity  M25 561     M25 562    6  Acute bilateral ankle pain  M25 571     M25 572    7  Partial thenar atrophy, right  G56 01                   Subjective: Pt reported that he has been focusing on balance as part of his HEP  Objective: See treatment diary below      Assessment: Tolerated treatment well  Initiated flexbar thenar strengthening to further strengthening which he performed well without provocation of pain  Patient demonstrated fatigue post treatment, exhibited good technique with therapeutic exercises and would benefit from continued PT  Plan: Continue per plan of care        Precautions: standard OA    Manual                 R knee STM, PROM                      Exercise Diary     Soleus stretch             Calf stretch             Stand hip abd TB             LP low in / 3x10       110/ 3x10  110/ 3x15 170/ 3x10   LP mid in / 3x10      110/ 3x10 110/ 3x15 170/ 3x10   LP high in / 3x10      110/ 3x10 110/ 3x15 170/ 3x10   LP u/l high in PF        50/  3x10 50/  3x10 50/ 3x10   LP u/l mid in PF       50/  3x10 50/  3x10 50/ 3x10   LP u/l low in PF       50/  3x10 50/  3x10 50/ 3x10   /grasp         3x10   Calf press high 210/ 3x10      110/ 3x10 110/ 3x15    Calf press mid 210/ 3x10      110/ 3x10 110/ 3x15    Calf press Low 210/ 3x10      110/3x10 110/  3x15 210/ 3x10   U/l HR iso high K mid A        1 min ea 1 min  ea 1 min ea   U/l HR iso mid K mid A        1 min ea 1 min   ea 1 min ea   u/l HR 3x15       3x15 3x15 3x15   B/l HR        45x 45x  45x   3 way HS stretch       Elliptical 5'         10'   TM              Putty        2'  2' ea    Clip pinch       x2 x2 2x10   Flexbar pinch flex/ext G 3x10      x15 ea Green  x20 ea G 3x10 ea                CP         10' R knee with putty

## 2021-01-12 ENCOUNTER — OFFICE VISIT (OUTPATIENT)
Dept: PHYSICAL THERAPY | Facility: CLINIC | Age: 71
End: 2021-01-12
Payer: COMMERCIAL

## 2021-01-12 DIAGNOSIS — G56.01 PARTIAL THENAR ATROPHY, RIGHT: ICD-10-CM

## 2021-01-12 DIAGNOSIS — M25.561 PAIN IN BOTH KNEES, UNSPECIFIED CHRONICITY: ICD-10-CM

## 2021-01-12 DIAGNOSIS — R26.89 BALANCE PROBLEM: ICD-10-CM

## 2021-01-12 DIAGNOSIS — M17.0 BILATERAL PRIMARY OSTEOARTHRITIS OF KNEE: ICD-10-CM

## 2021-01-12 DIAGNOSIS — R29.898 ANKLE WEAKNESS: ICD-10-CM

## 2021-01-12 DIAGNOSIS — G56.02 PARTIAL THENAR ATROPHY, LEFT: Primary | ICD-10-CM

## 2021-01-12 DIAGNOSIS — M25.572 ACUTE BILATERAL ANKLE PAIN: ICD-10-CM

## 2021-01-12 DIAGNOSIS — M25.562 PAIN IN BOTH KNEES, UNSPECIFIED CHRONICITY: ICD-10-CM

## 2021-01-12 DIAGNOSIS — M25.571 ACUTE BILATERAL ANKLE PAIN: ICD-10-CM

## 2021-01-12 PROCEDURE — 97112 NEUROMUSCULAR REEDUCATION: CPT | Performed by: PHYSICAL THERAPIST

## 2021-01-12 PROCEDURE — 97110 THERAPEUTIC EXERCISES: CPT | Performed by: PHYSICAL THERAPIST

## 2021-01-12 NOTE — PROGRESS NOTES
Daily Note     Today's date: 2021  Patient name: Roselia Peng  : 1950  MRN: [id number]  Referring provider: Karrie Geiger DO  Dx:   Encounter Diagnosis     ICD-10-CM    1  Partial thenar atrophy, left  G56 02    2  Ankle weakness  R29 898    3  Bilateral primary osteoarthritis of knee  M17 0    4  Balance problem  R26 89    5  Partial thenar atrophy, right  G56 01    6  Acute bilateral ankle pain  M25 571     M25 572    7  Pain in both knees, unspecified chronicity  M25 561     M25 562                   Subjective: Pt reported that he is improving with his strength with stair negotiation  Stated that he also is improving with his  strength, stated that he was able to open pill jar without thumb pain  Objective: See treatment diary below      Assessment: Tolerated treatment well  Demonstrated some improvement in balance with SLS ball toss, however continues to require soccer ball assist for first set due to deficits  Patient demonstrated fatigue post treatment, exhibited good technique with therapeutic exercises and would benefit from continued PT  Plan: Continue per plan of care        Precautions: standard OA    Manual                 R knee STM, PROM                      Exercise Diary     Soleus stretch             Calf stretch             Stand hip abd TB             LP low in / 3x10 210/ 3x10      110/ 3x10  110/ 3x15 170/ 3x10   LP mid in / 3x10 210/ 3x10     110/ 3x10 110/ 3x15 170/ 3x10   LP high in / 3x10 210/ 3x10     110/ 3x10 110/ 3x15 170/ 3x10   LP u/l high in PF        50/  3x10 50/  3x10 50/ 3x10   LP u/l mid in PF       50/  3x10 50/  3x10 50/ 3x10   LP u/l low in PF       50/  3x10 50/  3x10 50/ 3x10   /grasp         3x10   Calf press high 210/ 3x10 210/ 3x10     110/ 3x10 110/ 3x15    Calf press mid 210/ 3x10 210/ 3x10     110/ 3x10 110/ 3x15    Calf press Low 210/ 3x10 210/ 3x10     110/3x10 110/  3x15 210/ 3x10 U/l HR iso high K mid A  1 min ea      1 min ea 1 min  ea 1 min ea   U/l HR iso mid K mid A  1 min ea      1 min ea 1 min   ea 1 min ea   u/l HR 3x15 3x15      3x15 3x15 3x15   B/l HR        45x 45x  45x   3 way HS stretch             Elliptical 5' 5'         10'   TM              Putty        2'  2' ea    Clip pinch       x2 x2 2x10   Flexbar pinch flex/ext G 3x10 G 3x10ea     x15 ea Green  x20 ea G 3x10 ea   SLS ball toss, soccer ball  R 3x10ea           CP         10' R knee with putty

## 2021-01-15 ENCOUNTER — OFFICE VISIT (OUTPATIENT)
Dept: PHYSICAL THERAPY | Facility: CLINIC | Age: 71
End: 2021-01-15
Payer: COMMERCIAL

## 2021-01-15 DIAGNOSIS — M25.561 PAIN IN BOTH KNEES, UNSPECIFIED CHRONICITY: ICD-10-CM

## 2021-01-15 DIAGNOSIS — I10 ESSENTIAL HYPERTENSION: ICD-10-CM

## 2021-01-15 DIAGNOSIS — R26.89 BALANCE PROBLEM: ICD-10-CM

## 2021-01-15 DIAGNOSIS — R29.898 ANKLE WEAKNESS: ICD-10-CM

## 2021-01-15 DIAGNOSIS — G56.01 PARTIAL THENAR ATROPHY, RIGHT: Primary | ICD-10-CM

## 2021-01-15 DIAGNOSIS — M25.572 ACUTE BILATERAL ANKLE PAIN: ICD-10-CM

## 2021-01-15 DIAGNOSIS — M25.562 PAIN IN BOTH KNEES, UNSPECIFIED CHRONICITY: ICD-10-CM

## 2021-01-15 DIAGNOSIS — M17.0 BILATERAL PRIMARY OSTEOARTHRITIS OF KNEE: ICD-10-CM

## 2021-01-15 DIAGNOSIS — M25.571 ACUTE BILATERAL ANKLE PAIN: ICD-10-CM

## 2021-01-15 DIAGNOSIS — G56.02 PARTIAL THENAR ATROPHY, LEFT: ICD-10-CM

## 2021-01-15 PROCEDURE — 97530 THERAPEUTIC ACTIVITIES: CPT | Performed by: PHYSICAL THERAPIST

## 2021-01-15 PROCEDURE — 97110 THERAPEUTIC EXERCISES: CPT | Performed by: PHYSICAL THERAPIST

## 2021-01-15 RX ORDER — OLMESARTAN MEDOXOMIL AND HYDROCHLOROTHIAZIDE 20/12.5 20; 12.5 MG/1; MG/1
TABLET ORAL
Qty: 90 TABLET | Refills: 0 | Status: SHIPPED | OUTPATIENT
Start: 2021-01-15 | End: 2021-04-22

## 2021-01-15 NOTE — PROGRESS NOTES
Daily Note     Today's date: 1/15/2021  Patient name: Moriah Bustamante  : 1950  MRN: [id number]  Referring provider: Regina Cotto DO  Dx:   Encounter Diagnosis     ICD-10-CM    1  Partial thenar atrophy, right  G56 01    2  Partial thenar atrophy, left  G56 02    3  Ankle weakness  R29 898    4  Bilateral primary osteoarthritis of knee  M17 0    5  Balance problem  R26 89    6  Pain in both knees, unspecified chronicity  M25 561     M25 562    7  Acute bilateral ankle pain  M25 571     M25 572                   Subjective: Pt reported that he has been experiencing knee pain today more than normal, because he twisted it while sleeping  Objective: See treatment diary below      Assessment: Tolerated treatment well  Patient demonstrated fatigue post treatment, exhibited good technique with therapeutic exercises and would benefit from continued PT  Plan: Continue per plan of care        Precautions: standard OA    Manual                 R knee STM, PROM                      Exercise Diary  1/8 1/12 1/15      1/   Soleus stretch            Calf stretch   :15x5         Stand hip abd TB            LP low in / 3x10 210/ 3x10 210/ 3x10      170/ 3x10   LP mid in / 3x10 210/ 3x10 210/ 3x10      170/ 3x10   LP high in / 3x10 210/ 3x10 210/ 3x10      170/ 3x10   LP u/l high in PF   50/ 3x10      50/ 3x10   LP u/l mid in PF   50/ 3x10      50/ 3x10   LP u/l low in PF   50/ 3x10      50/ 3x10   Hamstring and calf stretch strap   :15x5      3x10   Calf press high 210/ 3x10 210/ 3x10 210/ 3x10         Calf press mid 210/ 3x10 210/ 3x10 210/ 3x10         Calf press Low 210/ 3x10 210/ 3x10 210/ 3x10      210/ 3x10   U/l HR iso high K mid A  1 min ea       1 min ea   U/l HR iso mid K mid A  1 min ea       1 min ea   u/l HR 3x15 3x15 3x15      3x15   B/l HR         45x   3 way HS stretch            Elliptical 5' 5'  5'       10'   TM             Putty             Clip pinch         2x10 Flexbar pinch flex/ext G 3x10 G 3x10ea R 3x10ea      G 3x10 ea   SLS ball toss, soccer ball  R 3x10ea R 3x10          CP

## 2021-01-19 ENCOUNTER — OFFICE VISIT (OUTPATIENT)
Dept: PHYSICAL THERAPY | Facility: CLINIC | Age: 71
End: 2021-01-19
Payer: COMMERCIAL

## 2021-01-19 DIAGNOSIS — R26.89 BALANCE PROBLEM: Primary | ICD-10-CM

## 2021-01-19 DIAGNOSIS — M17.0 BILATERAL PRIMARY OSTEOARTHRITIS OF KNEE: ICD-10-CM

## 2021-01-19 DIAGNOSIS — M25.572 ACUTE BILATERAL ANKLE PAIN: ICD-10-CM

## 2021-01-19 DIAGNOSIS — M25.571 ACUTE BILATERAL ANKLE PAIN: ICD-10-CM

## 2021-01-19 DIAGNOSIS — M25.561 PAIN IN BOTH KNEES, UNSPECIFIED CHRONICITY: ICD-10-CM

## 2021-01-19 DIAGNOSIS — G56.01 PARTIAL THENAR ATROPHY, RIGHT: ICD-10-CM

## 2021-01-19 DIAGNOSIS — G56.02 PARTIAL THENAR ATROPHY, LEFT: ICD-10-CM

## 2021-01-19 DIAGNOSIS — M25.562 PAIN IN BOTH KNEES, UNSPECIFIED CHRONICITY: ICD-10-CM

## 2021-01-19 DIAGNOSIS — R29.898 ANKLE WEAKNESS: ICD-10-CM

## 2021-01-19 PROCEDURE — 97110 THERAPEUTIC EXERCISES: CPT | Performed by: PHYSICAL THERAPIST

## 2021-01-19 PROCEDURE — 97530 THERAPEUTIC ACTIVITIES: CPT | Performed by: PHYSICAL THERAPIST

## 2021-01-19 NOTE — PROGRESS NOTES
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    4  Partial thenar atrophy, right  G56 01    5  Ankle weakness  R29 898    6  Acute bilateral ankle pain  M25 571     M25 572    7  Bilateral primary osteoarthritis of knee  M17 0                   Subjective: Pt reported that he has been increasing his cardiovascular exercises at home, which has been improving his endurance  Notes that the updated program has been improving his strength and confidence with functional activities  Objective: See treatment diary below      Assessment: Tolerated treatment well  In order to advance strengthening of legs initiated dumbbell squat curl, which he performed well without provocation of pain  Patient demonstrated fatigue post treatment, exhibited good technique with therapeutic exercises and would benefit from continued PT  Plan: Continue per plan of care        Precautions: standard OA    Manual                 R knee STM, PROM                      Exercise Diary  1/8 1/12 1/15 1/19     1/5   Soleus stretch            Calf stretch   :15x5         Stand hip abd TB            LP low in / 3x10 210/ 3x10 210/ 3x10 170/ 3x10     170/ 3x10   LP mid in / 3x10 210/ 3x10 210/ 3x10 170/ 3x10     170/ 3x10   LP high in / 3x10 210/ 3x10 210/ 3x10 170/ 3x10     170/ 3x10   LP u/l high in PF   50/ 3x10      50/ 3x10   LP u/l mid in PF   50/ 3x10      50/ 3x10   LP u/l low in PF   50/ 3x10      50/ 3x10   Hamstring and calf stretch strap   :15x5      3x10   Calf press high 210/ 3x10 210/ 3x10 210/ 3x10 210/ 3x10        Calf press mid 210/ 3x10 210/ 3x10 210/ 3x10 210/ 3x10        Calf press Low 210/ 3x10 210/ 3x10 210/ 3x10 210/ 3x10     210/ 3x10   U/l HR iso high K mid A  1 min ea  1 min ea     1 min ea   U/l HR iso mid K mid A  1 min ea  1 min ea     1 min ea   u/l HR 3x15 3x15 3x15 x45     3x15   Squat curl press    15# 3x10     45x   3 way HS stretch            Elliptical 5' 5'  5'  5'      10'   TM             Putty             Clip pinch         2x10   Flexbar pinch flex/ext G 3x10 G 3x10ea R 3x10ea R 3x10ea     G 3x10 ea   SLS ball toss, soccer ball  R 3x10ea R 3x10 R 3x10ea         CP

## 2021-01-21 ENCOUNTER — OFFICE VISIT (OUTPATIENT)
Dept: PHYSICAL THERAPY | Facility: CLINIC | Age: 71
End: 2021-01-21
Payer: COMMERCIAL

## 2021-01-21 DIAGNOSIS — R26.89 BALANCE PROBLEM: ICD-10-CM

## 2021-01-21 DIAGNOSIS — M25.562 PAIN IN BOTH KNEES, UNSPECIFIED CHRONICITY: ICD-10-CM

## 2021-01-21 DIAGNOSIS — M17.0 BILATERAL PRIMARY OSTEOARTHRITIS OF KNEE: ICD-10-CM

## 2021-01-21 DIAGNOSIS — G56.02 PARTIAL THENAR ATROPHY, LEFT: ICD-10-CM

## 2021-01-21 DIAGNOSIS — M25.572 ACUTE BILATERAL ANKLE PAIN: ICD-10-CM

## 2021-01-21 DIAGNOSIS — R29.898 ANKLE WEAKNESS: Primary | ICD-10-CM

## 2021-01-21 DIAGNOSIS — M25.561 PAIN IN BOTH KNEES, UNSPECIFIED CHRONICITY: ICD-10-CM

## 2021-01-21 DIAGNOSIS — G56.01 PARTIAL THENAR ATROPHY, RIGHT: ICD-10-CM

## 2021-01-21 DIAGNOSIS — M25.571 ACUTE BILATERAL ANKLE PAIN: ICD-10-CM

## 2021-01-21 PROCEDURE — 97112 NEUROMUSCULAR REEDUCATION: CPT | Performed by: PHYSICAL THERAPIST

## 2021-01-21 PROCEDURE — 97530 THERAPEUTIC ACTIVITIES: CPT | Performed by: PHYSICAL THERAPIST

## 2021-01-21 PROCEDURE — 97110 THERAPEUTIC EXERCISES: CPT | Performed by: PHYSICAL THERAPIST

## 2021-01-21 NOTE — PROGRESS NOTES
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    7  Partial thenar atrophy, right  G56 01                   Subjective: Pt reported that he "felt great after the last workout " No significant changes to report  Objective: See treatment diary below      Assessment: Tolerated treatment well  Patient demonstrated fatigue post treatment, exhibited good technique with therapeutic exercises and would benefit from continued PT  Plan: Continue per plan of care        Precautions: standard OA    Manual                 R knee STM, PROM                      Exercise Diary  1/8 1/12 1/15 1/19 1/21    1/5   Soleus stretch            Calf stretch   :15x5  :15x5       Stand hip abd TB            LP low in / 3x10 210/ 3x10 210/ 3x10 170/ 3x10 170/ 3x10    170/ 3x10   LP mid in / 3x10 210/ 3x10 210/ 3x10 170/ 3x10 170/ 3x10    170/ 3x10   LP high in / 3x10 210/ 3x10 210/ 3x10 170/ 3x10     170/ 3x10   LP u/l high in PF   50/ 3x10      50/ 3x10   LP u/l mid in PF   50/ 3x10      50/ 3x10   LP u/l low in PF   50/ 3x10      50/ 3x10   Hamstring and calf stretch strap   :15x5  :15x5    3x10   Calf press high 210/ 3x10 210/ 3x10 210/ 3x10 210/ 3x10        Calf press mid 210/ 3x10 210/ 3x10 210/ 3x10 210/ 3x10        Calf press Low 210/ 3x10 210/ 3x10 210/ 3x10 210/ 3x10     210/ 3x10   U/l HR iso high K mid A  1 min ea  1 min ea 1 min ea    1 min ea   U/l HR iso mid K mid A  1 min ea  1 min ea 1 min ea    1 min ea   u/l HR 3x15 3x15 3x15 x45 15# x45    3x15   Squat curl press    15# 3x10     45x Dumbbell squat     20# 3x10       Squat overhead press     15# 3x10       3 way HS stretch            Elliptical 5' 5'  5'  5'  5'    10'   TM             Putty             Clip pinch         2x10   Flexbar pinch flex/ext G 3x10 G 3x10ea R 3x10ea R 3x10ea R 3x10    G 3x10 ea   SLS ball toss, soccer ball  R 3x10ea R 3x10 R 3x10ea R 3x10        CP

## 2021-01-22 ENCOUNTER — APPOINTMENT (OUTPATIENT)
Dept: PHYSICAL THERAPY | Facility: CLINIC | Age: 71
End: 2021-01-22
Payer: COMMERCIAL

## 2021-01-26 ENCOUNTER — OFFICE VISIT (OUTPATIENT)
Dept: PHYSICAL THERAPY | Facility: CLINIC | Age: 71
End: 2021-01-26
Payer: COMMERCIAL

## 2021-01-26 DIAGNOSIS — M17.0 BILATERAL PRIMARY OSTEOARTHRITIS OF KNEE: ICD-10-CM

## 2021-01-26 DIAGNOSIS — M25.562 PAIN IN BOTH KNEES, UNSPECIFIED CHRONICITY: ICD-10-CM

## 2021-01-26 DIAGNOSIS — M25.572 ACUTE BILATERAL ANKLE PAIN: ICD-10-CM

## 2021-01-26 DIAGNOSIS — M25.571 ACUTE BILATERAL ANKLE PAIN: ICD-10-CM

## 2021-01-26 DIAGNOSIS — G56.01 PARTIAL THENAR ATROPHY, RIGHT: ICD-10-CM

## 2021-01-26 DIAGNOSIS — M25.561 PAIN IN BOTH KNEES, UNSPECIFIED CHRONICITY: ICD-10-CM

## 2021-01-26 DIAGNOSIS — G56.02 PARTIAL THENAR ATROPHY, LEFT: ICD-10-CM

## 2021-01-26 DIAGNOSIS — R26.89 BALANCE PROBLEM: ICD-10-CM

## 2021-01-26 DIAGNOSIS — R29.898 ANKLE WEAKNESS: Primary | ICD-10-CM

## 2021-01-26 PROCEDURE — 97112 NEUROMUSCULAR REEDUCATION: CPT | Performed by: PHYSICAL THERAPIST

## 2021-01-26 PROCEDURE — 97530 THERAPEUTIC ACTIVITIES: CPT | Performed by: PHYSICAL THERAPIST

## 2021-01-26 PROCEDURE — 97110 THERAPEUTIC EXERCISES: CPT | Performed by: PHYSICAL THERAPIST
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
## 2021-01-29 ENCOUNTER — OFFICE VISIT (OUTPATIENT)
Dept: PHYSICAL THERAPY | Facility: CLINIC | Age: 71
End: 2021-01-29
Payer: COMMERCIAL

## 2021-01-29 DIAGNOSIS — M25.571 ACUTE BILATERAL ANKLE PAIN: ICD-10-CM

## 2021-01-29 DIAGNOSIS — G56.02 PARTIAL THENAR ATROPHY, LEFT: ICD-10-CM

## 2021-01-29 DIAGNOSIS — R26.89 BALANCE PROBLEM: ICD-10-CM

## 2021-01-29 DIAGNOSIS — R29.898 ANKLE WEAKNESS: ICD-10-CM

## 2021-01-29 DIAGNOSIS — M17.0 BILATERAL PRIMARY OSTEOARTHRITIS OF KNEE: ICD-10-CM

## 2021-01-29 DIAGNOSIS — M25.562 PAIN IN BOTH KNEES, UNSPECIFIED CHRONICITY: ICD-10-CM

## 2021-01-29 DIAGNOSIS — M25.572 ACUTE BILATERAL ANKLE PAIN: ICD-10-CM

## 2021-01-29 DIAGNOSIS — M25.561 PAIN IN BOTH KNEES, UNSPECIFIED CHRONICITY: ICD-10-CM

## 2021-01-29 DIAGNOSIS — G56.01 PARTIAL THENAR ATROPHY, RIGHT: Primary | ICD-10-CM

## 2021-01-29 PROCEDURE — 97110 THERAPEUTIC EXERCISES: CPT | Performed by: PHYSICAL THERAPIST

## 2021-01-29 PROCEDURE — 97112 NEUROMUSCULAR REEDUCATION: CPT | Performed by: PHYSICAL THERAPIST

## 2021-01-29 PROCEDURE — 97530 THERAPEUTIC ACTIVITIES: CPT | Performed by: PHYSICAL THERAPIST

## 2021-01-29 NOTE — PROGRESS NOTES
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    3  Bilateral primary osteoarthritis of knee  M17 0    4  Acute bilateral ankle pain  M25 571     M25 572    5  Pain in both knees, unspecified chronicity  M25 561     M25 562    6  Balance problem  R26 89    7  Ankle weakness  R29 898             Subjective: Pt noted that he did not sleep well last night, so he is feeling low energy, however he stated that "I am still good to do exercises today though "    Objective: See treatment diary below    Assessment: Tolerated treatment well  Advanced balance training well without provocation of pain  Patient demonstrated fatigue post treatment, exhibited good technique with therapeutic exercises and would benefit from continued PT  Plan: Continue per plan of care        Precautions: standard OA    Manual                 R knee STM, PROM                      Exercise Diary  1/8 1/12 1/15 1/19 1/21 1/26 1/29  1/5   Soleus stretch            Calf stretch   :15x5  :15x5  :15x3     Stand hip abd TB            LP low in / 3x10 210/ 3x10 210/ 3x10 170/ 3x10 170/ 3x10 180/ 3x10   170/ 3x10   LP mid in / 3x10 210/ 3x10 210/ 3x10 170/ 3x10 170/ 3x10 180/ 3x10   170/ 3x10   LP high in / 3x10 210/ 3x10 210/ 3x10 170/ 3x10  180/ 3x10   170/ 3x10   LP u/l high in PF   50/ 3x10      50/ 3x10   LP u/l mid in PF   50/ 3x10      50/ 3x10   LP u/l low in PF   50/ 3x10      50/ 3x10   Hamstring and calf stretch strap   :15x5  :15x5  :15x5  3x10   Calf press high 210/ 3x10 210/ 3x10 210/ 3x10 210/ 3x10  220/ 3x10      Calf press mid 210/ 3x10 210/ 3x10 210/ 3x10 210/ 3x10  220/ 3x10      Calf press Low 210/ 3x10 210/ 3x10 210/ 3x10 210/ 3x10  220/ 3x10   210/ 3x10   U/l HR iso high K mid A  1 min ea  1 min ea 1 min ea    1 min ea   U/l HR iso mid K mid A  1 min ea  1 min ea 1 min ea    1 min ea   u/l HR 3x15 3x15 3x15 x45  x45 x45 x45  3x15   Squat curl press    15# 3x10     45x   Dumbbell squat     20# 3x10 20# 3x10      Squat overhead press     15# 3x10 15# 3x10 15# 3x10     Lunge with 2 handrail       3x10ea     Squats 2 hand assist       30x     3 way HS stretch            Elliptical 5' 5'  5'  5'  5'    10'   TM       5' 7'      Putty             Clip pinch         2x10   Flexbar pinch flex/ext G 3x10 G 3x10ea R 3x10ea R 3x10ea R 3x10 R 3x10 G 3x10  G 3x10 ea   SLS ball toss, soccer ball  R 3x10ea R 3x10 R 3x10ea R 3x10 R 3x10 R 3x10     Fitter AP      3x10 3x10                 SLS cone tap      3x10 30xea

## 2021-02-02 ENCOUNTER — APPOINTMENT (OUTPATIENT)
Dept: PHYSICAL THERAPY | Facility: CLINIC | Age: 71
End: 2021-02-02
Payer: COMMERCIAL

## 2021-02-04 ENCOUNTER — APPOINTMENT (OUTPATIENT)
Dept: PHYSICAL THERAPY | Facility: CLINIC | Age: 71
End: 2021-02-04
Payer: COMMERCIAL

## 2021-02-05 ENCOUNTER — OFFICE VISIT (OUTPATIENT)
Dept: PHYSICAL THERAPY | Facility: CLINIC | Age: 71
End: 2021-02-05
Payer: COMMERCIAL

## 2021-02-05 DIAGNOSIS — M17.0 BILATERAL PRIMARY OSTEOARTHRITIS OF KNEE: ICD-10-CM

## 2021-02-05 DIAGNOSIS — G56.02 PARTIAL THENAR ATROPHY, LEFT: ICD-10-CM

## 2021-02-05 DIAGNOSIS — R26.89 BALANCE PROBLEM: ICD-10-CM

## 2021-02-05 DIAGNOSIS — M25.561 PAIN IN BOTH KNEES, UNSPECIFIED CHRONICITY: Primary | ICD-10-CM

## 2021-02-05 DIAGNOSIS — G56.01 PARTIAL THENAR ATROPHY, RIGHT: ICD-10-CM

## 2021-02-05 DIAGNOSIS — M25.571 ACUTE BILATERAL ANKLE PAIN: ICD-10-CM

## 2021-02-05 DIAGNOSIS — M25.562 PAIN IN BOTH KNEES, UNSPECIFIED CHRONICITY: Primary | ICD-10-CM

## 2021-02-05 DIAGNOSIS — M25.572 ACUTE BILATERAL ANKLE PAIN: ICD-10-CM

## 2021-02-05 PROCEDURE — 97112 NEUROMUSCULAR REEDUCATION: CPT | Performed by: PHYSICAL THERAPIST

## 2021-02-05 PROCEDURE — 97140 MANUAL THERAPY 1/> REGIONS: CPT | Performed by: PHYSICAL THERAPIST

## 2021-02-05 PROCEDURE — 97110 THERAPEUTIC EXERCISES: CPT | Performed by: PHYSICAL THERAPIST

## 2021-02-05 PROCEDURE — 97530 THERAPEUTIC ACTIVITIES: CPT | Performed by: PHYSICAL THERAPIST

## 2021-02-05 NOTE — PROGRESS NOTES
Daily Note     Today's date: 2021  Patient name: Ana Lilia Smoker  : 1950  MRN: [id number]  Referring provider: Mallika Ruth DO  Dx:   Encounter Diagnosis     ICD-10-CM    1  Pain in both knees, unspecified chronicity  M25 561     M25 562    2  Partial thenar atrophy, right  G56 01    3  Partial thenar atrophy, left  G56 02    4  Balance problem  R26 89    5  Bilateral primary osteoarthritis of knee  M17 0    6  Acute bilateral ankle pain  M25 571     M25 572             Subjective: Pt noted that "I am having ups and downs with the thumbs and ankles  This week has been tough because of shoveling snow and snow blowing "     Objective: See treatment diary below    Assessment: Tolerated treatment well  Patient demonstrated fatigue post treatment, exhibited good technique with therapeutic exercises and would benefit from continued PT  Plan: Continue per plan of care        Precautions: standard OA    Manual             2/    R knee STM, PROM            JZ          Exercise Diary      /    Soleus stretch            Calf stretch     :15x5  :15x3 :15x5    Stand hip abd TB            LP low in PF     170/ 3x10 180/ 3x10      LP mid in PF     170/ 3x10 180/ 3x10      LP high in PF      180/ 3x10      LP u/l high in PF        90/ 3x10    LP u/l mid in PF        90/ 3x10    LP u/l low in PF        90/ 3x10    Hamstring and calf stretch strap     :15x5  :15x5     Calf press high      220/ 3x10      Calf press mid      220/ 3x10      Calf press Low      220/ 3x10      U/l HR iso high K mid A     1 min ea       U/l HR iso mid K mid A     1 min ea       u/l HR      x45 x45 x45 x45    Squat curl press            Dumbbell squat     20# 3x10 20# 3x10      Squat overhead press     15# 3x10 15# 3x10 15# 3x10 15# 3x10    Lunge with 2 handrail       3x10ea     Squats 2 hand assist       30x 30x    3 way HS stretch            Elliptical     5'       TM       5' 7'  11'     Putty             Clip pinch Flexbar pinch flex/ext     R 3x10 R 3x10 G 3x10 R 3x10    SLS soccer assist        :15x3ea    SLS ball toss, soccer ball     R 3x10 R 3x10 R 3x10 R 3x10    Fitter AP b/l      3x10 3x10 30x    Fitter AP u/l        30xea    SLS cone tap      3x10 30xea 30xea

## 2021-02-09 ENCOUNTER — OFFICE VISIT (OUTPATIENT)
Dept: PHYSICAL THERAPY | Facility: CLINIC | Age: 71
End: 2021-02-09
Payer: COMMERCIAL

## 2021-02-09 DIAGNOSIS — M25.561 PAIN IN BOTH KNEES, UNSPECIFIED CHRONICITY: Primary | ICD-10-CM

## 2021-02-09 DIAGNOSIS — G56.01 PARTIAL THENAR ATROPHY, RIGHT: ICD-10-CM

## 2021-02-09 DIAGNOSIS — M25.571 ACUTE BILATERAL ANKLE PAIN: ICD-10-CM

## 2021-02-09 DIAGNOSIS — G56.02 PARTIAL THENAR ATROPHY, LEFT: ICD-10-CM

## 2021-02-09 DIAGNOSIS — M25.562 PAIN IN BOTH KNEES, UNSPECIFIED CHRONICITY: Primary | ICD-10-CM

## 2021-02-09 DIAGNOSIS — R26.89 BALANCE PROBLEM: ICD-10-CM

## 2021-02-09 DIAGNOSIS — M17.0 BILATERAL PRIMARY OSTEOARTHRITIS OF KNEE: ICD-10-CM

## 2021-02-09 DIAGNOSIS — M25.572 ACUTE BILATERAL ANKLE PAIN: ICD-10-CM

## 2021-02-09 DIAGNOSIS — R29.898 ANKLE WEAKNESS: ICD-10-CM

## 2021-02-09 PROCEDURE — 97110 THERAPEUTIC EXERCISES: CPT | Performed by: PHYSICAL THERAPIST

## 2021-02-09 PROCEDURE — 97140 MANUAL THERAPY 1/> REGIONS: CPT | Performed by: PHYSICAL THERAPIST

## 2021-02-09 PROCEDURE — 97112 NEUROMUSCULAR REEDUCATION: CPT | Performed by: PHYSICAL THERAPIST

## 2021-02-09 NOTE — PROGRESS NOTES
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    7  Balance problem  R26 89                   Subjective: Pt reported that his thenar pain and weakness was "okay while shoveling, but I did not feel super strong with my  "       Objective: See treatment diary below      Assessment: Tolerated treatment well  Continues to have difficulty with balance training, however he is slowly progressing with improvement with NM control with SLS and other balance controls  Patient demonstrated fatigue post treatment, exhibited good technique with therapeutic exercises and would benefit from continued PT  Plan: Continue per plan of care         Precautions: standard OA    Manual                R knee STM, PROM            JZ JZ         Exercise Diary         Soleus stretch            Calf stretch     :15x5  :15x3 :15x5 :15x5   Stand hip abd TB            LP low in PF     170/ 3x10 180/ 3x10   180/ 3x10   LP mid in PF     170/ 3x10 180/ 3x10   180/ 3x10   LP high in PF      180/ 3x10   180/ 3x10   LP u/l high in PF        90/ 3x10 90/    LP u/l mid in PF        90/ 3x10 90/    LP u/l low in PF        90/ 3x10 90/    Hamstring and calf stretch strap     :15x5  :15x5     Calf press high      220/ 3x10   180/ 3x10   Calf press mid      220/ 3x10   180/ 3x10   Calf press Low      220/ 3x10   180/ 3x10   U/l HR iso high K mid A     1 min ea       U/l HR iso mid K mid A     1 min ea       u/l HR      x45 x45 x45 x45 x45     Squat curl press            Dumbbell squat     20# 3x10 20# 3x10      Squat overhead press     15# 3x10 15# 3x10 15# 3x10 15# 3x10 15# 3x10   Lunge with 2 handrail       3x10ea  3x10   Squats 2 hand assist       30x 30x 30x   3 way HS stretch            Elliptical     5'       TM       5' 7'  11'  3'   Putty             Clip pinch            Flexbar pinch flex/ext     R 3x10 R 3x10 G 3x10 R 3x10 R 3x10   SLS soccer assist        :15x3ea    SLS ball toss, soccer ball     R 3x10 R 3x10 R 3x10 R 3x10 R 3x10   Fitter AP b/l      3x10 3x10 30x :30x3   Fitter AP u/l        30xea :15x2ea   SLS cone tap      3x10 30xea 30xea 30xea

## 2021-02-12 ENCOUNTER — APPOINTMENT (OUTPATIENT)
Dept: PHYSICAL THERAPY | Facility: CLINIC | Age: 71
End: 2021-02-12
Payer: COMMERCIAL

## 2021-02-16 ENCOUNTER — OFFICE VISIT (OUTPATIENT)
Dept: PHYSICAL THERAPY | Facility: CLINIC | Age: 71
End: 2021-02-16
Payer: COMMERCIAL

## 2021-02-16 DIAGNOSIS — M17.0 BILATERAL PRIMARY OSTEOARTHRITIS OF KNEE: ICD-10-CM

## 2021-02-16 DIAGNOSIS — R29.898 ANKLE WEAKNESS: Primary | ICD-10-CM

## 2021-02-16 DIAGNOSIS — M25.571 ACUTE BILATERAL ANKLE PAIN: ICD-10-CM

## 2021-02-16 DIAGNOSIS — R26.89 BALANCE PROBLEM: ICD-10-CM

## 2021-02-16 DIAGNOSIS — G56.01 PARTIAL THENAR ATROPHY, RIGHT: ICD-10-CM

## 2021-02-16 DIAGNOSIS — M25.562 PAIN IN BOTH KNEES, UNSPECIFIED CHRONICITY: ICD-10-CM

## 2021-02-16 DIAGNOSIS — M25.561 PAIN IN BOTH KNEES, UNSPECIFIED CHRONICITY: ICD-10-CM

## 2021-02-16 DIAGNOSIS — M25.572 ACUTE BILATERAL ANKLE PAIN: ICD-10-CM

## 2021-02-16 DIAGNOSIS — G56.02 PARTIAL THENAR ATROPHY, LEFT: ICD-10-CM

## 2021-02-16 PROCEDURE — 97110 THERAPEUTIC EXERCISES: CPT | Performed by: PHYSICAL THERAPIST

## 2021-02-16 PROCEDURE — 97112 NEUROMUSCULAR REEDUCATION: CPT | Performed by: PHYSICAL THERAPIST

## 2021-02-16 PROCEDURE — 97530 THERAPEUTIC ACTIVITIES: CPT | Performed by: PHYSICAL THERAPIST

## 2021-02-16 NOTE — PROGRESS NOTES
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    5  Partial thenar atrophy, right  G56 01    6  Balance problem  R26 89    7  Acute bilateral ankle pain  M25 571     M25 572               Subjective: Pt reported that he has been trying to do more of his HEP since he missed PT this week  Stated that he was able to do 30 min on the elliptical this week without knee pain  Objective: See treatment diary below    Assessment: Tolerated treatment well  Patient demonstrated fatigue post treatment, exhibited good technique with therapeutic exercises and would benefit from continued PT  Plan: Continue per plan of care        Precautions: standard OA    Manual     R knee STM, PROM            JZ JZ         Exercise Diary     Soleus stretch            Calf stretch        :15x5 :15x5   Stand hip abd TB            LP low in / 3x10        180/ 3x10   LP mid in / 3x10        180/ 3x10   LP high in / 3x10        180/ 3x10   LP u/l high in PF 90/       90/ 3x10 90/    LP u/l mid in PF 90/       90/ 3x10 90/    LP u/l low in PF 90/       90/ 3x10 90/    Hamstring and calf stretch strap            Calf press high 180/ 3x10        180/ 3x10   Calf press mid 180/ 3x10        180/ 3x10   Calf press Low 180/ 3x10        180/ 3x10   U/l HR iso high K mid A            U/l HR iso mid K mid A            u/l HR        x45 x45     Squat curl press            Step ups  8" 3x10           Squat overhead press 15# 3x10       15# 3x10 15# 3x10   Lunge with 2 handrail 2x10ea        3x10   Squats 2 hand assist        30x 30x   3 way HS stretch Elliptical            TM         11'  3'   Putty             Clip pinch            Flexbar pinch flex/ext R 3x10       R 3x10 R 3x10   SLS  :15x2ea       :15x3ea    SLS ball toss, soccer ball :15x2ea       R 3x10 R 3x10   Fitter AP b/l 3x10       30x :30x3   Fitter AP u/l 2x10ea       30xea :15x2ea   SLS cone tap 30xea       30xea 30xea

## 2021-02-19 ENCOUNTER — OFFICE VISIT (OUTPATIENT)
Dept: PHYSICAL THERAPY | Facility: CLINIC | Age: 71
End: 2021-02-19
Payer: COMMERCIAL

## 2021-02-19 DIAGNOSIS — G56.01 PARTIAL THENAR ATROPHY, RIGHT: Primary | ICD-10-CM

## 2021-02-19 DIAGNOSIS — M25.562 PAIN IN BOTH KNEES, UNSPECIFIED CHRONICITY: ICD-10-CM

## 2021-02-19 DIAGNOSIS — R26.89 BALANCE PROBLEM: ICD-10-CM

## 2021-02-19 DIAGNOSIS — M17.0 BILATERAL PRIMARY OSTEOARTHRITIS OF KNEE: ICD-10-CM

## 2021-02-19 DIAGNOSIS — G56.02 PARTIAL THENAR ATROPHY, LEFT: ICD-10-CM

## 2021-02-19 DIAGNOSIS — R29.898 ANKLE WEAKNESS: ICD-10-CM

## 2021-02-19 DIAGNOSIS — M25.571 ACUTE BILATERAL ANKLE PAIN: ICD-10-CM

## 2021-02-19 DIAGNOSIS — M25.561 PAIN IN BOTH KNEES, UNSPECIFIED CHRONICITY: ICD-10-CM

## 2021-02-19 DIAGNOSIS — M25.572 ACUTE BILATERAL ANKLE PAIN: ICD-10-CM

## 2021-02-19 PROCEDURE — 97110 THERAPEUTIC EXERCISES: CPT | Performed by: PHYSICAL THERAPIST

## 2021-02-19 PROCEDURE — 97140 MANUAL THERAPY 1/> REGIONS: CPT | Performed by: PHYSICAL THERAPIST

## 2021-02-19 NOTE — PROGRESS NOTES
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             Subjective: Pt noted that he is experiencing more pain than usual because he has had to do snow shoveling and ice scraping at home  Stated that he would really like to "have a day focused on stretching "    Objective: See treatment diary below    Assessment: Tolerated treatment well  Modified program due to severity of symptoms  Patient demonstrated fatigue post treatment, exhibited good technique with therapeutic exercises and would benefit from continued PT  Plan: Continue per plan of care        Precautions: standard OA    Manual     R knee STM, PROM  JZ          JZ JZ         Exercise Diary     Soleus stretch  :15x5          Calf stretch  :15x5      :15x5 :15x5   Stand hip abd TB            LP low in / 3x10        180/ 3x10   LP mid in / 3x10        180/ 3x10   LP high in / 3x10        180/ 3x10   LP u/l high in PF 90/       90/ 3x10 90/    LP u/l mid in PF 90/       90/ 3x10 90/    LP u/l low in PF 90/       90/ 3x10 90/    Hamstring and calf stretch strap  :15x5          ITB stretch  :15x5          Calf press high 180/ 3x10        180/ 3x10   Calf press mid 180/ 3x10        180/ 3x10   Calf press Low 180/ 3x10        180/ 3x10   U/l HR iso high K mid A            U/l HR iso mid K mid A            u/l HR        x45 x45     Squat curl press            Step ups  8" 3x10           Squat overhead press 15# 3x10       15# 3x10 15# 3x10 Lunge with 2 handrail 2x10ea        3x10   Squats 2 hand assist        30x 30x   Piriformis stretch  :15x5ea          3 way HS stretch  :15x5ea          Elliptical            TM         11'  3'   Putty             Clip pinch            Flexbar pinch flex/ext R 3x10       R 3x10 R 3x10   SLS  :15x2ea       :15x3ea    SLS ball toss, soccer ball :15x2ea       R 3x10 R 3x10   Fitter AP b/l 3x10       30x :30x3   Fitter AP u/l 2x10ea       30xea :15x2ea   SLS cone tap 30xea       30xea 30xea

## 2021-02-22 ENCOUNTER — APPOINTMENT (OUTPATIENT)
Dept: PHYSICAL THERAPY | Facility: CLINIC | Age: 71
End: 2021-02-22
Payer: COMMERCIAL

## 2021-02-24 ENCOUNTER — OFFICE VISIT (OUTPATIENT)
Dept: PHYSICAL THERAPY | Facility: CLINIC | Age: 71
End: 2021-02-24
Payer: COMMERCIAL

## 2021-02-24 DIAGNOSIS — M25.561 PAIN IN BOTH KNEES, UNSPECIFIED CHRONICITY: ICD-10-CM

## 2021-02-24 DIAGNOSIS — G56.01 PARTIAL THENAR ATROPHY, RIGHT: Primary | ICD-10-CM

## 2021-02-24 DIAGNOSIS — G56.02 PARTIAL THENAR ATROPHY, LEFT: ICD-10-CM

## 2021-02-24 DIAGNOSIS — M25.571 ACUTE BILATERAL ANKLE PAIN: ICD-10-CM

## 2021-02-24 DIAGNOSIS — M25.572 ACUTE BILATERAL ANKLE PAIN: ICD-10-CM

## 2021-02-24 DIAGNOSIS — R26.89 BALANCE PROBLEM: ICD-10-CM

## 2021-02-24 DIAGNOSIS — M17.0 BILATERAL PRIMARY OSTEOARTHRITIS OF KNEE: ICD-10-CM

## 2021-02-24 DIAGNOSIS — M25.562 PAIN IN BOTH KNEES, UNSPECIFIED CHRONICITY: ICD-10-CM

## 2021-02-24 DIAGNOSIS — R29.898 ANKLE WEAKNESS: ICD-10-CM

## 2021-02-24 PROCEDURE — 97140 MANUAL THERAPY 1/> REGIONS: CPT

## 2021-02-24 PROCEDURE — 97110 THERAPEUTIC EXERCISES: CPT

## 2021-02-24 NOTE — PROGRESS NOTES
Daily Note     Today's date: 2021  Patient name: Patricia Cedeno  : 1950  MRN: [id number]  Referring provider: El Saleh DO  Dx:   Encounter Diagnosis     ICD-10-CM    1  Partial thenar atrophy, right  G56 01    2  Ankle weakness  R29 898    3  Pain in both knees, unspecified chronicity  M25 561     M25 562    4  Balance problem  R26 89    5  Acute bilateral ankle pain  M25 571     M25 572    6  Bilateral primary osteoarthritis of knee  M17 0    7  Partial thenar atrophy, left  G56 02             Subjective: [address] reports he continues to experience increased pain in low back, R knee, and thenar eminence since shoveling snow the past weekend  Objective: See treatment diary below    Assessment: Dalton tolerated PT treatment well  Continued with modified program with focus on stretching d/t subjective reports  Pt noted lumbar pain relief with 2 way piriformis stretch and PB rollout  STM performed to thenar eminence today, TTP present  Pt noted improved sx's following PT session  Pt would benefit from continued PT to further address impairments and maximize functional level  Plan: Continue per plan of care        Precautions: standard OA    Manual     R knee STM, PROM  JZ STM thenar JW         JZ JZ         Exercise Diary     Soleus stretch  :15x5 :15x5         Calf stretch  :15x5 :15x5     :15x5 :15x5   Stand hip abd TB            LP low in / 3x10        180/ 3x10   LP mid in / 3x10        180/ 3x10   LP high in / 3x10        180/ 3x10   LP u/l high in PF 90/       90/ 3x10 90/    LP u/l mid in PF 90/       90/ 3x10 90/    LP u/l low in PF 90/       90/ 3x10 90/    Hamstring and calf stretch strap  :15x5 :15x5         ITB stretch  :15x5 :15x5         Calf press high 180/ 3x10        180/ 3x10   Calf press mid 180/ 3x10        180/ 3x10   Calf press Low 180/ 3x10        180/ 3x10   U/l HR iso high K mid A            U/l HR iso mid K mid A            u/l HR        x45 x45     Squat curl press            Step ups  8" 3x10           Squat overhead press 15# 3x10       15# 3x10 15# 3x10   Lunge with 2 handrail 2x10ea        3x10   Squats 2 hand assist        30x 30x   Piriformis stretch  :15x5ea 2 way :15x5ea         3 way HS stretch  :15x5ea :15xea         Elliptical            TM         11'  3'   Putty             Clip pinch            PB rollout    :10x10 ea          Flexbar pinch flex/ext R 3x10       R 3x10 R 3x10   SLS  :15x2ea       :15x3ea    SLS ball toss, soccer ball :15x2ea       R 3x10 R 3x10   Fitter AP b/l 3x10       30x :30x3   Fitter AP u/l 2x10ea       30xea :15x2ea   SLS cone tap 30xea       30xea 30xea

## 2021-02-26 ENCOUNTER — OFFICE VISIT (OUTPATIENT)
Dept: PHYSICAL THERAPY | Facility: CLINIC | Age: 71
End: 2021-02-26
Payer: COMMERCIAL

## 2021-02-26 DIAGNOSIS — M25.562 PAIN IN BOTH KNEES, UNSPECIFIED CHRONICITY: ICD-10-CM

## 2021-02-26 DIAGNOSIS — G56.01 PARTIAL THENAR ATROPHY, RIGHT: ICD-10-CM

## 2021-02-26 DIAGNOSIS — R26.89 BALANCE PROBLEM: ICD-10-CM

## 2021-02-26 DIAGNOSIS — M25.572 ACUTE BILATERAL ANKLE PAIN: Primary | ICD-10-CM

## 2021-02-26 DIAGNOSIS — M17.0 BILATERAL PRIMARY OSTEOARTHRITIS OF KNEE: ICD-10-CM

## 2021-02-26 DIAGNOSIS — R29.898 ANKLE WEAKNESS: ICD-10-CM

## 2021-02-26 DIAGNOSIS — M25.561 PAIN IN BOTH KNEES, UNSPECIFIED CHRONICITY: ICD-10-CM

## 2021-02-26 DIAGNOSIS — M25.571 ACUTE BILATERAL ANKLE PAIN: Primary | ICD-10-CM

## 2021-02-26 DIAGNOSIS — G56.02 PARTIAL THENAR ATROPHY, LEFT: ICD-10-CM

## 2021-02-26 PROCEDURE — 97110 THERAPEUTIC EXERCISES: CPT | Performed by: PHYSICAL THERAPIST

## 2021-02-26 PROCEDURE — 97530 THERAPEUTIC ACTIVITIES: CPT | Performed by: PHYSICAL THERAPIST

## 2021-02-26 PROCEDURE — 97140 MANUAL THERAPY 1/> REGIONS: CPT | Performed by: PHYSICAL THERAPIST

## 2021-02-26 NOTE — PROGRESS NOTES
Daily Note     Today's date: 2021  Patient name: Johny Calhoun  : 1950  MRN: [id number]  Referring provider: Ivette Leblanc DO  Dx:   Encounter Diagnosis     ICD-10-CM    1  Acute bilateral ankle pain  M25 571     M25 572    2  Partial thenar atrophy, right  G56 01    3  Bilateral primary osteoarthritis of knee  M17 0    4  Ankle weakness  R29 898    5  Partial thenar atrophy, left  G56 02    6  Pain in both knees, unspecified chronicity  M25 561     M25 562    7  Balance problem  R26 89             Subjective: Pt reported that he is still recovering from shoveling early this week  Stated that his knee and ankle pain have improved, however stated that his thumb pain is persisting  Objective: See treatment diary below    Assessment: Tolerated treatment well  In order to address thumb pain and stiffness, initiated thenar stretches and performed STM and jt mobs/jt distraction, which improved symptoms significantly  Patient demonstrated fatigue post treatment, exhibited good technique with therapeutic exercises and would benefit from continued PT  Plan: Continue per plan of care        Precautions: standard OA    Manual     Thumb jt mobs    JZ         R knee STM, PROM  JZ STM thenar JW STM thenar JZ        JZ JZ         Exercise Diary     Soleus stretch  :15x5 :15x5         Calf stretch  :15x5 :15x5     :15x5 :15x5    Resisted retro walk    50/ 2x10        LP low in / 3x10   170/ 3x10     180/ 3x10   LP mid in / 3x10   170/ 3x10     180/ 3x10   LP high in / 3x10   170/ 3x10     180/ 3x10   LP u/l high in PF 90/       90/ 3x10 90/    LP u/l mid in PF 90/       90/ 3x10 90/    LP u/l low in PF 90/       90/ 3x10 90/    Hamstring and calf stretch strap  :15x5 :15x5 :15x3ea        ITB stretch  :15x5 :15x5 :15x3ea        Calf press high 180/ 3x10        180/ 3x10   Calf press mid 180/ 3x10        180/ 3x10   Calf press Low 180/ 3x10        180/ 3x10   Thenar stretch    :15x5        U/l HR iso mid K mid A            u/l HR        x45 x45     Squat curl press            Step ups  8" 3x10           Squat overhead press 15# 3x10   15# 3x10    15# 3x10 15# 3x10   Lunge with 2 handrail 2x10ea        3x10   Squats 2 hand assist        30x 30x   Piriformis stretch  :15x5ea 2 way :15x5ea         3 way HS stretch  :15x5ea :15xea         Elliptical    5'        TM     5'    11'  3'   Putty             Clip pinch            PB rollout    :10x10 ea          Flexbar pinch flex/ext R 3x10   R 3x10    R 3x10 R 3x10   SLS  :15x2ea       :15x3ea    SLS ball toss, soccer ball :15x2ea       R 3x10 R 3x10   Fitter AP b/l 3x10       30x :30x3   Fitter AP u/l 2x10ea       30xea :15x2ea   SLS cone tap 30xea       30xea 30xea

## 2021-03-01 ENCOUNTER — OFFICE VISIT (OUTPATIENT)
Dept: PHYSICAL THERAPY | Facility: CLINIC | Age: 71
End: 2021-03-01
Payer: COMMERCIAL

## 2021-03-01 DIAGNOSIS — M25.562 PAIN IN BOTH KNEES, UNSPECIFIED CHRONICITY: ICD-10-CM

## 2021-03-01 DIAGNOSIS — M25.571 ACUTE BILATERAL ANKLE PAIN: Primary | ICD-10-CM

## 2021-03-01 DIAGNOSIS — M25.561 PAIN IN BOTH KNEES, UNSPECIFIED CHRONICITY: ICD-10-CM

## 2021-03-01 DIAGNOSIS — G56.01 PARTIAL THENAR ATROPHY, RIGHT: ICD-10-CM

## 2021-03-01 DIAGNOSIS — G56.02 PARTIAL THENAR ATROPHY, LEFT: ICD-10-CM

## 2021-03-01 DIAGNOSIS — R26.89 BALANCE PROBLEM: ICD-10-CM

## 2021-03-01 DIAGNOSIS — M25.572 ACUTE BILATERAL ANKLE PAIN: Primary | ICD-10-CM

## 2021-03-01 DIAGNOSIS — M17.0 BILATERAL PRIMARY OSTEOARTHRITIS OF KNEE: ICD-10-CM

## 2021-03-01 PROCEDURE — 97110 THERAPEUTIC EXERCISES: CPT

## 2021-03-01 PROCEDURE — 97140 MANUAL THERAPY 1/> REGIONS: CPT

## 2021-03-01 PROCEDURE — 97530 THERAPEUTIC ACTIVITIES: CPT

## 2021-03-01 NOTE — PROGRESS NOTES
Daily Note     Today's date: 3/1/2021  Patient name: Mercedes Valverde  : 1950  MRN: [id number]  Referring provider: Garfield Marquez DO  Dx:   Encounter Diagnosis     ICD-10-CM    1  Acute bilateral ankle pain  M25 571     M25 572    2  Partial thenar atrophy, right  G56 01    3  Bilateral primary osteoarthritis of knee  M17 0    4  Partial thenar atrophy, left  G56 02    5  Pain in both knees, unspecified chronicity  M25 561     M25 562    6  Balance problem  R26 89             Subjective: Alpesh Griselda reports knee and ankle sx's have improved since LV, thumb discomfort is still persisting  He states LE soreness was present following last PT session  Objective: See treatment diary below    Assessment: Dalton tolerated PT treatment well  Continued with current POC with good tolerance  Pt displays improved strength with leg press activities  Pt favors STM and manual stretch to thenar, improved sx's following  Pt would benefit from continued PT to further address impairments and maximize functional level  Plan: Continue per plan of care        Precautions: standard OA    Manual  2/16 2/19 2/24 2/26 3/1       2/5 2/9   Thumb jt mobs    JZ JW        R knee STM, PROM  JZ STM thenar JW STM thenar JZ STM thenar JW       JZ JZ         Exercise Diary  2/16 2/19 2/24 2/26 3/1   2/5 2/9   Soleus stretch  :15x5 :15x5         Calf stretch  :15x5 :15x5     :15x5 :15x5    Resisted retro walk    50/ 2x10 50/ 2x10       LP low in / 3x10   170/ 3x10 175/ 30x    180/ 3x10   LP mid in / 3x10   170/ 3x10 175/ 30x    180/ 3x10   LP high in / 3x10   170/ 3x10 175/ 30x    180/ 3x10   LP u/l high in PF 90/       90/ 3x10 90/    LP u/l mid in PF 90/       90/ 3x10 90/    LP u/l low in PF 90/       90/ 3x10 90/    Hamstring and calf stretch strap  :15x5 :15x5 :15x3ea :15x3ea       ITB stretch  :15x5 :15x5 :15x3ea        Calf press high 180/ 3x10    175/ 30x    180/ 3x10   Calf press mid 180/ 3x10    175/ 30x    180/ 3x10   Calf press Low 180/ 3x10    175/ 30x    180/ 3x10   Thenar stretch    :15x5 :15x5       U/l HR iso mid K mid A            u/l HR        x45 x45     Squat curl press            Step ups  8" 3x10           Squat overhead press 15# 3x10   15# 3x10    15# 3x10 15# 3x10   Lunge with 2 handrail 2x10ea        3x10   Squats 2 hand assist        30x 30x   Piriformis stretch  :15x5ea 2 way :15x5ea         3 way HS stretch  :15x5ea :15xea         Elliptical    5'        TM     5' 5'   11'  3'   Putty             Clip pinch            PB rollout    :10x10 ea          Flexbar pinch flex/ext R 3x10   R 3x10 R 3x10   R 3x10 R 3x10   SLS  :15x2ea       :15x3ea    SLS ball toss, soccer ball :15x2ea       R 3x10 R 3x10   Fitter AP b/l 3x10       30x :30x3   Fitter AP u/l 2x10ea       30xea :15x2ea   SLS cone tap 30xea       30xea 30xea

## 2021-03-03 ENCOUNTER — TELEPHONE (OUTPATIENT)
Dept: FAMILY MEDICINE CLINIC | Facility: CLINIC | Age: 71
End: 2021-03-03

## 2021-03-03 DIAGNOSIS — Z23 ENCOUNTER FOR IMMUNIZATION: ICD-10-CM

## 2021-03-03 DIAGNOSIS — E11.9 TYPE 2 DIABETES MELLITUS WITHOUT COMPLICATION, WITHOUT LONG-TERM CURRENT USE OF INSULIN (HCC): Primary | ICD-10-CM

## 2021-03-04 ENCOUNTER — OFFICE VISIT (OUTPATIENT)
Dept: PHYSICAL THERAPY | Facility: CLINIC | Age: 71
End: 2021-03-04
Payer: COMMERCIAL

## 2021-03-04 DIAGNOSIS — R29.898 ANKLE WEAKNESS: ICD-10-CM

## 2021-03-04 DIAGNOSIS — M25.562 PAIN IN BOTH KNEES, UNSPECIFIED CHRONICITY: ICD-10-CM

## 2021-03-04 DIAGNOSIS — M25.572 ACUTE BILATERAL ANKLE PAIN: Primary | ICD-10-CM

## 2021-03-04 DIAGNOSIS — G56.02 PARTIAL THENAR ATROPHY, LEFT: ICD-10-CM

## 2021-03-04 DIAGNOSIS — M17.0 BILATERAL PRIMARY OSTEOARTHRITIS OF KNEE: ICD-10-CM

## 2021-03-04 DIAGNOSIS — R26.89 BALANCE PROBLEM: ICD-10-CM

## 2021-03-04 DIAGNOSIS — G56.01 PARTIAL THENAR ATROPHY, RIGHT: ICD-10-CM

## 2021-03-04 DIAGNOSIS — M25.571 ACUTE BILATERAL ANKLE PAIN: Primary | ICD-10-CM

## 2021-03-04 DIAGNOSIS — M25.561 PAIN IN BOTH KNEES, UNSPECIFIED CHRONICITY: ICD-10-CM

## 2021-03-04 PROCEDURE — 97140 MANUAL THERAPY 1/> REGIONS: CPT

## 2021-03-04 PROCEDURE — 97110 THERAPEUTIC EXERCISES: CPT

## 2021-03-04 PROCEDURE — 97530 THERAPEUTIC ACTIVITIES: CPT

## 2021-03-04 NOTE — PROGRESS NOTES
Daily Note     Today's date: 3/4/2021  Patient name: Gaudencio Stinson  : 1950  MRN: [id number]  Referring provider: Lorenzo Lamar DO  Dx:   Encounter Diagnosis     ICD-10-CM    1  Acute bilateral ankle pain  M25 571     M25 572    2  Partial thenar atrophy, right  G56 01    3  Bilateral primary osteoarthritis of knee  M17 0    4  Pain in both knees, unspecified chronicity  M25 561     M25 562    5  Balance problem  R26 89    6  Ankle weakness  R29 898    7  Partial thenar atrophy, left  G56 02             Subjective: John Ramirez reports he he can feel himself getting stronger  He states she has been completing exercises daily and has been able to complete more outdoor work w/ less difficulty  Objective: See treatment diary below    Assessment: John Ramirez is progressing well with current POC  Increased lbs with leg press activities today, pt challenged but able to complete  Fatigue evident with squat leg press, rest breaks required  Continued with STM to thenar, decreased TTP present  Tightness present with manual stretch, pt encouraged to complete at home  Pt would benefit from continued PT to further address impairments and maximize functional level  Plan: Continue per plan of care        Precautions: standard OA    Manual  2/16 2/19 2/24 2/26 3/1  3/4     2/5 2/9   Thumb jt mobs    JZ         R knee STM, PROM  JZ STM thenar JW STM thenar JZ STM thenar JW  STM thenar JW     JZ JZ         Exercise Diary  2/16 2/19 2/24 2/26 3/1 3/4  2/5 2/9   Soleus stretch  :15x5 :15x5         Calf stretch  :15x5 :15x5     :15x5 :15x5    Resisted retro walk    50/ 2x10 50/ 2x10 65/ x10      LP low in / 3x10   170/ 3x10 175/ 30x 190/  20x   180/ 3x10   LP mid in / 3x10   170/ 3x10 175/ 30x 190/ 20x   180/ 3x10   LP high in / 3x10   170/ 3x10 175/ 30x 190/  20x   180/ 3x10   LP u/l high in PF 90/       90/ 3x10 90/    LP u/l mid in PF 90/       90/ 3x10 90/    LP u/l low in PF 90/       90/ 3x10 90/ Hamstring and calf stretch strap  :15x5 :15x5 :15x3ea :15x3ea :15x3ea      ITB stretch  :15x5 :15x5 :15x3ea  :15x3 ea      Calf press high 180/ 3x10    175/ 30x 190/  30x   180/ 3x10   Calf press mid 180/ 3x10    175/ 30x 190/  30x   180/ 3x10   Calf press Low 180/ 3x10    175/ 30x 190/  30x   180/ 3x10   Thenar stretch    :15x5 :15x5 :15x5      U/l HR iso mid K mid A            u/l HR        x45 x45     Squat curl press            Step ups  8" 3x10           Squat overhead press 15# 3x10   15# 3x10  15# 3x10  15# 3x10 15# 3x10   Lunge with 2 handrail 2x10ea        3x10   Squats 2 hand assist        30x 30x   Piriformis stretch  :15x5ea 2 way :15x5ea   2 way :15x5ea      3 way HS stretch  :15x5ea :15xea         Elliptical    5'        TM     5' 5' 5'  11'  3'   Putty             Clip pinch            PB rollout    :10x10 ea          Flexbar pinch flex/ext R 3x10   R 3x10 R 3x10 R 3x10  R 3x10 R 3x10   SLS  :15x2ea       :15x3ea    SLS ball toss, soccer ball :15x2ea       R 3x10 R 3x10   Fitter AP b/l 3x10       30x :30x3   Fitter AP u/l 2x10ea       30xea :15x2ea   SLS cone tap 30xea       30xea 30xea

## 2021-03-08 ENCOUNTER — OFFICE VISIT (OUTPATIENT)
Dept: PHYSICAL THERAPY | Facility: CLINIC | Age: 71
End: 2021-03-08
Payer: COMMERCIAL

## 2021-03-08 DIAGNOSIS — M17.0 BILATERAL PRIMARY OSTEOARTHRITIS OF KNEE: ICD-10-CM

## 2021-03-08 DIAGNOSIS — R29.898 ANKLE WEAKNESS: ICD-10-CM

## 2021-03-08 DIAGNOSIS — M25.561 PAIN IN BOTH KNEES, UNSPECIFIED CHRONICITY: ICD-10-CM

## 2021-03-08 DIAGNOSIS — G56.02 PARTIAL THENAR ATROPHY, LEFT: ICD-10-CM

## 2021-03-08 DIAGNOSIS — G56.01 PARTIAL THENAR ATROPHY, RIGHT: ICD-10-CM

## 2021-03-08 DIAGNOSIS — R26.89 BALANCE PROBLEM: ICD-10-CM

## 2021-03-08 DIAGNOSIS — M25.571 ACUTE BILATERAL ANKLE PAIN: Primary | ICD-10-CM

## 2021-03-08 DIAGNOSIS — M25.562 PAIN IN BOTH KNEES, UNSPECIFIED CHRONICITY: ICD-10-CM

## 2021-03-08 DIAGNOSIS — M25.572 ACUTE BILATERAL ANKLE PAIN: Primary | ICD-10-CM

## 2021-03-08 PROCEDURE — 97140 MANUAL THERAPY 1/> REGIONS: CPT | Performed by: PHYSICAL THERAPIST

## 2021-03-08 PROCEDURE — 97110 THERAPEUTIC EXERCISES: CPT | Performed by: PHYSICAL THERAPIST

## 2021-03-08 NOTE — PROGRESS NOTES
Daily Note     Today's date: 3/8/2021  Patient name: Moriah Bustamante  : 1950  MRN: [id number]  Referring provider: Regina Cotto DO  Dx:   Encounter Diagnosis     ICD-10-CM    1  Acute bilateral ankle pain  M25 571     M25 572    2  Balance problem  R26 89    3  Partial thenar atrophy, right  G56 01    4  Ankle weakness  R29 898    5  Bilateral primary osteoarthritis of knee  M17 0    6  Partial thenar atrophy, left  G56 02    7  Pain in both knees, unspecified chronicity  M25 561     M25 562               Subjective: Pt reported that he "over did it over the weekend " Noted that he did "too much exercises " Noted that his knee is sore today, 5/10 pain upon walking into PT  Objective: See treatment diary below    Assessment: Tolerated treatment well  Due to pain modified treatment to focus on manual treatment and stretches to improve flexibility and decrease pain  Patient demonstrated fatigue post treatment, exhibited good technique with therapeutic exercises and would benefit from continued PT  Plan: Continue per plan of care        Precautions: standard OA    Manual  2/16 2/19 2/24 2/26 3/1  3/4  3/8   2/5 2/9   Thumb jt mobs    JZ   JZ      R knee STM, PROM  JZ STM thenar JW STM thenar JZ STM thenar JW  STM thenar JW  JZ   JZ JZ         Exercise Diary  2/16 2/19 2/24 2/26 3/1 3/4 3/8     Soleus stretch  :15x5 :15x5         Calf stretch  :15x5 :15x5          Resisted retro walk    50/ 2x10 50/ 2x10 65/ x10      LP low in / 3x10   170/ 3x10 175/ 30x 190/  20x      LP mid in / 3x10   170/ 3x10 175/ 30x 190/ 20x      LP high in / 3x10   170/ 3x10 175/ 30x 190/  20x      LP u/l high in PF 90/           LP u/l mid in PF 90/           LP u/l low in PF 90/           Hamstring and calf stretch strap  :15x5 :15x5 :15x3ea :15x3ea :15x3ea :15x3ea     ITB stretch  :15x5 :15x5 :15x3ea  :15x3 ea :15x3ea     Calf press high 180/ 3x10    175/ 30x 190/  30x      Calf press mid 180/ 3x10    175/ 30x 190/  30x      Calf press Low 180/ 3x10    175/ 30x 190/  30x      Thenar stretch    :15x5 :15x5 :15x5 :15x3ea     U/l HR iso mid K mid A            u/l HR            Squat curl press            Step ups  8" 3x10           Squat overhead press 15# 3x10   15# 3x10  15# 3x10      Lunge with 2 handrail 2x10ea           Squats 2 hand assist            Piriformis stretch  :15x5ea 2 way :15x5ea   2 way :15x5ea :15x3ea     3 way HS stretch  :15x5ea :15xea         Elliptical    5'        TM     5' 5' 5'      Putty             Clip pinch            PB rollout    :10x10 ea          Flexbar pinch flex/ext R 3x10   R 3x10 R 3x10 R 3x10      SLS  :15x2ea           SLS ball toss, soccer ball :15x2ea           Fitter AP b/l 3x10           Fitter AP u/l 2x10ea           SLS cone tap 30xea

## 2021-03-10 ENCOUNTER — RA CDI HCC (OUTPATIENT)
Dept: OTHER | Facility: HOSPITAL | Age: 71
End: 2021-03-10

## 2021-03-10 NOTE — PROGRESS NOTES
[facility] 75  coding oppertunities             Chart reviewed, (number of) suggestions sent to provider: 1                   E11 40 T2DM with diabetic neuropathy [facility])     If this is correct, please document and assess at your next visit 3/17/21

## 2021-03-11 ENCOUNTER — OFFICE VISIT (OUTPATIENT)
Dept: PHYSICAL THERAPY | Facility: CLINIC | Age: 71
End: 2021-03-11
Payer: COMMERCIAL

## 2021-03-11 DIAGNOSIS — M25.562 PAIN IN BOTH KNEES, UNSPECIFIED CHRONICITY: ICD-10-CM

## 2021-03-11 DIAGNOSIS — R26.89 BALANCE PROBLEM: ICD-10-CM

## 2021-03-11 DIAGNOSIS — G56.01 PARTIAL THENAR ATROPHY, RIGHT: ICD-10-CM

## 2021-03-11 DIAGNOSIS — M25.572 ACUTE BILATERAL ANKLE PAIN: Primary | ICD-10-CM

## 2021-03-11 DIAGNOSIS — M25.571 ACUTE BILATERAL ANKLE PAIN: Primary | ICD-10-CM

## 2021-03-11 DIAGNOSIS — G56.02 PARTIAL THENAR ATROPHY, LEFT: ICD-10-CM

## 2021-03-11 DIAGNOSIS — M17.0 BILATERAL PRIMARY OSTEOARTHRITIS OF KNEE: ICD-10-CM

## 2021-03-11 DIAGNOSIS — M25.561 PAIN IN BOTH KNEES, UNSPECIFIED CHRONICITY: ICD-10-CM

## 2021-03-11 DIAGNOSIS — R29.898 ANKLE WEAKNESS: ICD-10-CM

## 2021-03-11 LAB — HBA1C MFR BLD: 5.9 % OF TOTAL HGB

## 2021-03-11 PROCEDURE — 97110 THERAPEUTIC EXERCISES: CPT | Performed by: PHYSICAL THERAPIST

## 2021-03-11 PROCEDURE — 97530 THERAPEUTIC ACTIVITIES: CPT | Performed by: PHYSICAL THERAPIST

## 2021-03-11 PROCEDURE — 97140 MANUAL THERAPY 1/> REGIONS: CPT | Performed by: PHYSICAL THERAPIST

## 2021-03-11 PROCEDURE — 3044F HG A1C LEVEL LT 7.0%: CPT | Performed by: FAMILY MEDICINE

## 2021-03-11 NOTE — PROGRESS NOTES
Daily Note     Today's date: 3/11/2021  Patient name: Tamiko Ochoa  : 1950  MRN: [id number]  Referring provider: Miguel Ángel Joiner DO  Dx:   Encounter Diagnosis     ICD-10-CM    1  Acute bilateral ankle pain  M25 571     M25 572    2  Bilateral primary osteoarthritis of knee  M17 0    3  Partial thenar atrophy, left  G56 02    4  Balance problem  R26 89    5  Partial thenar atrophy, right  G56 01    6  Pain in both knees, unspecified chronicity  M25 561     M25 562    7  Ankle weakness  R29 898             Subjective: Pt reported that he has a follow up appointment with his knee surgeon next Thursday  Objective: See treatment diary below    Assessment: Tolerated treatment well  Patient demonstrated fatigue post treatment, exhibited good technique with therapeutic exercises and would benefit from continued PT  Plan: Continue per plan of care        Precautions: standard OA    Manual  2/16 2/19 2/24 2/26 3/1  3/4  3/8  3/11     Thumb jt mobs    JZ   JZ JZ     R knee STM, PROM  JZ STM thenar JW STM thenar JZ STM thenar JW  STM thenar JW  JZ  JZ           Exercise Diary  2/16 2/19 2/24 2/26 3/1 3/4 3/8 3/11    Soleus stretch  :15x5 :15x5         Calf stretch  :15x5 :15x5          Resisted retro walk    50/ 2x10 50/ 2x10 65/ x10      LP low in / 3x10   170/ 3x10 175/ 30x 190/  20x  150/ 3x10    LP mid in / 3x10   170/ 3x10 175/ 30x 190/ 20x  150/ 3x10    LP high in / 3x10   170/ 3x10 175/ 30x 190/  20x  150/ 3x10    LP u/l high in PF 90/           LP u/l mid in PF 90/           LP u/l low in PF 90/           Hamstring and calf stretch strap  :15x5 :15x5 :15x3ea :15x3ea :15x3ea :15x3ea :15x3ea    ITB stretch  :15x5 :15x5 :15x3ea  :15x3 ea :15x3ea :15x3ea    Calf press high 180/ 3x10    175/ 30x 190/  30x  150/ 3x10    Calf press mid 180/ 3x10    175/ 30x 190/  30x  150/ 3x10    Calf press Low 180/ 3x10    175/ 30x 190/  30x  150/ 3x10    Thenar stretch    :15x5 :15x5 :15x5 :15x3ea :15x3 U/l HR iso mid K mid A            u/l HR        x45ea    Squat curl press            Step ups  8" 3x10           Squat overhead press 15# 3x10   15# 3x10  15# 3x10  15# 3x10    Lunge with 2 handrail 2x10ea           Squats 2 hand assist            Piriformis stretch  :15x5ea 2 way :15x5ea   2 way :15x5ea :15x3ea :15x3    3 way HS stretch  :15x5ea :15xea         Elliptical    5'    5'    TM     5' 5' 5'      Putty             Clip pinch            PB rollout    :10x10 ea          Flexbar pinch flex/ext R 3x10   R 3x10 R 3x10 R 3x10  R 3x10    SLS  :15x2ea           SLS ball toss, soccer ball :15x2ea           Fitter AP b/l 3x10           Fitter AP u/l 2x10ea           SLS cone tap 30xea

## 2021-03-12 ENCOUNTER — TELEPHONE (OUTPATIENT)
Dept: FAMILY MEDICINE CLINIC | Facility: CLINIC | Age: 71
End: 2021-03-12

## 2021-03-12 NOTE — TELEPHONE ENCOUNTER
----- Message from Gio Carson DO sent at 3/11/2021  1:51 PM EST -----  Hemoglobin A1c is slightly higher  Continue to be smart with diet  Continue current Rx

## 2021-03-15 ENCOUNTER — OFFICE VISIT (OUTPATIENT)
Dept: PHYSICAL THERAPY | Facility: CLINIC | Age: 71
End: 2021-03-15
Payer: COMMERCIAL

## 2021-03-15 DIAGNOSIS — E11.9 TYPE 2 DIABETES MELLITUS WITHOUT COMPLICATION, WITHOUT LONG-TERM CURRENT USE OF INSULIN (HCC): ICD-10-CM

## 2021-03-15 DIAGNOSIS — M17.0 BILATERAL PRIMARY OSTEOARTHRITIS OF KNEE: ICD-10-CM

## 2021-03-15 DIAGNOSIS — M25.571 ACUTE BILATERAL ANKLE PAIN: Primary | ICD-10-CM

## 2021-03-15 DIAGNOSIS — R26.89 BALANCE PROBLEM: ICD-10-CM

## 2021-03-15 DIAGNOSIS — M25.572 ACUTE BILATERAL ANKLE PAIN: Primary | ICD-10-CM

## 2021-03-15 DIAGNOSIS — M25.561 PAIN IN BOTH KNEES, UNSPECIFIED CHRONICITY: ICD-10-CM

## 2021-03-15 DIAGNOSIS — M25.562 PAIN IN BOTH KNEES, UNSPECIFIED CHRONICITY: ICD-10-CM

## 2021-03-15 DIAGNOSIS — G56.02 PARTIAL THENAR ATROPHY, LEFT: ICD-10-CM

## 2021-03-15 DIAGNOSIS — G56.01 PARTIAL THENAR ATROPHY, RIGHT: ICD-10-CM

## 2021-03-15 DIAGNOSIS — R29.898 ANKLE WEAKNESS: ICD-10-CM

## 2021-03-15 PROCEDURE — 97140 MANUAL THERAPY 1/> REGIONS: CPT

## 2021-03-15 PROCEDURE — 97530 THERAPEUTIC ACTIVITIES: CPT

## 2021-03-15 PROCEDURE — 97110 THERAPEUTIC EXERCISES: CPT

## 2021-03-15 NOTE — PROGRESS NOTES
Daily Note     Today's date: 3/15/2021  Patient name: Pauline Luna  : 1950  MRN: [id number]  Referring provider: Garth Garrison DO  Dx:   Encounter Diagnosis     ICD-10-CM    1  Acute bilateral ankle pain  M25 571     M25 572    2  Bilateral primary osteoarthritis of knee  M17 0    3  Partial thenar atrophy, left  G56 02    4  Balance problem  R26 89    5  Partial thenar atrophy, right  G56 01    6  Pain in both knees, unspecified chronicity  M25 561     M25 562    7  Ankle weakness  R29 898             Subjective: Lucila Mortimer reports swelling and discomfort in R knee has subsided since LV  He states he has been doing gentle stretching and icing at home to help ease sx's  He noted throbbing present in R thenar over the weekend  Objective: See treatment diary below    Assessment: Lucila Mortimer displays good tolerance to current POC  Increased lbs with leg press activities today, good tolerance  Cues required to avoid knee valgus with squatting motion, good carryover  Continued with STM and IASTM to thenar eminence, restriction present  Per patient request create updated HEP for next visit  Pt would benefit from continued PT to further address impairments and maximize functional level  Plan: Continue per plan of care        Precautions: standard OA    Manual  2/16 2/19 2/24 2/26 3/1  3/4  3/8  3/11 3/15    Thumb jt mobs    JZ   JZ JZ     R knee STM, PROM  JZ STM thenar JW STM thenar JZ STM thenar JW  STM thenar Amira Caitlyn JW          Exercise Diary   3 3/4 3/8 3/11 3/15   Soleus stretch  :15x5 :15x5         Calf stretch  :15x5 :15x5          Resisted retro walk    50/ 2x10 50/ 2x10 65/ x10      LP low in / 3x10   170/ 3x10 175/ 30x 190/  20x  150/ 3x10 170/ 3x10   LP mid in / 3x10   170/ 3x10 175/ 30x 190/ 20x  150/ 3x10 170/ 3x10   LP high in / 3x10   170/ 3x10 175/ 30x 190/  20x  150/ 3x10 170/ 3x10   LP u/l high in PF 90/           LP u/l mid in PF 90/           LP u/l low in PF 90/           Hamstring and calf stretch strap  :15x5 :15x5 :15x3ea :15x3ea :15x3ea :15x3ea :15x3ea :15x3ea   ITB stretch  :15x5 :15x5 :15x3ea  :15x3 ea :15x3ea :15x3ea :15x3ea   Calf press high 180/ 3x10    175/ 30x 190/  30x  150/ 3x10 170/ 3x10   Calf press mid 180/ 3x10    175/ 30x 190/  30x  150/ 3x10 170/ 3x10   Calf press Low 180/ 3x10    175/ 30x 190/  30x  150/ 3x10 170/ 3x10   Thenar stretch    :15x5 :15x5 :15x5 :15x3ea :15x3 :15x3   U/l HR iso mid K mid A            u/l HR        x45ea x45 ea   Squat curl press            Step ups  8" 3x10           Squat overhead press 15# 3x10   15# 3x10  15# 3x10  15# 3x10 15# 3x12   Lunge with 2 handrail 2x10ea           Squats 2 hand assist            Piriformis stretch  :15x5ea 2 way :15x5ea   2 way :15x5ea :15x3ea :15x3 :15x3   3 way HS stretch  :15x5ea :15xea         Elliptical    5'    5' 5'   TM     5' 5' 5'      Putty             Clip pinch            PB rollout    :10x10 ea          Flexbar pinch flex/ext R 3x10   R 3x10 R 3x10 R 3x10  R 3x10    SLS  :15x2ea           SLS ball toss, soccer ball :15x2ea           Fitter AP b/l 3x10           Fitter AP u/l 2x10ea           SLS cone tap 30xea

## 2021-03-17 ENCOUNTER — OFFICE VISIT (OUTPATIENT)
Dept: FAMILY MEDICINE CLINIC | Facility: CLINIC | Age: 71
End: 2021-03-17
Payer: COMMERCIAL

## 2021-03-17 VITALS
DIASTOLIC BLOOD PRESSURE: 90 MMHG | BODY MASS INDEX: 28.07 KG/M2 | SYSTOLIC BLOOD PRESSURE: 140 MMHG | HEIGHT: 75 IN | TEMPERATURE: 97 F | OXYGEN SATURATION: 98 % | WEIGHT: 225.75 LBS | HEART RATE: 64 BPM

## 2021-03-17 DIAGNOSIS — Z00.00 WELL ADULT EXAM: Primary | ICD-10-CM

## 2021-03-17 DIAGNOSIS — E11.40 TYPE 2 DIABETES MELLITUS WITH DIABETIC NEUROPATHY, WITHOUT LONG-TERM CURRENT USE OF INSULIN (HCC): ICD-10-CM

## 2021-03-17 DIAGNOSIS — I10 BENIGN ESSENTIAL HYPERTENSION: ICD-10-CM

## 2021-03-17 PROCEDURE — 1160F RVW MEDS BY RX/DR IN RCRD: CPT | Performed by: FAMILY MEDICINE

## 2021-03-17 PROCEDURE — 3080F DIAST BP >= 90 MM HG: CPT | Performed by: FAMILY MEDICINE

## 2021-03-17 PROCEDURE — 1101F PT FALLS ASSESS-DOCD LE1/YR: CPT | Performed by: FAMILY MEDICINE

## 2021-03-17 PROCEDURE — 3725F SCREEN DEPRESSION PERFORMED: CPT | Performed by: FAMILY MEDICINE

## 2021-03-17 PROCEDURE — 3077F SYST BP >= 140 MM HG: CPT | Performed by: FAMILY MEDICINE

## 2021-03-17 PROCEDURE — 1036F TOBACCO NON-USER: CPT | Performed by: FAMILY MEDICINE

## 2021-03-17 PROCEDURE — 99397 PER PM REEVAL EST PAT 65+ YR: CPT | Performed by: FAMILY MEDICINE

## 2021-03-17 PROCEDURE — 3288F FALL RISK ASSESSMENT DOCD: CPT | Performed by: FAMILY MEDICINE

## 2021-03-17 NOTE — PATIENT INSTRUCTIONS
Weight Management   AMBULATORY CARE:   Why it is important to manage your weight:  Being overweight increases your risk of health conditions such as heart disease, high blood pressure, type 2 diabetes, and certain types of cancer  It can also increase your risk for osteoarthritis, sleep apnea, and other respiratory problems  Aim for a slow, steady weight loss  Even a small amount of weight loss can lower your risk of health problems  How to lose weight safely:  A safe and healthy way to lose weight is to eat fewer calories and get regular exercise  · You can lose up about 1 pound a week by decreasing the number of calories you eat by 500 calories each day  You can decrease calories by eating smaller portion sizes or by cutting out high-calorie foods  Read labels to find out how many calories are in the foods you eat  · You can also burn calories with exercise such as walking, swimming, or biking  You will be more likely to keep weight off if you make these changes part of your lifestyle  Exercise at least 30 minutes per day on most days of the week  You can also fit in more physical activity by taking the stairs instead of the elevator or parking farther away from stores  Ask your healthcare provider about the best exercise plan for you  Healthy meal plan for weight management:  A healthy meal plan includes a variety of foods, contains fewer calories, and helps you stay healthy  A healthy meal plan includes the following:     · Eat whole-grain foods more often  A healthy meal plan should contain fiber  Fiber is the part of grains, fruits, and vegetables that is not broken down by your body  Whole-grain foods are healthy and provide extra fiber in your diet  Some examples of whole-grain foods are whole-wheat breads and pastas, oatmeal, brown rice, and bulgur  · Eat a variety of vegetables every day  Include dark, leafy greens such as spinach, kale, basia greens, and mustard greens   Eat yellow and orange vegetables such as carrots, sweet potatoes, and winter squash  · Eat a variety of fruits every day  Choose fresh or canned fruit (canned in its own juice or light syrup) instead of juice  Fruit juice has very little or no fiber  · Eat low-fat dairy foods  Drink fat-free (skim) milk or 1% milk  Eat fat-free yogurt and low-fat cottage cheese  Try low-fat cheeses such as mozzarella and other reduced-fat cheeses  · Choose meat and other protein foods that are low in fat  Choose beans or other legumes such as split peas or lentils  Choose fish, skinless poultry (chicken or turkey), or lean cuts of red meat (beef or pork)  Before you cook meat or poultry, cut off any visible fat  · Use less fat and oil  Try baking foods instead of frying them  Add less fat, such as margarine, sour cream, regular salad dressing and mayonnaise to foods  Eat fewer high-fat foods  Some examples of high-fat foods include french fries, doughnuts, ice cream, and cakes  · Eat fewer sweets  Limit foods and drinks that are high in sugar  This includes candy, cookies, regular soda, and sweetened drinks  Ways to decrease calories:   · Eat smaller portions  ? Use a small plate with smaller servings  ? Do not eat second helpings  ? When you eat at a restaurant, ask for a box and place half of your meal in the box before you eat  ? Share an entrée with someone else  · Replace high-calorie snacks with healthy, low-calorie snacks  ? Choose fresh fruit, vegetables, fat-free rice cakes, or air-popped popcorn instead of potato chips, nuts, or chocolate  ? Choose water or calorie-free drinks instead of soda or sweetened drinks  · Do not shop for groceries when you are hungry  You may be more likely to make unhealthy food choices  Take a grocery list of healthy foods and shop after you have eaten  · Eat regular meals  Do not skip meals  Skipping meals can lead to overeating later in the day   This can make it harder for you to lose weight  Eat a healthy snack in place of a meal if you do not have time to eat a regular meal  Talk with a dietitian to help you create a meal plan and schedule that is right for you  Other things to consider as you try to lose weight:   · Be aware of situations that may give you the urge to overeat, such as eating while watching television  Find ways to avoid these situations  For example, read a book, go for a walk, or do crafts  · Meet with a weight loss support group or friends who are also trying to lose weight  This may help you stay motivated to continue working on your weight loss goals  © Copyright [address] Information is for End User's use only and may not be sold, redistributed or otherwise used for commercial purposes  All illustrations and images included in CareNotes® are the copyrighted property of [employer]  or [facility] Pamela Martinez   The above information is an  only  It is not intended as medical advice for individual conditions or treatments  Talk to your doctor, nurse or pharmacist before following any medical regimen to see if it is safe and effective for you

## 2021-03-18 ENCOUNTER — OFFICE VISIT (OUTPATIENT)
Dept: OBGYN CLINIC | Facility: CLINIC | Age: 71
End: 2021-03-18
Payer: COMMERCIAL

## 2021-03-18 VITALS
WEIGHT: 225 LBS | TEMPERATURE: 97.8 F | SYSTOLIC BLOOD PRESSURE: 148 MMHG | HEART RATE: 81 BPM | BODY MASS INDEX: 27.98 KG/M2 | HEIGHT: 75 IN | DIASTOLIC BLOOD PRESSURE: 96 MMHG

## 2021-03-18 DIAGNOSIS — M17.11 PRIMARY OSTEOARTHRITIS OF RIGHT KNEE: Primary | ICD-10-CM

## 2021-03-18 PROCEDURE — 20610 DRAIN/INJ JOINT/BURSA W/O US: CPT | Performed by: ORTHOPAEDIC SURGERY

## 2021-03-18 PROCEDURE — 3008F BODY MASS INDEX DOCD: CPT | Performed by: FAMILY MEDICINE

## 2021-03-18 RX ORDER — METHYLPREDNISOLONE ACETATE 40 MG/ML
1 INJECTION, SUSPENSION INTRA-ARTICULAR; INTRALESIONAL; INTRAMUSCULAR; SOFT TISSUE
Status: COMPLETED | OUTPATIENT
Start: 2021-03-18 | End: 2021-03-18

## 2021-03-18 RX ORDER — BUPIVACAINE HYDROCHLORIDE 2.5 MG/ML
4 INJECTION, SOLUTION INFILTRATION; PERINEURAL
Status: COMPLETED | OUTPATIENT
Start: 2021-03-18 | End: 2021-03-18

## 2021-03-18 RX ADMIN — BUPIVACAINE HYDROCHLORIDE 4 ML: 2.5 INJECTION, SOLUTION INFILTRATION; PERINEURAL at 09:43

## 2021-03-18 RX ADMIN — METHYLPREDNISOLONE ACETATE 1 ML: 40 INJECTION, SUSPENSION INTRA-ARTICULAR; INTRALESIONAL; INTRAMUSCULAR; SOFT TISSUE at 09:43

## 2021-03-18 NOTE — PROGRESS NOTES
Large joint arthrocentesis: R knee  Universal Protocol:  Consent given by: patient  Time out: Immediately prior to procedure a "time out" was called to verify the correct patient, procedure, equipment, support staff and site/side marked as required    Site marked: the operative site was marked  Supporting Documentation  Indications: pain   Procedure Details  Location: knee - R knee  Needle size: 20 G  Approach: anterior  Medications administered: 1 mL methylPREDNISolone acetate 40 mg/mL; 4 mL bupivacaine 0 25 %    Patient tolerance: patient tolerated the procedure well with no immediate complications  Dressing:  Sterile dressing applied

## 2021-03-19 ENCOUNTER — EVALUATION (OUTPATIENT)
Dept: PHYSICAL THERAPY | Facility: CLINIC | Age: 71
End: 2021-03-19
Payer: COMMERCIAL

## 2021-03-19 DIAGNOSIS — M25.572 ACUTE BILATERAL ANKLE PAIN: Primary | ICD-10-CM

## 2021-03-19 DIAGNOSIS — M25.562 PAIN IN BOTH KNEES, UNSPECIFIED CHRONICITY: ICD-10-CM

## 2021-03-19 DIAGNOSIS — R29.898 ANKLE WEAKNESS: ICD-10-CM

## 2021-03-19 DIAGNOSIS — M25.571 ACUTE BILATERAL ANKLE PAIN: Primary | ICD-10-CM

## 2021-03-19 DIAGNOSIS — M25.561 PAIN IN BOTH KNEES, UNSPECIFIED CHRONICITY: ICD-10-CM

## 2021-03-19 DIAGNOSIS — M17.0 BILATERAL PRIMARY OSTEOARTHRITIS OF KNEE: ICD-10-CM

## 2021-03-19 DIAGNOSIS — G56.01 PARTIAL THENAR ATROPHY, RIGHT: ICD-10-CM

## 2021-03-19 DIAGNOSIS — R26.89 BALANCE PROBLEM: ICD-10-CM

## 2021-03-19 DIAGNOSIS — G56.02 PARTIAL THENAR ATROPHY, LEFT: ICD-10-CM

## 2021-03-19 PROCEDURE — 97110 THERAPEUTIC EXERCISES: CPT | Performed by: PHYSICAL THERAPIST

## 2021-03-19 PROCEDURE — 97164 PT RE-EVAL EST PLAN CARE: CPT | Performed by: PHYSICAL THERAPIST

## 2021-03-19 PROCEDURE — 97140 MANUAL THERAPY 1/> REGIONS: CPT | Performed by: PHYSICAL THERAPIST

## 2021-03-19 NOTE — PROGRESS NOTES
Physical Therapy Progress Noted     Today's date: 3/19/2021  Patient name: Gypsy Poole  : 1950  MRN: [id number]  Referring provider: Lima Hobson DO  Dx:   Encounter Diagnosis     ICD-10-CM    1  Acute bilateral ankle pain  M25 571     M25 572    2  Partial thenar atrophy, right  G56 01    3  Pain in both knees, unspecified chronicity  M25 561     M25 562    4  Bilateral primary osteoarthritis of knee  M17 0    5  Partial thenar atrophy, left  G56 02    6  Ankle weakness  R29 898    7  Balance problem  R26 89               Assessment  Assessment details: Pt has attended 19 visits this year to address his ankles, thumbs, and knees  Notes GROC improvement of 50% for his ankles, 50% for his thumbs, 25% for his knees  MMT and  dynamometry demonstrated improved strength  ROM assessment demonstrated no significant changes in flexibility  Balance assessment demonstrated slight improvement in SLS  Noted improvements include improved gripping/grasping ability, improved balance and decreased fear of falling  Persisting impairments include R knee pain with increased activity intensity, recently received cortisone injections in R knee and is scheduled for another shot in his thumbs  Improvements reflect that PT has been effective  Despite improvements he continues to present with functional limitations  Pt would benefit from further skilled outpateint PT in order to restore him to his PLOF  Impairments: abnormal gait, abnormal or restricted ROM, activity intolerance, impaired balance, impaired physical strength, lacks appropriate home exercise program, pain with function, weight-bearing intolerance, poor posture  and poor body mechanics  Functional limitations: /grasp jars/bottles, pinching, balance, descending stairs without hand rail, lunge/kneeling, participating in fitness activities      Prognosis: good  Prognosis details: + factors: high motivation levels  - factors: age and chronicity of pain    Goals  Short Term Goals to be accomplished in 4 weeks:  STG1: Pt will be I with HEP  Achieved   STG2: Pt will demo 10 degrees improved ROM in wrist and ankle to improve stair negotiation and gripping  STG3: Pt will improve  strength 5 lbs to improve grasp ability  75% achieved   STG4: Pt will be able to perform u/l heel raises through increased ROM and improve decreased risk of falls  Long Term Goals to be accomplished by Feb 15th:   LTG1: Pt will be able to open jars, open seals, and grasp large mugs repetively without pain  50% achieved   LTG2: Pt will be able to descend stairs repetitively without hand rail or fear of falling  25% due to recent fall  LTG3: Pt will demonstrate SLS of 30 sec b/l to improve falls risk  50% achieved   LTG4: Pt will be able to perform R knee lunge/kneeling without pain  LTG5: Improve pinch  strength 10 lbs to improve pinch tasks such as peeling aluminum cover/seals  Plan  Plan details: HEP development, stretching, strengthening, A/AA/PROM, joint mobilizations, posture education, STM/MI as needed to reduce muscle tension, muscle reeducation, PLOC discussed and agreed upon with patient  Patient would benefit from: PT eval and skilled physical therapy  Planned modality interventions: cryotherapy and thermotherapy: hydrocollator packs  Planned therapy interventions: manual therapy, neuromuscular re-education, self care, therapeutic activities, therapeutic exercise, home exercise program, patient education, stretching, strengthening, flexibility, balance and joint mobilization  Frequency: 3x week  Plan of Care beginning date: 12/23/2020  Plan of Care expiration date: 6/30/2021  Treatment plan discussed with: patient        Subjective Evaluation    History of Present Illness  Date of onset: 9/10/2019  Mechanism of injury: Pt is a 72 y/o male who presents to PT with chronic b/l knee pain   Notes R knee operation scope 37 yrs ago, 8 years ago meniscal surgery  Had PT 1 year ago which did improve pain, however has not been performing HEP since and pain has returned  At most recent appointment with PCP, he was prescribed PT to address functional limitations  : Noted that he recently has been experiencing weakness and pain in his thumbs when performing functional tasks such as opening jars or gripping activities  Stated that a few years ago he received a cortisone injection in his R thumb MCP joint, which improved his symptoms  Noted that over the past year or so the symptoms have been progressively getting worse  Noted that he has started to have difficulty opening jars, pinching open caps of medication bottles and coffee creamer aluminum lids  At his most recent appointment with Dr Karla South he was prescribed PT to address his thenar weakness and pain  At that same appointment he was prescribed PT to address his ankle weakness and pain that he has been experiencing acutely over the past 2-3 weeks or so  Insidious onset of ankle pain bilaterally  3/19/21: Pt has attended 19 visits this year to address his ankles, thumbs, and knees  Notes GROC improvement of 50% for his ankles, 50% for his thumbs, 25% for his knees  Pt stated that his symptoms have been exacerbated due to a fall two days ago  Noted that he tripped over his dog and it caused him to fall onto the stairs, which caused some set back in his knees, ankles and thumbs  Noted improvements include improved gripping/grasping ability, improved balance and decreased fear of falling  Persisting impairments include R knee pain with increased activity intensity, walking on uneven surfaces, and carrying/lifting objects that require strong   He recently received cortisone injections in R knee and will receive another injection into his thumbs mid April     Pain  Current pain ratin  At best pain ratin  At worst pain rating: 3  Location: R lateral knee pain, b/l thenar eminences at worst: 10 at best: 0/10  Quality: pulling, pressure, sharp and grinding  Relieving factors: relaxation, rest and ice  Aggravating factors: stair climbing, walking, overhead activity and lifting    Treatments  Current treatment: physical therapy  Patient Goals  Patient goals for therapy: increased strength, independence with ADLs/IADLs, return to sport/leisure activities, increased motion, improved balance and decreased pain  Patient goal: /grasp jars/bottles, pinching, balance, descending stairs without hand rail, lunge/kneeling, participating in fitness activities  Objective     Observations     Left Wrist/Hand   Negative for edema  Left Ankle/Foot   Negative for edema  Right Wrist/Hand   Negative for edema  Right Ankle/Foot   Negative for edema  Tenderness     Left Shoulder   No tenderness in the infraspinatus tendon and supraspinatus tendon  Lumbar Spine  No tenderness in the spinous process  Left Knee   No tenderness in the lateral joint line  Right Knee   No tenderness in the lateral joint line  Left Ankle/Foot   Tenderness in the Achilles insertion, proximal Achilles and talar dome  Right Ankle/Foot   Tenderness in the Achilles insertion, proximal Achilles and talar dome       Additional Tenderness Details  No longer has TTP to lumbar or thoracic spine     Some muscle tightness still present   SLS  R: 15 sec significant hip and ankle strategy required  L: 30 sec significant hip and ankle strategy required    U/l HR  R: 20 reps through full ROM with straight knee   L: 20 reps through full ROM with straight knee     Active Range of Motion   Left Shoulder   Flexion: 170 degrees   Abduction: 170 degrees   External rotation 0°: 70 degrees     Right Shoulder   Flexion: 170 degrees   Abduction: 170 degrees   External rotation 0°: 70 degrees     Left Wrist   Wrist flexion: 70 degrees   Wrist extension: 60 degrees     Right Wrist   Wrist flexion: 70 degrees   Wrist extension: 50 degrees Left Knee   Flexion: 135 degrees   Extension: 0 degrees     Right Knee   Flexion: 124 degrees with pain  Extension: 0 degrees with pain    Passive Range of Motion   Left Knee   Flexion: 135 degrees   Extension: 0 degrees     Right Knee   Flexion: 125 degrees with pain  Prone flexion: 115 degrees with pain  Extension: 0 degrees   Left Ankle/Foot    Dorsiflexion (ke): 15 degrees   Plantar flexion: 45 degrees     Right Ankle/Foot    Dorsiflexion (ke): 15 degrees   Plantar flexion: 45 degrees     Joint Play     Hypomobile: L4 and L5   Mechanical Assessment    Cervical      Thoracic      Lumbar    Standing flexion: repeated movements   Pain location:no change  Pain intensity: better  Lying flexion: repeated movements  Pain location: no change  Pain intensity: better  Standing extension: repeated movements  Pain location: no change  Lying extension: repeated movements  Pain location: no change    Strength/Myotome Testing     Left Shoulder     Planes of Motion   Flexion: 5   Abduction: 5   External rotation at 0°: 5     Right Shoulder     Planes of Motion   Flexion: 5   Abduction: 5   External rotation at 0°: 5     Left Wrist/Hand   Wrist extension: 4  Wrist flexion: 4     (2nd hand position)     Trial 1: 70    Trial 2: 80    Trial 3: 83    Thumb Strength  Key/Lateral Pinch     Trial 1: 24    Trial 2: 27    Trial 3: 27    Average: 26    Right Wrist/Hand   Wrist extension: 4  Wrist flexion: 4     (2nd hand position)     Trial 1: 62    Trial 2: 85    Trial 3: 75    Thumb Strength   Key/Lateral Pinch     Trial 1: 20    Trial 2: 20    Trial 3: 21    Average: 20 33    Left Hip   Planes of Motion   Flexion: 4+  Abduction: 3-    Right Hip   Planes of Motion   Flexion: 4+  Abduction: 3-    Left Knee   Flexion: 5  Extension: 5  Quadriceps contraction: good    Right Knee   Flexion: 4  Extension: 4  Quadriceps contraction: good    Left Ankle/Foot   Dorsiflexion: 4  Inversion: 4  Eversion: 4    Right Ankle/Foot Dorsiflexion: 4  Inversion: 4  Eversion: 4    Muscle Activation     Additional Muscle Activation Details  Prone Plank  30 sec till loss of form    Tests     Left Shoulder   Negative empty can, Hawkin's and painful arc  Left Wrist/Hand   Positive CMC grind  Right Wrist/Hand   Positive CMC grind  Lumbar     Left   Negative passive SLR and quadrant  Right   Negative passive SLR and quadrant  Right Knee   Positive patellar apprehension and patellar compression  Negative Thessaly's test at 20 degrees  Left Ankle/Foot   Positive for impingement sign  Negative for anterior drawer  Right Ankle/Foot   Positive for impingement sign  Negative for anterior drawer          Precautions: standard OA  Manual  2/16 2/19 2/24 2/26 3/1  3/4  3/8  3/11 3/15  3/19   Thumb jt mobs       JZ     JZ JZ       R knee STM, PROM   JZ STM thenar JW STM thenar JZ STM thenar JW  STM thenar Murvin Ponds JW  JZ      Exercise Diary  2/16 2/19 2/24 2/26 3/1 3/4 3/8 3/11 3/15 3/19   Soleus stretch   :15x5 :15x5                Calf stretch   :15x5 :15x5                 Resisted retro walk       50/ 2x10 50/ 2x10 65/ x10          LP low in / 3x10     170/ 3x10 175/ 30x 190/  20x   150/ 3x10 170/ 3x10    LP mid in / 3x10     170/ 3x10 175/ 30x 190/ 20x   150/ 3x10 170/ 3x10    LP high in / 3x10     170/ 3x10 175/ 30x 190/  20x   150/ 3x10 170/ 3x10    LP u/l high in PF 90/                    LP u/l mid in PF 90/                    LP u/l low in PF 90/                    Hamstring and calf stretch strap   :15x5 :15x5 :15x3ea :15x3ea :15x3ea :15x3ea :15x3ea :15x3ea :15x5ea   ITB stretch   :15x5 :15x5 :15x3ea   :15x3 ea :15x3ea :15x3ea :15x3ea :15x3ea   Calf press high 180/ 3x10       175/ 30x 190/  30x   150/ 3x10 170/ 3x10    Calf press mid 180/ 3x10       175/ 30x 190/  30x   150/ 3x10 170/ 3x10    Calf press Low 180/ 3x10       175/ 30x 190/  30x   150/ 3x10 170/ 3x10    Thenar stretch       :15x5 :15x5 :15x5 :15x3ea :15x3 :15x3 :15x5   U/l HR iso mid K mid A                      u/l HR               x45ea x45 ea x45   Squat curl press                      Step ups  8" 3x10                    Squat overhead press 15# 3x10     15# 3x10   15# 3x10   15# 3x10 15# 3x12    Lunge with 2 handrail 2x10ea                    Squats 2 hand assist                      Piriformis stretch   :15x5ea 2 way :15x5ea     2 way :15x5ea :15x3ea :15x3 :15x3 :15x3   3 way HS stretch   :15x5ea :15xea                Elliptical       5'       5' 5'    TM        5' 5' 5'          Putty                       Clip pinch                      PB rollout      :10x10 ea                 Flexbar pinch flex/ext R 3x10     R 3x10 R 3x10 R 3x10   R 3x10      SLS  :15x2ea                    SLS ball toss, soccer ball :15x2ea                    Fitter AP b/l 3x10                    Fitter AP u/l 2x10ea                    SLS cone tap 30xea

## 2021-03-22 ENCOUNTER — OFFICE VISIT (OUTPATIENT)
Dept: PHYSICAL THERAPY | Facility: CLINIC | Age: 71
End: 2021-03-22
Payer: COMMERCIAL

## 2021-03-22 DIAGNOSIS — G56.02 PARTIAL THENAR ATROPHY, LEFT: ICD-10-CM

## 2021-03-22 DIAGNOSIS — M17.0 BILATERAL PRIMARY OSTEOARTHRITIS OF KNEE: ICD-10-CM

## 2021-03-22 DIAGNOSIS — M25.571 ACUTE BILATERAL ANKLE PAIN: Primary | ICD-10-CM

## 2021-03-22 DIAGNOSIS — M25.561 PAIN IN BOTH KNEES, UNSPECIFIED CHRONICITY: ICD-10-CM

## 2021-03-22 DIAGNOSIS — G56.01 PARTIAL THENAR ATROPHY, RIGHT: ICD-10-CM

## 2021-03-22 DIAGNOSIS — R26.89 BALANCE PROBLEM: ICD-10-CM

## 2021-03-22 DIAGNOSIS — R29.898 ANKLE WEAKNESS: ICD-10-CM

## 2021-03-22 DIAGNOSIS — M25.562 PAIN IN BOTH KNEES, UNSPECIFIED CHRONICITY: ICD-10-CM

## 2021-03-22 DIAGNOSIS — M25.572 ACUTE BILATERAL ANKLE PAIN: Primary | ICD-10-CM

## 2021-03-22 PROCEDURE — 97530 THERAPEUTIC ACTIVITIES: CPT

## 2021-03-22 PROCEDURE — 97140 MANUAL THERAPY 1/> REGIONS: CPT

## 2021-03-22 PROCEDURE — 97110 THERAPEUTIC EXERCISES: CPT

## 2021-03-22 NOTE — PROGRESS NOTES
Daily Note     Today's date: 3/22/2021  Patient name: Jj Diaz  : 1950  MRN: [id number]  Referring provider: Roselyn Naranjo DO  Dx:   Encounter Diagnosis     ICD-10-CM    1  Acute bilateral ankle pain  M25 571     M25 572    2  Partial thenar atrophy, right  G56 01    3  Pain in both knees, unspecified chronicity  M25 561     M25 562    4  Bilateral primary osteoarthritis of knee  M17 0    5  Partial thenar atrophy, left  G56 02    6  Ankle weakness  R29 898    7  Balance problem  R26 89                   Subjective: Brigitte Gomez reports he is feeling better since most recent fall last week  He states he has swelling and discomfort present in R knee, but has improved since LV  Notes L sided rib discomfort with forward bending motion  Objective: See treatment diary below      Assessment: Dalton tolerated treatment well  Resumed strengthening exercises today, modified program performed d/t recent fall  Limited knee flexion ROM observed with manual stretching  TTP present with thenar STM  Pt would benefit from continued PT to further address impairments and maximize functional level  Plan: Continue per plan of care        Precautions: standard OA  Manual  3/22  2/24 2/26 3/1  3/4  3/8  3/11 3/15  3/19   Thumb jt mobs     JZ     JZ JZ       R knee STM, PROM JW+thenar   STM thenar JW STM thenar JZ STM thenar JW  STM thenar [place] Jacob JW  JZ      Exercise Diary  3/22  2/24 2/26 3/1 3/4 3/8 3/11 3/15 3/19   Soleus stretch   :15x5                Calf stretch   :15x5                 Resisted retro walk     50/ 2x10 50/ 2x10 65/ x10          LP low in / 3x10    170/ 3x10 175/ 30x 190/  20x   150/ 3x10 170/ 3x10    LP mid in / 3x10    170/ 3x10 175/ 30x 190/ 20x   150/ 3x10 170/ 3x10    LP high in / 3x10    170/ 3x10 175/ 30x 190/  20x   150/ 3x10 170/ 3x10    LP u/l high in PF                    LP u/l mid in PF                    LP u/l low in PF                    Hamstring and calf stretch strap :15x5ea  :15x5 :15x3ea :15x3ea :15x3ea :15x3ea :15x3ea :15x3ea :15x5ea   ITB stretch :15x3ea  :15x5 :15x3ea   :15x3 ea :15x3ea :15x3ea :15x3ea :15x3ea   Calf press high 150/ 3x10      175/ 30x 190/  30x   150/ 3x10 170/ 3x10    Calf press mid 150/ 3x10      175/ 30x 190/  30x   150/ 3x10 170/ 3x10    Calf press Low 150/ 3x10      175/ 30x 190/  30x   150/ 3x10 170/ 3x10    Thenar stretch     :15x5 :15x5 :15x5 :15x3ea :15x3 :15x3 :15x5   U/l HR iso mid K mid A                    u/l HR x45 ea             x45ea x45 ea x45   Squat curl press                    Step ups                     Squat overhead press     15# 3x10   15# 3x10   15# 3x10 15# 3x12    Lunge with 2 handrail                    Squats 2 hand assist                    Piriformis stretch   2 way :15x5ea     2 way :15x5ea :15x3ea :15x3 :15x3 :15x3   3 way HS stretch   :15xea                Elliptical     5'       5' 5'    TM  5'    5' 5' 5'          Putty                     Clip pinch                    PB rollout    :10x10 ea                 Flexbar pinch flex/ext     R 3x10 R 3x10 R 3x10   R 3x10      SLS                     SLS ball toss, soccer ball                    Fitter AP b/l                    Fitter AP u/l                    SLS cone tap

## 2021-03-26 ENCOUNTER — OFFICE VISIT (OUTPATIENT)
Dept: PHYSICAL THERAPY | Facility: CLINIC | Age: 71
End: 2021-03-26
Payer: COMMERCIAL

## 2021-03-26 DIAGNOSIS — M17.0 BILATERAL PRIMARY OSTEOARTHRITIS OF KNEE: ICD-10-CM

## 2021-03-26 DIAGNOSIS — G56.02 PARTIAL THENAR ATROPHY, LEFT: ICD-10-CM

## 2021-03-26 DIAGNOSIS — R29.898 ANKLE WEAKNESS: ICD-10-CM

## 2021-03-26 DIAGNOSIS — R26.89 BALANCE PROBLEM: ICD-10-CM

## 2021-03-26 DIAGNOSIS — M25.572 ACUTE BILATERAL ANKLE PAIN: Primary | ICD-10-CM

## 2021-03-26 DIAGNOSIS — M25.571 ACUTE BILATERAL ANKLE PAIN: Primary | ICD-10-CM

## 2021-03-26 DIAGNOSIS — G56.01 PARTIAL THENAR ATROPHY, RIGHT: ICD-10-CM

## 2021-03-26 DIAGNOSIS — M25.561 PAIN IN BOTH KNEES, UNSPECIFIED CHRONICITY: ICD-10-CM

## 2021-03-26 DIAGNOSIS — M25.562 PAIN IN BOTH KNEES, UNSPECIFIED CHRONICITY: ICD-10-CM

## 2021-03-26 PROCEDURE — 97530 THERAPEUTIC ACTIVITIES: CPT

## 2021-03-26 PROCEDURE — 97110 THERAPEUTIC EXERCISES: CPT

## 2021-03-26 NOTE — PROGRESS NOTES
Daily Note     Today's date: 3/26/2021  Patient name: Shania Miller  : 1950  MRN: [id number]  Referring provider: Carl Singh DO  Dx:   Encounter Diagnosis     ICD-10-CM    1  Acute bilateral ankle pain  M25 571     M25 572    2  Partial thenar atrophy, right  G56 01    3  Pain in both knees, unspecified chronicity  M25 561     M25 562    4  Bilateral primary osteoarthritis of knee  M17 0    5  Partial thenar atrophy, left  G56 02    6  Ankle weakness  R29 898    7  Balance problem  R26 89                   Subjective: Andrew Monterroso reports he has been taking it easy since last PT visit  He states he feels he is back to where he was before the fall  He notes less swelling and discomfort in R knee  Objective: See treatment diary below      Assessment: Dalton tolerated treatment well  Continued with strength training focus, reintroduced squat press and retro walking, added u/l mini squat to program with challenge  Great weakness observed on RLE with exercises  Pt would benefit from continued PT to further address impairments and maximize functional level  Plan: Continue per plan of care        Precautions: standard OA  Manual  3/22 3/26  2/26 3/1  3/4  3/8  3/11 3/15  3/19   Thumb jt mobs    JZ     JZ JZ       R knee STM, PROM JW+thenar    STM thenar JZ STM thenar JW  STM thenar JW  JZ  JZ JW  JZ      Exercise Diary  3/22 3/26  2/26 3/1 3/4 3/8 3/11 3/15 3/19   Soleus stretch                   Calf stretch                    Resisted retro walk  50/ 5 laps   50/ 2x10 50/ 2x10 65/ x10          LP low in / 3x10 170/   3x10  170/ 3x10 175/ 30x 190/  20x   150/ 3x10 170/ 3x10    LP mid in / 3x10 170/   3x10  170/ 3x10 175/ 30x 190/ 20x   150/ 3x10 170/ 3x10    LP high in / 3x10 170/   3x10  170/ 3x10 175/ 30x 190/  20x   150/ 3x10 170/ 3x10    LP u/l high in PF                   LP u/l mid in PF                   LP u/l low in PF                   Hamstring and calf stretch strap :15x5ea :15x5 ea  :15x3ea :15x3ea :15x3ea :15x3ea :15x3ea :15x3ea :15x5ea   ITB stretch :15x3ea :15x3 ea  :15x3ea   :15x3 ea :15x3ea :15x3ea :15x3ea :15x3ea   Calf press high 150/ 3x10 170/   3x10    175/ 30x 190/  30x   150/ 3x10 170/ 3x10    Calf press mid 150/ 3x10 170/   3x10    175/ 30x 190/  30x   150/ 3x10 170/ 3x10    Calf press Low 150/ 3x10 170/   3x10    175/ 30x 190/  30x   150/ 3x10 170/ 3x10    Thenar stretch    :15x5 :15x5 :15x5 :15x3ea :15x3 :15x3 :15x5   U/l HR iso mid K mid A                   u/l HR x45 ea  x45 ea          x45ea x45 ea x45   Squat curl press                   Step ups                    Squat overhead press  15# 3x10   15# 3x10   15# 3x10   15# 3x10 15# 3x12    Lunge with 2 handrail                   Squats 2 hand assist  U/l 20x HHA                 Piriformis stretch        2 way :15x5ea :15x3ea :15x3 :15x3 :15x3   3 way HS stretch                   Elliptical    5'       5' 5'    TM  5' 5'  5' 5' 5'          Putty                    Clip pinch                   PB rollout                    Flexbar pinch flex/ext    R 3x10 R 3x10 R 3x10   R 3x10      SLS                    SLS ball toss, soccer ball                   Fitter AP b/l                   Fitter AP u/l                   SLS cone tap

## 2021-03-31 ENCOUNTER — OFFICE VISIT (OUTPATIENT)
Dept: PHYSICAL THERAPY | Facility: CLINIC | Age: 71
End: 2021-03-31
Payer: COMMERCIAL

## 2021-03-31 DIAGNOSIS — M25.572 ACUTE BILATERAL ANKLE PAIN: Primary | ICD-10-CM

## 2021-03-31 DIAGNOSIS — R29.898 ANKLE WEAKNESS: ICD-10-CM

## 2021-03-31 DIAGNOSIS — G56.02 PARTIAL THENAR ATROPHY, LEFT: ICD-10-CM

## 2021-03-31 DIAGNOSIS — M25.562 PAIN IN BOTH KNEES, UNSPECIFIED CHRONICITY: ICD-10-CM

## 2021-03-31 DIAGNOSIS — M17.0 BILATERAL PRIMARY OSTEOARTHRITIS OF KNEE: ICD-10-CM

## 2021-03-31 DIAGNOSIS — R26.89 BALANCE PROBLEM: ICD-10-CM

## 2021-03-31 DIAGNOSIS — G56.01 PARTIAL THENAR ATROPHY, RIGHT: ICD-10-CM

## 2021-03-31 DIAGNOSIS — M25.571 ACUTE BILATERAL ANKLE PAIN: Primary | ICD-10-CM

## 2021-03-31 DIAGNOSIS — M25.561 PAIN IN BOTH KNEES, UNSPECIFIED CHRONICITY: ICD-10-CM

## 2021-03-31 PROCEDURE — 97110 THERAPEUTIC EXERCISES: CPT

## 2021-03-31 PROCEDURE — 97530 THERAPEUTIC ACTIVITIES: CPT

## 2021-03-31 PROCEDURE — 97140 MANUAL THERAPY 1/> REGIONS: CPT

## 2021-03-31 NOTE — PROGRESS NOTES
Daily Note     Today's date: 3/31/2021  Patient name: Alessandro Martinez  : 1950  MRN: [id number]  Referring provider: Lyssa Spangler DO  Dx:   Encounter Diagnosis     ICD-10-CM    1  Acute bilateral ankle pain  M25 571     M25 572    2  Partial thenar atrophy, right  G56 01    3  Pain in both knees, unspecified chronicity  M25 561     M25 562    4  Bilateral primary osteoarthritis of knee  M17 0    5  Balance problem  R26 89    6  Ankle weakness  R29 898    7  Partial thenar atrophy, left  G56 02                   Subjective: Roman Davison reports when he woke up this morning his R knee felt " pretty good and strong"  R knee sx's get aggravated with increased WB activities  Objective: See treatment diary below      Assessment: Dalton tolerated PT treatment well  Progressed strength training today  U/l exercises performed on leg press with moderate challenged R>L  RLE displays greater quad, glut, and hamstring weakness evident u/l exercises  Occasional rest breaks required throughout session d/t fatigue  Pt noted R knee soreness post exercises  Concluded with CP  Pt would benefit from continued PT to further address impairments and maximize functional level  Plan: Continue per plan of care        Precautions: standard OA  Manual  3/22 3/26 3/31  3/1  3/4  3/8  3/11 3/15  3/19   Thumb jt mobs         JZ JZ       R knee STM, PROM JW+thenar   JW  STM thenar JW  STM thenar Mallorie Vieira JW  JZ      Exercise Diary  3/22 3/26 3/31  3/1 3/4 3/8 3/11 3/15 3/19   Soleus stretch                  Calf stretch                   Resisted retro walk  50/ 5 laps    50/ 2x10 65/ x10          LP low in / 3x10 170/   3x10 170/   2x10  175/ 30x 190/  20x   150/ 3x10 170/ 3x10    LP mid in / 3x10 170/   3x10 170/   2x10  175/ 30x 190/ 20x   150/ 3x10 170/ 3x10    LP high in / 3x10 170/   3x10 170/   2x10  175/ 30x 190/  20x   150/ 3x10 170/ 3x10    LP u/l high in PF   75 2x10               LP u/l mid in PF   75 2x10              LP u/l low in PF   75 2x10               Hamstring and calf stretch strap :15x5ea :15x5 ea :15x5 ea  :15x3ea :15x3ea :15x3ea :15x3ea :15x3ea :15x5ea   ITB stretch :15x3ea :15x3 ea :15x3 ea    :15x3 ea :15x3ea :15x3ea :15x3ea :15x3ea   Calf press high 150/ 3x10 170/   3x10   175/ 30x 190/  30x   150/ 3x10 170/ 3x10    Calf press mid 150/ 3x10 170/   3x10   175/ 30x 190/  30x   150/ 3x10 170/ 3x10    Calf press Low 150/ 3x10 170/   3x10   175/ 30x 190/  30x   150/ 3x10 170/ 3x10    Thenar stretch     :15x5 :15x5 :15x3ea :15x3 :15x3 :15x5   U/l HR iso mid K mid A                  u/l HR x45 ea  x45 ea x45 ea        x45ea x45 ea x45   Squat curl press                  Step ups                   Squat overhead press  15# 3x10  15# 3x10     15# 3x10   15# 3x10 15# 3x12    Straight leg RDL              Lunge with 2 handrail                  Squats 2 hand assist  U/l 20x HHA U/l 20x HHA               Piriformis stretch       2 way :15x5ea :15x3ea :15x3 :15x3 :15x3   3 way HS stretch                  Elliptical           5' 5'    TM  5' 5' 5'  5' 5'          Putty                   Clip pinch                  PB rollout                   Flexbar pinch flex/ext     R 3x10 R 3x10   R 3x10      SLS                   SLS ball toss, soccer ball                  Fitter AP b/l                  Fitter AP u/l                  SLS cone tap

## 2021-04-01 ENCOUNTER — IMMUNIZATIONS (OUTPATIENT)
Dept: FAMILY MEDICINE CLINIC | Facility: HOSPITAL | Age: 71
End: 2021-04-01

## 2021-04-01 DIAGNOSIS — Z23 ENCOUNTER FOR IMMUNIZATION: Primary | ICD-10-CM

## 2021-04-01 PROCEDURE — 91300 SARS-COV-2 / COVID-19 MRNA VACCINE (PFIZER-BIONTECH) 30 MCG: CPT

## 2021-04-01 PROCEDURE — 0001A SARS-COV-2 / COVID-19 MRNA VACCINE (PFIZER-BIONTECH) 30 MCG: CPT

## 2021-04-05 ENCOUNTER — OFFICE VISIT (OUTPATIENT)
Dept: PHYSICAL THERAPY | Facility: CLINIC | Age: 71
End: 2021-04-05
Payer: COMMERCIAL

## 2021-04-05 DIAGNOSIS — M25.572 ACUTE BILATERAL ANKLE PAIN: Primary | ICD-10-CM

## 2021-04-05 DIAGNOSIS — G56.01 PARTIAL THENAR ATROPHY, RIGHT: ICD-10-CM

## 2021-04-05 DIAGNOSIS — M25.571 ACUTE BILATERAL ANKLE PAIN: Primary | ICD-10-CM

## 2021-04-05 DIAGNOSIS — M17.0 BILATERAL PRIMARY OSTEOARTHRITIS OF KNEE: ICD-10-CM

## 2021-04-05 DIAGNOSIS — M25.561 PAIN IN BOTH KNEES, UNSPECIFIED CHRONICITY: ICD-10-CM

## 2021-04-05 DIAGNOSIS — R26.89 BALANCE PROBLEM: ICD-10-CM

## 2021-04-05 DIAGNOSIS — M25.562 PAIN IN BOTH KNEES, UNSPECIFIED CHRONICITY: ICD-10-CM

## 2021-04-05 DIAGNOSIS — G56.02 PARTIAL THENAR ATROPHY, LEFT: ICD-10-CM

## 2021-04-05 DIAGNOSIS — R29.898 ANKLE WEAKNESS: ICD-10-CM

## 2021-04-05 PROCEDURE — 97530 THERAPEUTIC ACTIVITIES: CPT

## 2021-04-05 PROCEDURE — 97110 THERAPEUTIC EXERCISES: CPT

## 2021-04-05 NOTE — PROGRESS NOTES
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                   Subjective: Silva Brown states " I am getting better"  He notes R knee continues to be sore following increased activity, but has been tolerable  He notes that he feels himself getting stronger  Objective: See treatment diary below      Assessment: Silva Brown is progressing well with current POC  Continued with focus on u/l strength training  Increased difficulty displayed on REL with leg press activities  Added lunges, 1 HHA required for stability  Occasional rest breaks required throughout session d/t fatigue  Pt would benefit from continued PT to further address impairments and maximize functional level  Plan: Continue per plan of care        Precautions: standard OA  Manual  3/22 3/26 3/31 4/5   3/4  3/8  3/11 3/15  3/19   Thumb jt mobs        JZ JZ       R knee STM, PROM JW+thenar   JW    STM thenar JW  JZ  JZ JW  JZ      Exercise Diary  3/22 3/26 3/31 4  3/4 3/8 3/11 3/15 3/19   Soleus stretch                 Calf stretch                  Resisted retro walk  50/ 5 laps   65/ x10  65/ x10          LP low in / 3x10 170/   3x10 170/   2x10   190/  20x   150/ 3x10 170/ 3x10    LP mid in / 3x10 170/   3x10 170/   2x10   190/ 20x   150/ 3x10 170/ 3x10    LP high in / 3x10 170/   3x10 170/   2x10   190/  20x   150/ 3x10 170/ 3x10    LP u/l high in PF   75 2x10 75/ 3x10             LP u/l mid in PF   75 2x10 75/ 3x10             LP u/l low in PF   75 2x10 75/ 3x10             Hamstring and calf stretch strap :15x5ea :15x5 ea :15x5 ea :15x5 ea  :15x3ea :15x3ea :15x3ea :15x3ea :15x5ea   ITB stretch :15x3ea :15x3 ea :15x3 ea :15x3 ea  :15x3 ea :15x3ea :15x3ea :15x3ea :15x3ea   Calf press high 150/ 3x10 170/   3x10  U/l 90 2x10  190/  30x   150/ 3x10 170/ 3x10    Calf press mid 150/ 3x10 170/   3x10  U/l 90 2x10  190/  30x   150/ 3x10 170/ 3x10    Calf press Low 150/ 3x10 170/   3x10  U/l 90 2x10  190/  30x   150/ 3x10 170/ 3x10    Thenar stretch      :15x5 :15x3ea :15x3 :15x3 :15x5   U/l HR iso mid K mid A                 u/l HR x45 ea  x45 ea x45 ea       x45ea x45 ea x45   Squat curl press                 Step ups                  Squat overhead press  15# 3x10  15# 3x10  15# 3x12  15# 3x10   15# 3x10 15# 3x12    Straight leg RDL              Lunge with 2 handrail    1HHA x20 b/l              Squats 2 hand assist  U/l 20x HHA U/l 20x HHA              Piriformis stretch      2 way :15x5ea :15x3ea :15x3 :15x3 :15x3   3 way HS stretch                 Elliptical          5' 5'    TM  5' 5' 5' 5'  5'          Putty                  Clip pinch                 PB rollout                  Flexbar pinch flex/ext      R 3x10   R 3x10      SLS                  SLS ball toss, soccer ball                 Fitter AP b/l                 Fitter AP u/l                 SLS cone tap

## 2021-04-08 ENCOUNTER — OFFICE VISIT (OUTPATIENT)
Dept: PHYSICAL THERAPY | Facility: CLINIC | Age: 71
End: 2021-04-08
Payer: COMMERCIAL

## 2021-04-08 DIAGNOSIS — M25.562 PAIN IN BOTH KNEES, UNSPECIFIED CHRONICITY: ICD-10-CM

## 2021-04-08 DIAGNOSIS — M25.571 ACUTE BILATERAL ANKLE PAIN: ICD-10-CM

## 2021-04-08 DIAGNOSIS — R29.898 ANKLE WEAKNESS: ICD-10-CM

## 2021-04-08 DIAGNOSIS — M17.0 BILATERAL PRIMARY OSTEOARTHRITIS OF KNEE: ICD-10-CM

## 2021-04-08 DIAGNOSIS — G56.01 PARTIAL THENAR ATROPHY, RIGHT: ICD-10-CM

## 2021-04-08 DIAGNOSIS — G56.02 PARTIAL THENAR ATROPHY, LEFT: ICD-10-CM

## 2021-04-08 DIAGNOSIS — R26.89 BALANCE PROBLEM: Primary | ICD-10-CM

## 2021-04-08 DIAGNOSIS — M25.561 PAIN IN BOTH KNEES, UNSPECIFIED CHRONICITY: ICD-10-CM

## 2021-04-08 DIAGNOSIS — M25.572 ACUTE BILATERAL ANKLE PAIN: ICD-10-CM

## 2021-04-08 PROCEDURE — 97110 THERAPEUTIC EXERCISES: CPT | Performed by: PHYSICAL THERAPIST

## 2021-04-08 PROCEDURE — 97530 THERAPEUTIC ACTIVITIES: CPT | Performed by: PHYSICAL THERAPIST

## 2021-04-08 NOTE — PROGRESS NOTES
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    4  Ankle weakness  R29 898    5  Partial thenar atrophy, left  G56 02    6  Pain in both knees, unspecified chronicity  M25 561     M25 562    7  Bilateral primary osteoarthritis of knee  M17 0             Subjective: Pt reported that he would like to advance his HEP  Noted that "I think I might be able to handle a little more "     Objective: See treatment diary below    Assessment: Tolerated treatment well  Updated HEP and he did demonstrate improved understanding of exercises  Patient demonstrated fatigue post treatment, exhibited good technique with therapeutic exercises and would benefit from continued PT  Plan: Continue per plan of care        Precautions: standard OA  Manual  3/22 3/26 3/31 4/5 4/8      3/19   Thumb jt mobs              R knee STM, PROM JW+thenar   JW  JZ      JZ      Exercise Diary  3/22 3/26 3/31 4/5 4/8    3/15 3/19   Soleus stretch              Calf stretch               Resisted retro walk  50/ 5 laps   65/ x10          LP low in / 3x10 170/   3x10 170/   2x10      170/ 3x10    LP mid in / 3x10 170/   3x10 170/   2x10      170/ 3x10    LP high in / 3x10 170/   3x10 170/   2x10      170/ 3x10    LP u/l high in PF   75 2x10 75/ 3x10          LP u/l mid in PF   75 2x10 75/ 3x10          LP u/l low in PF   75 2x10 75/ 3x10          Hamstring and calf stretch strap :15x5ea :15x5 ea :15x5 ea :15x5 ea :15x5    :15x3ea :15x5ea   ITB stretch :15x3ea :15x3 ea :15x3 ea :15x3 ea :15x5    :15x3ea :15x3ea   Calf press high 150/ 3x10 170/   3x10  U/l 90 2x10     170/ 3x10    Calf press mid 150/ 3x10 170/   3x10  U/l 90 2x10     170/ 3x10    Calf press Low 150/ 3x10 170/   3x10 U/l 90 2x10     170/ 3x10    Thenar stretch         :15x3 :15x5   U/l HR iso mid K mid A              u/l HR x45 ea  x45 ea x45 ea  x45    x45 ea x45   Squat curl press              Step ups               Squat overhead press  15# 3x10  15# 3x10  15# 3x12     15# 3x12    Straight leg RDL              Lunge with 2 handrail    1HHA x20 b/l  1HHA 2x10ea         Squats 2 hand assist  U/l 20x HHA U/l 20x HHA  3x10 to chair         Piriformis stretch         :15x3 :15x3   3 way HS stretch              Elliptical         5'    TM  5' 5' 5' 5'          Putty               Clip pinch              Sh exte     B/ 3x10        Row     2M 3x10        horiz abd     B 3x10        horiz add     B 3x10        PB rollout               Flexbar pinch flex/ext              SLS               SLS ball toss, soccer ball              Fitter AP b/l              Fitter AP u/l              SLS cone tap

## 2021-04-12 ENCOUNTER — APPOINTMENT (OUTPATIENT)
Dept: PHYSICAL THERAPY | Facility: CLINIC | Age: 71
End: 2021-04-12
Payer: COMMERCIAL

## 2021-04-13 ENCOUNTER — APPOINTMENT (OUTPATIENT)
Dept: PHYSICAL THERAPY | Facility: CLINIC | Age: 71
End: 2021-04-13
Payer: COMMERCIAL

## 2021-04-15 ENCOUNTER — OFFICE VISIT (OUTPATIENT)
Dept: PHYSICAL THERAPY | Facility: CLINIC | Age: 71
End: 2021-04-15
Payer: COMMERCIAL

## 2021-04-15 DIAGNOSIS — G56.02 PARTIAL THENAR ATROPHY, LEFT: Primary | ICD-10-CM

## 2021-04-15 DIAGNOSIS — M25.562 PAIN IN BOTH KNEES, UNSPECIFIED CHRONICITY: ICD-10-CM

## 2021-04-15 DIAGNOSIS — R29.898 ANKLE WEAKNESS: ICD-10-CM

## 2021-04-15 DIAGNOSIS — M25.572 ACUTE BILATERAL ANKLE PAIN: ICD-10-CM

## 2021-04-15 DIAGNOSIS — M17.0 BILATERAL PRIMARY OSTEOARTHRITIS OF KNEE: ICD-10-CM

## 2021-04-15 DIAGNOSIS — G56.01 PARTIAL THENAR ATROPHY, RIGHT: ICD-10-CM

## 2021-04-15 DIAGNOSIS — M25.561 PAIN IN BOTH KNEES, UNSPECIFIED CHRONICITY: ICD-10-CM

## 2021-04-15 DIAGNOSIS — R26.89 BALANCE PROBLEM: ICD-10-CM

## 2021-04-15 DIAGNOSIS — M25.571 ACUTE BILATERAL ANKLE PAIN: ICD-10-CM

## 2021-04-15 PROCEDURE — 97110 THERAPEUTIC EXERCISES: CPT | Performed by: PHYSICAL THERAPIST

## 2021-04-15 PROCEDURE — 97112 NEUROMUSCULAR REEDUCATION: CPT | Performed by: PHYSICAL THERAPIST

## 2021-04-15 PROCEDURE — 97530 THERAPEUTIC ACTIVITIES: CPT | Performed by: PHYSICAL THERAPIST

## 2021-04-15 NOTE — PROGRESS NOTES
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    7  Ankle weakness  R29 898        Start Time: 1215  Stop Time: 1300  Total time in clinic (min): 45 minutes    Subjective: Pt reported that he has not been performing his HEP, said that he is confused about some of the exercises and the frequency they are supposed to be performed  Objective: See treatment diary below      Assessment: Tolerated treatment well  Advanced HEP to diversify his exercises and he did demonstrate understanding and proper form during session today  Patient demonstrated fatigue post treatment, exhibited good technique with therapeutic exercises and would benefit from continued PT  Plan: Continue per plan of care        Precautions: standard OA  Manual  3/22 3/26 3/31 4/5 4/8 4/15     3/19   Thumb jt mobs              R knee STM, PROM JW+thenar   JW  JZ      JZ      Exercise Diary  3/22 3/26 3/31 4/5 4/8 4/15   3/15 3/19   Soleus stretch              Calf stretch               Resisted retro walk  50/ 5 laps   65/ x10          LP low in / 3x10 170/   3x10 170/   2x10      170/ 3x10    LP mid in / 3x10 170/   3x10 170/   2x10      170/ 3x10    LP high in / 3x10 170/   3x10 170/   2x10      170/ 3x10    LP u/l high in PF   75 2x10 75/ 3x10          LP u/l mid in PF   75 2x10 75/ 3x10          LP u/l low in PF   75 2x10 75/ 3x10          Hamstring and calf stretch strap :15x5ea :15x5 ea :15x5 ea :15x5 ea :15x5    :15x3ea :15x5ea   ITB stretch :15x3ea :15x3 ea :15x3 ea :15x3 ea :15x5    :15x3ea :15x3ea Calf press high 150/ 3x10 170/   3x10  U/l 90 2x10     170/ 3x10    Calf press mid 150/ 3x10 170/   3x10  U/l 90 2x10     170/ 3x10    Calf press Low 150/ 3x10 170/   3x10  U/l 90 2x10     170/ 3x10    Thenar stretch         :15x3 :15x5   U/l HR iso mid K mid A              u/l HR x45 ea  x45 ea x45 ea  x45 3x15ea   x45 ea x45   Stand hip flexion      3x10ea        Stand hip abd      3x10ea        Goblet squat kettle bell      15# 3x10       Pallof Rotation      B 2x10ea       Bicep curl      10# 3x10       Squat overhead press  15# 3x10  15# 3x10  15# 3x12     15# 3x12    Straight leg RDL              Lunge with 2 handrail    1HHA x20 b/l  1HHA 2x10ea         Squats 2 hand assist  U/l 20x HHA U/l 20x HHA  3x10 to chair         Piriformis stretch         :15x3 :15x3   3 way HS stretch      :15x3ea        Elliptical         5'    TM  5' 5' 5' 5'          Putty               Clip pinch              Sh exte     B/ 3x10        Row     2M 3x10        horiz abd     B 3x10        horiz add     B 3x10        PB rollout               Flexbar pinch flex/ext              SLS       :20x3ea        SLS ball toss, soccer ball              Fitter AP b/l              Fitter AP u/l              SLS cone tap

## 2021-04-19 ENCOUNTER — OFFICE VISIT (OUTPATIENT)
Dept: PHYSICAL THERAPY | Facility: CLINIC | Age: 71
End: 2021-04-19
Payer: COMMERCIAL

## 2021-04-19 DIAGNOSIS — R26.89 BALANCE PROBLEM: ICD-10-CM

## 2021-04-19 DIAGNOSIS — M25.572 ACUTE BILATERAL ANKLE PAIN: ICD-10-CM

## 2021-04-19 DIAGNOSIS — M25.562 PAIN IN BOTH KNEES, UNSPECIFIED CHRONICITY: ICD-10-CM

## 2021-04-19 DIAGNOSIS — M17.0 BILATERAL PRIMARY OSTEOARTHRITIS OF KNEE: ICD-10-CM

## 2021-04-19 DIAGNOSIS — R29.898 ANKLE WEAKNESS: ICD-10-CM

## 2021-04-19 DIAGNOSIS — G56.01 PARTIAL THENAR ATROPHY, RIGHT: ICD-10-CM

## 2021-04-19 DIAGNOSIS — G56.02 PARTIAL THENAR ATROPHY, LEFT: Primary | ICD-10-CM

## 2021-04-19 DIAGNOSIS — M25.571 ACUTE BILATERAL ANKLE PAIN: ICD-10-CM

## 2021-04-19 DIAGNOSIS — M25.561 PAIN IN BOTH KNEES, UNSPECIFIED CHRONICITY: ICD-10-CM

## 2021-04-19 PROCEDURE — 97530 THERAPEUTIC ACTIVITIES: CPT

## 2021-04-19 PROCEDURE — 97110 THERAPEUTIC EXERCISES: CPT

## 2021-04-19 NOTE — PROGRESS NOTES
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    7  Bilateral primary osteoarthritis of knee  M17 0                   Subjective: Melani Morgan reports R knee continues to be very bothersome  He states he has been completing cardio daily and has started back on HEP from PT  Objective: See treatment diary below      Assessment: Melani Morgan displays good tolerance to current POC  Pt performed prescribed exercises well  Added TB resistance to hip strengthening, unable to complete standing on RLE d/t knee buckling  Reintroduced stair navigation back into program, decreased step required for RLE secondary to weakness and discomfort  Add to HEP NV  Plan: Continue per plan of care        Precautions: standard OA  Manual  3/22 3/26 3/31 4/5 4/8 4/15 4/19    3/19   Thumb jt mobs              R knee STM, PROM JW+thenar   JW  JZ      JZ      Exercise Diary  3/22 3/26 3/31 4/5 4/8 4/15 4/19  3/15 3/19   Soleus stretch              Calf stretch               Resisted retro walk  50/ 5 laps   65/ x10          LP low in / 3x10 170/   3x10 170/   2x10      170/ 3x10    LP mid in / 3x10 170/   3x10 170/   2x10      170/ 3x10    LP high in / 3x10 170/   3x10 170/   2x10      170/ 3x10    LP u/l high in PF   75 2x10 75/ 3x10          LP u/l mid in PF   75 2x10 75/ 3x10          LP u/l low in PF   75 2x10 75/ 3x10          Hamstring and calf stretch strap :15x5ea :15x5 ea :15x5 ea :15x5 ea :15x5    :15x3ea :15x5ea   ITB stretch :15x3ea :15x3 ea :15x3 ea :15x3 ea :15x5    :15x3ea :15x3ea   Calf press high 150/ 3x10 170/ 3x10  U/l 90 2x10     170/ 3x10    Calf press mid 150/ 3x10 170/   3x10  U/l 90 2x10     170/ 3x10    Calf press Low 150/ 3x10 170/   3x10  U/l 90 2x10     170/ 3x10    Thenar stretch         :15x3 :15x5   U/l HR iso mid K mid A              u/l HR x45 ea  x45 ea x45 ea  x45 3x15ea 3x15 ea   x45 ea x45   Stand hip flexion      3x10ea 3x10 ea       Stand hip abd      3x10ea O 3x10 ea       Goblet squat kettle bell      15# 3x10 15# 3x10      Pallof Rotation      B 2x10ea       Bicep curl      10# 3x10       Squat overhead press  15# 3x10  15# 3x10  15# 3x12     15# 3x12    Straight leg RDL              Lunge with 2 handrail    1HHA x20 b/l  1HHA 2x10ea         Squats 2 hand assist  U/l 20x HHA U/l 20x HHA  3x10 to chair         Piriformis stretch         :15x3 :15x3   3 way HS stretch      :15x3ea :15x3ea       Elliptical         5'    TM  5' 5' 5' 5'   5'       Putty               Clip pinch              Sh exte     B/ 3x10        Row     2M 3x10        horiz abd     B 3x10        horiz add     B 3x10        PB rollout               Flexbar pinch flex/ext              SLS       :20x3ea :20x3 ea       SLS ball toss, soccer ball              Fitter AP b/l              Fitter AP u/l              SLS cone tap              PETRA       30x      Step ups       8" 3x10 b/l      Step down fwd       4" 2x10 b/l       Step down lat       8" 2x10 L   6" 2x10 R

## 2021-04-22 ENCOUNTER — OFFICE VISIT (OUTPATIENT)
Dept: PHYSICAL THERAPY | Facility: CLINIC | Age: 71
End: 2021-04-22
Payer: COMMERCIAL

## 2021-04-22 DIAGNOSIS — G56.01 PARTIAL THENAR ATROPHY, RIGHT: ICD-10-CM

## 2021-04-22 DIAGNOSIS — M17.0 BILATERAL PRIMARY OSTEOARTHRITIS OF KNEE: ICD-10-CM

## 2021-04-22 DIAGNOSIS — I10 ESSENTIAL HYPERTENSION: ICD-10-CM

## 2021-04-22 DIAGNOSIS — R26.89 BALANCE PROBLEM: ICD-10-CM

## 2021-04-22 DIAGNOSIS — R29.898 ANKLE WEAKNESS: ICD-10-CM

## 2021-04-22 DIAGNOSIS — M25.572 ACUTE BILATERAL ANKLE PAIN: ICD-10-CM

## 2021-04-22 DIAGNOSIS — M25.561 PAIN IN BOTH KNEES, UNSPECIFIED CHRONICITY: ICD-10-CM

## 2021-04-22 DIAGNOSIS — M25.571 ACUTE BILATERAL ANKLE PAIN: ICD-10-CM

## 2021-04-22 DIAGNOSIS — M25.562 PAIN IN BOTH KNEES, UNSPECIFIED CHRONICITY: ICD-10-CM

## 2021-04-22 DIAGNOSIS — G56.02 PARTIAL THENAR ATROPHY, LEFT: Primary | ICD-10-CM

## 2021-04-22 PROCEDURE — 97112 NEUROMUSCULAR REEDUCATION: CPT | Performed by: PHYSICAL THERAPIST

## 2021-04-22 PROCEDURE — 97140 MANUAL THERAPY 1/> REGIONS: CPT | Performed by: PHYSICAL THERAPIST

## 2021-04-22 PROCEDURE — 97110 THERAPEUTIC EXERCISES: CPT | Performed by: PHYSICAL THERAPIST

## 2021-04-22 RX ORDER — OLMESARTAN MEDOXOMIL AND HYDROCHLOROTHIAZIDE 20/12.5 20; 12.5 MG/1; MG/1
TABLET ORAL
Qty: 90 TABLET | Refills: 0 | Status: SHIPPED | OUTPATIENT
Start: 2021-04-22 | End: 2021-08-05

## 2021-04-22 NOTE — PROGRESS NOTES
Daily Note     Today's date: 2021  Patient name: Rene Joe  : 1950  MRN: [id number]  Referring provider: Josey Garnica DO  Dx:   Encounter Diagnosis     ICD-10-CM    1  Partial thenar atrophy, left  G56 02    2  Partial thenar atrophy, right  G56 01    3  Balance problem  R26 89    4  Ankle weakness  R29 898    5  Bilateral primary osteoarthritis of knee  M17 0    6  Acute bilateral ankle pain  M25 571     M25 572    7  Pain in both knees, unspecified chronicity  M25 561     M25 562             Subjective: Pt reported that he was doing yard work and felt an increase in lumbar tightness and pain that has lasted over the past couple days  Stated that he tried some of his stretches from previous PT, which helped with his pain  Objective: See treatment diary below    Assessment: Tolerated treatment well  Advanced home exercises well without provocation of pain  Patient demonstrated fatigue post treatment, exhibited good technique with therapeutic exercises and would benefit from continued PT  Plan: Continue per plan of care        Precautions: standard OA  Manual  3/22 3/26 3/31 4/5 4/8 4/15 4/19 4/22     Thumb jt mobs             R knee STM, PROM JW+thenar   JW  JZ   JZ        Exercise Diary  3/22 3/26 3/31 4/5 4/8 4/15 4/19 4/22     Soleus stretch             Calf stretch             S/l thoracic rotation        5# 20x     LP low in / 3x10 170/   3x10 170/   2x10          LP mid in / 3x10 170/   3x10 170/   2x10          LP high in / 3x10 170/   3x10 170/   2x10          LP u/l high in PF   75 2x10 75/ 3x10         LP u/l mid in PF   75 2x10 75/ 3x10         LP u/l low in PF   75 2x10 75/ 3x10         TB side step        B 5x20'     Hamstring and calf stretch strap :15x5ea :15x5 ea :15x5 ea :15x5 ea :15x5        ITB stretch :15x3ea :15x3 ea :15x3 ea :15x3 ea :15x5        Calf press high 150/ 3x10 170/   3x10  U/l 90 2x10         Calf press mid 150/ 3x10 170/   3x10  U/l 90 2x10         Calf press Low 150/ 3x10 170/   3x10  U/l 90 2x10         Thenar stretch             U/l HR iso mid K mid A             u/l HR x45 ea  x45 ea x45 ea  x45 3x15ea 3x15 ea       Stand hip flexion      3x10ea 3x10 ea      Stand hip abd      3x10ea O 3x10 ea      Goblet squat kettle bell      15# 3x10 15# 3x10      Pallof Rotation      B 2x10ea       Bicep curl      10# 3x10       Squat overhead press  15# 3x10  15# 3x10  15# 3x12         Shallow crunch        3x15     NSP march        3x15     Straight leg RDL         10# 3x10     Lunge with 2 handrail    1HHA x20 b/l  1HHA 2x10ea        Squats 2 hand assist  U/l 20x HHA U/l 20x HHA  3x10 to chair        Piriformis stretch             3 way HS stretch      :15x3ea :15x3ea      Elliptical             TM  5' 5' 5' 5'   5'      Putty              Clip pinch             Sh exte     B/ 3x10        Row     2M 3x10        horiz abd     B 3x10        horiz add     B 3x10        PB rollout              Flexbar pinch flex/ext             SLS       :20x3ea :20x3 ea      SLS ball toss, soccer ball             Fitter AP b/l             Fitter AP u/l             SLS cone tap             PETRA       30x      Step ups       8" 3x10 b/l      Step down fwd       4" 2x10 b/l       Step down lat       8" 2x10 L   6" 2x10 R

## 2021-04-23 ENCOUNTER — IMMUNIZATIONS (OUTPATIENT)
Dept: FAMILY MEDICINE CLINIC | Facility: HOSPITAL | Age: 71
End: 2021-04-23

## 2021-04-23 DIAGNOSIS — Z23 ENCOUNTER FOR IMMUNIZATION: Primary | ICD-10-CM

## 2021-04-23 PROCEDURE — 91300 SARS-COV-2 / COVID-19 MRNA VACCINE (PFIZER-BIONTECH) 30 MCG: CPT

## 2021-04-23 PROCEDURE — 0002A SARS-COV-2 / COVID-19 MRNA VACCINE (PFIZER-BIONTECH) 30 MCG: CPT

## 2021-04-26 ENCOUNTER — OFFICE VISIT (OUTPATIENT)
Dept: PHYSICAL THERAPY | Facility: CLINIC | Age: 71
End: 2021-04-26
Payer: COMMERCIAL

## 2021-04-26 DIAGNOSIS — G56.02 PARTIAL THENAR ATROPHY, LEFT: Primary | ICD-10-CM

## 2021-04-26 DIAGNOSIS — M25.562 PAIN IN BOTH KNEES, UNSPECIFIED CHRONICITY: ICD-10-CM

## 2021-04-26 DIAGNOSIS — M25.561 PAIN IN BOTH KNEES, UNSPECIFIED CHRONICITY: ICD-10-CM

## 2021-04-26 DIAGNOSIS — M17.0 BILATERAL PRIMARY OSTEOARTHRITIS OF KNEE: ICD-10-CM

## 2021-04-26 DIAGNOSIS — G56.01 PARTIAL THENAR ATROPHY, RIGHT: ICD-10-CM

## 2021-04-26 DIAGNOSIS — R29.898 ANKLE WEAKNESS: ICD-10-CM

## 2021-04-26 DIAGNOSIS — M25.571 ACUTE BILATERAL ANKLE PAIN: ICD-10-CM

## 2021-04-26 DIAGNOSIS — M25.572 ACUTE BILATERAL ANKLE PAIN: ICD-10-CM

## 2021-04-26 DIAGNOSIS — R26.89 BALANCE PROBLEM: ICD-10-CM

## 2021-04-26 PROCEDURE — 97110 THERAPEUTIC EXERCISES: CPT

## 2021-04-26 PROCEDURE — 97112 NEUROMUSCULAR REEDUCATION: CPT

## 2021-04-26 NOTE — PROGRESS NOTES
Daily Note     Today's date: 2021  Patient name: Luciana Irvin  : 1950  MRN: [id number]  Referring provider: Isaac Harmon DO  Dx:   Encounter Diagnosis     ICD-10-CM    1  Partial thenar atrophy, left  G56 02    2  Partial thenar atrophy, right  G56 01    3  Balance problem  R26 89    4  Ankle weakness  R29 898    5  Bilateral primary osteoarthritis of knee  M17 0    6  Acute bilateral ankle pain  M25 571     M25 572    7  Pain in both knees, unspecified chronicity  M25 561     M25 562             Subjective: Melani Morgan reports he has been completing HEP daily  He states he completed leg exercises yesterday, and now his legs feel very tight  Notes improvement in overall strength  Objective: See treatment diary below    Assessment: Dalton tolerated PT treatment well  Initiated PT session with LE stretching d/t subjective reports of tightness  Progressed core stability today with moderated difficulty  90/90 tap, flutter kick, and s/l plank were added to exercise program  Cues required for proper form with RDL  Fatigue evident throughout session requiring rest breaks throughout session  Pt would benefit from continued PT to address impairments and maximize functional level with goal of D/C to HEP  Plan: Continue per plan of care        Precautions: standard OA  Manual  3/22 3/26 3/31 4/5 4/8 4/15 4/19 4/22 4/26    Thumb jt mobs             R knee STM, PROM JW+thenar   JW  JZ   JZ        Exercise Diary  3/22 3/26 3/31 4/5 4/8 4/15 4/19 4/22 4/26    Soleus stretch             Calf stretch             S/l thoracic rotation        5# 20x 5# 20x    LP low in / 3x10 170/   3x10 170/   2x10          LP mid in / 3x10 170/   3x10 170/   2x10          LP high in / 3x10 170/   3x10 170/   2x10          LP u/l high in PF   75 2x10 75/ 3x10         LP u/l mid in PF   75 2x10 75/ 3x10         LP u/l low in PF   75 2x10 75/ 3x10         TB side step        B 5x20'     Hamstring and calf stretch strap :15x5ea :15x5 ea :15x5 ea :15x5 ea :15x5    :15x5     ITB stretch :15x3ea :15x3 ea :15x3 ea :15x3 ea :15x5    :15x5    Calf press high 150/ 3x10 170/   3x10  U/l 90 2x10         Calf press mid 150/ 3x10 170/   3x10  U/l 90 2x10         Calf press Low 150/ 3x10 170/   3x10  U/l 90 2x10         Thenar stretch             U/l HR iso mid K mid A             u/l HR x45 ea  x45 ea x45 ea  x45 3x15ea 3x15 ea       Stand hip flexion      3x10ea 3x10 ea      Stand hip abd      3x10ea O 3x10 ea      Goblet squat kettle bell      15# 3x10 15# 3x10      Pallof Rotation      B 2x10ea       Bicep curl      10# 3x10       Squat overhead press  15# 3x10  15# 3x10  15# 3x12         Shallow crunch        3x15 3x15    90/90/ tap         3x10    Flutter kicks          :30x3    NSP march        3x15 3x15             :30x2    Straight leg RDL         10# 3x10 10# 3x10     Lunge with 2 handrail    1HHA x20 b/l  1HHA 2x10ea        Squats 2 hand assist  U/l 20x HHA U/l 20x HHA  3x10 to chair        Piriformis stretch             3 way HS stretch      :15x3ea :15x3ea      Elliptical             TM  5' 5' 5' 5'   5'  5'    Putty              Clip pinch             Sh exte     B/ 3x10        Row     2M 3x10        horiz abd     B 3x10        horiz add     B 3x10        PB rollout              Flexbar pinch flex/ext             SLS       :20x3ea :20x3 ea      SLS ball toss, soccer ball             Fitter AP b/l             Fitter AP u/l             SLS cone tap             PETRA       30x      Step ups       8" 3x10 b/l      Step down fwd       4" 2x10 b/l       Step down lat       8" 2x10 L   6" 2x10 R

## 2021-04-29 ENCOUNTER — OFFICE VISIT (OUTPATIENT)
Dept: PHYSICAL THERAPY | Facility: CLINIC | Age: 71
End: 2021-04-29
Payer: COMMERCIAL

## 2021-04-29 DIAGNOSIS — M25.571 ACUTE BILATERAL ANKLE PAIN: ICD-10-CM

## 2021-04-29 DIAGNOSIS — R26.89 BALANCE PROBLEM: ICD-10-CM

## 2021-04-29 DIAGNOSIS — G56.01 PARTIAL THENAR ATROPHY, RIGHT: ICD-10-CM

## 2021-04-29 DIAGNOSIS — G56.02 PARTIAL THENAR ATROPHY, LEFT: Primary | ICD-10-CM

## 2021-04-29 DIAGNOSIS — M17.0 BILATERAL PRIMARY OSTEOARTHRITIS OF KNEE: ICD-10-CM

## 2021-04-29 DIAGNOSIS — M25.572 ACUTE BILATERAL ANKLE PAIN: ICD-10-CM

## 2021-04-29 DIAGNOSIS — R29.898 ANKLE WEAKNESS: ICD-10-CM

## 2021-04-29 PROCEDURE — 97110 THERAPEUTIC EXERCISES: CPT

## 2021-04-29 PROCEDURE — 97112 NEUROMUSCULAR REEDUCATION: CPT

## 2021-04-29 NOTE — PROGRESS NOTES
Daily Note     Today's date: 2021  Patient name: Lucas Dreas  : 1950  MRN: [id number]  Referring provider: Marixa Luna DO  Dx:   Encounter Diagnosis     ICD-10-CM    1  Partial thenar atrophy, left  G56 02    2  Partial thenar atrophy, right  G56 01    3  Balance problem  R26 89    4  Ankle weakness  R29 898    5  Bilateral primary osteoarthritis of knee  M17 0    6  Acute bilateral ankle pain  M25 571     M25 572             Subjective: Atiya Berkowitz reports his low back and R knee feel very stiff after moving mulch and completing a lot of yard work yesterday  Objective: See treatment diary below    Assessment: Atiya Berkowitz is progressing well with current POC  PT session focused on mobility and core strengthening  Added PB LTR to address lumbar tightness  Pt very challenged with core stability exercises, improve LE control observed with 90/90 toe taps  Cues required for proper form with side planks  Fatigue evident throughout session requiring rest breaks throughout session  Pt would benefit from continued PT to address impairments and maximize functional level with goal of D/C to HEP  Plan: Continue per plan of care        Precautions: standard OA  Manual  3/22 3/26 3/31 4/5 4/8 4/15 4/19 4/22 4/26 4/29   Thumb jt mobs             R knee STM, PROM JW+thenar   JW  JZ   JZ        Exercise Diary  3/22 3/26 3/31 4/5 4/8 4/15 4/19 4/22 4/26 4/29   Soleus stretch             Calf stretch             S/l thoracic rotation        5# 20x 5# 20x 5# 20x   LP low in / 3x10 170/   3x10 170/   2x10          LP mid in / 3x10 170/   3x10 170/   2x10          LP high in / 3x10 170/   3x10 170/   2x10          LP u/l high in PF   75 2x10 75/ 3x10         LP u/l mid in PF   75 2x10 75/ 3x10         LP u/l low in PF   75 2x10 75/ 3x10         TB side step        B 5x20'     Hamstring and calf stretch strap :15x5ea :15x5 ea :15x5 ea :15x5 ea :15x5    :15x5  :15x5   ITB stretch :15x3ea :15x3 ea :15x3 ea :15x3 ea :15x5    :15x5 :15x5   Calf press high 150/ 3x10 170/   3x10  U/l 90 2x10         Calf press mid 150/ 3x10 170/   3x10  U/l 90 2x10         Calf press Low 150/ 3x10 170/   3x10  U/l 90 2x10         Thenar stretch             U/l HR iso mid K mid A             u/l HR x45 ea  x45 ea x45 ea  x45 3x15ea 3x15 ea       Stand hip flexion      3x10ea 3x10 ea      Stand hip abd      3x10ea O 3x10 ea      Goblet squat kettle bell      15# 3x10 15# 3x10      Pallof Rotation      B 2x10ea       Bicep curl      10# 3x10       Squat overhead press  15# 3x10  15# 3x10  15# 3x12         Shallow crunch        3x15 3x15 3x15   90/90/ tap         3x10 3x10   Flutter kicks          :30x3 :30x3   NSP march        3x15 3x15    S/l plank         :30x2 :30x2   Straight leg RDL         10# 3x10 10# 3x10     Lunge with 2 handrail    1HHA x20 b/l  1HHA 2x10ea        Squats 2 hand assist  U/l 20x HHA U/l 20x HHA  3x10 to chair        Piriformis stretch             3 way HS stretch      :15x3ea :15x3ea      Elliptical             TM  5' 5' 5' 5'   5'  5' 5'   Putty              Clip pinch             Sh exte     B/ 3x10        Row     2M 3x10        horiz abd     B 3x10        horiz add     B 3x10        PB rollout              Flexbar pinch flex/ext             SLS       :20x3ea :20x3 ea      SLS ball toss, soccer ball             Fitter AP b/l             Fitter AP u/l             SLS cone tap             PETRA       30x      Step ups       8" 3x10 b/l      Step down fwd       4" 2x10 b/l       Step down lat       8" 2x10 L   6" 2x10 R       PB LTR          :05x20

## 2021-05-03 ENCOUNTER — APPOINTMENT (OUTPATIENT)
Dept: PHYSICAL THERAPY | Facility: CLINIC | Age: 71
End: 2021-05-03
Payer: COMMERCIAL

## 2021-05-04 ENCOUNTER — OFFICE VISIT (OUTPATIENT)
Dept: PHYSICAL THERAPY | Facility: CLINIC | Age: 71
End: 2021-05-04
Payer: COMMERCIAL

## 2021-05-04 DIAGNOSIS — R29.898 ANKLE WEAKNESS: ICD-10-CM

## 2021-05-04 DIAGNOSIS — M17.0 BILATERAL PRIMARY OSTEOARTHRITIS OF KNEE: ICD-10-CM

## 2021-05-04 DIAGNOSIS — R26.89 BALANCE PROBLEM: ICD-10-CM

## 2021-05-04 DIAGNOSIS — M25.561 PAIN IN BOTH KNEES, UNSPECIFIED CHRONICITY: ICD-10-CM

## 2021-05-04 DIAGNOSIS — M25.562 PAIN IN BOTH KNEES, UNSPECIFIED CHRONICITY: ICD-10-CM

## 2021-05-04 DIAGNOSIS — G56.02 PARTIAL THENAR ATROPHY, LEFT: Primary | ICD-10-CM

## 2021-05-04 DIAGNOSIS — G56.01 PARTIAL THENAR ATROPHY, RIGHT: ICD-10-CM

## 2021-05-04 DIAGNOSIS — M25.571 ACUTE BILATERAL ANKLE PAIN: ICD-10-CM

## 2021-05-04 DIAGNOSIS — M25.572 ACUTE BILATERAL ANKLE PAIN: ICD-10-CM

## 2021-05-04 PROCEDURE — 97112 NEUROMUSCULAR REEDUCATION: CPT

## 2021-05-04 PROCEDURE — 97140 MANUAL THERAPY 1/> REGIONS: CPT

## 2021-05-04 PROCEDURE — 97110 THERAPEUTIC EXERCISES: CPT

## 2021-05-04 NOTE — PROGRESS NOTES
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  He states R knee and low back have been sore with the increased activity  He states he has been stretching daily, but has not been completing strength training  Objective: See treatment diary below    Assessment: Dalton tolerated PT treatment well  Initiated PT session with gentle LE stretching  Manual stretch performed to R knee, some discomfort present nearing end range flexion  Pt is challenged with core stability exercises  Greatest difficulty displayed with s/l planks  Progress exercises with increased repetitions, fatigue present  Pt would benefit from continued PT to address impairments and maximize functional level with goal of D/C to HEP  Plan: Continue per plan of care        Precautions: standard OA  Manual  5/4  3/31 4/5 4/8 4/15 4/19 4/22 4/26 4/29   Thumb jt mobs             R knee STM, PROM JW  JW  JZ   JZ        Exercise Diary  5/4  3/31 4/5 4/8 4/15 4/19 4/22 4/26 4/29   Soleus stretch             Calf stretch             S/l thoracic rotation        5# 20x 5# 20x 5# 20x   LP low in PF   170/   2x10          LP mid in PF   170/   2x10          LP high in PF   170/   2x10          LP u/l high in PF   75 2x10 75/ 3x10         LP u/l mid in PF   75 2x10 75/ 3x10         LP u/l low in PF   75 2x10 75/ 3x10         TB side step        B 5x20'     Hamstring and calf stretch strap :15x5  :15x5 ea :15x5 ea :15x5    :15x5  :15x5   ITB stretch "15x5   :15x3 ea :15x3 ea :15x5    :15x5 :15x5   Calf press high    U/l 90 2x10         Calf press mid    U/l 90 2x10         Calf press Low    U/l 90 2x10         Thenar stretch             U/l HR iso mid K mid A             u/l HR   x45 ea  x45 3x15ea 3x15 ea       Stand hip flexion      3x10ea 3x10 ea      Stand hip abd      3x10ea O 3x10 ea      Goblet squat kettle bell      15# 3x10 15# 3x10      Pallof Rotation      B 2x10ea       Bicep curl      10# 3x10       Squat overhead press   15# 3x10  15# 3x12         Shallow crunch 3x15       3x15 3x15 3x15   90/90/ tap 3x15        3x10 3x10   Flutter kicks  :30x3        :30x3 :30x3   NSP march        3x15 3x15    S/l plank :30x3        :30x2 :30x2   Straight leg RDL         10# 3x10 10# 3x10     Lunge with 2 handrail    1HHA x20 b/l  1HHA 2x10ea        Squats 2 hand assist   U/l 20x HHA  3x10 to chair        Piriformis stretch             3 way HS stretch      :15x3ea :15x3ea      Elliptical             TM  5'  5' 5'   5'  5' 5'   Putty              Clip pinch             Sh exte     B/ 3x10        Row     2M 3x10        horiz abd     B 3x10        horiz add     B 3x10        PB rollout              Flexbar pinch flex/ext             SLS       :20x3ea :20x3 ea      SLS ball toss, soccer ball             Fitter AP b/l             Fitter AP u/l             SLS cone tap             PETRA       30x      Step ups       8" 3x10 b/l      Step down fwd       4" 2x10 b/l       Step down lat       8" 2x10 L   6" 2x10 R       PB LTR :05x20          :05x20

## 2021-05-06 ENCOUNTER — OFFICE VISIT (OUTPATIENT)
Dept: PHYSICAL THERAPY | Facility: CLINIC | Age: 71
End: 2021-05-06
Payer: COMMERCIAL

## 2021-05-06 DIAGNOSIS — M25.572 ACUTE BILATERAL ANKLE PAIN: ICD-10-CM

## 2021-05-06 DIAGNOSIS — R29.898 ANKLE WEAKNESS: ICD-10-CM

## 2021-05-06 DIAGNOSIS — M25.562 PAIN IN BOTH KNEES, UNSPECIFIED CHRONICITY: ICD-10-CM

## 2021-05-06 DIAGNOSIS — M17.0 BILATERAL PRIMARY OSTEOARTHRITIS OF KNEE: Primary | ICD-10-CM

## 2021-05-06 DIAGNOSIS — G56.02 PARTIAL THENAR ATROPHY, LEFT: ICD-10-CM

## 2021-05-06 DIAGNOSIS — M25.571 ACUTE BILATERAL ANKLE PAIN: ICD-10-CM

## 2021-05-06 DIAGNOSIS — R26.89 BALANCE PROBLEM: ICD-10-CM

## 2021-05-06 DIAGNOSIS — M25.561 PAIN IN BOTH KNEES, UNSPECIFIED CHRONICITY: ICD-10-CM

## 2021-05-06 DIAGNOSIS — G56.01 PARTIAL THENAR ATROPHY, RIGHT: ICD-10-CM

## 2021-05-06 PROCEDURE — 97140 MANUAL THERAPY 1/> REGIONS: CPT | Performed by: PHYSICAL THERAPIST

## 2021-05-06 PROCEDURE — 97110 THERAPEUTIC EXERCISES: CPT | Performed by: PHYSICAL THERAPIST

## 2021-05-06 NOTE — PROGRESS NOTES
Daily Note     Today's date: 2021  Patient name: Mary Jane Alaniz  : 1950  MRN: [id number]  Referring provider: Tremayne Leger DO  Dx:   Encounter Diagnosis     ICD-10-CM    1  Bilateral primary osteoarthritis of knee  M17 0    2  Partial thenar atrophy, left  G56 02    3  Acute bilateral ankle pain  M25 571     M25 572    4  Partial thenar atrophy, right  G56 01    5  Balance problem  R26 89    6  Pain in both knees, unspecified chronicity  M25 561     M25 562    7  Ankle weakness  R29 898             Subjective: Pt reported that he has been performing his HEP as prescribed, especially the stretches  Stated that he has been also doing a lot of yard work requiring a lot of kneeling, which has increased his pain  Objective: See treatment diary below    Assessment: Tolerated treatment well  Patient demonstrated fatigue post treatment, exhibited good technique with therapeutic exercises and would benefit from continued PT  Plan: Continue per plan of care        Precautions: standard OA  Manual             Thumb jt mobs             R knee STM, PROM JW JZ              Exercise Diary     Soleus stretch             Calf stretch             S/l thoracic rotation        5# 20x 5# 20x 5# 20x   LP low in PF  130/ 3x10           LP mid in PF  130/ 3x10           LP high in PF  130/ 3x10           LP u/l high in PF  75/ 3x10           LP u/l mid in PF  75/ 3x10           LP u/l low in PF  75/ 3x10           TB side step        B 5x20'     Hamstring and calf stretch strap :15x5        :15x5  :15x5   ITB stretch "15x5         :15x5 :15x5   Calf press high  130/ 3x10           Calf press mid  130/ 3x10           Calf press Low  130/ 3x10           Thenar stretch             U/l HR iso mid K mid A             u/l HR             [place] punch  20/ 3x10           Stand hip abd             Goblet squat kettle atkins             Pallof Rotation  15/ 2x10ea           Bicep curl Squat overhead press             Shallow crunch 3x15       3x15 3x15 3x15   90/90/ tap 3x15        3x10 3x10   Flutter kicks  :30x3        :30x3 :30x3   NSP march        3x15 3x15    S/l plank :30x3        :30x2 :30x2   Straight leg RDL         10# 3x10 10# 3x10     Lunge with 2 handrail             Squats 2 hand assist             Piriformis stretch             3 way HS stretch             Elliptical             TM  5' 5'       5' 5'   Putty              Clip pinch             Sh exte             Row             horiz abd             horiz add             PB rollout              Flexbar pinch flex/ext             SLS              SLS ball toss, soccer ball             Fitter AP b/l             Fitter AP u/l             SLS cone tap             PETRA             Step ups             Step down fwd             Step down lat             PB LTR :05x20          :05x20

## 2021-05-10 ENCOUNTER — APPOINTMENT (OUTPATIENT)
Dept: PHYSICAL THERAPY | Facility: CLINIC | Age: 71
End: 2021-05-10
Payer: COMMERCIAL

## 2021-05-11 ENCOUNTER — OFFICE VISIT (OUTPATIENT)
Dept: PHYSICAL THERAPY | Facility: CLINIC | Age: 71
End: 2021-05-11
Payer: COMMERCIAL

## 2021-05-11 DIAGNOSIS — R26.89 BALANCE PROBLEM: Primary | ICD-10-CM

## 2021-05-11 DIAGNOSIS — M25.571 ACUTE BILATERAL ANKLE PAIN: ICD-10-CM

## 2021-05-11 DIAGNOSIS — M25.572 ACUTE BILATERAL ANKLE PAIN: ICD-10-CM

## 2021-05-11 DIAGNOSIS — R29.898 ANKLE WEAKNESS: ICD-10-CM

## 2021-05-11 DIAGNOSIS — M25.561 PAIN IN BOTH KNEES, UNSPECIFIED CHRONICITY: ICD-10-CM

## 2021-05-11 DIAGNOSIS — G56.02 PARTIAL THENAR ATROPHY, LEFT: ICD-10-CM

## 2021-05-11 DIAGNOSIS — G56.01 PARTIAL THENAR ATROPHY, RIGHT: ICD-10-CM

## 2021-05-11 DIAGNOSIS — M17.0 BILATERAL PRIMARY OSTEOARTHRITIS OF KNEE: ICD-10-CM

## 2021-05-11 DIAGNOSIS — M25.562 PAIN IN BOTH KNEES, UNSPECIFIED CHRONICITY: ICD-10-CM

## 2021-05-11 PROCEDURE — 97110 THERAPEUTIC EXERCISES: CPT | Performed by: PHYSICAL THERAPIST

## 2021-05-11 PROCEDURE — 97140 MANUAL THERAPY 1/> REGIONS: CPT | Performed by: PHYSICAL THERAPIST

## 2021-05-11 NOTE — PROGRESS NOTES
Daily Note     Today's date: 2021  Patient name: Al Martinez  : 1950  MRN: [id number]  Referring provider: Melvi Pérez DO  Dx:   Encounter Diagnosis     ICD-10-CM    1  Balance problem  R26 89    2  Bilateral primary osteoarthritis of knee  M17 0    3  Partial thenar atrophy, left  G56 02    4  Pain in both knees, unspecified chronicity  M25 561     M25 562    5  Ankle weakness  R29 898    6  Acute bilateral ankle pain  M25 571     M25 572    7  Partial thenar atrophy, right  G56 01             Subjective: Pt reported that he hurt his back while doing tree removal in his back yard  Objective: See treatment diary below    Assessment: Tolerated treatment well  Patient demonstrated fatigue post treatment, exhibited good technique with therapeutic exercises and would benefit from continued PT  Plan: Continue per plan of care        Precautions: standard OA  Manual            Thumb jt mobs             R knee STM, PROM JW JZ JZ             Exercise Diary     Soleus stretch   :30x3          Calf stretch   :30x3ea          S/l thoracic rotation   3# 20x     5# 20x 5# 20x 5# 20x   LP low in PF  130/ 3x10           LP mid in PF  130/ 3x10           LP high in PF  130/ 3x10           LP u/l high in PF  75/ 3x10           LP u/l mid in PF  75/ 3x10           LP u/l low in PF  75/ 3x10           TB side step        B 5x20'     Hamstring and calf stretch strap :15x5  :15x5ea      :15x5  :15x5   ITB stretch "15x5   :15x5ea      :15x5 :15x5   Calf press high  130/ 3x10           Calf press mid  130/ 3x10           Calf press Low  130/ 3x10           Thenar stretch   :15x5ea          U/l HR iso mid K mid A             u/l HR             [place] punch  20/ 3x10           Stand hip abd             Goblet squat kettle atkins             Pallof Rotation  15/ 2x10ea           Bicep curl             Squat overhead press             Shallow crunch 3x15       3x15 3x15 3x15 90/90/ tap 3x15        3x10 3x10   Flutter kicks  :30x3        :30x3 :30x3   NSP march        3x15 3x15    S/l plank :30x3        :30x2 :30x2   Straight leg RDL    3x10     10# 3x10 10# 3x10     Lunge with 2 handrail             Squats 2 hand assist             Piriformis stretch             3 way HS stretch             Elliptical             TM  5' 5'       5' 5'   Putty              Clip pinch             Sh exte             Row             horiz abd             horiz add             PB rollout              Flexbar pinch flex/ext             SLS              SLS ball toss, soccer ball             Fitter AP b/l             Fitter AP u/l             SLS cone tap             PETRA  3x10 3x10          Step ups             Step down fwd             Step down lat             PB LTR :05x20          :05x20

## 2021-05-13 ENCOUNTER — OFFICE VISIT (OUTPATIENT)
Dept: PHYSICAL THERAPY | Facility: CLINIC | Age: 71
End: 2021-05-13
Payer: COMMERCIAL

## 2021-05-13 DIAGNOSIS — M25.572 ACUTE BILATERAL ANKLE PAIN: ICD-10-CM

## 2021-05-13 DIAGNOSIS — M17.0 BILATERAL PRIMARY OSTEOARTHRITIS OF KNEE: ICD-10-CM

## 2021-05-13 DIAGNOSIS — G56.01 PARTIAL THENAR ATROPHY, RIGHT: ICD-10-CM

## 2021-05-13 DIAGNOSIS — M25.561 PAIN IN BOTH KNEES, UNSPECIFIED CHRONICITY: ICD-10-CM

## 2021-05-13 DIAGNOSIS — R26.89 BALANCE PROBLEM: Primary | ICD-10-CM

## 2021-05-13 DIAGNOSIS — M25.571 ACUTE BILATERAL ANKLE PAIN: ICD-10-CM

## 2021-05-13 DIAGNOSIS — M25.562 PAIN IN BOTH KNEES, UNSPECIFIED CHRONICITY: ICD-10-CM

## 2021-05-13 DIAGNOSIS — R29.898 ANKLE WEAKNESS: ICD-10-CM

## 2021-05-13 DIAGNOSIS — G56.02 PARTIAL THENAR ATROPHY, LEFT: ICD-10-CM

## 2021-05-13 PROCEDURE — 97110 THERAPEUTIC EXERCISES: CPT

## 2021-05-13 PROCEDURE — 97140 MANUAL THERAPY 1/> REGIONS: CPT

## 2021-05-13 NOTE — PROGRESS NOTES
Daily Note     Today's date: 2021  Patient name: Adama Robles  : 1950  MRN: [id number]  Referring provider: Roverto Dennison DO  Dx:   Encounter Diagnosis     ICD-10-CM    1  Balance problem  R26 89    2  Bilateral primary osteoarthritis of knee  M17 0    3  Partial thenar atrophy, left  G56 02    4  Pain in both knees, unspecified chronicity  M25 561     M25 562    5  Ankle weakness  R29 898    6  Acute bilateral ankle pain  M25 571     M25 572    7  Partial thenar atrophy, right  G56 01             Subjective: Lizz Carrero reports low back discomfort and tightness upon arrival  Sx's have been present since this past weekend after completing yard work  He requested to focus on stretching in today's therapy session  Objective: See treatment diary below    Assessment: Dalton tolerated PT treatment well  Per pt request focused on gentle stretching today  He presents with limited flexibility in b/l hamstrings and piriformis  Decreased tightness present following manual stretching  Resume strength training when able  Pt would benefit from continued PT to further address impairments and maximize functional level  Plan: Continue per plan of care        Precautions: standard OA  Manual           Thumb jt mobs             R knee STM, PROM JW JZ JZ JW            Exercise Diary     Soleus stretch   :30x3 :30x3 ea         Calf stretch   :30x3ea :30x3 ea         S/l thoracic rotation   3# 20x 3# 20x    5# 20x 5# 20x 5# 20x   LP low in PF  130/ 3x10           LP mid in PF  130/ 3x10           LP high in PF  130/ 3x10           LP u/l high in PF  75/ 3x10           LP u/l mid in PF  75/ 3x10           LP u/l low in PF  75/ 3x10           TB side step        B 5x20'     Hamstring and calf stretch strap :15x5  :15x5ea :15x5 ea     :15x5  :15x5   ITB stretch "15x5   :15x5ea :15x5 ea     :15x5 :15x5   Calf press high  130/ 3x10           Calf press mid  130/ 3x10 Calf press Low  130/ 3x10           Thenar stretch   :15x5ea          U/l HR iso mid K mid A             u/l HR             [place] punch  20/ 3x10           Stand hip abd             Goblet squat kettle atkins             Pallof Rotation  15/ 2x10ea           Bicep curl             Squat overhead press             Shallow crunch 3x15       3x15 3x15 3x15   90/90/ tap 3x15        3x10 3x10   Flutter kicks  :30x3        :30x3 :30x3   NSP march        3x15 3x15    S/l plank :30x3        :30x2 :30x2   Straight leg RDL    3x10     10# 3x10 10# 3x10     Lunge with 2 handrail             Squats 2 hand assist             Piriformis stretch             3 way HS stretch             Elliptical             TM  5' 5'  5'     5' 5'   Putty              Clip pinch             Sh exte             Row             horiz abd             horiz add             PB rollout              Flexbar pinch flex/ext             SLS              SLS ball toss, soccer ball             Fitter AP b/l             Fitter AP u/l             SLS cone tap             PETRA  3x10 3x10          Step ups             Step down fwd             Step down lat             PB LTR :05x20    :05x20      :05x20

## 2021-05-17 ENCOUNTER — APPOINTMENT (OUTPATIENT)
Dept: PHYSICAL THERAPY | Facility: CLINIC | Age: 71
End: 2021-05-17
Payer: COMMERCIAL

## 2021-05-19 ENCOUNTER — OFFICE VISIT (OUTPATIENT)
Dept: PHYSICAL THERAPY | Facility: CLINIC | Age: 71
End: 2021-05-19
Payer: COMMERCIAL

## 2021-05-19 DIAGNOSIS — M25.571 ACUTE BILATERAL ANKLE PAIN: ICD-10-CM

## 2021-05-19 DIAGNOSIS — G56.01 PARTIAL THENAR ATROPHY, RIGHT: ICD-10-CM

## 2021-05-19 DIAGNOSIS — R29.898 ANKLE WEAKNESS: ICD-10-CM

## 2021-05-19 DIAGNOSIS — R26.89 BALANCE PROBLEM: Primary | ICD-10-CM

## 2021-05-19 DIAGNOSIS — M25.572 ACUTE BILATERAL ANKLE PAIN: ICD-10-CM

## 2021-05-19 DIAGNOSIS — M17.0 BILATERAL PRIMARY OSTEOARTHRITIS OF KNEE: ICD-10-CM

## 2021-05-19 DIAGNOSIS — M25.562 PAIN IN BOTH KNEES, UNSPECIFIED CHRONICITY: ICD-10-CM

## 2021-05-19 DIAGNOSIS — G56.02 PARTIAL THENAR ATROPHY, LEFT: ICD-10-CM

## 2021-05-19 DIAGNOSIS — M25.561 PAIN IN BOTH KNEES, UNSPECIFIED CHRONICITY: ICD-10-CM

## 2021-05-19 PROCEDURE — 97110 THERAPEUTIC EXERCISES: CPT

## 2021-05-19 PROCEDURE — 97530 THERAPEUTIC ACTIVITIES: CPT

## 2021-05-19 NOTE — PROGRESS NOTES
Daily Note     Today's date: 2021  Patient name: Castillo Good  : 1950  MRN: [id number]  Referring provider: Bertha Jimenez DO  Dx:   Encounter Diagnosis     ICD-10-CM    1  Balance problem  R26 89    2  Bilateral primary osteoarthritis of knee  M17 0    3  Partial thenar atrophy, left  G56 02    4  Pain in both knees, unspecified chronicity  M25 561     M25 562    5  Ankle weakness  R29 898    6  Acute bilateral ankle pain  M25 571     M25 572    7  Partial thenar atrophy, right  G56 01             Subjective: Bruna Rinne reports low back discomfort has improved since LV  He states he has been to the chiropractor 5 times and feels that has helped in addition to the stretching performed in PT  He states 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, but he is taking breaks when needed to avoid excessive low back strain  Objective: See treatment diary below    Assessment: Dalton tolerated PT treatment well  Resumed strength training today  He was challenged with leg press activities, calf cramping occurring with first few reps, improved after stretching  He denied symptom provocation throughout and post treatment session  Pt would benefit from continued PT to further address impairments and maximize functional level  Plan: Continue per plan of care        Precautions: standard OA  Manual          Thumb jt mobs             R knee STM, PROM JW JZ JZ JW            Exercise Diary     Soleus stretch   :30x3 :30x3 ea :30x3 ea        Calf stretch   :30x3ea :30x3 ea :30x3 ea         S/l thoracic rotation   3# 20x 3# 20x    5# 20x 5# 20x 5# 20x   LP low in PF  130/ 3x10   130/ 3x10        LP mid in PF  130/ 3x10   130/ 3x10        LP high in PF  130/ 3x10   130/ 3x10        LP u/l high in PF  75/ 3x10   75/ 3x10        LP u/l mid in PF  75/ 3x10   75/ 3x10        LP u/l low in PF  75/ 3x10   75/ 3x10        TB side step        B 5x20' Hamstring and calf stretch strap :15x5  :15x5ea :15x5 ea :15x5ea    :15x5  :15x5   ITB stretch "15x5   :15x5ea :15x5 ea :15x5 ea     :15x5 :15x5   Calf press high  130/ 3x10           Calf press mid  130/ 3x10           Calf press Low  130/ 3x10           Thenar stretch   :15x5ea          U/l HR iso mid K mid A             u/l HR             [place] punch  20/ 3x10           Stand hip abd             Goblet squat kettle atkins             Pallof Rotation  15/ 2x10ea           Bicep curl             Squat overhead press             Shallow crunch 3x15       3x15 3x15 3x15   90/90/ tap 3x15        3x10 3x10   Flutter kicks  :30x3        :30x3 :30x3   NSP march        3x15 3x15    S/l plank :30x3        :30x2 :30x2   Straight leg RDL    3x10  10# 3x10   10# 3x10 10# 3x10     Lunge with 2 handrail             Squats 2 hand assist             Piriformis stretch             3 way HS stretch             Elliptical             TM  5' 5'  5' 5'    5' 5'   Putty              Clip pinch             Sh exte             Row             horiz abd             horiz add             PB rollout              Flexbar pinch flex/ext             SLS              SLS ball toss, soccer ball             Fitter AP b/l             Fitter AP u/l             SLS cone tap             PETRA  3x10 3x10          Step ups             Step down fwd             Step down lat             PB LTR :05x20    :05x20 :05x20     :05x20

## 2021-05-20 ENCOUNTER — APPOINTMENT (OUTPATIENT)
Dept: PHYSICAL THERAPY | Facility: CLINIC | Age: 71
End: 2021-05-20
Payer: COMMERCIAL

## 2021-05-21 ENCOUNTER — OFFICE VISIT (OUTPATIENT)
Dept: PHYSICAL THERAPY | Facility: CLINIC | Age: 71
End: 2021-05-21
Payer: COMMERCIAL

## 2021-05-21 DIAGNOSIS — R29.898 ANKLE WEAKNESS: ICD-10-CM

## 2021-05-21 DIAGNOSIS — M25.561 PAIN IN BOTH KNEES, UNSPECIFIED CHRONICITY: ICD-10-CM

## 2021-05-21 DIAGNOSIS — G56.01 PARTIAL THENAR ATROPHY, RIGHT: ICD-10-CM

## 2021-05-21 DIAGNOSIS — M17.0 BILATERAL PRIMARY OSTEOARTHRITIS OF KNEE: ICD-10-CM

## 2021-05-21 DIAGNOSIS — M25.571 ACUTE BILATERAL ANKLE PAIN: ICD-10-CM

## 2021-05-21 DIAGNOSIS — G56.02 PARTIAL THENAR ATROPHY, LEFT: ICD-10-CM

## 2021-05-21 DIAGNOSIS — M25.562 PAIN IN BOTH KNEES, UNSPECIFIED CHRONICITY: ICD-10-CM

## 2021-05-21 DIAGNOSIS — R26.89 BALANCE PROBLEM: Primary | ICD-10-CM

## 2021-05-21 DIAGNOSIS — M25.572 ACUTE BILATERAL ANKLE PAIN: ICD-10-CM

## 2021-05-21 PROCEDURE — 97530 THERAPEUTIC ACTIVITIES: CPT

## 2021-05-21 PROCEDURE — 97110 THERAPEUTIC EXERCISES: CPT

## 2021-05-21 NOTE — PROGRESS NOTES
Daily Note     Today's date: 2021  Patient name: Estefania Jose  : 1950  MRN: [id number]  Referring provider: Leigh Moore DO  Dx:   Encounter Diagnosis     ICD-10-CM    1  Balance problem  R26 89    2  Bilateral primary osteoarthritis of knee  M17 0    3  Partial thenar atrophy, left  G56 02    4  Pain in both knees, unspecified chronicity  M25 561     M25 562    5  Ankle weakness  R29 898    6  Acute bilateral ankle pain  M25 571     M25 572    7  Partial thenar atrophy, right  G56 01             Subjective: Ania Agarwal reports continued improvement in lumbar discomfort  He states b/l knees have been feeling better, notes intermittent discomfort with increased activity but overall tolerable  Objective: See treatment diary below    Assessment: Ania Agarwal displays good tolerance to current POC  Increased resistance with leg press activities today, pt performed well with some difficulty observed  Greater difficulty displayed on RLE with SL exercises  Fatigue present post session  He denied symptom provocation throughout and post treatment session  Pt would benefit from continued PT to further address impairments and maximize functional level  Plan: Continue per plan of care        Precautions: standard OA  Manual         Thumb jt mobs             R knee STM, PROM JW JZ JZ JW            Exercise Diary     Soleus stretch   :30x3 :30x3 ea :30x3 ea :30x3 ea       Calf stretch   :30x3ea :30x3 ea :30x3 ea  :30x3 ea       S/l thoracic rotation   3# 20x 3# 20x    5# 20x 5# 20x 5# 20x   LP low in PF  130/ 3x10   130/ 3x10 140/ 3x10        LP mid in PF  130/ 3x10   130/ 3x10 140/ 3x10        LP high in PF  130/ 3x10   130/ 3x10 140/ 3x10        LP u/l high in PF  75/ 3x10   75/ 3x10 85/ 3x10       LP u/l mid in PF  75/ 3x10   75/ 3x10 85/ 3x10       LP u/l low in PF  75/ 3x10   75/ 3x10 85/ 3x10       TB side step        B 5x20' Hamstring and calf stretch strap :15x5  :15x5ea :15x5 ea :15x5ea :15x5 e    :15x5  :15x5   ITB stretch "15x5   :15x5ea :15x5 ea :15x5 ea  :15x5 ea    :15x5 :15x5   Calf press high  130/ 3x10    145/ 3x10       Calf press mid  130/ 3x10    145/ 3x10       Calf press Low  130/ 3x10    145/ 3x10       U/l HR iso mid K mid A             u/l HR             [place] punch  20/ 3x10           Stand hip abd             Goblet squat kettle atkins             Pallof Rotation  15/ 2x10ea           Squat overhead press             Shallow crunch 3x15       3x15 3x15 3x15   90/90/ tap 3x15        3x10 3x10   Flutter kicks  :30x3        :30x3 :30x3   NSP march        3x15 3x15    S/l plank :30x3        :30x2 :30x2   Straight leg RDL    3x10  10# 3x10 15# 3x10  10# 3x10 10# 3x10     Lunge with 2 handrail             Squats 2 hand assist      3x10 mini       Piriformis stretch             3 way HS stretch             Elliptical             TM  5' 5'  5' 5'    5' 5'   PB rollout              PETRA  3x10 3x10          PB LTR :05x20    :05x20 :[id number] :[id number]     :[id number]

## 2021-05-25 ENCOUNTER — APPOINTMENT (OUTPATIENT)
Dept: PHYSICAL THERAPY | Facility: CLINIC | Age: 71
End: 2021-05-25
Payer: COMMERCIAL

## 2021-05-27 ENCOUNTER — OFFICE VISIT (OUTPATIENT)
Dept: PHYSICAL THERAPY | Facility: CLINIC | Age: 71
End: 2021-05-27
Payer: COMMERCIAL

## 2021-05-27 DIAGNOSIS — G56.01 PARTIAL THENAR ATROPHY, RIGHT: ICD-10-CM

## 2021-05-27 DIAGNOSIS — M17.0 BILATERAL PRIMARY OSTEOARTHRITIS OF KNEE: ICD-10-CM

## 2021-05-27 DIAGNOSIS — G56.02 PARTIAL THENAR ATROPHY, LEFT: ICD-10-CM

## 2021-05-27 DIAGNOSIS — R26.89 BALANCE PROBLEM: Primary | ICD-10-CM

## 2021-05-27 DIAGNOSIS — M25.571 ACUTE BILATERAL ANKLE PAIN: ICD-10-CM

## 2021-05-27 DIAGNOSIS — M25.562 PAIN IN BOTH KNEES, UNSPECIFIED CHRONICITY: ICD-10-CM

## 2021-05-27 DIAGNOSIS — M25.561 PAIN IN BOTH KNEES, UNSPECIFIED CHRONICITY: ICD-10-CM

## 2021-05-27 DIAGNOSIS — M25.572 ACUTE BILATERAL ANKLE PAIN: ICD-10-CM

## 2021-05-27 DIAGNOSIS — R29.898 ANKLE WEAKNESS: ICD-10-CM

## 2021-05-27 PROCEDURE — 97530 THERAPEUTIC ACTIVITIES: CPT

## 2021-05-27 PROCEDURE — 97110 THERAPEUTIC EXERCISES: CPT

## 2021-05-27 NOTE — PROGRESS NOTES
Daily Note     Today's date: 2021  Patient name: Porfirio Gitelman  : 1950  MRN: [id number]  Referring provider: Omer Guerrero DO  Dx:   Encounter Diagnosis     ICD-10-CM    1  Balance problem  R26 89    2  Bilateral primary osteoarthritis of knee  M17 0    3  Partial thenar atrophy, left  G56 02    4  Pain in both knees, unspecified chronicity  M25 561     M25 562    5  Ankle weakness  R29 898    6  Acute bilateral ankle pain  M25 571     M25 572    7  Partial thenar atrophy, right  G56 01             Subjective: Gamaliel Rosales reports some knee soreness following last PT session  He states he has not been completing as much outside work recently  Objective: See treatment diary below    Assessment: Dalton tolerated PT treatment well  Continued focus on LE strength training  Progressed resistance on leg press today, challenge present  Reintroduced lunges to exercise program  Pt requires cueing for proper form with squatting activities  He denied symptom provocation throughout and post treatment session  Pt would benefit from continued PT to further address impairments and maximize functional level  Plan: Continue per plan of care        Precautions: standard OA  Manual        Thumb jt mobs             R knee STM, PROM JW JZ JZ JW            Exercise Diary     Soleus stretch   :30x3 :30x3 ea :30x3 ea :30x3 ea :30x3 ea      Calf stretch   :30x3ea :30x3 ea :30x3 ea  :30x3 ea :30x3 ea       S/l thoracic rotation   3# 20x 3# 20x     5# 20x 5# 20x   LP low in PF  130/ 3x10   130/ 3x10 140/ 3x10  150/ 3x10      LP mid in PF  130/ 3x10   130/ 3x10 140/ 3x10  150/ 3x10       LP high in PF  130/ 3x10   130/ 3x10 140/ 3x10  150/ 3x10       LP u/l high in PF  75/ 3x10   75/ 3x10 85/ 3x10 90/ 3x10      LP u/l mid in PF  75/ 3x10   75/ 3x10 85/ 3x10 90/ 3x10      LP u/l low in PF  75/ 3x10   75/ 3x10 85/ 3x10 90/ 3x10      TB side step Hamstring and calf stretch strap :15x5  :15x5ea :15x5 ea :15x5ea :15x5 e  :15x5 ea  :15x5  :15x5   ITB stretch "15x5   :15x5ea :15x5 ea :15x5 ea  :15x5 ea  :15x5 ea  :15x5 :15x5   Calf press high  130/ 3x10    145/ 3x10 150/ 3x10       Calf press mid  130/ 3x10    145/ 3x10 150/ 3x10      Calf press Low  130/ 3x10    145/ 3x10 150/ 3x10      U/l HR iso mid K mid A             u/l HR             [place] punch  20/ 3x10           Stand hip abd             Goblet squat kettle atkins             Pallof Rotation  15/ 2x10ea           Squat overhead press             Shallow crunch 3x15        3x15 3x15   90/90/ tap 3x15        3x10 3x10   Flutter kicks  :30x3        :30x3 :30x3   NSP march         3x15    S/l plank :30x3        :30x2 :30x2   Straight leg RDL    3x10  10# 3x10 15# 3x10 15# 3x10  10# 3x10     Lunge with 2 handrail       1 Handrail x15 ea       Squats 2 hand assist      3x10 mini 3x10 mini       Piriformis stretch             3 way HS stretch             Elliptical             TM  5' 5'  5' 5'  5'  5' 5'   PB rollout              PETRA  3x10 3x10          PB LTR :05x20    :[id number] :[id number] :[id number]     :50T91

## 2021-05-28 ENCOUNTER — APPOINTMENT (OUTPATIENT)
Dept: PHYSICAL THERAPY | Facility: CLINIC | Age: 71
End: 2021-05-28
Payer: COMMERCIAL

## 2021-06-02 ENCOUNTER — OFFICE VISIT (OUTPATIENT)
Dept: PHYSICAL THERAPY | Facility: CLINIC | Age: 71
End: 2021-06-02
Payer: COMMERCIAL

## 2021-06-02 DIAGNOSIS — M25.572 ACUTE BILATERAL ANKLE PAIN: ICD-10-CM

## 2021-06-02 DIAGNOSIS — M17.0 BILATERAL PRIMARY OSTEOARTHRITIS OF KNEE: ICD-10-CM

## 2021-06-02 DIAGNOSIS — R26.89 BALANCE PROBLEM: Primary | ICD-10-CM

## 2021-06-02 DIAGNOSIS — R29.898 ANKLE WEAKNESS: ICD-10-CM

## 2021-06-02 DIAGNOSIS — G56.02 PARTIAL THENAR ATROPHY, LEFT: ICD-10-CM

## 2021-06-02 DIAGNOSIS — G56.01 PARTIAL THENAR ATROPHY, RIGHT: ICD-10-CM

## 2021-06-02 DIAGNOSIS — M25.561 PAIN IN BOTH KNEES, UNSPECIFIED CHRONICITY: ICD-10-CM

## 2021-06-02 DIAGNOSIS — M25.562 PAIN IN BOTH KNEES, UNSPECIFIED CHRONICITY: ICD-10-CM

## 2021-06-02 DIAGNOSIS — M25.571 ACUTE BILATERAL ANKLE PAIN: ICD-10-CM

## 2021-06-02 PROCEDURE — 97530 THERAPEUTIC ACTIVITIES: CPT

## 2021-06-02 PROCEDURE — 97110 THERAPEUTIC EXERCISES: CPT

## 2021-06-02 NOTE — PROGRESS NOTES
Patient's shoes and socks removed  Right Foot/Ankle   Right Foot Inspection  Skin Exam: skin normal, skin intact and abnormal color no dry skin, no warmth, no callus, no erythema, no maceration, no pre-ulcer, no ulcer and no callus                          Toe Exam: ROM and strength within normal limits  Sensory   Vibration: intact  Proprioception: intact   Monofilament testing: diminished  Vascular  Capillary refills: < 3 seconds  The right DP pulse is 2+  The right PT pulse is 2+  Right Toe  - Comprehensive Exam  Ecchymosis: none  Arch: normal  Hammertoes: absent  Claw Toes: absent  Swelling: none   Tenderness: none         Left Foot/Ankle  Left Foot Inspection  Skin Exam: skin normal, skin intact and abnormal colorno dry skin, no warmth, no erythema, no maceration, no pre-ulcer, no ulcer and no callus                         Toe Exam: ROM and strength within normal limits                   Sensory   Vibration: intact  Proprioception: intact  Monofilament: diminished  Vascular  Capillary refills: < 3 seconds  The left DP pulse is 2+  The left PT pulse is 2+  Left Toe  - Comprehensive Exam  Ecchymosis: none  Arch: normal  Hammertoes: absent  Claw toes: absent  Swelling: none   Tenderness: none       Assign Risk Category:  Deformity present; Loss of protective sensation; No weak pulses       Risk: 1    Subjective     Jose Miller is a 79 y o   male and is here for routine health maintenance  The patient reports persistent polyarthralgia  History of Present Illness     Patient here for his physical   Reports feeling well  Has been actively exercising on his stationary bike, progressing through PT  Well Adult Physical   Patient here for a comprehensive physical exam       Diet and Physical Activity  Diet: well balanced diet and low carbohydrate diet  Weight concerns: Patient is overweight (BMI 25 0-29  9)  Exercise: frequently      Depression Screen  PHQ-9 Depression Screening    PHQ-9:   Frequency of the following problems over the past two weeks:      Little interest or pleasure in doing things: 0 - not at all  Feeling down, depressed, or hopeless: 0 - not at all  PHQ-2 Score: 0          General Health  Hearing: Normal:  bilateral  Vision: goes for regular eye exams  Dental: regular dental visits      Cancer Screening  Colononoscopy 5/16/2019  PSA 2 0 on 2/8/2019    Smoker never smoker  Annual screening with low-dose helical computed tomography (CT) for patients age 54 to 76 years with history of smoking at least 30 pack-years and, if a former smoker, had quit within the previous 15 years      The following portions of the patient's history were reviewed and updated as appropriate: allergies, current medications, past family history, past medical history, past social history, past surgical history and problem list     Review of Systems     Review of Systems   Constitutional: Negative  Negative for activity change, appetite change, chills, fatigue and fever  HENT: Negative  Negative for congestion, ear pain, postnasal drip and sinus pain  Eyes: Negative  Respiratory: Negative  Negative for cough and shortness of breath  Cardiovascular: Negative  Negative for chest pain and leg swelling  Gastrointestinal: Negative  Negative for constipation and diarrhea  Endocrine: Negative  Genitourinary: Negative  Negative for dysuria  Musculoskeletal: Positive for arthralgias and joint swelling  Right knee, b/l ankles   Skin: Negative  Allergic/Immunologic: Negative  Negative for immunocompromised state  Neurological: Negative  Negative for dizziness and light-headedness  Hematological: Negative  Psychiatric/Behavioral: Negative  Past Medical History     Past Medical History:   Diagnosis Date    Hyperplastic colon polyp     last assessed 4/26/14    Sleep apnea     last assessed 4/13/13       Past Surgical History     History reviewed   No pertinent surgical history      Social History     Social History     Socioeconomic History    Marital status: /Civil Union     Spouse name: None    Number of children: None    Years of education: None    Highest education level: None   Occupational History    None   Social Needs    Financial resource strain: None    Food insecurity     Worry: None     Inability: None    Transportation needs     Medical: None     Non-medical: None   Tobacco Use    Smoking status: Never Smoker    Smokeless tobacco: Never Used   Substance and Sexual Activity    Alcohol use: No    Drug use: No    Sexual activity: None   Lifestyle    Physical activity     Days per week: None     Minutes per session: None    Stress: None   Relationships    Social connections     Talks on phone: None     Gets together: None     Attends Sikh service: None     Active member of club or organization: None     Attends meetings of clubs or organizations: None     Relationship status: None    Intimate partner violence     Fear of current or ex partner: None     Emotionally abused: None     Physically abused: None     Forced sexual activity: None   Other Topics Concern    None   Social History Narrative    None       Family History     Family History   Problem Relation Age of Onset    No Known Problems Father        Current Medications       Current Outpatient Medications:     ascorbic acid (VITAMIN C) 500 mg tablet, Take 500 mg by mouth daily, Disp: , Rfl:     metFORMIN (GLUCOPHAGE) 500 mg tablet, take 2 tablets by mouth twice a day WITH MEALS (Patient taking differently: Take 500 mg by mouth 2 (two) times a day ), Disp: 360 tablet, Rfl: 3    olmesartan-hydrochlorothiazide (BENICAR HCT) 20-12 5 MG per tablet, take 1 tablet by mouth once daily, Disp: 90 tablet, Rfl: 0    PREBIOTIC PRODUCT PO, Take by mouth daily, Disp: , Rfl:     Red Yeast Rice 600 MG CAPS, Take by mouth daily, Disp: , Rfl:      Allergies     No Known Allergies    Objective /90   Pulse 64   Temp (!) 97 °F (36 1 °C)   Ht 6' 3" (1 905 m)   Wt 102 kg (225 lb 12 oz)   SpO2 98%   BMI 28 22 kg/m²      Physical Exam  Vitals signs and nursing note reviewed  Constitutional:       Appearance: Normal appearance  HENT:      Head: Normocephalic and atraumatic  Right Ear: Tympanic membrane, ear canal and external ear normal       Left Ear: Tympanic membrane, ear canal and external ear normal       Nose: Nose normal       Mouth/Throat:      Mouth: Mucous membranes are moist       Pharynx: Oropharynx is clear  Eyes:      Extraocular Movements: Extraocular movements intact  Conjunctiva/sclera: Conjunctivae normal       Pupils: Pupils are equal, round, and reactive to light  Neck:      Musculoskeletal: Normal range of motion and neck supple  Cardiovascular:      Rate and Rhythm: Normal rate and regular rhythm  Pulses: Normal pulses  no weak pulses          Dorsalis pedis pulses are 2+ on the right side and 2+ on the left side  Posterior tibial pulses are 2+ on the right side and 2+ on the left side  Heart sounds: Normal heart sounds  Pulmonary:      Effort: Pulmonary effort is normal       Breath sounds: Normal breath sounds  Abdominal:      General: Bowel sounds are normal       Palpations: Abdomen is soft  Musculoskeletal: Normal range of motion  Feet:      Right foot:      Skin integrity: No ulcer, skin breakdown, erythema, warmth, callus or dry skin  Left foot:      Skin integrity: No ulcer, skin breakdown, erythema, warmth, callus or dry skin  Skin:     General: Skin is warm and dry  Capillary Refill: Capillary refill takes 2 to 3 seconds  Neurological:      General: No focal deficit present  Mental Status: He is alert and oriented to person, place, and time  Sensory: Sensory deficit present        Comments: Neuropathy b/l plantar surface of feet   Psychiatric:         Mood and Affect: Mood normal          Behavior: Behavior normal          Thought Content:  Thought content normal          Judgment: Judgment normal            No exam data present    Health Maintenance     Health Maintenance   Topic Date Due    Hepatitis C Screening  1950    COVID-19 Vaccine (1 of 2) 12/25/1966    Annual Physical  12/25/1968    PT PLAN OF CARE  01/22/2021    Diabetic Foot Exam  03/04/2021    BMI: Followup Plan  06/10/2021    Pneumococcal Vaccine: 65+ Years (1 of 1 - PPSV23) 09/16/2021 (Originally 4/28/2019)    HEMOGLOBIN A1C  09/10/2021    Fall Risk  03/17/2022    Depression Screening PHQ  03/17/2022    BMI: Adult  03/17/2022    DM Eye Exam  09/16/2022    Colonoscopy Surveillance  05/16/2024    DTaP,Tdap,and Td Vaccines (3 - Td) 04/17/2026    Colorectal Cancer Screening  05/16/2029    Influenza Vaccine  Completed    HIB Vaccine  Aged Out    Hepatitis B Vaccine  Aged Out    IPV Vaccine  Aged Out    Hepatitis A Vaccine  Aged Out    Meningococcal ACWY Vaccine  Aged Out    HPV Vaccine  Aged Out     Immunization History   Administered Date(s) Administered    Influenza, high dose seasonal 0 7 mL 10/24/2020    Influenza, nasal 10/18/2012    Pneumococcal Polysaccharide PPV23 04/28/2014    Tdap 04/28/2014, 04/17/2016       Laboratory Results:   Lab Results   Component Value Date    WBC 5 3 02/08/2019    WBC 4 9 11/08/2018    HGB 15 2 02/08/2019    HGB 15 9 11/08/2018    HCT 44 0 02/08/2019    HCT 45 6 11/08/2018    MCV 96 9 02/08/2019    MCV 95 0 11/08/2018     02/08/2019     11/08/2018     Lab Results   Component Value Date    BUN 17 05/28/2020    BUN 20 02/26/2020    BUN 18 05/23/2019     Lab Results   Component Value Date    GLUC 108 (H) 05/28/2020    GLUC 129 (H) 02/26/2020    GLUC 109 (H) 05/23/2019    ALT 22 05/28/2020    ALT 37 02/26/2020    ALT 27 05/23/2019    AST 21 05/28/2020    AST 27 02/26/2020    AST 23 05/23/2019     No results found for: TSH  Lab Results   Component Value Date    HGBA1C 5 9 (H) Self 03/10/2021    HGBA1C 5 7 (H) 12/09/2020    HGBA1C 5 7 (H) 09/08/2020       Lipid Profile:   Lab Results   Component Value Date    CHOL 189 02/01/2017    CHOL 137 10/12/2015    CHOL 218 (H) 04/21/2014     Lab Results   Component Value Date    HDL 41 05/28/2020    HDL 53 02/26/2020    HDL 45 05/23/2019     No results found for: LDLC  Lab Results   Component Value Date    LDLCALC 82 05/28/2020    LDLCALC 102 (H) 02/26/2020    LDLCALC 77 05/23/2019     Lab Results   Component Value Date    TRIG 127 05/28/2020    TRIG 170 (H) 02/26/2020    TRIG 68 05/23/2019       Assessment/Plan       1  Healthy male exam   2  Patient Counseling:   · Nutrition: Stressed importance of a well balanced diet, moderation of sodium/saturated fat, caloric balance and sufficient intake of fiber  · Exercise: Stressed the importance of regular exercise with a goal of 150 minutes per week  · Dental Health: Discussed daily flossing and brushing and regular dental visits     · Immunizations reviewed  · Discussed benefits of screening   · Discussed the patient's BMI with him  The BMI is above average; BMI management plan is completed  3  Cancer Screening   4  Labs  5  Follow up in 3 months  ALIVIA Ng  BMI Counseling: Body mass index is 28 22 kg/m²  The BMI is above normal  Nutrition recommendations include consuming healthier snacks, increasing intake of lean protein, reducing intake of saturated fat and trans fat and reducing intake of cholesterol  Exercise recommendations include moderate aerobic physical activity for 150 minutes/week and exercising 3-5 times per week

## 2021-06-02 NOTE — PROGRESS NOTES
Daily Note     Today's date: 2021  Patient name: Porfirio Gitelman  : 1950  MRN: [id number]  Referring provider: Omer Guerrero DO  Dx:   Encounter Diagnosis     ICD-10-CM    1  Balance problem  R26 89    2  Bilateral primary osteoarthritis of knee  M17 0    3  Partial thenar atrophy, left  G56 02    4  Pain in both knees, unspecified chronicity  M25 561     M25 562    5  Ankle weakness  R29 898    6  Acute bilateral ankle pain  M25 571     M25 572    7  Partial thenar atrophy, right  G56 01             Subjective: Gamaliel Rosales reports overall he is doing well  He states he is very active throughout the day, when his knees do bother him he completes the HEP stretching and that helps a lot  Objective: See treatment diary below    Assessment: Dalton tolerated PT treatment well  Pt performed prescribed exercises with good technique  Added [place] mini squat today, pt performed well and was able to control excessive forward flexion with squatting motion  Less difficulty displayed with leg press and lunges, LE strength is slowly improving  Pt would benefit from continued PT to further address impairments and maximize functional level  Plan: Continue per plan of care        Precautions: standard OA  Manual       Thumb jt mobs             R knee STM, PROM JW JZ JZ JW            Exercise Diary     Soleus stretch   :30x3 :30x3 ea :30x3 ea :30x3 ea :30x3 ea      Calf stretch   :30x3ea :30x3 ea :30x3 ea  :30x3 ea :30x3 ea       S/l thoracic rotation   3# 20x 3# 20x      5# 20x   LP low in PF  130/ 3x10   130/ 3x10 140/ 3x10  150/ 3x10 150/ 3x10     LP mid in PF  130/ 3x10   130/ 3x10 140/ 3x10  150/ 3x10  150/ 3x10      LP high in PF  130/ 3x10   130/ 3x10 140/ 3x10  150/ 3x10  150/ 3x10      LP u/l high in PF  75/ 3x10   75/ 3x10 85/ 3x10 90/ 3x10 90/ 3x10     LP u/l mid in PF  75/ 3x10   75/ 3x10 85/ 3x10 90/ 3x10 90/ 3x10     LP u/l low in PF  75/ 3x10   75/ 3x10 85/ 3x10 90/ 3x10 90/ 3x10     TB side step             Hamstring and calf stretch strap :15x5  :15x5ea :15x5 ea :15x5ea :15x5 e  :15x5 ea :15x5 ea  :15x5   ITB stretch "15x5   :15x5ea :15x5 ea :15x5 ea  :15x5 ea  :15x5 ea :15x5 ea  :15x5   Calf press high  130/ 3x10    145/ 3x10 150/ 3x10  150/ 3x10      Calf press mid  130/ 3x10    145/ 3x10 150/ 3x10 150/ 3x10      Calf press Low  130/ 3x10    145/ 3x10 150/ 3x10 150/ 3x10      [place] squat         3x10     U/l HR iso mid K mid A             u/l HR             [place] punch  20/ 3x10           Stand hip abd             Goblet squat kettle atkins             Pallof Rotation  15/ 2x10ea           Squat overhead press             Shallow crunch 3x15         3x15   90/90/ tap 3x15         3x10   Flutter kicks  :30x3         :30x3   NSP march             S/l plank :30x3         :30x2   Straight leg RDL    3x10  10# 3x10 15# 3x10 15# 3x10      Lunge with 2 handrail       1 Handrail x15 ea  1 HHA x20 ea      Squats 2 hand assist      3x10 mini 3x10 mini       Piriformis stretch             3 way HS stretch             Elliptical             TM  5' 5'  5' 5'  5' 5'   5'   PB rollout              PETRA  3x10 3x10          PB LTR :05x20    :05x20 :[id number] :[id number]     :16K03

## 2021-06-03 ENCOUNTER — OFFICE VISIT (OUTPATIENT)
Dept: PHYSICAL THERAPY | Facility: CLINIC | Age: 71
End: 2021-06-03
Payer: COMMERCIAL

## 2021-06-03 DIAGNOSIS — R26.89 BALANCE PROBLEM: Primary | ICD-10-CM

## 2021-06-03 DIAGNOSIS — M25.562 PAIN IN BOTH KNEES, UNSPECIFIED CHRONICITY: ICD-10-CM

## 2021-06-03 DIAGNOSIS — M25.572 ACUTE BILATERAL ANKLE PAIN: ICD-10-CM

## 2021-06-03 DIAGNOSIS — M17.0 BILATERAL PRIMARY OSTEOARTHRITIS OF KNEE: ICD-10-CM

## 2021-06-03 DIAGNOSIS — G56.02 PARTIAL THENAR ATROPHY, LEFT: ICD-10-CM

## 2021-06-03 DIAGNOSIS — R29.898 ANKLE WEAKNESS: ICD-10-CM

## 2021-06-03 DIAGNOSIS — G56.01 PARTIAL THENAR ATROPHY, RIGHT: ICD-10-CM

## 2021-06-03 DIAGNOSIS — M25.571 ACUTE BILATERAL ANKLE PAIN: ICD-10-CM

## 2021-06-03 DIAGNOSIS — M25.561 PAIN IN BOTH KNEES, UNSPECIFIED CHRONICITY: ICD-10-CM

## 2021-06-03 PROCEDURE — 97110 THERAPEUTIC EXERCISES: CPT

## 2021-06-03 PROCEDURE — 97530 THERAPEUTIC ACTIVITIES: CPT

## 2021-06-03 PROCEDURE — 97112 NEUROMUSCULAR REEDUCATION: CPT

## 2021-06-03 NOTE — PROGRESS NOTES
Daily Note     Today's date: 6/3/2021  Patient name: Castillo Good  : 1950  MRN: [id number]  Referring provider: Bertha Jimenez DO  Dx:   Encounter Diagnosis     ICD-10-CM    1  Balance problem  R26 89    2  Bilateral primary osteoarthritis of knee  M17 0    3  Partial thenar atrophy, left  G56 02    4  Pain in both knees, unspecified chronicity  M25 561     M25 562    5  Ankle weakness  R29 898    6  Acute bilateral ankle pain  M25 571     M25 572    7  Partial thenar atrophy, right  G56 01             Subjective: Bruna Rinne reports he did not sleep well last night  He states this morning his whole body feels very stiff  Objective: See treatment diary below    Assessment: Dalton tolerated PT treatment well  PT session focused on flexibility and core strength/stability  Reintroduced planks, flutter kicks, and crunches today  Increased difficulty displayed with s/l planks, pt unable to keep contralateral hip drop from occurring  SLR and pallof press/rotation were added, moderate challenge present  Fatigue evident throughout and post session, rest breaks required between exercises  Pt would benefit from continued PT to further address impairments and maximize functional level  Plan: Continue per plan of care        Precautions: standard OA  Manual   6 6/3    Thumb jt mobs             R knee STM, PROM JW JZ JZ JW            Exercise Diary   6/2 6/3    Soleus stretch   :30x3 :30x3 ea :30x3 ea :30x3 ea :30x3 ea  :30x3 ea     Calf stretch   :30x3ea :30x3 ea :30x3 ea  :30x3 ea :30x3 ea   :30x3 ea    S/l thoracic rotation   3# 20x 3# 20x         LP low in PF  130/ 3x10   130/ 3x10 140/ 3x10  150/ 3x10 150/ 3x10     LP mid in PF  130/ 3x10   130/ 3x10 140/ 3x10  150/ 3x10  150/ 3x10      LP high in PF  130/ 3x10   130/ 3x10 140/ 3x10  150/ 3x10  150/ 3x10      LP u/l high in PF  75/ 3x10   75/ 3x10 85/ 3x10 90/ 3x10 90/ 3x10     LP u/l mid in PF 75/ 3x10   75/ 3x10 85/ 3x10 90/ 3x10 90/ 3x10     LP u/l low in PF  75/ 3x10   75/ 3x10 85/ 3x10 90/ 3x10 90/ 3x10     TB side step             Hamstring and calf stretch strap :15x5  :15x5ea :15x5 ea :15x5ea :15x5 e  :15x5 ea :15x5 ea :15x5 ea    ITB stretch "15x5   :15x5ea :15x5 ea :15x5 ea  :15x5 ea  :15x5 ea :15x5 ea :15x5 ea    PQS         :15x5 ea    Calf press high  130/ 3x10    145/ 3x10 150/ 3x10  150/ 3x10      Calf press mid  130/ 3x10    145/ 3x10 150/ 3x10 150/ 3x10      Calf press Low  130/ 3x10    145/ 3x10 150/ 3x10 150/ 3x10      [place] squat         3x10     U/l HR iso mid K mid A             u/l HR             [place] punch  20/ 3x10           Stand hip abd             Goblet squat kettle atkins             Pallof press          25/ 3x10 ea    Pallof Rotation  15/ 2x10ea       20/ 3x10 ea    Squat overhead press             Shallow crunch 3x15        3x15    90/90/ tap 3x15        3x15    Flutter kicks  :30x3        :30x3    NSP march             S/l plank :30x3        :30x3 b/l    Straight leg RDL    3x10  10# 3x10 15# 3x10 15# 3x10      Lunge with 2 handrail       1 Handrail x15 ea  1 HHA x20 ea      Squats 2 hand assist      3x10 mini 3x10 mini       Piriformis stretch             3 way HS stretch             Elliptical             TM  5' 5'  5' 5'  5' 5'  5'    PB rollout                                                     TA SLR          3x10     S/l thoracic rot          3# 2x10 b/l     PETRA  3x10 3x10          PB LTR :05x20    :05x20 :05x20 :05x20

## 2021-06-08 ENCOUNTER — OFFICE VISIT (OUTPATIENT)
Dept: PHYSICAL THERAPY | Facility: CLINIC | Age: 71
End: 2021-06-08
Payer: COMMERCIAL

## 2021-06-08 DIAGNOSIS — M25.561 PAIN IN BOTH KNEES, UNSPECIFIED CHRONICITY: ICD-10-CM

## 2021-06-08 DIAGNOSIS — G56.02 PARTIAL THENAR ATROPHY, LEFT: ICD-10-CM

## 2021-06-08 DIAGNOSIS — M25.562 PAIN IN BOTH KNEES, UNSPECIFIED CHRONICITY: ICD-10-CM

## 2021-06-08 DIAGNOSIS — M25.571 ACUTE BILATERAL ANKLE PAIN: ICD-10-CM

## 2021-06-08 DIAGNOSIS — R29.898 ANKLE WEAKNESS: ICD-10-CM

## 2021-06-08 DIAGNOSIS — M17.0 BILATERAL PRIMARY OSTEOARTHRITIS OF KNEE: Primary | ICD-10-CM

## 2021-06-08 DIAGNOSIS — M25.572 ACUTE BILATERAL ANKLE PAIN: ICD-10-CM

## 2021-06-08 DIAGNOSIS — G56.01 PARTIAL THENAR ATROPHY, RIGHT: ICD-10-CM

## 2021-06-08 PROCEDURE — 97110 THERAPEUTIC EXERCISES: CPT

## 2021-06-08 PROCEDURE — 97530 THERAPEUTIC ACTIVITIES: CPT

## 2021-06-08 PROCEDURE — 97112 NEUROMUSCULAR REEDUCATION: CPT

## 2021-06-08 NOTE — PROGRESS NOTES
Daily Note     Today's date: 2021  Patient name: Ana Beasley  : 1950  MRN: [id number]  Referring provider: Rosario Sotomayor DO  Dx:   Encounter Diagnosis     ICD-10-CM    1  Balance problem  R26 89    2  Bilateral primary osteoarthritis of knee  M17 0    3  Partial thenar atrophy, left  G56 02    4  Pain in both knees, unspecified chronicity  M25 561     M25 562    5  Ankle weakness  R29 898    6  Acute bilateral ankle pain  M25 571     M25 572    7  Partial thenar atrophy, right  G56 01             Subjective: Mayela Diehl reports b/l knee and low back have been feeing pretty good recently  He states he continues to be very active around his home and is able to complete various activity with less difficulty and discomfort  Objective: See treatment diary below    Assessment: Mayela Diehl displays good tolerance to current POC  Continued with focus on core stability and strengthening today  He displayed improved stability and control with s/l planks and flutter kicks  Continue to progress strengthening exercises as able  Fatigue evident throughout and post session, rest breaks required between exercises  Pt would benefit from continued PT to further address impairments and maximize functional level  Plan: Continue per plan of care        Precautions: standard OA  Manual   6/ 6/3 6/8   Thumb jt mobs             R knee STM, PROM JW JZ JZ JW            Exercise Diary   6/2 6/3 6/8   Soleus stretch   :30x3 :30x3 ea :30x3 ea :30x3 ea :30x3 ea  :30x3 ea     Calf stretch   :30x3ea :30x3 ea :30x3 ea  :30x3 ea :30x3 ea   :30x3 ea    S/l thoracic rotation   3# 20x 3# 20x         LP low in PF  130/ 3x10   130/ 3x10 140/ 3x10  150/ 3x10 150/ 3x10     LP mid in PF  130/ 3x10   130/ 3x10 140/ 3x10  150/ 3x10  150/ 3x10      LP high in PF  130/ 3x10   130/ 3x10 140/ 3x10  150/ 3x10  150/ 3x10      LP u/l high in PF  75/ 3x10   75/ 3x10 85/ 3x10 90/ 3x10 90/ 3x10     LP u/l mid in PF  75/ 3x10   75/ 3x10 85/ 3x10 90/ 3x10 90/ 3x10     LP u/l low in PF  75/ 3x10   75/ 3x10 85/ 3x10 90/ 3x10 90/ 3x10     TB side step             Hamstring and calf stretch strap :15x5  :15x5ea :15x5 ea :15x5ea :15x5 e  :15x5 ea :15x5 ea :15x5 ea :15x5 ea    ITB stretch "15x5   :15x5ea :15x5 ea :15x5 ea  :15x5 ea  :15x5 ea :15x5 ea :15x5 ea :15x5 ea   PQS         :15x5 ea :15x5 ea   Calf press high  130/ 3x10    145/ 3x10 150/ 3x10  150/ 3x10      Calf press mid  130/ 3x10    145/ 3x10 150/ 3x10 150/ 3x10      Calf press Low  130/ 3x10    145/ 3x10 150/ 3x10 150/ 3x10      [place] squat         3x10     U/l HR iso mid K mid A             u/l HR             [place] punch  20/ 3x10           Stand hip abd             Goblet squat kettle atkins             Pallof press          25/ 3x10 ea 20/ 3x10 ea   Pallof Rotation  15/ 2x10ea       20/ 3x10 ea 20/ 3x10 ea   Squat overhead press             Shallow crunch 3x15        3x15 3x15   90/90/ tap 3x15        3x15 3x15   Flutter kicks  :30x3        :30x3    NSP march             S/l plank :30x3        :30x3 b/l :30x3 b/l   Straight leg RDL    3x10  10# 3x10 15# 3x10 15# 3x10      Lunge with 2 handrail       1 Handrail x15 ea  1 HHA x20 ea      Squats 2 hand assist      3x10 mini 3x10 mini       Piriformis stretch             3 way HS stretch             Elliptical             TM  5' 5'  5' 5'  5' 5'  5' 5'   PB rollout                                                     TA SLR          3x10  3x10    S/l thoracic rot          3# 2x10 b/l     PETRA  3x10 3x10          PB LTR :05x20    :05x20 :05x20 :05x20

## 2021-06-10 ENCOUNTER — OFFICE VISIT (OUTPATIENT)
Dept: PHYSICAL THERAPY | Facility: CLINIC | Age: 71
End: 2021-06-10
Payer: COMMERCIAL

## 2021-06-10 DIAGNOSIS — G56.01 PARTIAL THENAR ATROPHY, RIGHT: ICD-10-CM

## 2021-06-10 DIAGNOSIS — M25.562 PAIN IN BOTH KNEES, UNSPECIFIED CHRONICITY: ICD-10-CM

## 2021-06-10 DIAGNOSIS — M25.572 ACUTE BILATERAL ANKLE PAIN: ICD-10-CM

## 2021-06-10 DIAGNOSIS — G56.02 PARTIAL THENAR ATROPHY, LEFT: ICD-10-CM

## 2021-06-10 DIAGNOSIS — R29.898 ANKLE WEAKNESS: ICD-10-CM

## 2021-06-10 DIAGNOSIS — M17.0 BILATERAL PRIMARY OSTEOARTHRITIS OF KNEE: Primary | ICD-10-CM

## 2021-06-10 DIAGNOSIS — M25.561 PAIN IN BOTH KNEES, UNSPECIFIED CHRONICITY: ICD-10-CM

## 2021-06-10 DIAGNOSIS — M25.571 ACUTE BILATERAL ANKLE PAIN: ICD-10-CM

## 2021-06-10 PROCEDURE — 97110 THERAPEUTIC EXERCISES: CPT

## 2021-06-10 NOTE — PROGRESS NOTES
Daily Note     Today's date: 6/10/2021  Patient name: López Heath  : 1950  MRN: [id number]  Referring provider: Josey Garnica DO  Dx:   Encounter Diagnosis     ICD-10-CM    1  Bilateral primary osteoarthritis of knee  M17 0    2  Partial thenar atrophy, left  G56 02    3  Ankle weakness  R29 898    4  Pain in both knees, unspecified chronicity  M25 561     M25 562    5  Acute bilateral ankle pain  M25 571     M25 572    6  Partial thenar atrophy, right  G56 01             Subjective: Blondell Finger reports he hardly slept last night  He states he took green tea pills by accident and was unable to fall asleep, resulting in only getting 4 hours of sleep  He states his back feels very stiff and request to focus only on stretching  Objective: See treatment diary below    Assessment: Dalton tolerated PT treatment well  Per pt request session focused primarily on flexibility  He performed prescried exercises well  Decreased lumbar stiffness noted following exercises  Resume strength training NV  Instructed pt to perform stretching at home  Pt would benefit from continued PT to further address impairments and maximize functional level  Plan: Continue per plan of care        Precautions: standard OA  Manual   6/ 6/3 6/8    Thumb jt mobs              R knee STM, PROM JW JZ JZ JW             Exercise Diary   6 6/3 6/8 6/10   Soleus stretch   :30x3 :30x3 ea :30x3 ea :30x3 ea :30x3 ea  :30x3 ea      Calf stretch   :30x3ea :30x3 ea :30x3 ea  :30x3 ea :30x3 ea   :30x3 ea     S/l thoracic rotation   3# 20x 3# 20x          LP low in PF  130/ 3x10   130/ 3x10 140/ 3x10  150/ 3x10 150/ 3x10      LP mid in PF  130/ 3x10   130/ 3x10 140/ 3x10  150/ 3x10  150/ 3x10       LP high in PF  130/ 3x10   130/ 3x10 140/ 3x10  150/ 3x10  150/ 3x10       LP u/l high in PF  75/ 3x10   75/ 3x10 85/ 3x10 90/ 3x10 90/ 3x10      LP u/l mid in PF  75/ 3x10   75/ 3x10 85/ 3x10 90/ 3x10 90/ 3x10      LP u/l low in PF  75/ 3x10   75/ 3x10 85/ 3x10 90/ 3x10 90/ 3x10      TB side step              Hamstring and calf stretch strap :15x5  :15x5ea :15x5 ea :15x5ea :15x5 e  :15x5 ea :15x5 ea :15x5 ea :15x5 ea  :15x5 ea   ITB stretch "15x5   :15x5ea :15x5 ea :15x5 ea  :15x5 ea  :15x5 ea :15x5 ea :15x5 ea :15x5 ea :15x5 ea   PQS         :15x5 ea :15x5 ea :15x5 ea    Calf press high  130/ 3x10    145/ 3x10 150/ 3x10  150/ 3x10       Calf press mid  130/ 3x10    145/ 3x10 150/ 3x10 150/ 3x10       Calf press Low  130/ 3x10    145/ 3x10 150/ 3x10 150/ 3x10       Fransisco squat         3x10      U/l HR iso mid K mid A              u/l HR              Fransisco punch  20/ 3x10            Stand hip abd              Goblet squat [employer] atkins              Pallof press          25/ 3x10 ea 20/ 3x10 ea    Pallof Rotation  15/ 2x10ea       20/ 3x10 ea 20/ 3x10 ea    Squat overhead press              Shallow crunch 3x15        3x15 3x15    90/90/ tap 3x15        3x15 3x15    Flutter kicks  :30x3        :30x3     NSP march              S/l plank :30x3        :30x3 b/l :30x3 b/l    Straight leg RDL    3x10  10# 3x10 15# 3x10 15# 3x10       Lunge with 2 handrail       1 Handrail x15 ea  1 HHA x20 ea       Squats 2 hand assist      3x10 mini 3x10 mini        Piriformis stretch           :30x3    3 way HS stretch              Elliptical              TM  5' 5'  5' 5'  5' 5'  5' 5'    PB rollout            :10x10                                             TA SLR          3x10  3x10     S/l thoracic rot          3# 2x10 b/l   3# 2x10 b/l    PETRA  3x10 3x10        3x10   PB LTR :05x20    :05x20 :05x20 :05x20      :05x20

## 2021-06-15 ENCOUNTER — OFFICE VISIT (OUTPATIENT)
Dept: PHYSICAL THERAPY | Facility: CLINIC | Age: 71
End: 2021-06-15
Payer: COMMERCIAL

## 2021-06-15 DIAGNOSIS — G56.01 PARTIAL THENAR ATROPHY, RIGHT: ICD-10-CM

## 2021-06-15 DIAGNOSIS — M25.571 ACUTE BILATERAL ANKLE PAIN: ICD-10-CM

## 2021-06-15 DIAGNOSIS — R29.898 ANKLE WEAKNESS: ICD-10-CM

## 2021-06-15 DIAGNOSIS — G56.02 PARTIAL THENAR ATROPHY, LEFT: ICD-10-CM

## 2021-06-15 DIAGNOSIS — M25.562 PAIN IN BOTH KNEES, UNSPECIFIED CHRONICITY: ICD-10-CM

## 2021-06-15 DIAGNOSIS — M25.561 PAIN IN BOTH KNEES, UNSPECIFIED CHRONICITY: ICD-10-CM

## 2021-06-15 DIAGNOSIS — M17.0 BILATERAL PRIMARY OSTEOARTHRITIS OF KNEE: Primary | ICD-10-CM

## 2021-06-15 DIAGNOSIS — M25.572 ACUTE BILATERAL ANKLE PAIN: ICD-10-CM

## 2021-06-15 PROCEDURE — 97110 THERAPEUTIC EXERCISES: CPT

## 2021-06-15 PROCEDURE — 97530 THERAPEUTIC ACTIVITIES: CPT

## 2021-06-15 NOTE — PROGRESS NOTES
Daily Note     Today's date: 6/15/2021  Patient name: Humza Barr  : 1950  MRN: [id number]  Referring provider: Arabella Rangel DO  Dx:   Encounter Diagnosis     ICD-10-CM    1  Bilateral primary osteoarthritis of knee  M17 0    2  Partial thenar atrophy, left  G56 02    3  Ankle weakness  R29 898    4  Pain in both knees, unspecified chronicity  M25 561     M25 562    5  Acute bilateral ankle pain  M25 571     M25 572    6  Partial thenar atrophy, right  G56 01             Subjective: Sushma Stanley reports his low back and b/l knees are sore and achy today  He contributes increased soreness to changes in weather  Objective: See treatment diary below    Assessment: Dalton tolerated PT treatment well  Initiated PT session with gentle flexibility exercises, pt performed well noting improvement in sx's following  Today's session focused on LE strength training  Pt performs mini squats with better form when having Fransisco counter weight  Pt displays greatest difficulty with lunges, primarily on R side  Glute, hamstring, and quadricep weakness remains evident throughout session but has improved significantly since IE  Pt would benefit from continued PT to further address impairments and maximize functional level  Plan: Continue per plan of care        Precautions: standard OA  Manual   6/2 6/3 6/8    Thumb jt mobs              R knee STM, PROM JW JZ JZ JW             Exercise Diary  6/15  5/11 5/13 5/19 5/21 5/27 6/2 6/3 6/8 6/10   Soleus stretch   :30x3 :30x3 ea :30x3 ea :30x3 ea :30x3 ea  :30x3 ea      Calf stretch   :30x3ea :30x3 ea :30x3 ea  :30x3 ea :30x3 ea   :30x3 ea     S/l thoracic rotation 3# 20x  3# 20x 3# 20x          LP low in /  3x10    130/ 3x10 140/ 3x10  150/ 3x10 150/ 3x10      LP mid in /  3x10    130/ 3x10 140/ 3x10  150/ 3x10  150/ 3x10       LP high in /  3x10    130/ 3x10 140/ 3x10  150/ 3x10  150/ 3x10       LP u/l high in PF     75/ 3x10 85/ 3x10 90/ 3x10 90/ 3x10      LP u/l mid in PF     75/ 3x10 85/ 3x10 90/ 3x10 90/ 3x10      LP u/l low in PF     75/ 3x10 85/ 3x10 90/ 3x10 90/ 3x10      TB side step              Hamstring and calf stretch strap :15x5 ea  :15x5ea :15x5 ea :15x5ea :15x5 e  :15x5 ea :15x5 ea :15x5 ea :15x5 ea  :15x5 ea   ITB stretch :15x5 ea  :15x5ea :15x5 ea :15x5 ea  :15x5 ea  :15x5 ea :15x5 ea :15x5 ea :15x5 ea :15x5 ea   PQS         :15x5 ea :15x5 ea :15x5 ea    Calf press high 170/  3x10     145/ 3x10 150/ 3x10  150/ 3x10       Calf press mid 170/  3x10     145/ 3x10 150/ 3x10 150/ 3x10       Calf press Low 170/  3x10     145/ 3x10 150/ 3x10 150/ 3x10       U/l HR iso mid K mid A              u/l HR              Fransisco punch              Stand hip abd              Goblet squat kettle atkins              Pallof press          25/ 3x10 ea 20/ 3x10 ea    Pallof Rotation         20/ 3x10 ea 20/ 3x10 ea    Squat overhead press              Shallow crunch         3x15 3x15    90/90/ tap         3x15 3x15    Flutter kicks          :30x3     NSP march              S/l plank         :30x3 b/l :30x3 b/l    Straight leg RDL    3x10  10# 3x10 15# 3x10 15# 3x10       Lunge with 2 handrail 1 HHA x20 ea       1 Handrail x15 ea  1 HHA x20 ea       Squats 2 hand assist Mini 3x10      3x10 mini 3x10 mini        Piriformis stretch           :30x3    TM  5'   5' 5'  5' 5'  5' 5'    TA SLR          3x10  3x10     S/l thoracic rot  3# 2x10 b/l         3# 2x10 b/l   3# 2x10 b/l    PETRA 3x10  3x10        3x10   PB LTR :05x20    :05x20 :05x20 :05x20      :05x20

## 2021-06-16 ENCOUNTER — TELEPHONE (OUTPATIENT)
Dept: FAMILY MEDICINE CLINIC | Facility: CLINIC | Age: 71
End: 2021-06-16

## 2021-06-16 DIAGNOSIS — I10 ESSENTIAL HYPERTENSION: Primary | ICD-10-CM

## 2021-06-16 DIAGNOSIS — E78.2 MIXED HYPERLIPIDEMIA: ICD-10-CM

## 2021-06-16 DIAGNOSIS — Z12.5 PROSTATE CANCER SCREENING: ICD-10-CM

## 2021-06-16 DIAGNOSIS — E11.40 TYPE 2 DIABETES MELLITUS WITH DIABETIC NEUROPATHY, WITHOUT LONG-TERM CURRENT USE OF INSULIN (HCC): ICD-10-CM

## 2021-06-17 ENCOUNTER — APPOINTMENT (OUTPATIENT)
Dept: PHYSICAL THERAPY | Facility: CLINIC | Age: 71
End: 2021-06-17
Payer: COMMERCIAL

## 2021-06-22 ENCOUNTER — OFFICE VISIT (OUTPATIENT)
Dept: PHYSICAL THERAPY | Facility: CLINIC | Age: 71
End: 2021-06-22
Payer: COMMERCIAL

## 2021-06-22 DIAGNOSIS — M17.0 BILATERAL PRIMARY OSTEOARTHRITIS OF KNEE: Primary | ICD-10-CM

## 2021-06-22 DIAGNOSIS — M25.572 ACUTE BILATERAL ANKLE PAIN: ICD-10-CM

## 2021-06-22 DIAGNOSIS — M25.562 PAIN IN BOTH KNEES, UNSPECIFIED CHRONICITY: ICD-10-CM

## 2021-06-22 DIAGNOSIS — M25.561 PAIN IN BOTH KNEES, UNSPECIFIED CHRONICITY: ICD-10-CM

## 2021-06-22 DIAGNOSIS — R29.898 ANKLE WEAKNESS: ICD-10-CM

## 2021-06-22 DIAGNOSIS — M25.571 ACUTE BILATERAL ANKLE PAIN: ICD-10-CM

## 2021-06-22 PROCEDURE — 97110 THERAPEUTIC EXERCISES: CPT

## 2021-06-22 PROCEDURE — 97112 NEUROMUSCULAR REEDUCATION: CPT

## 2021-06-22 NOTE — PROGRESS NOTES
Daily Note     Today's date: 2021  Patient name: Charlie Arias  : 1950  MRN: [id number]  Referring provider: Elias Hunter DO  Dx:   Encounter Diagnosis     ICD-10-CM    1  Bilateral primary osteoarthritis of knee  M17 0    2  Ankle weakness  R29 898    3  Pain in both knees, unspecified chronicity  M25 561     M25 562    4  Acute bilateral ankle pain  M25 571     M25 572             Subjective: Primo Finley reports overall he is feeling good  He states b/l knee and lumbar soreness occurs with weather changing, prolonged positions, and increased activity outside around his home  He states he is complaint with HEP daily, and that helps ease sx's  He states he is not completing strength training at home  Objective: See treatment diary below    Assessment: Dalton tolerated PT treatment well  Initiated warmup with treadmill and LE stretching  Today's session focused on core stability and strength training  Pball pass and bird dog were added to program, pt was challenged  Instability present with s/l plank and bird dogs  Improved control of LE displayed with flutter kicks today  Pt would benefit from continued PT to further address impairments and maximize functional level  Plan: Continue per plan of care        Precautions: standard OA  Manual  6/15 6/22 5/11 5/13 5/19 5/21 5/27 6/2 6/3 6/8 6/10   Thumb jt mobs              R knee STM, PROM   JZ JW             Exercise Diary  6/15 6/22  5/13 5/19 5/21 5/27 6/2 6/3 6/8 6/10   Soleus stretch    :30x3 ea :30x3 ea :30x3 ea :30x3 ea  :30x3 ea      Calf stretch    :30x3 ea :30x3 ea  :30x3 ea :30x3 ea   :30x3 ea     S/l thoracic rotation 3# 20x   3# 20x          LP low in /  3x10    130/ 3x10 140/ 3x10  150/ 3x10 150/ 3x10      LP mid in /  3x10    130/ 3x10 140/ 3x10  150/ 3x10  150/ 3x10       LP high in /  3x10    130/ 3x10 140/ 3x10  150/ 3x10  150/ 3x10       LP u/l high in PF     75/ 3x10 85/ 3x10 90/ 3x10 90/ 3x10      LP u/l mid in PF 75/ 3x10 85/ 3x10 90/ 3x10 90/ 3x10      LP u/l low in PF     75/ 3x10 85/ 3x10 90/ 3x10 90/ 3x10      TB side step              Hamstring and calf stretch strap :15x5 ea   :15x5 ea :15x5ea :15x5 e  :15x5 ea :15x5 ea :15x5 ea :15x5 ea  :15x5 ea   ITB stretch :15x5 ea   :15x5 ea :15x5 ea  :15x5 ea  :15x5 ea :15x5 ea :15x5 ea :15x5 ea :15x5 ea   PQS         :15x5 ea :15x5 ea :15x5 ea    Calf press high 170/  3x10     145/ 3x10 150/ 3x10  150/ 3x10       Calf press mid 170/  3x10     145/ 3x10 150/ 3x10 150/ 3x10       Calf press Low 170/  3x10     145/ 3x10 150/ 3x10 150/ 3x10       U/l HR iso mid K mid A              u/l HR              [place] punch              Stand hip abd              Goblet squat kettle atkins              Pallof press   25/ 3x10 ea       25/ 3x10 ea 20/ 3x10 ea    Pallof Rotation  20/ 3x10 ea       20/ 3x10 ea 20/ 3x10 ea    Squat overhead press              Shallow crunch  3x15       3x15 3x15    90/90/ tap         3x15 3x15    Flutter kicks   :30x3        :30x3     NSP march              S/l plank   :30x3 b/l       :30x3 b/l :30x3 b/l    Straight leg RDL      10# 3x10 15# 3x10 15# 3x10       Lunge with 2 handrail 1 HHA x20 ea       1 Handrail x15 ea  1 HHA x20 ea       Squats 2 hand assist Mini 3x10      3x10 mini 3x10 mini        Piriformis stretch           :30x3    Pball pass   x10            TM  5' 5'  5' 5'  5' 5'  5' 5'    TA SLR          3x10  3x10     S/l thoracic rot  3# 2x10 b/l         3# 2x10 b/l   3# 2x10 b/l    PETRA 3x10          3x10   PB LTR :05x20    :05x20 :05x20 :05x20      :05x20

## 2021-06-24 ENCOUNTER — OFFICE VISIT (OUTPATIENT)
Dept: PHYSICAL THERAPY | Facility: CLINIC | Age: 71
End: 2021-06-24
Payer: COMMERCIAL

## 2021-06-24 DIAGNOSIS — M25.561 PAIN IN BOTH KNEES, UNSPECIFIED CHRONICITY: ICD-10-CM

## 2021-06-24 DIAGNOSIS — M25.562 PAIN IN BOTH KNEES, UNSPECIFIED CHRONICITY: ICD-10-CM

## 2021-06-24 DIAGNOSIS — G56.02 PARTIAL THENAR ATROPHY, LEFT: ICD-10-CM

## 2021-06-24 DIAGNOSIS — R26.89 BALANCE PROBLEM: ICD-10-CM

## 2021-06-24 DIAGNOSIS — M25.572 ACUTE BILATERAL ANKLE PAIN: ICD-10-CM

## 2021-06-24 DIAGNOSIS — R29.898 ANKLE WEAKNESS: ICD-10-CM

## 2021-06-24 DIAGNOSIS — G56.01 PARTIAL THENAR ATROPHY, RIGHT: ICD-10-CM

## 2021-06-24 DIAGNOSIS — M17.0 BILATERAL PRIMARY OSTEOARTHRITIS OF KNEE: Primary | ICD-10-CM

## 2021-06-24 DIAGNOSIS — M25.571 ACUTE BILATERAL ANKLE PAIN: ICD-10-CM

## 2021-06-24 LAB
ALBUMIN SERPL-MCNC: 4.3 G/DL (ref 3.6–5.1)
ALBUMIN/GLOB SERPL: 1.9 (CALC) (ref 1–2.5)
ALP SERPL-CCNC: 63 U/L (ref 35–144)
ALT SERPL-CCNC: 19 U/L (ref 9–46)
AST SERPL-CCNC: 19 U/L (ref 10–35)
BASOPHILS # BLD AUTO: 41 CELLS/UL (ref 0–200)
BASOPHILS NFR BLD AUTO: 0.9 %
BILIRUB SERPL-MCNC: 0.4 MG/DL (ref 0.2–1.2)
BUN SERPL-MCNC: 14 MG/DL (ref 7–25)
BUN/CREAT SERPL: ABNORMAL (CALC) (ref 6–22)
CALCIUM SERPL-MCNC: 9.8 MG/DL (ref 8.6–10.3)
CHLORIDE SERPL-SCNC: 104 MMOL/L (ref 98–110)
CHOLEST SERPL-MCNC: 140 MG/DL
CHOLEST/HDLC SERPL: 3.1 (CALC)
CO2 SERPL-SCNC: 28 MMOL/L (ref 20–32)
CREAT SERPL-MCNC: 0.89 MG/DL (ref 0.7–1.18)
EOSINOPHIL # BLD AUTO: 113 CELLS/UL (ref 15–500)
EOSINOPHIL NFR BLD AUTO: 2.5 %
ERYTHROCYTE [DISTWIDTH] IN BLOOD BY AUTOMATED COUNT: 13.3 % (ref 11–15)
GLOBULIN SER CALC-MCNC: 2.3 G/DL (CALC) (ref 1.9–3.7)
GLUCOSE SERPL-MCNC: 117 MG/DL (ref 65–99)
HCT VFR BLD AUTO: 42.1 % (ref 38.5–50)
HDLC SERPL-MCNC: 45 MG/DL
HGB BLD-MCNC: 14.3 G/DL (ref 13.2–17.1)
LDLC SERPL CALC-MCNC: 78 MG/DL (CALC)
LYMPHOCYTES # BLD AUTO: 1526 CELLS/UL (ref 850–3900)
LYMPHOCYTES NFR BLD AUTO: 33.9 %
MCH RBC QN AUTO: 32.6 PG (ref 27–33)
MCHC RBC AUTO-ENTMCNC: 34 G/DL (ref 32–36)
MCV RBC AUTO: 96.1 FL (ref 80–100)
MONOCYTES # BLD AUTO: 405 CELLS/UL (ref 200–950)
MONOCYTES NFR BLD AUTO: 9 %
NEUTROPHILS # BLD AUTO: 2417 CELLS/UL (ref 1500–7800)
NEUTROPHILS NFR BLD AUTO: 53.7 %
NONHDLC SERPL-MCNC: 95 MG/DL (CALC)
PLATELET # BLD AUTO: 148 THOUSAND/UL (ref 140–400)
PMV BLD REES-ECKER: 13 FL (ref 7.5–12.5)
POTASSIUM SERPL-SCNC: 4.4 MMOL/L (ref 3.5–5.3)
PROT SERPL-MCNC: 6.6 G/DL (ref 6.1–8.1)
PSA FREE MFR SERPL: 15 % (CALC)
PSA FREE SERPL-MCNC: 0.4 NG/ML
PSA SERPL-MCNC: 2.6 NG/ML
RBC # BLD AUTO: 4.38 MILLION/UL (ref 4.2–5.8)
SL AMB EGFR AFRICAN AMERICAN: 100 ML/MIN/1.73M2
SL AMB EGFR NON AFRICAN AMERICAN: 87 ML/MIN/1.73M2
SODIUM SERPL-SCNC: 139 MMOL/L (ref 135–146)
TRIGL SERPL-MCNC: 86 MG/DL
TSH SERPL-ACNC: 1.95 MIU/L (ref 0.4–4.5)
WBC # BLD AUTO: 4.5 THOUSAND/UL (ref 3.8–10.8)

## 2021-06-24 PROCEDURE — 97112 NEUROMUSCULAR REEDUCATION: CPT

## 2021-06-24 PROCEDURE — 97110 THERAPEUTIC EXERCISES: CPT

## 2021-06-24 NOTE — PROGRESS NOTES
Daily Note     Today's date: 2021  Patient name: Al Martinez  : 1950  MRN: [id number]  Referring provider: Melvi Pérez DO  Dx:   Encounter Diagnosis     ICD-10-CM    1  Bilateral primary osteoarthritis of knee  M17 0    2  Ankle weakness  R29 898    3  Pain in both knees, unspecified chronicity  M25 561     M25 562    4  Acute bilateral ankle pain  M25 571     M25 572    5  Partial thenar atrophy, left  G56 02    6  Partial thenar atrophy, right  G56 01    7  Balance problem  R26 89             Subjective: iJan Morales reports he was on his hands and knees yesterday doing yard work, states he stretched following and felt pretty good  He states mild soreness following last PT session, but tolerable  Objective: See treatment diary below    Assessment: Dalton tolerated PT treatment well  Continued with focus on core strength and stability  Pt is challenged with current exercise program  Pt displays difficulty maintaining neurtral hips with s/l plank, contralateral hip drop worsens with fatigue  Instability present with bird dog, cues required for form  Prone planks added today  Pt fatigues with core stability with b/l LE movements  Rest breaks required between exercises  Pt would benefit from continued PT to further address impairments and maximize functional level  Plan: Continue per plan of care        Precautions: standard OA  Manual  6/15 6/22 6/24 5/13 5/19 5/21 5/27 6/ 6/3 6/8 6/10   Thumb jt mobs              R knee STM, PROM    JW             Exercise Diary  6/15 6/22 6/24  5/19 5/21 5/27 6/2 6/3 6/8 6/10   Soleus stretch     :30x3 ea :30x3 ea :30x3 ea  :30x3 ea      Calf stretch     :30x3 ea  :30x3 ea :30x3 ea   :30x3 ea     S/l thoracic rotation 3# 20x             LP low in /  3x10    130/ 3x10 140/ 3x10  150/ 3x10 150/ 3x10      LP mid in /  3x10    130/ 3x10 140/ 3x10  150/ 3x10  150/ 3x10       LP high in /  3x10    130/ 3x10 140/ 3x10  150/ 3x10  150/ 3x10       LP u/l high in PF     75/ 3x10 85/ 3x10 90/ 3x10 90/ 3x10      LP u/l mid in PF     75/ 3x10 85/ 3x10 90/ 3x10 90/ 3x10      LP u/l low in PF     75/ 3x10 85/ 3x10 90/ 3x10 90/ 3x10      TB side step              Hamstring and calf stretch strap :15x5 ea    :15x5ea :15x5 e  :15x5 ea :15x5 ea :15x5 ea :15x5 ea  :15x5 ea   ITB stretch :15x5 ea    :15x5 ea  :15x5 ea  :15x5 ea :15x5 ea :15x5 ea :15x5 ea :15x5 ea   PQS         :15x5 ea :15x5 ea :15x5 ea    Calf press high 170/  3x10     145/ 3x10 150/ 3x10  150/ 3x10       Calf press mid 170/  3x10     145/ 3x10 150/ 3x10 150/ 3x10       Calf press Low 170/  3x10     145/ 3x10 150/ 3x10 150/ 3x10       U/l HR iso mid K mid A              u/l HR              Fransisco punch              Stand hip abd              Goblet squat kettle atkins              Pallof press   25/ 3x10 ea       25/ 3x10 ea 20/ 3x10 ea    Pallof Rotation  20/ 3x10 ea       20/ 3x10 ea 20/ 3x10 ea    Squat overhead press              Shallow crunch  3x15       3x15 3x15    90/90/ tap   3x10      3x15 3x15    Bird dog    2x10            Flutter kicks   :30x3  :30x3       :30x3     S/l plank   :30x3 b/l :30x3 b/l       :30x3 b/l :30x3 b/l    Plank    :30x3            Straight leg RDL      10# 3x10 15# 3x10 15# 3x10       Lunge with 2 handrail 1 HHA x20 ea       1 Handrail x15 ea  1 HHA x20 ea       Squats 2 hand assist Mini 3x10      3x10 mini 3x10 mini        Piriformis stretch           :30x3    Pball pass   x10            TM  5' 5' 5'  5'  5' 5'  5' 5'    TA SLR    x30 b/l       3x10  3x10     S/l thoracic rot  3# 2x10 b/l         3# 2x10 b/l   3# 2x10 b/l    PETRA 3x10          3x10   PB LTR :05x20     :05x20 :05x20      :05x20

## 2021-06-25 ENCOUNTER — TELEPHONE (OUTPATIENT)
Dept: FAMILY MEDICINE CLINIC | Facility: CLINIC | Age: 71
End: 2021-06-25

## 2021-06-25 NOTE — TELEPHONE ENCOUNTER
----- Message from Danitza Dominguez DO sent at 6/25/2021  6:19 AM EDT -----  Labs are stable   Cont current Rx

## 2021-06-29 ENCOUNTER — OFFICE VISIT (OUTPATIENT)
Dept: PHYSICAL THERAPY | Facility: CLINIC | Age: 71
End: 2021-06-29
Payer: COMMERCIAL

## 2021-06-29 DIAGNOSIS — M25.572 ACUTE BILATERAL ANKLE PAIN: ICD-10-CM

## 2021-06-29 DIAGNOSIS — R26.89 BALANCE PROBLEM: ICD-10-CM

## 2021-06-29 DIAGNOSIS — M17.0 BILATERAL PRIMARY OSTEOARTHRITIS OF KNEE: Primary | ICD-10-CM

## 2021-06-29 DIAGNOSIS — M25.571 ACUTE BILATERAL ANKLE PAIN: ICD-10-CM

## 2021-06-29 DIAGNOSIS — R29.898 ANKLE WEAKNESS: ICD-10-CM

## 2021-06-29 DIAGNOSIS — M25.562 PAIN IN BOTH KNEES, UNSPECIFIED CHRONICITY: ICD-10-CM

## 2021-06-29 DIAGNOSIS — M25.561 PAIN IN BOTH KNEES, UNSPECIFIED CHRONICITY: ICD-10-CM

## 2021-06-29 PROCEDURE — 97110 THERAPEUTIC EXERCISES: CPT

## 2021-06-29 PROCEDURE — 97530 THERAPEUTIC ACTIVITIES: CPT

## 2021-06-29 NOTE — PROGRESS NOTES
Daily Note     Today's date: 2021  Patient name: Luciana Irvin  : 1950  MRN: [id number]  Referring provider: Isaac Harmon DO  Dx:   Encounter Diagnosis     ICD-10-CM    1  Bilateral primary osteoarthritis of knee  M17 0    2  Ankle weakness  R29 898    3  Pain in both knees, unspecified chronicity  M25 561     M25 562    4  Acute bilateral ankle pain  M25 571     M25 572    5  Balance problem  R26 89             Subjective: Melani Morgan reports he has been very active outside this morning  He states R knee feels a bit sore today  Objective: See treatment diary below    Assessment: Dalton tolerated PT treatment well  PT session focused on LE strength training and flexibility  He displayed improved hamstring and ITB flexibility with supine stretches  Reintroduced lunges, u/l heel raises, mini squats, and leg press activities  He performed leg press well, cues required for full range with squatting  Limited PF observed with u/l heel raise secondary to weakness  HHA x1 required with lunges leading with RLE due to instability and weakness  Pt would benefit from continued PT to further address impairments and maximize functional level  Plan: Continue per plan of care        Precautions: standard OA  Manual  6/15 6/22 6/24 6/29 5/19 5/21 5/27 6 6/3 6 6/10   Thumb jt mobs              R knee STM, PROM                 Exercise Diary  6/15 6/22 6/24 6/29  5/21 5/27 6 6/3 6/8 6/10   Soleus stretch      :30x3 ea :30x3 ea  :30x3 ea      Calf stretch      :30x3 ea :30x3 ea   :30x3 ea     S/l thoracic rotation 3# 20x             LP low in /  3x10   170/  3x10  140/ 3x10  150/ 3x10 150/ 3x10      LP mid in /  3x10   170/  3x10  140/ 3x10  150/ 3x10  150/ 3x10       LP high in /  3x10   170/  3x10  140/ 3x10  150/ 3x10  150/ 3x10       LP u/l high in PF      85/ 3x10 90/ 3x10 90/ 3x10      LP u/l mid in PF      85/ 3x10 90/ 3x10 90/ 3x10      LP u/l low in PF      85/ 3x10 90/ 3x10 90/ 3x10 TB side step              Hamstring and calf stretch strap :15x5 ea   :15x5 ea   :15x5 e  :15x5 ea :15x5 ea :15x5 ea :15x5 ea  :15x5 ea   ITB stretch :15x5 ea   :15x5 ea   :15x5 ea  :15x5 ea :15x5 ea :15x5 ea :15x5 ea :15x5 ea   PQS         :15x5 ea :15x5 ea :15x5 ea    Calf press high 170/  3x10   170/  3x10  145/ 3x10 150/ 3x10  150/ 3x10       Calf press mid 170/  3x10   170/  3x10  145/ 3x10 150/ 3x10 150/ 3x10       Calf press Low 170/  3x10   170/  3x10  145/ 3x10 150/ 3x10 150/ 3x10       U/l HR iso mid K mid A              u/l HR    3x10          [place] punch              Stand hip abd              Goblet squat kettle atkins              Pallof press   25/ 3x10 ea       25/ 3x10 ea 20/ 3x10 ea    Pallof Rotation  20/ 3x10 ea       20/ 3x10 ea 20/ 3x10 ea    Squat overhead press              Shallow crunch  3x15       3x15 3x15    90/90/ tap   3x10      3x15 3x15    Bird dog    2x10            Flutter kicks   :30x3  :30x3       :30x3     S/l plank   :30x3 b/l :30x3 b/l       :30x3 b/l :30x3 b/l    Plank    :30x3            Straight leg RDL     15# 3x10  15# 3x10 15# 3x10       Lunge with 2 handrail 1 HHA x20 ea    1 HHA x20 ea    1 Handrail x15 ea  1 HHA x20 ea       Squats 2 hand assist Mini 3x10    Mini 3x10   3x10 mini 3x10 mini        Piriformis stretch           :30x3    Pball pass   x10            TM  5' 5' 5' 5'   5' 5'  5' 5'    TA SLR    x30 b/l       3x10  3x10     S/l thoracic rot  3# 2x10 b/l         3# 2x10 b/l   3# 2x10 b/l    PETRA 3x10          3x10   PB LTR :05x20      :05x20      :05x20

## 2021-06-30 ENCOUNTER — OFFICE VISIT (OUTPATIENT)
Dept: FAMILY MEDICINE CLINIC | Facility: CLINIC | Age: 71
End: 2021-06-30
Payer: COMMERCIAL

## 2021-06-30 VITALS
WEIGHT: 228 LBS | OXYGEN SATURATION: 97 % | SYSTOLIC BLOOD PRESSURE: 120 MMHG | TEMPERATURE: 97.5 F | BODY MASS INDEX: 28.35 KG/M2 | DIASTOLIC BLOOD PRESSURE: 80 MMHG | HEIGHT: 75 IN | HEART RATE: 68 BPM

## 2021-06-30 DIAGNOSIS — I10 BENIGN ESSENTIAL HYPERTENSION: ICD-10-CM

## 2021-06-30 DIAGNOSIS — M17.0 PRIMARY OSTEOARTHRITIS OF BOTH KNEES: ICD-10-CM

## 2021-06-30 DIAGNOSIS — E11.40 TYPE 2 DIABETES MELLITUS WITH DIABETIC NEUROPATHY, WITHOUT LONG-TERM CURRENT USE OF INSULIN (HCC): Primary | ICD-10-CM

## 2021-06-30 LAB — SL AMB POCT HEMOGLOBIN AIC: 6 (ref ?–6.5)

## 2021-06-30 PROCEDURE — 1036F TOBACCO NON-USER: CPT | Performed by: FAMILY MEDICINE

## 2021-06-30 PROCEDURE — 3008F BODY MASS INDEX DOCD: CPT | Performed by: FAMILY MEDICINE

## 2021-06-30 PROCEDURE — 1160F RVW MEDS BY RX/DR IN RCRD: CPT | Performed by: FAMILY MEDICINE

## 2021-06-30 PROCEDURE — 3079F DIAST BP 80-89 MM HG: CPT | Performed by: FAMILY MEDICINE

## 2021-06-30 PROCEDURE — 3044F HG A1C LEVEL LT 7.0%: CPT | Performed by: FAMILY MEDICINE

## 2021-06-30 PROCEDURE — 83036 HEMOGLOBIN GLYCOSYLATED A1C: CPT | Performed by: FAMILY MEDICINE

## 2021-06-30 PROCEDURE — 99213 OFFICE O/P EST LOW 20 MIN: CPT | Performed by: FAMILY MEDICINE

## 2021-06-30 PROCEDURE — 3074F SYST BP LT 130 MM HG: CPT | Performed by: FAMILY MEDICINE

## 2021-06-30 NOTE — ASSESSMENT & PLAN NOTE
Khaipravin Cai has been eating more ice cream recently, otherwise has been trying to control blood sugars through diet and exercise  Dalton's goal is to get off metformin  Agreed to attempt discontinuation of metformin and recheck A1C in 3 months      Lab Results   Component Value Date    HGBA1C 6 0 06/30/2021

## 2021-06-30 NOTE — PROGRESS NOTES
Assessment/Plan:    Diabetic neuropathy, type II diabetes mellitus ([facility] [address] )  Lizz Carrero has been eating more ice cream recently, otherwise has been trying to control blood sugars through diet and exercise  Dalton's goal is to get off metformin  Agreed to attempt discontinuation of metformin and recheck A1C in 3 months  Lab Results   Component Value Date    HGBA1C 6 0 06/30/2021       Benign essential hypertension  Well controlled with Benicar  Continue current tx/rx  Osteoarthritis of both knees  Continues PT this past year without adequate relief of his OA  Is now wanting to pursue right TKR next year  Diagnoses and all orders for this visit:    Type 2 diabetes mellitus with diabetic neuropathy, without long-term current use of insulin ([facility])  -     POCT hemoglobin A1c    Benign essential hypertension    Primary osteoarthritis of both knees          Subjective:      Patient ID: Adama Robles is a 79 y o  male  Dr Arnaldo Barrios is here for follow up  Reports after a year of PT it is time for evaluation right TKR but wants to pursue next year  Daughter is going to [place] this year so Lizz Carrero has concerns regarding her heading to college  Lizz Carrero wants to trial cessation of metformin, control his diabetes through diet and exercise  He denies CP/SOB/GI/ issues  The following portions of the patient's history were reviewed and updated as appropriate: allergies, current medications, past family history, past medical history, past social history, past surgical history and problem list     Review of Systems   Constitutional: Negative  Negative for activity change, appetite change, chills, fatigue and fever  HENT: Negative  Negative for congestion, ear pain, postnasal drip and sinus pain  Eyes: Negative  Respiratory: Negative  Negative for cough and shortness of breath  Cardiovascular: Negative  Negative for chest pain and leg swelling  Gastrointestinal: Negative    Negative for constipation and diarrhea  Endocrine: Negative  Genitourinary: Negative  Negative for dysuria  Musculoskeletal: Positive for arthralgias  Skin: Negative  Allergic/Immunologic: Negative  Negative for immunocompromised state  Neurological: Negative  Negative for dizziness and light-headedness  Hematological: Negative  Psychiatric/Behavioral: Negative  Objective:      /80   Pulse 68   Temp 97 5 °F (36 4 °C)   Ht 6' 3" (1 905 m)   Wt 103 kg (228 lb)   SpO2 97%   BMI 28 50 kg/m²          Physical Exam  Vitals and nursing note reviewed  Constitutional:       Appearance: Normal appearance  He is normal weight  HENT:      Head: Normocephalic and atraumatic  Right Ear: Tympanic membrane, ear canal and external ear normal       Left Ear: Tympanic membrane, ear canal and external ear normal       Nose: Nose normal       Mouth/Throat:      Mouth: Mucous membranes are moist       Pharynx: Oropharynx is clear  Eyes:      Extraocular Movements: Extraocular movements intact  Conjunctiva/sclera: Conjunctivae normal       Pupils: Pupils are equal, round, and reactive to light  Cardiovascular:      Rate and Rhythm: Normal rate and regular rhythm  Pulses: Normal pulses  Heart sounds: Normal heart sounds  Pulmonary:      Effort: Pulmonary effort is normal       Breath sounds: Normal breath sounds  Abdominal:      General: Bowel sounds are normal       Palpations: Abdomen is soft  Musculoskeletal:         General: Normal range of motion  Cervical back: Normal range of motion and neck supple  Skin:     General: Skin is warm and dry  Capillary Refill: Capillary refill takes 2 to 3 seconds  Neurological:      General: No focal deficit present  Mental Status: He is alert and oriented to person, place, and time  Sensory: Sensory deficit present        Comments: Hx neuropathy b/l feet   Psychiatric:         Mood and Affect: Mood normal          Behavior: Behavior normal          Thought Content:  Thought content normal          Judgment: Judgment normal

## 2021-07-01 ENCOUNTER — OFFICE VISIT (OUTPATIENT)
Dept: PHYSICAL THERAPY | Facility: CLINIC | Age: 71
End: 2021-07-01
Payer: COMMERCIAL

## 2021-07-01 DIAGNOSIS — M25.571 ACUTE BILATERAL ANKLE PAIN: ICD-10-CM

## 2021-07-01 DIAGNOSIS — R29.898 ANKLE WEAKNESS: ICD-10-CM

## 2021-07-01 DIAGNOSIS — M25.561 PAIN IN BOTH KNEES, UNSPECIFIED CHRONICITY: ICD-10-CM

## 2021-07-01 DIAGNOSIS — M17.0 BILATERAL PRIMARY OSTEOARTHRITIS OF KNEE: Primary | ICD-10-CM

## 2021-07-01 DIAGNOSIS — R26.89 BALANCE PROBLEM: ICD-10-CM

## 2021-07-01 DIAGNOSIS — M25.572 ACUTE BILATERAL ANKLE PAIN: ICD-10-CM

## 2021-07-01 DIAGNOSIS — M25.562 PAIN IN BOTH KNEES, UNSPECIFIED CHRONICITY: ICD-10-CM

## 2021-07-01 PROCEDURE — 97110 THERAPEUTIC EXERCISES: CPT

## 2021-07-01 PROCEDURE — 97112 NEUROMUSCULAR REEDUCATION: CPT

## 2021-07-01 NOTE — PROGRESS NOTES
Daily Note     Today's date: 2021  Patient name: Mariano Brown  : 1950  MRN: [id number]  Referring provider: Ese Ferreira DO  Dx:   Encounter Diagnosis     ICD-10-CM    1  Bilateral primary osteoarthritis of knee  M17 0    2  Ankle weakness  R29 898    3  Pain in both knees, unspecified chronicity  M25 561     M25 562    4  Acute bilateral ankle pain  M25 571     M25 572    5  Balance problem  R26 89             Subjective: Linh Linton reports his R knee was sore following last PT session, sx's still present today  He brought in HEP and would like to review and update for NV and be D/C  Objective: See treatment diary below    Assessment: Dalton tolerated PT treatment well  Modified session performed today d/t pt tardiness and time conflict  Focused on core stabilization and strengthening today  Pt displays improved stability with table exercises, however is still very challenged  Cues required to avoid contralateral hip drop with side planks, exacerbatted with fatigue  Update HEP for NV  Pt to be D/C to HEP NV  Plan: Continue per plan of care        Precautions: standard OA  Manual  6/15 6/22 6/24 6/29 5/19 5/21 5/27 6/2 6/3 6 6/10   Thumb jt mobs              R knee STM, PROM                 Exercise Diary  6/15 6/22 6/24 6/29 7/1  5/27 6/2 6/3 6 6/10   Soleus stretch       :30x3 ea  :30x3 ea      Calf stretch       :30x3 ea   :30x3 ea     S/l thoracic rotation 3# 20x             LP low in /  3x10   170/  3x10   150/ 3x10 150/ 3x10      LP mid in /  3x10   170/  3x10   150/ 3x10  150/ 3x10       LP high in /  3x10   170/  3x10   150/ 3x10  150/ 3x10       LP u/l high in PF       90/ 3x10 90/ 3x10      LP u/l mid in PF       90/ 3x10 90/ 3x10      LP u/l low in PF       90/ 3x10 90/ 3x10      TB side step              Hamstring and calf stretch strap :15x5 ea   :15x5 ea  :15x5 ea  :15x5 ea :15x5 ea :15x5 ea :15x5 ea  :15x5 ea   ITB stretch :15x5 ea   :15x5 ea    :15x5 ea :15x5 ea :15x5 ea :15x5 ea :15x5 ea   PQS         :15x5 ea :15x5 ea :15x5 ea    Calf press high 170/  3x10   170/  3x10   150/ 3x10  150/ 3x10       Calf press mid 170/  3x10   170/  3x10   150/ 3x10 150/ 3x10       Calf press Low 170/  3x10   170/  3x10   150/ 3x10 150/ 3x10       U/l HR iso mid K mid A              u/l HR    3x10          Fransisco punch              Stand hip abd              Goblet squat kettle atkins              Pallof press   25/ 3x10 ea       25/ 3x10 ea 20/ 3x10 ea    Pallof Rotation  20/ 3x10 ea       20/ 3x10 ea 20/ 3x10 ea    Squat overhead press              Shallow crunch  3x15       3x15 3x15    90/90/ tap   3x10      3x15 3x15    Bird dog    2x10   2x10          Flutter kicks   :30x3  :30x3   :30x3     :30x3     S/l plank   :30x3 b/l :30x3 b/l   :30x3    :30x3 b/l :30x3 b/l    Plank    :30x3   :30x3         Straight leg RDL     15# 3x10   15# 3x10       Lunge with 2 handrail 1 HHA x20 ea    1 HHA x20 ea    1 Handrail x15 ea  1 HHA x20 ea       Squats 2 hand assist Mini 3x10    Mini 3x10    3x10 mini        Piriformis stretch           :30x3    Pball pass   x10            TM  5' 5' 5' 5'   5' 5'  5' 5'    TA SLR    x30 b/l       3x10  3x10     S/l thoracic rot  3# 2x10 b/l         3# 2x10 b/l   3# 2x10 b/l    PETRA 3x10          3x10   PB LTR :05x20           :05x20

## 2021-07-07 ENCOUNTER — APPOINTMENT (OUTPATIENT)
Dept: PHYSICAL THERAPY | Facility: CLINIC | Age: 71
End: 2021-07-07
Payer: COMMERCIAL

## 2021-07-12 ENCOUNTER — OFFICE VISIT (OUTPATIENT)
Dept: PHYSICAL THERAPY | Facility: CLINIC | Age: 71
End: 2021-07-12
Payer: COMMERCIAL

## 2021-07-12 DIAGNOSIS — M25.562 PAIN IN BOTH KNEES, UNSPECIFIED CHRONICITY: ICD-10-CM

## 2021-07-12 DIAGNOSIS — G56.01 PARTIAL THENAR ATROPHY, RIGHT: ICD-10-CM

## 2021-07-12 DIAGNOSIS — M25.561 PAIN IN BOTH KNEES, UNSPECIFIED CHRONICITY: ICD-10-CM

## 2021-07-12 DIAGNOSIS — M25.571 ACUTE BILATERAL ANKLE PAIN: ICD-10-CM

## 2021-07-12 DIAGNOSIS — R26.89 BALANCE PROBLEM: Primary | ICD-10-CM

## 2021-07-12 DIAGNOSIS — M17.0 BILATERAL PRIMARY OSTEOARTHRITIS OF KNEE: ICD-10-CM

## 2021-07-12 DIAGNOSIS — M25.572 ACUTE BILATERAL ANKLE PAIN: ICD-10-CM

## 2021-07-12 DIAGNOSIS — G56.02 PARTIAL THENAR ATROPHY, LEFT: ICD-10-CM

## 2021-07-12 DIAGNOSIS — R29.898 ANKLE WEAKNESS: ICD-10-CM

## 2021-07-12 PROCEDURE — 97530 THERAPEUTIC ACTIVITIES: CPT | Performed by: PHYSICAL THERAPIST

## 2021-07-12 PROCEDURE — 97140 MANUAL THERAPY 1/> REGIONS: CPT | Performed by: PHYSICAL THERAPIST

## 2021-07-12 PROCEDURE — 97110 THERAPEUTIC EXERCISES: CPT | Performed by: PHYSICAL THERAPIST

## 2021-07-12 NOTE — PROGRESS NOTES
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    6  Pain in both knees, unspecified chronicity  M25 561     M25 562    7  Acute bilateral ankle pain  M25 571     M25 572             Subjective: Pt reported that he has been performing his HEP relatively consistently  Noted that he was able to do power washing over the weekend with minimal onset of pain  Objective: See treatment diary below    Assessment: Tolerated treatment well  Patient demonstrated fatigue post treatment, exhibited good technique with therapeutic exercises and would benefit from continued PT  Plan: Continue per plan of care        Precautions: standard OA  Manual               Thumb jt mobs       JZ       R knee STM, PROM       JZ          Exercise Diary  6/15 6/22 6/24 6/29 7/1 7/12    6/8 6/10   Soleus stretch              Calf stretch              S/l thoracic rotation 3# 20x             LP low in /  3x10   170/  3x10          LP mid in /  3x10   170/  3x10          LP high in /  3x10   170/  3x10          LP u/l high in PF              LP u/l mid in PF              LP u/l low in PF              TB side step              Hamstring and calf stretch strap :15x5 ea   :15x5 ea  :15x5 ea :15x5    :15x5 ea  :15x5 ea   ITB stretch :15x5 ea   :15x5 ea       :15x5 ea :15x5 ea   PQS          :15x5 ea :15x5 ea    Calf press high 170/  3x10   170/  3x10          Calf press mid 170/  3x10   170/  3x10          Calf press Low 170/  3x10   170/  3x10          U/l HR iso mid K mid A              u/l HR    3x10  3x10        [place]onson punch              Sh flexion      5# 3x10        Sh abduction      5# 3x10         press      10# 3x10        Overhead tricep      10# 3x10        Fransisco ER 90-90       1/ 3x10        Goblet squat kettle atkins              Pallof press   25/ 3x10 ea        20/ 3x10 ea    Pallof Rotation  20/ 3x10 ea        20/ 3x10 ea    Squat overhead press              Shallow crunch  3x15        3x15    90/90/ tap   3x10       3x15    Bird dog    2x10   2x10          Flutter kicks   :30x3  :30x3   :30x3          S/l plank   :30x3 b/l :30x3 b/l   :30x3     :30x3 b/l    Plank    :30x3   :30x3         Straight leg RDL     15# 3x10          Lunge with 2 handrail 1 HHA x20 ea    1 HHA x20 ea   1 HHA x20 ea        Squats 2 hand assist Mini 3x10    Mini 3x10   Mini 3x10        Piriformis stretch           :30x3    Pball pass   x10            TM  5' 5' 5' 5' 5'  5'    5'    TA SLR    x30 b/l        3x10     S/l thoracic rot  3# 2x10 b/l           3# 2x10 b/l    PETRA 3x10          3x10   PB LTR :05x20           :05x20

## 2021-07-15 ENCOUNTER — OFFICE VISIT (OUTPATIENT)
Dept: PHYSICAL THERAPY | Facility: CLINIC | Age: 71
End: 2021-07-15
Payer: COMMERCIAL

## 2021-07-15 DIAGNOSIS — M17.0 BILATERAL PRIMARY OSTEOARTHRITIS OF KNEE: ICD-10-CM

## 2021-07-15 DIAGNOSIS — M25.561 PAIN IN BOTH KNEES, UNSPECIFIED CHRONICITY: ICD-10-CM

## 2021-07-15 DIAGNOSIS — M25.571 ACUTE BILATERAL ANKLE PAIN: ICD-10-CM

## 2021-07-15 DIAGNOSIS — R26.89 BALANCE PROBLEM: ICD-10-CM

## 2021-07-15 DIAGNOSIS — G56.01 PARTIAL THENAR ATROPHY, RIGHT: Primary | ICD-10-CM

## 2021-07-15 DIAGNOSIS — R29.898 ANKLE WEAKNESS: ICD-10-CM

## 2021-07-15 DIAGNOSIS — M25.562 PAIN IN BOTH KNEES, UNSPECIFIED CHRONICITY: ICD-10-CM

## 2021-07-15 DIAGNOSIS — M25.572 ACUTE BILATERAL ANKLE PAIN: ICD-10-CM

## 2021-07-15 DIAGNOSIS — G56.02 PARTIAL THENAR ATROPHY, LEFT: ICD-10-CM

## 2021-07-15 PROCEDURE — 97530 THERAPEUTIC ACTIVITIES: CPT | Performed by: PHYSICAL THERAPIST

## 2021-07-15 PROCEDURE — 97140 MANUAL THERAPY 1/> REGIONS: CPT | Performed by: PHYSICAL THERAPIST

## 2021-07-15 PROCEDURE — 97110 THERAPEUTIC EXERCISES: CPT | Performed by: PHYSICAL THERAPIST

## 2021-07-15 PROCEDURE — 97112 NEUROMUSCULAR REEDUCATION: CPT | Performed by: PHYSICAL THERAPIST

## 2021-07-15 NOTE — PROGRESS NOTES
Daily Note     Today's date: 7/15/2021  Patient name: Mariano Brown  : 1950  MRN: [id number]  Referring provider: Ese Ferreira DO  Dx:   Encounter Diagnosis     ICD-10-CM    1  Partial thenar atrophy, right  G56 01    2  Balance problem  R26 89    3  Bilateral primary osteoarthritis of knee  M17 0    4  Pain in both knees, unspecified chronicity  M25 561     M25 562    5  Acute bilateral ankle pain  M25 571     M25 572    6  Ankle weakness  R29 898    7  Partial thenar atrophy, left  G56 02             Subjective: Pt reported that he has been performing more yard work than normal without onset of pain  Objective: See treatment diary below    Assessment: Tolerated treatment well  Patient demonstrated fatigue post treatment, exhibited good technique with therapeutic exercises and would benefit from continued PT  Plan: Continue per plan of care        Precautions: standard OA  Manual        7/12 7/15      Thumb jt mobs       JZ JZ      R knee STM, PROM       JZ JZ         Exercise Diary  6/15 6/22 6/24 6/29 7/1 7/12 7/15   6 6/10   Soleus stretch              Calf stretch              S/l thoracic rotation 3# 20x             LP low in /  3x10   170/  3x10   170/ 3x10       LP mid in /  3x10   170/  3x10   170/ 3x10       LP high in /  3x10   170/  3x10   170/ 3x10       LP u/l high in PF              LP u/l mid in PF              LP u/l low in PF              TB side step              Hamstring and calf stretch strap :15x5 ea   :15x5 ea  :15x5 ea :15x5    :15x5 ea  :15x5 ea   ITB stretch :15x5 ea   :15x5 ea       :15x5 ea :15x5 ea   PQS          :15x5 ea :15x5 ea    Calf press high 170/  3x10   170/  3x10          Calf press mid 170/  3x10   170/  3x10          Calf press Low 170/  3x10   170/  3x10          U/l HR iso mid K mid A              u/l HR    3x10  3x10        Bicep curl       10# 3x10       Fransisco row       30/ 3x10       Fransisco punch              Sh flexion      5# 3x10 5# 3x10       Sh abduction      5# 3x10 5# 3x10        press      10# 3x10 10# 3x10       Overhead tricep      10# 3x10 10# 3x10       [employer] [facility] ER 90-90       1/ 3x10 1/ 3x10       Sh Y        1/ 3x10       Pallof press   25/ 3x10 ea        20/ 3x10 ea    Pallof Rotation  20/ 3x10 ea        20/ 3x10 ea    Squat overhead press              Shallow crunch  3x15        3x15    90/90/ tap   3x10       3x15    Bird dog    2x10   2x10          Flutter kicks   :30x3  :30x3   :30x3          S/l plank   :30x3 b/l :30x3 b/l   :30x3     :30x3 b/l    Plank    :30x3   :30x3         Straight leg RDL     15# 3x10          Lunge with 2 handrail 1 HHA x20 ea    1 HHA x20 ea   1 HHA x20 ea        Squats 2 hand assist Mini 3x10    Mini 3x10   Mini 3x10        Piriformis stretch           :30x3    Pball pass   x10            TM  5' 5' 5' 5' 5'  5' 5'    5'    TA SLR    x30 b/l        3x10     S/l thoracic rot  3# 2x10 b/l           3# 2x10 b/l    PETRA 3x10          3x10   PB LTR :05x20           :05x20

## 2021-07-20 ENCOUNTER — OFFICE VISIT (OUTPATIENT)
Dept: PHYSICAL THERAPY | Facility: CLINIC | Age: 71
End: 2021-07-20
Payer: COMMERCIAL

## 2021-07-20 DIAGNOSIS — R29.898 ANKLE WEAKNESS: ICD-10-CM

## 2021-07-20 DIAGNOSIS — G56.02 PARTIAL THENAR ATROPHY, LEFT: ICD-10-CM

## 2021-07-20 DIAGNOSIS — G56.01 PARTIAL THENAR ATROPHY, RIGHT: Primary | ICD-10-CM

## 2021-07-20 DIAGNOSIS — M17.0 BILATERAL PRIMARY OSTEOARTHRITIS OF KNEE: ICD-10-CM

## 2021-07-20 DIAGNOSIS — M25.561 PAIN IN BOTH KNEES, UNSPECIFIED CHRONICITY: ICD-10-CM

## 2021-07-20 DIAGNOSIS — M25.562 PAIN IN BOTH KNEES, UNSPECIFIED CHRONICITY: ICD-10-CM

## 2021-07-20 DIAGNOSIS — R26.89 BALANCE PROBLEM: ICD-10-CM

## 2021-07-20 DIAGNOSIS — M25.572 ACUTE BILATERAL ANKLE PAIN: ICD-10-CM

## 2021-07-20 DIAGNOSIS — M25.571 ACUTE BILATERAL ANKLE PAIN: ICD-10-CM

## 2021-07-20 PROCEDURE — 97110 THERAPEUTIC EXERCISES: CPT

## 2021-07-20 PROCEDURE — 97140 MANUAL THERAPY 1/> REGIONS: CPT

## 2021-07-20 PROCEDURE — 97530 THERAPEUTIC ACTIVITIES: CPT

## 2021-07-20 NOTE — PROGRESS NOTES
Daily Note     Today's date: 2021  Patient name: Lauro Mtz  : 1950  MRN: [id number]  Referring provider: Pily Walter DO  Dx:   Encounter Diagnosis     ICD-10-CM    1  Partial thenar atrophy, right  G56 01    2  Balance problem  R26 89    3  Bilateral primary osteoarthritis of knee  M17 0    4  Pain in both knees, unspecified chronicity  M25 561     M25 562    5  Acute bilateral ankle pain  M25 571     M25 572    6  Ankle weakness  R29 898    7  Partial thenar atrophy, left  G56 02             Subjective: Trang Harding reports he has been completing a lot of mulching around his home, noting increased knee soreness following activity  He states sometimes sx's wake him up at night  Objective: See treatment diary below    Assessment: Dalton tolerated PT treatment well  Initiated PT session with warmup on treadmill  Knee PROM performed with good tolerance, mild tightness and discomfort present with knee flexion  He performed prescribed exercises well  Pt is challenged with addition of scapular strength training  Cues required to correct form with ER 90/90 and shoulder standing Y  Concluded with CP to knee  Pt would benefit from continued PT to further address impairments and maximize functional level  Plan: Continue per plan of care        Precautions: standard OA  Manual        7/12 7/15 7/20     Thumb jt mobs       JZ JDIONICIO      R knee STM, PROM       JZ JZ JW        Exercise Diary  6/15 6/22 6/24 6/29 7/1 7/12 7/15 7/20  6 6/10   Soleus stretch              Calf stretch              S/l thoracic rotation 3# 20x             LP low in /  3x10   170/  3x10   170/ 3x10 170/ 3x10      LP mid in /  3x10   170/  3x10   170/ 3x10 170/ 3x10      LP high in /  3x10   170/  3x10   170/ 3x10 170/ 3x10      LP u/l high in PF              LP u/l mid in PF              LP u/l low in PF              TB side step              Hamstring and calf stretch strap :15x5 ea   :15x5 ea  :15x5 ea :15x5 :15x5 ea  :15x5 ea   ITB stretch :15x5 ea   :15x5 ea       :15x5 ea :15x5 ea   PQS          :15x5 ea :15x5 ea    Calf press high 170/  3x10   170/  3x10    170/  3x10      Calf press mid 170/  3x10   170/  3x10    170/  3x10      Calf press Low 170/  3x10   170/  3x10    170/  3x10      U/l HR iso mid K mid A              u/l HR    3x10  3x10        Bicep curl       10# 3x10       [place] row       30/ 3x10 30/ 3x10       [place]utton punch              Sh flexion      5# 3x10 5# 3x10       Sh abduction      5# 3x10 5# 3x10        press      10# 3x10 10# 3x10       Overhead tricep      10# 3x10 10# 3x10       [place] ER 90-90       1/ 3x10 1/ 3x10 1/ 3x10      Sh Y        1/ 3x10 3/ 3x10      Pallof press   25/ 3x10 ea        20/ 3x10 ea    Pallof Rotation  20/ 3x10 ea        20/ 3x10 ea    Squat overhead press              Shallow crunch  3x15        3x15    90/90/ tap   3x10       3x15    Bird dog    2x10   2x10          Flutter kicks   :30x3  :30x3   :30x3          S/l plank   :30x3 b/l :30x3 b/l   :30x3     :30x3 b/l    Plank    :30x3   :30x3         Straight leg RDL     15# 3x10          Lunge with 2 handrail 1 HHA x20 ea    1 HHA x20 ea   1 HHA x20 ea        Squats 2 hand assist Mini 3x10    Mini 3x10   Mini 3x10        Piriformis stretch           :30x3    Pball pass   x10            TM  5' 5' 5' 5' 5'  5' 5'  5'   5'    TA SLR    x30 b/l        3x10     S/l thoracic rot  3# 2x10 b/l           3# 2x10 b/l    PETRA 3x10          3x10   PB LTR :05x20           :05x20

## 2021-07-23 ENCOUNTER — OFFICE VISIT (OUTPATIENT)
Dept: PHYSICAL THERAPY | Facility: CLINIC | Age: 71
End: 2021-07-23
Payer: COMMERCIAL

## 2021-07-23 DIAGNOSIS — M25.561 PAIN IN BOTH KNEES, UNSPECIFIED CHRONICITY: ICD-10-CM

## 2021-07-23 DIAGNOSIS — R26.89 BALANCE PROBLEM: ICD-10-CM

## 2021-07-23 DIAGNOSIS — M17.0 BILATERAL PRIMARY OSTEOARTHRITIS OF KNEE: ICD-10-CM

## 2021-07-23 DIAGNOSIS — M25.562 PAIN IN BOTH KNEES, UNSPECIFIED CHRONICITY: ICD-10-CM

## 2021-07-23 DIAGNOSIS — M25.572 ACUTE BILATERAL ANKLE PAIN: ICD-10-CM

## 2021-07-23 DIAGNOSIS — G56.02 PARTIAL THENAR ATROPHY, LEFT: ICD-10-CM

## 2021-07-23 DIAGNOSIS — R29.898 ANKLE WEAKNESS: ICD-10-CM

## 2021-07-23 DIAGNOSIS — G56.01 PARTIAL THENAR ATROPHY, RIGHT: Primary | ICD-10-CM

## 2021-07-23 DIAGNOSIS — M25.571 ACUTE BILATERAL ANKLE PAIN: ICD-10-CM

## 2021-07-23 PROCEDURE — 97110 THERAPEUTIC EXERCISES: CPT

## 2021-07-23 PROCEDURE — 97140 MANUAL THERAPY 1/> REGIONS: CPT

## 2021-07-23 NOTE — PROGRESS NOTES
Daily Note     Today's date: 2021  Patient name: Shalonda Brady  : 1950  MRN: [id number]  Referring provider: Skinny Orourke DO  Dx:   Encounter Diagnosis     ICD-10-CM    1  Partial thenar atrophy, right  G56 01    2  Balance problem  R26 89    3  Bilateral primary osteoarthritis of knee  M17 0    4  Pain in both knees, unspecified chronicity  M25 561     M25 562    5  Acute bilateral ankle pain  M25 571     M25 572    6  Ankle weakness  R29 898    7  Partial thenar atrophy, left  G56 02             Subjective: Briseida Mead reports he had a recent fall yesterday while working outside  He states he tripped over a weed, causing a fall and rolling motion  He reports his low back, b/l knees, and R thumb is very sore today  Objective: See treatment diary below    Assessment: Dalton tolerated PT treatment well  Modified program performed today d/t soreness after recent fall  Initiated PT session with MH to lumbar spine with gentle stretching  STM and stretching performed to thenar eminence,mild discomfort present  The remainder of the session focused on lumbar mobility and LE stretching  Resume strength training NV  Pt noted improved sx's post session  Pt would benefit from continued PT to further address impairments and maximize functional level  Plan: Continue per plan of care        Precautions: standard OA  Manual        7/12 7/15 7/20 7/23    Thumb jt mobs       JZ JZ  STM JW     R knee STM, PROM       JZ JZ JW        Exercise Diary  6/15 6/22 6/24 6/29 7/1 7/12 7/15 7/20 7/23  6/10   Soleus stretch              Calf stretch              S/l thoracic rotation 3# 20x             LP low in /  3x10   170/  3x10   170/ 3x10 170/ 3x10      LP mid in /  3x10   170/  3x10   170/ 3x10 170/ 3x10      LP high in /  3x10   170/  3x10   170/ 3x10 170/ 3x10      LP u/l high in PF              LP u/l mid in PF              LP u/l low in PF              TB side step              Hamstring and calf stretch strap :15x5 ea   :15x5 ea  :15x5 ea :15x5   :30x3  :15x5 ea   ITB stretch :15x5 ea   :15x5 ea      :30x3  :15x5 ea   PQS         :30x3  :15x5 ea    Calf press high 170/  3x10   170/  3x10    170/  3x10      Calf press mid 170/  3x10   170/  3x10    170/  3x10      Calf press Low 170/  3x10   170/  3x10    170/  3x10      U/l HR iso mid K mid A              u/l HR    3x10  3x10        Bicep curl       10# 3x10       Fransisco row       30/ 3x10 30/ 3x10       Fransisco punch              Sh flexion      5# 3x10 5# 3x10       Sh abduction      5# 3x10 5# 3x10        press      10# 3x10 10# 3x10       Overhead tricep      10# 3x10 10# 3x10       Fransisco ER 90-90       1/ 3x10 1/ 3x10 1/ 3x10      Sh Y        1/ 3x10 3/ 3x10      Pallof press   25/ 3x10 ea            Pallof Rotation  20/ 3x10 ea            Squat overhead press              Shallow crunch  3x15            90/90/ tap   3x10           Bird dog    2x10   2x10          Flutter kicks   :30x3  :30x3   :30x3          S/l plank   :30x3 b/l :30x3 b/l   :30x3         Plank    :30x3   :30x3         Straight leg RDL     15# 3x10          Lunge with 2 handrail 1 HHA x20 ea    1 HHA x20 ea   1 HHA x20 ea        Squats 2 hand assist Mini 3x10    Mini 3x10   Mini 3x10        Piriformis stretch           :30x3    Pball pass   x10            TM  5' 5' 5' 5' 5'  5' 5'  5'       TA SLR    x30 b/l            S/l thoracic rot  3# 2x10 b/l           3# 2x10 b/l    PETRA 3x10          3x10   PB LTR :05x20         :05x20  :05x20   Pball roll out          :10x10 fwd :10x5 lat     Pball DKTC          :10x10

## 2021-07-27 ENCOUNTER — OFFICE VISIT (OUTPATIENT)
Dept: PHYSICAL THERAPY | Facility: CLINIC | Age: 71
End: 2021-07-27
Payer: COMMERCIAL

## 2021-07-27 DIAGNOSIS — M25.572 ACUTE BILATERAL ANKLE PAIN: ICD-10-CM

## 2021-07-27 DIAGNOSIS — G56.01 PARTIAL THENAR ATROPHY, RIGHT: Primary | ICD-10-CM

## 2021-07-27 DIAGNOSIS — M25.571 ACUTE BILATERAL ANKLE PAIN: ICD-10-CM

## 2021-07-27 DIAGNOSIS — M25.561 PAIN IN BOTH KNEES, UNSPECIFIED CHRONICITY: ICD-10-CM

## 2021-07-27 DIAGNOSIS — R26.89 BALANCE PROBLEM: ICD-10-CM

## 2021-07-27 DIAGNOSIS — R29.898 ANKLE WEAKNESS: ICD-10-CM

## 2021-07-27 DIAGNOSIS — M17.0 BILATERAL PRIMARY OSTEOARTHRITIS OF KNEE: ICD-10-CM

## 2021-07-27 DIAGNOSIS — M25.562 PAIN IN BOTH KNEES, UNSPECIFIED CHRONICITY: ICD-10-CM

## 2021-07-27 DIAGNOSIS — G56.02 PARTIAL THENAR ATROPHY, LEFT: ICD-10-CM

## 2021-07-27 PROCEDURE — 97110 THERAPEUTIC EXERCISES: CPT

## 2021-07-27 PROCEDURE — 97530 THERAPEUTIC ACTIVITIES: CPT

## 2021-07-27 NOTE — PROGRESS NOTES
Daily Note     Today's date: 2021  Patient name: Waqas Purdy  : 1950  MRN: [id number]  Referring provider: Lei Puente DO  Dx:   Encounter Diagnosis     ICD-10-CM    1  Partial thenar atrophy, right  G56 01    2  Balance problem  R26 89    3  Bilateral primary osteoarthritis of knee  M17 0    4  Pain in both knees, unspecified chronicity  M25 561     M25 562    5  Acute bilateral ankle pain  M25 571     M25 572    6  Ankle weakness  R29 898    7  Partial thenar atrophy, left  G56 02             Subjective: Glenn Esparza reports he is feeling better since LV, noting low back discomfort has subsided a lot  He states b/l knees feel very stiff, most notable when going up and down the steps  Objective: See treatment diary below    Assessment: Dalton tolerated PT treatment well  Initiated PT session with warmup on elliptical  Today's session focused on LE stretching and stair navigation  He displays limited flexibility in R knee secondary to pain  He performed step up w/ minimal difficulty and w/o pain  Pt was very challenged with lateral and forward step downs, lower step height required to complete  Greater challenged present on RLE  Concluded with CP  Pt would benefit from continued PT to further address impairments and maximize functional level  Plan: Continue per plan of care        Precautions: standard OA  Manual        7/12 7/15 7/20 7/23 7/27   Thumb jt mobs       JZ JZ  STM JW     R knee STM, PROM       JZ JZ JW        Exercise Diary  6/15 6/22 6/24 6/29 7/1 7/12 7/15 7/20 7/23 7/27    Soleus stretch              Calf stretch          Wedge :30x3    S/l thoracic rotation 3# 20x             LP low in /  3x10   170/  3x10   170/ 3x10 170/ 3x10      LP mid in /  3x10   170/  3x10   170/ 3x10 170/ 3x10      LP high in /  3x10   170/  3x10   170/ 3x10 170/ 3x10      LP u/l high in PF              LP u/l mid in PF              LP u/l low in PF              Step down           6" 3x10    Lateral step down           6" 3x10    Step up          8" 3x10    TB side step              Hamstring and calf stretch strap :15x5 ea   :15x5 ea  :15x5 ea :15x5   :30x3 :30x3    ITB stretch :15x5 ea   :15x5 ea      :30x3 :30x3    PQS         :30x3 :30x3    Calf press high 170/  3x10   170/  3x10    170/  3x10      Calf press mid 170/  3x10   170/  3x10    170/  3x10      Calf press Low 170/  3x10   170/  3x10    170/  3x10      U/l HR iso mid K mid A              u/l HR    3x10  3x10        Bicep curl       10# 3x10       Fransisco row       30/ 3x10 30/ 3x10       Fransisco punch              Sh flexion      5# 3x10 5# 3x10       Sh abduction      5# 3x10 5# 3x10        press      10# 3x10 10# 3x10       Overhead tricep      10# 3x10 10# 3x10       [place] ER 90-90       1/ 3x10 1/ 3x10 1/ 3x10      Sh Y        1/ 3x10 3/ 3x10      Pallof press   25/ 3x10 ea            Pallof Rotation  20/ 3x10 ea            Squat overhead press              Shallow crunch  3x15            90/90/ tap   3x10           Bird dog    2x10   2x10          Flutter kicks   :30x3  :30x3   :30x3          S/l plank   :30x3 b/l :30x3 b/l   :30x3         Plank    :30x3   :30x3         Straight leg RDL     15# 3x10          Lunge with 2 handrail 1 HHA x20 ea    1 HHA x20 ea   1 HHA x20 ea        Squats 2 hand assist Mini 3x10    Mini 3x10   Mini 3x10        Piriformis stretch              Pball pass   x10            TM  5' 5' 5' 5' 5'  5' 5'  5'   5'    TA SLR    x30 b/l            S/l thoracic rot  3# 2x10 b/l              PETRA 3x10         30x    PB LTR :05x20         :05x20     Pball roll out          :10x10 fwd :10x5 lat     Pball DKTC          :10x10

## 2021-07-30 ENCOUNTER — OFFICE VISIT (OUTPATIENT)
Dept: PHYSICAL THERAPY | Facility: CLINIC | Age: 71
End: 2021-07-30
Payer: COMMERCIAL

## 2021-07-30 DIAGNOSIS — M25.571 ACUTE BILATERAL ANKLE PAIN: ICD-10-CM

## 2021-07-30 DIAGNOSIS — M25.562 PAIN IN BOTH KNEES, UNSPECIFIED CHRONICITY: ICD-10-CM

## 2021-07-30 DIAGNOSIS — R26.89 BALANCE PROBLEM: ICD-10-CM

## 2021-07-30 DIAGNOSIS — M25.572 ACUTE BILATERAL ANKLE PAIN: ICD-10-CM

## 2021-07-30 DIAGNOSIS — R29.898 ANKLE WEAKNESS: ICD-10-CM

## 2021-07-30 DIAGNOSIS — G56.01 PARTIAL THENAR ATROPHY, RIGHT: Primary | ICD-10-CM

## 2021-07-30 DIAGNOSIS — G56.02 PARTIAL THENAR ATROPHY, LEFT: ICD-10-CM

## 2021-07-30 DIAGNOSIS — M17.0 BILATERAL PRIMARY OSTEOARTHRITIS OF KNEE: ICD-10-CM

## 2021-07-30 DIAGNOSIS — M25.561 PAIN IN BOTH KNEES, UNSPECIFIED CHRONICITY: ICD-10-CM

## 2021-07-30 PROCEDURE — 97530 THERAPEUTIC ACTIVITIES: CPT

## 2021-07-30 PROCEDURE — 97110 THERAPEUTIC EXERCISES: CPT

## 2021-07-30 NOTE — PROGRESS NOTES
Daily Note     Today's date: 2021  Patient name: Janice Schaefer  : 1950  MRN: [id number]  Referring provider: Max Bray DO  Dx:   Encounter Diagnosis     ICD-10-CM    1  Partial thenar atrophy, right  G56 01    2  Balance problem  R26 89    3  Bilateral primary osteoarthritis of knee  M17 0    4  Pain in both knees, unspecified chronicity  M25 561     M25 562    5  Acute bilateral ankle pain  M25 571     M25 572    6  Ankle weakness  R29 898    7  Partial thenar atrophy, left  G56 02             Subjective: Lorraine Magaña reports the program we did at last PT session helped his knee pain significantly  He states L knee is feeling he is feeling better, R knee is still bothersome  Pt requested to continued with same POC  Objective: See treatment diary below    Assessment: Dalton tolerated PT treatment well  Continued with current exercise program with good tolerance  Pt performs prescribed exercises with good technique, little cueing required  Pt was able to complete 8" step ups w/o b/l knee pain  Greater challenge present with fwd/lat step downs, discomfort noted with RLE unable to complete full reps  Concluded with CP  Pt would benefit from continued PT to further address impairments and maximize functional level  Plan: Continue per plan of care        Precautions: standard OA  Manual  7/30      7/12 7/15 7/20 7/23 7/27   Thumb jt mobs       JZ JZ  STM JW     R knee STM, PROM       JZ JZ JW        Exercise Diary  6/15 6/22 6/24 6/29 7/1 7/12 7/15 7/20 7/23 7/27 7/30   Soleus stretch              Calf stretch          Wedge :30x3 Wedge :30x3   S/l thoracic rotation 3# 20x             LP low in /  3x10   170/  3x10   170/ 3x10 170/ 3x10      LP mid in /  3x10   170/  3x10   170/ 3x10 170/ 3x10      LP high in /  3x10   170/  3x10   170/ 3x10 170/ 3x10      LP u/l high in PF              LP u/l mid in PF              LP u/l low in PF              Step down           6" 3x10 6" 3x10 Lateral step down           6" 3x10 6" 3x10   Step up          8" 3x10 8" 3x10   TB side step              Hamstring and calf stretch strap :15x5 ea   :15x5 ea  :15x5 ea :15x5   :30x3 :30x3 :30x3   ITB stretch :15x5 ea   :15x5 ea      :30x3 :30x3 :30x3   PQS         :30x3 :30x3 :30x3   Calf press high 170/  3x10   170/  3x10    170/  3x10      Calf press mid 170/  3x10   170/  3x10    170/  3x10      Calf press Low 170/  3x10   170/  3x10    170/  3x10      U/l HR iso mid K mid A              u/l HR    3x10  3x10        Bicep curl       10# 3x10       [place] row       30/ 3x10 30/ 3x10       [place] punch              Sh flexion      5# 3x10 5# 3x10       Sh abduction      5# 3x10 5# 3x10        press      10# 3x10 10# 3x10       Overhead tricep      10# 3x10 10# 3x10       [place] ER 90-90       1/ 3x10 1/ 3x10 1/ 3x10      Sh Y        1/ 3x10 3/ 3x10      Pallof press   25/ 3x10 ea            Pallof Rotation  20/ 3x10 ea            Squat overhead press              Shallow crunch  3x15            90/90/ tap   3x10           Bird dog    2x10   2x10          Flutter kicks   :30x3  :30x3   :30x3          S/l plank   :30x3 b/l :30x3 b/l   :30x3         Plank    :30x3   :30x3         Straight leg RDL     15# 3x10          Lunge with 2 handrail 1 HHA x20 ea    1 HHA x20 ea   1 HHA x20 ea        Squats 2 hand assist Mini 3x10    Mini 3x10   Mini 3x10        Piriformis stretch              Pball pass   x10            TM  5' 5' 5' 5' 5'  5' 5'  5'   5' 5'   TA SLR    x30 b/l            S/l thoracic rot  3# 2x10 b/l              PETRA 3x10         30x 30x   PB LTR :05x20         :05x20  :05x20   Pball roll out          :10x10 fwd :10x5 lat     Pball DKTC          :10x10

## 2021-08-03 ENCOUNTER — OFFICE VISIT (OUTPATIENT)
Dept: PHYSICAL THERAPY | Facility: CLINIC | Age: 71
End: 2021-08-03
Payer: COMMERCIAL

## 2021-08-03 DIAGNOSIS — M17.0 BILATERAL PRIMARY OSTEOARTHRITIS OF KNEE: ICD-10-CM

## 2021-08-03 DIAGNOSIS — M25.561 PAIN IN BOTH KNEES, UNSPECIFIED CHRONICITY: ICD-10-CM

## 2021-08-03 DIAGNOSIS — R29.898 ANKLE WEAKNESS: ICD-10-CM

## 2021-08-03 DIAGNOSIS — M25.571 ACUTE BILATERAL ANKLE PAIN: ICD-10-CM

## 2021-08-03 DIAGNOSIS — R26.89 BALANCE PROBLEM: ICD-10-CM

## 2021-08-03 DIAGNOSIS — G56.02 PARTIAL THENAR ATROPHY, LEFT: ICD-10-CM

## 2021-08-03 DIAGNOSIS — G56.01 PARTIAL THENAR ATROPHY, RIGHT: Primary | ICD-10-CM

## 2021-08-03 DIAGNOSIS — M25.572 ACUTE BILATERAL ANKLE PAIN: ICD-10-CM

## 2021-08-03 DIAGNOSIS — M25.562 PAIN IN BOTH KNEES, UNSPECIFIED CHRONICITY: ICD-10-CM

## 2021-08-03 PROCEDURE — 97530 THERAPEUTIC ACTIVITIES: CPT

## 2021-08-03 PROCEDURE — 97110 THERAPEUTIC EXERCISES: CPT

## 2021-08-03 NOTE — PROGRESS NOTES
Daily Note     Today's date: 8/3/2021  Patient name: Devan Powers  : 1950  MRN: [id number]  Referring provider: Anabel Stearns DO  Dx:   Encounter Diagnosis     ICD-10-CM    1  Partial thenar atrophy, right  G56 01    2  Balance problem  R26 89    3  Pain in both knees, unspecified chronicity  M25 561     M25 562    4  Acute bilateral ankle pain  M25 571     M25 572    5  Ankle weakness  R29 898    6  Bilateral primary osteoarthritis of knee  M17 0    7  Partial thenar atrophy, left  G56 02             Subjective: Summer Ortiz reports L knee pain has been minimal the last few days, R knee is still feeling tight and sore with activity  Objective: See treatment diary below    Assessment: Dalton tolerated PT treatment well  Initiated session with gentle LE stretching to address knee ROM  Pt was able to complete lateral step downs on 8" step today, greater challenge observed on RLE  Pt no longer requires HHA with lunges, improved stability observed  Attempted DB squat press, pt unable to complete secondary to weakness  He was able to perform goblet squat with DB  Concluded with CP  Pt would benefit from continued PT to further address impairments and maximize functional level  Plan: Continue per plan of care        Precautions: standard OA  Manual  7/30 8/3     7/12 7/15 7/20 7/23 7/27   Thumb jt mobs       JZ JZ  STM JW     R knee STM, PROM       JZ JZ JW        Exercise Diary  8/3  6/24 6/29 7/1 7/12 7/15 7/20 7/23 7/27 7/30   Soleus stretch              Calf stretch          Wedge :30x3 Wedge :30x3   S/l thoracic rotation              LP low in PF    170/  3x10   170/ 3x10 170/ 3x10      LP mid in PF    170/  3x10   170/ 3x10 170/ 3x10      LP high in PF    170/  3x10   170/ 3x10 170/ 3x10      LP u/l high in PF              LP u/l mid in PF              LP u/l low in PF              Step down           6" 3x10 6" 3x10   Lateral step down  8" 3x10         6" 3x10 6" 3x10   Step up 8" 3x10         8" 3x10 8" 3x10   TB side step              Hamstring and calf stretch strap :30x3   :15x5 ea  :15x5 ea :15x5   :30x3 :30x3 :30x3   ITB stretch :30x3   :15x5 ea      :30x3 :30x3 :30x3   PQS :30x3        :30x3 :30x3 :30x3   Calf press high    170/  3x10    170/  3x10      Calf press mid    170/  3x10    170/  3x10      Calf press Low    170/  3x10    170/  3x10      U/l HR iso mid K mid A              u/l HR    3x10  3x10        Bicep curl       10# 3x10       [place] row       30/ 3x10 30/ 3x10       [place] punch              Sh flexion      5# 3x10 5# 3x10       Sh abduction      5# 3x10 5# 3x10        press      10# 3x10 10# 3x10       Overhead tricep      10# 3x10 10# 3x10       [place] ER 90-90       1/ 3x10 1/ 3x10 1/ 3x10      Sh Y        1/ 3x10 3/ 3x10      Pallof press               Pallof Rotation              Squat overhead press DB 20# 2x10             Shallow crunch              90/90/ tap   3x10           Bird dog    2x10   2x10          Flutter kicks    :30x3   :30x3          S/l plank   :30x3 b/l   :30x3         Plank    :30x3   :30x3         Straight leg RDL     15# 3x10          Lunge with 2 handrail 0 HHA 20x ea    1 HHA x20 ea   1 HHA x20 ea        Squats 2 hand assist    Mini 3x10   Mini 3x10        Piriformis stretch              Pball pass               TM  5'  5' 5' 5'  5' 5'  5'   5' 5'   TA SLR    x30 b/l            S/l thoracic rot               PETRA          30x 30x   PB LTR         :05x20  :05x20   Pball roll out          :10x10 fwd :10x5 lat     Pball DKTC          :10x10

## 2021-08-05 DIAGNOSIS — I10 ESSENTIAL HYPERTENSION: ICD-10-CM

## 2021-08-05 RX ORDER — OLMESARTAN MEDOXOMIL AND HYDROCHLOROTHIAZIDE 20/12.5 20; 12.5 MG/1; MG/1
TABLET ORAL
Qty: 90 TABLET | Refills: 0 | Status: SHIPPED | OUTPATIENT
Start: 2021-08-05 | End: 2021-11-01

## 2021-08-06 ENCOUNTER — OFFICE VISIT (OUTPATIENT)
Dept: PHYSICAL THERAPY | Facility: CLINIC | Age: 71
End: 2021-08-06
Payer: COMMERCIAL

## 2021-08-06 DIAGNOSIS — G56.01 PARTIAL THENAR ATROPHY, RIGHT: Primary | ICD-10-CM

## 2021-08-06 DIAGNOSIS — M17.0 BILATERAL PRIMARY OSTEOARTHRITIS OF KNEE: ICD-10-CM

## 2021-08-06 DIAGNOSIS — M25.562 PAIN IN BOTH KNEES, UNSPECIFIED CHRONICITY: ICD-10-CM

## 2021-08-06 DIAGNOSIS — M25.561 PAIN IN BOTH KNEES, UNSPECIFIED CHRONICITY: ICD-10-CM

## 2021-08-06 DIAGNOSIS — R29.898 ANKLE WEAKNESS: ICD-10-CM

## 2021-08-06 DIAGNOSIS — G56.02 PARTIAL THENAR ATROPHY, LEFT: ICD-10-CM

## 2021-08-06 DIAGNOSIS — M25.572 ACUTE BILATERAL ANKLE PAIN: ICD-10-CM

## 2021-08-06 DIAGNOSIS — M25.571 ACUTE BILATERAL ANKLE PAIN: ICD-10-CM

## 2021-08-06 DIAGNOSIS — R26.89 BALANCE PROBLEM: ICD-10-CM

## 2021-08-06 PROCEDURE — 97530 THERAPEUTIC ACTIVITIES: CPT

## 2021-08-06 PROCEDURE — 97112 NEUROMUSCULAR REEDUCATION: CPT

## 2021-08-06 PROCEDURE — 97110 THERAPEUTIC EXERCISES: CPT

## 2021-08-06 NOTE — PROGRESS NOTES
Daily Note     Today's date: 2021  Patient name: Fidel Ralph  : 1950  MRN: [id number]  Referring provider: Alejandro Gomes DO  Dx:   Encounter Diagnosis     ICD-10-CM    1  Partial thenar atrophy, right  G56 01    2  Balance problem  R26 89    3  Pain in both knees, unspecified chronicity  M25 561     M25 562    4  Acute bilateral ankle pain  M25 571     M25 572    5  Ankle weakness  R29 898    6  Bilateral primary osteoarthritis of knee  M17 0    7  Partial thenar atrophy, left  G56 02             Subjective: Antonella Butler reports he wasn't able to stretch this morning, therfor both knees feel a little stiff  He states he feels his strength is returning since fall a few weeks ago  Objective: See treatment diary below    Assessment: Antonella Butler is progressing well with current POC  Continued with focus on LE flexibility and strength training  Pt was able to perform deeper lunge w/o knee pain, HHA required for some stability  [place]u mini squats added to program, pt very challenged to complete  Pt is making steady progress with each visit evident with exercises  Knee soreness noted following PT session  Concluded with CP  Pt would benefit from continued PT to further address impairments and maximize functional level  Plan: Continue per plan of care        Precautions: standard OA  Manual  7/30 8/3 8/6    7/12 7/15 7/20 7/23 7/27   Thumb jt mobs       JZ JZ  STM JW     R knee STM, PROM       JZ JZ JW        Exercise Diary  8/3 8/6  6/29 7/1 7/12 7/15 7/20 7/23 7/27 7/30   Soleus stretch              Calf stretch          Wedge :30x3 Wedge :30x3   S/l thoracic rotation              LP low in PF  175/ 3x10  170/  3x10   170/ 3x10 170/ 3x10      LP mid in PF  175/ 3x10  170/  3x10   170/ 3x10 170/ 3x10      LP high in PF  175/ 3x10  170/  3x10   170/ 3x10 170/ 3x10      LP u/l high in PF              LP u/l mid in PF              LP u/l low in PF              Step down           6" 3x10 6" 3x10   Lateral step down 8" 3x10         6" 3x10 6" 3x10   Step up 8" 3x10         8" 3x10 8" 3x10   TB side step              Hamstring and calf stretch strap :30x3 :30x3  :15x5 ea  :15x5 ea :15x5   :30x3 :30x3 :30x3   ITB stretch :30x3 :30x3  :15x5 ea      :30x3 :30x3 :30x3   PQS :30x3 :30x3       :30x3 :30x3 :30x3   Calf press high  175/ 3x10  170/  3x10    170/  3x10      Calf press mid  175/ 3x10   170/  3x10    170/  3x10      Calf press Low  175/ 3x10  170/  3x10    170/  3x10      U/l HR iso mid K mid A              u/l HR    3x10  3x10        Bicep curl       10# 3x10       [place] row       30/ 3x10 30/ 3x10       [place] punch              Sh flexion      5# 3x10 5# 3x10       Sh abduction      5# 3x10 5# 3x10        press      10# 3x10 10# 3x10       Overhead tricep      10# 3x10 10# 3x10       Fr[place]isco ER 90-90       1/ 3x10 1/ 3x10 1/ 3x10      Sh Y        1/ 3x10 3/ 3x10      Pallof press               Pallof Rotation              Squat overhead press DB 20# 2x10             Shallow crunch              90/90/ tap              Bird dog      2x10          Flutter kicks      :30x3          S/l plank     :30x3         Plank      :30x3         Straight leg RDL     15# 3x10          Lunge with 2 handrail 0 HHA 20x ea  1 HHA deep 20x ea   1 HHA x20 ea   1 HHA x20 ea        Squats 2 hand assist  Mini 3x10  DB 20#  Mini 3x10   Mini 3x10        Piriformis stretch              Pball pass               TM  5'   5' 5'  5' 5'  5'   5' 5'   TA SLR               S/l thoracic rot               PETRA          30x 30x   PB LTR         :05x20  :05x20   Pball roll out          :10x10 fwd :10x5 lat     Pball DKTC          :10x10

## 2021-08-11 ENCOUNTER — OFFICE VISIT (OUTPATIENT)
Dept: PHYSICAL THERAPY | Facility: CLINIC | Age: 71
End: 2021-08-11
Payer: COMMERCIAL

## 2021-08-11 DIAGNOSIS — G56.02 PARTIAL THENAR ATROPHY, LEFT: ICD-10-CM

## 2021-08-11 DIAGNOSIS — M17.0 BILATERAL PRIMARY OSTEOARTHRITIS OF KNEE: ICD-10-CM

## 2021-08-11 DIAGNOSIS — R29.898 ANKLE WEAKNESS: Primary | ICD-10-CM

## 2021-08-11 PROCEDURE — 97110 THERAPEUTIC EXERCISES: CPT | Performed by: PHYSICAL THERAPIST

## 2021-08-11 PROCEDURE — 97530 THERAPEUTIC ACTIVITIES: CPT | Performed by: PHYSICAL THERAPIST

## 2021-08-11 PROCEDURE — 97140 MANUAL THERAPY 1/> REGIONS: CPT | Performed by: PHYSICAL THERAPIST

## 2021-08-11 NOTE — PROGRESS NOTES
Daily Note     Today's date: 2021  Patient name: Rico Todd  : 1950  MRN: [id number]  Referring provider: Velvet Gray DO  Dx:   Encounter Diagnosis     ICD-10-CM    1  Ankle weakness  R29 898    2  Bilateral primary osteoarthritis of knee  M17 0    3  Partial thenar atrophy, left  G56 02                   Subjective: Patient reports he feels about the same, no big change in symptoms from LV  Objective: See treatment diary below      Assessment: Tolerated treatment well  Patient demonstrates good form with lunges and squats, no valgus collapse or instability with lunges to 90 degrees of knee flexion  Continues to progress with knee extension overall  Patient demonstrated fatigue post treatment, exhibited good technique with therapeutic exercises and would benefit from continued PT      Plan: Continue per plan of care  Progress treatment as tolerated  Progress challenge as appropriate       Precautions: standard OA  Manual  7/30 8/3 8/6 8/11   7/12 7/15 7/20 7/23 7/27   Thumb jt mobs       JZ JZ  STM JW     R knee STM, PROM       JZ JZ JW        Exercise Diary  8/3 8/6  6/29 7/1 7/12 7/15 7/20 7/23 7/27 7/30   Soleus stretch              Calf stretch          Wedge :30x3 Wedge :30x3   S/l thoracic rotation              LP low in PF  175/ 3x10 175/ 3x10 170/  3x10   170/ 3x10 170/ 3x10      LP mid in PF  175/ 3x10 175/ 3x10 170/  3x10   170/ 3x10 170/ 3x10      LP high in PF  175/ 3x10 175/ 3x10 170/  3x10   170/ 3x10 170/ 3x10      LP u/l high in PF              LP u/l mid in PF              LP u/l low in PF              Step down           6" 3x10 6" 3x10   Lateral step down  8" 3x10         6" 3x10 6" 3x10   Step up 8" 3x10         8" 3x10 8" 3x10   TB side step              Hamstring and calf stretch strap :30x3 :30x3 30s x3 :15x5 ea  :15x5 ea :15x5   :30x3 :30x3 :30x3   ITB stretch :30x3 :30x3 30s x 3 :15x5 ea      :30x3 :30x3 :30x3   PQS :30x3 :30x3 30s x 3      :30x3 :30x3 :30x3 Calf press high  175/ 3x10 175/ 3x10 170/  3x10    170/  3x10      Calf press mid  175/ 3x10  175/ 3x10 170/  3x10    170/  3x10      Calf press Low  175/ 3x10 175/ 3x10 170/  3x10    170/  3x10      U/l HR iso mid K mid A              u/l HR    3x10  3x10        Bicep curl       10# 3x10       [place] row       30/ 3x10 30/ 3x10       Fransisco punch              Sh flexion      5# 3x10 5# 3x10       Sh abduction      5# 3x10 5# 3x10        press      10# 3x10 10# 3x10       Overhead tricep      10# 3x10 10# 3x10       [place] ER 90-90       1/ 3x10 1/ 3x10 1/ 3x10      Sh Y        1/ 3x10 3/ 3x10      Pallof press               Pallof Rotation              Squat overhead press DB 20# 2x10             Shallow crunch              90/90/ tap              Bird dog      2x10          Flutter kicks      :30x3          S/l plank     :30x3         Plank      :30x3         Straight leg RDL     15# 3x10          Lunge with 2 handrail 0 HHA 20x ea  1 HHA deep 20x ea  1 HHA deep 20x 1 HHA x20 ea   1 HHA x20 ea        Squats 2 hand assist  Mini 3x10  DB 20# Mini 3x10 DB 20# Mini 3x10   Mini 3x10        Piriformis stretch              Pball pass               TM  5'  5' 5' 5'  5' 5'  5'   5' 5'   TA SLR               S/l thoracic rot               PETRA          30x 30x   PB LTR         :05x20  :05x20   Pball roll out          :10x10 fwd :10x5 lat     Pball DKTC          :10x10

## 2021-08-16 ENCOUNTER — OFFICE VISIT (OUTPATIENT)
Dept: PHYSICAL THERAPY | Facility: CLINIC | Age: 71
End: 2021-08-16
Payer: COMMERCIAL

## 2021-08-16 DIAGNOSIS — R26.89 BALANCE PROBLEM: ICD-10-CM

## 2021-08-16 DIAGNOSIS — G56.01 PARTIAL THENAR ATROPHY, RIGHT: ICD-10-CM

## 2021-08-16 DIAGNOSIS — M25.572 ACUTE BILATERAL ANKLE PAIN: ICD-10-CM

## 2021-08-16 DIAGNOSIS — M25.562 PAIN IN BOTH KNEES, UNSPECIFIED CHRONICITY: ICD-10-CM

## 2021-08-16 DIAGNOSIS — M17.0 BILATERAL PRIMARY OSTEOARTHRITIS OF KNEE: ICD-10-CM

## 2021-08-16 DIAGNOSIS — R29.898 ANKLE WEAKNESS: Primary | ICD-10-CM

## 2021-08-16 DIAGNOSIS — M25.561 PAIN IN BOTH KNEES, UNSPECIFIED CHRONICITY: ICD-10-CM

## 2021-08-16 DIAGNOSIS — G56.02 PARTIAL THENAR ATROPHY, LEFT: ICD-10-CM

## 2021-08-16 DIAGNOSIS — M25.571 ACUTE BILATERAL ANKLE PAIN: ICD-10-CM

## 2021-08-16 PROCEDURE — 97110 THERAPEUTIC EXERCISES: CPT

## 2021-08-16 PROCEDURE — 97530 THERAPEUTIC ACTIVITIES: CPT

## 2021-08-16 NOTE — PROGRESS NOTES
Daily Note     Today's date: 2021  Patient name: Ortiz Alford  : 1950  MRN: [id number]  Referring provider: Vivienne Craig DO  Dx:   Encounter Diagnosis     ICD-10-CM    1  Ankle weakness  R29 898    2  Bilateral primary osteoarthritis of knee  M17 0    3  Partial thenar atrophy, left  G56 02    4  Partial thenar atrophy, right  G56 01    5  Balance problem  R26 89    6  Pain in both knees, unspecified chronicity  M25 561     M25 562    7  Acute bilateral ankle pain  M25 571     M25 572                 Subjective: Terri Pabon reports he was unable to stretch yesterday morning and then was walking a lot around [address] [place] [address]  He notes increased tightness and soreness in both knees upon arrival        Objective: See treatment diary below      Assessment: Terri Pabon tolerated PT treatment well  Pt is appropriately challenged with current exercise program  Initiated session with gentle LE stretching  Focused on functional strength training today  Pt was able to perform deeper lunge with less instability  Greatest challenge present with stair navigation, smaller step required for lateral and froward step downs  Weakness evident with descending, poor eccentric control requiring UE assistance  Fatigue present post session  Pt would benefit from continued PT to further address impairments and maximize functional level  Plan: Continue per plan of care  Progress treatment as tolerated  Progress challenge as appropriate       Precautions: standard OA  Manual  7/30 8/3 8/6 8/11 8/16  7/12 7/15 7/20 7/23 7/27   Thumb jt mobs       JZ JZ  STM JW     R knee STM, PROM       JZ JZ JW        Exercise Diary  8/3 8/6 8/11 8/16  7/12 7/15 7/20 7/23 7/27 7/30   Soleus stretch              Calf stretch          Wedge :30x3 Wedge :30x3   S/l thoracic rotation              LP low in PF  175/ 3x10 175/ 3x10    170/ 3x10 170/ 3x10      LP mid in PF  175/ 3x10 175/ 3x10    170/ 3x10 170/ 3x10      LP high in PF  175/ 3x10 175/ 3x10    170/ 3x10 170/ 3x10      LP u/l high in PF              LP u/l mid in PF              LP u/l low in PF              Step down     6" 3x10       6" 3x10 6" 3x10   Lateral step down  8" 3x10   6" 3x10       6" 3x10 6" 3x10   Step up 8" 3x10   8" 3x10      8" 3x10 8" 3x10   TB side step              Hamstring and calf stretch strap :30x3 :30x3 30s x3 :30x3   :15x5   :30x3 :30x3 :30x3   ITB stretch :30x3 :30x3 30s x 3 :30x3     :30x3 :30x3 :30x3   PQS :30x3 :30x3 30s x 3 :30x3     :30x3 :30x3 :30x3   Calf press high  175/ 3x10 175/ 3x10     170/  3x10      Calf press mid  175/ 3x10  175/ 3x10     170/  3x10      Calf press Low  175/ 3x10 175/ 3x10     170/  3x10      U/l HR iso mid K mid A              u/l HR      3x10        Bicep curl       10# 3x10       Fransisco row       30/ 3x10 30/ 3x10       [place] punch              Sh flexion      5# 3x10 5# 3x10       Sh abduction      5# 3x10 5# 3x10        press      10# 3x10 10# 3x10       Overhead tricep      10# 3x10 10# 3x10       [place] ER 90-90       1/ 3x10 1/ 3x10 1/ 3x10      Sh Y        1/ 3x10 3/ 3x10      Pallof press               Pallof Rotation              Squat overhead press DB 20# 2x10             Shallow crunch              90/90/ tap              Bird dog               Flutter kicks               S/l plank              Plank               Straight leg RDL               Lunge with 2 handrail 0 HHA 20x ea  1 HHA deep 20x ea  1 HHA deep 20x 1 HHA deep 20x  1 HHA x20 ea        Squats 2 hand assist  Mini 3x10  DB 20# Mini 3x10 DB 20# Mini 3x10 DB 20#  Mini 3x10        Piriformis stretch              Pball pass               TM  5'  5' 5'  5' 5'  5'   5' 5'   TA SLR               S/l thoracic rot               PETRA          30x 30x   PB LTR         :05x20  :05x20   Pball roll out          :10x10 fwd :10x5 lat     Pball DKTC          :10x10

## 2021-08-18 ENCOUNTER — OFFICE VISIT (OUTPATIENT)
Dept: PHYSICAL THERAPY | Facility: CLINIC | Age: 71
End: 2021-08-18
Payer: COMMERCIAL

## 2021-08-18 DIAGNOSIS — M25.571 ACUTE BILATERAL ANKLE PAIN: ICD-10-CM

## 2021-08-18 DIAGNOSIS — R26.89 BALANCE PROBLEM: ICD-10-CM

## 2021-08-18 DIAGNOSIS — M25.572 ACUTE BILATERAL ANKLE PAIN: ICD-10-CM

## 2021-08-18 DIAGNOSIS — M17.0 BILATERAL PRIMARY OSTEOARTHRITIS OF KNEE: ICD-10-CM

## 2021-08-18 DIAGNOSIS — G56.02 PARTIAL THENAR ATROPHY, LEFT: ICD-10-CM

## 2021-08-18 DIAGNOSIS — M25.561 PAIN IN BOTH KNEES, UNSPECIFIED CHRONICITY: ICD-10-CM

## 2021-08-18 DIAGNOSIS — M25.562 PAIN IN BOTH KNEES, UNSPECIFIED CHRONICITY: ICD-10-CM

## 2021-08-18 DIAGNOSIS — G56.01 PARTIAL THENAR ATROPHY, RIGHT: ICD-10-CM

## 2021-08-18 DIAGNOSIS — R29.898 ANKLE WEAKNESS: Primary | ICD-10-CM

## 2021-08-18 PROCEDURE — 97530 THERAPEUTIC ACTIVITIES: CPT

## 2021-08-18 PROCEDURE — 97110 THERAPEUTIC EXERCISES: CPT

## 2021-08-18 NOTE — PROGRESS NOTES
Daily Note     Today's date: 2021  Patient name: Fidel Ralph  : 1950  MRN: [id number]  Referring provider: Alejandro Gomes DO  Dx:   Encounter Diagnosis     ICD-10-CM    1  Ankle weakness  R29 898    2  Bilateral primary osteoarthritis of knee  M17 0    3  Partial thenar atrophy, left  G56 02    4  Partial thenar atrophy, right  G56 01    5  Balance problem  R26 89    6  Pain in both knees, unspecified chronicity  M25 561     M25 562    7  Acute bilateral ankle pain  M25 571     M25 572                 Subjective: Antonella Butler reports he was in the car for over 6+ hours driving yesterday  He reports his low back and knees "are killing him" and he feels very stiff  He was able to stretch a little last night, but woke up this morning w/ stiffness  Objective: See treatment diary below      Assessment: Dalton tolerated PT treatment well  Modified program performed today to focus on LE and lumbar flexibility  Pt performed table stretching w/ good tolerance  LTR, PETRA, s/l thoracic rotation, and side bend added back into program to address lumbar/thoracic mobility  Pt noted improved sx's post PT session  Resume strength training NV  Encouraged increased stretching at home  Pt would benefit from continued PT to further address impairments and maximize functional level  Plan: Continue per plan of care  Progress treatment as tolerated  Progress challenge as appropriate       Precautions: standard OA  Manual  7/30 8/3 8/6 8/11 8/16 8/18  7/15 7/20 7/23 7/27   Thumb jt mobs        JZ  STM JW     R knee STM, PROM        JZ JW        Exercise Diary  8/3 8/6 8/11 8/16 8/18  7/15 7/20 7/23 7/27 7/30   Soleus stretch              Calf stretch          Wedge :30x3 Wedge :30x3   S/l thoracic rotation              LP low in PF  175/ 3x10 175/ 3x10    170/ 3x10 170/ 3x10      LP mid in PF  175/ 3x10 175/ 3x10    170/ 3x10 170/ 3x10      LP high in PF  175/ 3x10 175/ 3x10    170/ 3x10 170/ 3x10      LP u/l high in PF              LP u/l mid in PF              LP u/l low in PF              Step down     6" 3x10       6" 3x10 6" 3x10   Lateral step down  8" 3x10   6" 3x10       6" 3x10 6" 3x10   Step up 8" 3x10   8" 3x10      8" 3x10 8" 3x10   TB side step              Hamstring and calf stretch strap :30x3 :30x3 30s x3 :30x3  :30x3    :30x3 :30x3 :30x3   ITB stretch :30x3 :30x3 30s x 3 :30x3 :30x3    :30x3 :30x3 :30x3   PQS :30x3 :30x3 30s x 3 :30x3 :30x3    :30x3 :30x3 :30x3   Calf press high  175/ 3x10 175/ 3x10     170/  3x10      Calf press mid  175/ 3x10  175/ 3x10     170/  3x10      Calf press Low  175/ 3x10 175/ 3x10     170/  3x10      U/l HR iso mid K mid A              u/l HR              Bicep curl       10# 3x10       [place] row       30/ 3x10 30/ 3x10       [place] punch              Sh flexion       5# 3x10       Sh abduction       5# 3x10        press       10# 3x10       Overhead tricep       10# 3x10       [place] ER 90-90        1/ 3x10 1/ 3x10      Sh Y        1/ 3x10 3/ 3x10      Pallof press               Pallof Rotation              Squat overhead press DB 20# 2x10             Shallow crunch              90/90/ tap              Bird dog               Flutter kicks               S/l plank              Plank               Straight leg RDL               Lunge with 2 handrail 0 HHA 20x ea  1 HHA deep 20x ea  1 HHA deep 20x 1 HHA deep 20x          Squats 2 hand assist  Mini 3x10  DB 20# Mini 3x10 DB 20# Mini 3x10 DB 20#          Piriformis stretch              Pball pass               TM  5'  5' 5' 5'  5'  5'   5' 5'   TA SLR               S/l thoracic rot      4# 20x ea         PETRA     3x10     30x 30x   PB LTR     "05x20    :05x20  :05x20   Side bend over table      x20 ea          Pball roll out          :10x10 fwd :10x5 lat     Pball DKTC          :10x10

## 2021-08-23 ENCOUNTER — APPOINTMENT (OUTPATIENT)
Dept: PHYSICAL THERAPY | Facility: CLINIC | Age: 71
End: 2021-08-23
Payer: COMMERCIAL

## 2021-08-26 ENCOUNTER — OFFICE VISIT (OUTPATIENT)
Dept: PHYSICAL THERAPY | Facility: CLINIC | Age: 71
End: 2021-08-26
Payer: COMMERCIAL

## 2021-08-26 DIAGNOSIS — M17.0 BILATERAL PRIMARY OSTEOARTHRITIS OF KNEE: ICD-10-CM

## 2021-08-26 DIAGNOSIS — M25.561 PAIN IN BOTH KNEES, UNSPECIFIED CHRONICITY: ICD-10-CM

## 2021-08-26 DIAGNOSIS — G56.01 PARTIAL THENAR ATROPHY, RIGHT: ICD-10-CM

## 2021-08-26 DIAGNOSIS — M25.572 ACUTE BILATERAL ANKLE PAIN: ICD-10-CM

## 2021-08-26 DIAGNOSIS — G56.02 PARTIAL THENAR ATROPHY, LEFT: ICD-10-CM

## 2021-08-26 DIAGNOSIS — M25.571 ACUTE BILATERAL ANKLE PAIN: ICD-10-CM

## 2021-08-26 DIAGNOSIS — R29.898 ANKLE WEAKNESS: Primary | ICD-10-CM

## 2021-08-26 DIAGNOSIS — M25.562 PAIN IN BOTH KNEES, UNSPECIFIED CHRONICITY: ICD-10-CM

## 2021-08-26 DIAGNOSIS — R26.89 BALANCE PROBLEM: ICD-10-CM

## 2021-08-26 PROCEDURE — 97530 THERAPEUTIC ACTIVITIES: CPT

## 2021-08-26 PROCEDURE — 97110 THERAPEUTIC EXERCISES: CPT

## 2021-08-26 NOTE — PROGRESS NOTES
Daily Note     Today's date: 2021  Patient name: Haile Dillon  : 1950  MRN: [id number]  Referring provider: Shania Dumont DO  Dx:   Encounter Diagnosis     ICD-10-CM    1  Ankle weakness  R29 898    2  Bilateral primary osteoarthritis of knee  M17 0    3  Partial thenar atrophy, left  G56 02    4  Partial thenar atrophy, right  G56 01    5  Balance problem  R26 89    6  Pain in both knees, unspecified chronicity  M25 561     M25 562    7  Acute bilateral ankle pain  M25 571     M25 572                 Subjective: Dulce Maria Alexandra reports b/l knees and low back have been feeling pretty good since LV  He states he has been very active the last few days and doing a lot of walking around his daughter's college campus  Objective: See treatment diary below      Assessment: Dulce Maria Alexandra displays good tolerance to current POC  Slider lunges added to exercise program, pt performed well  Greatest difficulty present with 6" forward and lateral step downs, leading with LLE and WB through RLE  He performed leg press activities well and w/o symptom provocation  Fatigue evident post session  Concluded with CP  Pt would benefit from continued PT to further address impairments and maximize functional level  Plan: Continue per plan of care  Progress treatment as tolerated  Progress challenge as appropriate       Precautions: standard OA  Manual  7/30 8/3 8/6 8/11 8/16 8/18 8/26  7/20 7/23 7/27   Thumb jt mobs          STM JW     R knee STM, PROM         JW        Exercise Diary  8/3 8/6 8/11 8/16 8/18 8/26  7/20 7/23 7/27 7/30   Soleus stretch              Calf stretch          Wedge :30x3 Wedge :30x3   S/l thoracic rotation              LP low in PF  175/ 3x10 175/ 3x10   175/ 3x10  170/ 3x10      LP mid in PF  175/ 3x10 175/ 3x10   175/ 3x10  170/ 3x10      LP high in PF  175/ 3x10 175/ 3x10   175/ 3x10  170/ 3x10      LP u/l high in PF              LP u/l mid in PF              LP u/l low in PF              Step down 6" 3x10   6" 3x20    6" 3x10 6" 3x10   Lateral step down  8" 3x10   6" 3x10   6" 3x10    6" 3x10 6" 3x10   Step up 8" 3x10   8" 3x10      8" 3x10 8" 3x10   TB side step              Hamstring and calf stretch strap :30x3 :30x3 30s x3 :30x3  :30x3 :30x3   :30x3 :30x3 :30x3   ITB stretch :30x3 :30x3 30s x 3 :30x3 :30x3 :30x3    :30x3 :30x3 :30x3   PQS :30x3 :30x3 30s x 3 :30x3 :30x3 :30x3    :30x3 :30x3 :30x3   Calf press high  175/ 3x10 175/ 3x10   175/ 3x10  170/  3x10      Calf press mid  175/ 3x10  175/ 3x10   175/ 3x10  170/  3x10      Calf press Low  175/ 3x10 175/ 3x10   175/ 3x10  170/  3x10      U/l HR iso mid K mid A              u/l HR              Bicep curl              [place] row        30/ 3x10       [place] punch              Sh flexion              Sh abduction               press              Overhead tricep              [place] ER 90-90         1/ 3x10      Sh Y         3/ 3x10      Pallof press               Pall[place] Rotation              Squat overhead press DB 20# 2x10             Shallow crunch              90/90/ tap              Bird dog               Flutter kicks               S/l plank              Plank               Straight leg RDL               Lunge with 2 handrail 0 HHA 20x ea  1 HHA deep 20x ea  1 HHA deep 20x 1 HHA deep 20x  Slider lung 2x10 b/l         Squats 2 hand assist  Mini 3x10  DB 20# Mini 3x10 DB 20# Mini 3x10 DB 20#          Piriformis stretch              Pball pass               TM  5'  5' 5' 5'   5'   5' 5'   TA SLR               S/l thoracic rot      4# 20x ea         PETRA     3x10     30x 30x   PB LTR     "05x20 :05x20    :05x20  :05x20   Side bend over table      x20 ea          Pball roll out          :10x10 fwd :10x5 lat     Pball DKTC          :10x10

## 2021-08-30 ENCOUNTER — OFFICE VISIT (OUTPATIENT)
Dept: PHYSICAL THERAPY | Facility: CLINIC | Age: 71
End: 2021-08-30
Payer: COMMERCIAL

## 2021-08-30 DIAGNOSIS — M25.562 PAIN IN BOTH KNEES, UNSPECIFIED CHRONICITY: ICD-10-CM

## 2021-08-30 DIAGNOSIS — R26.89 BALANCE PROBLEM: Primary | ICD-10-CM

## 2021-08-30 DIAGNOSIS — M17.0 BILATERAL PRIMARY OSTEOARTHRITIS OF KNEE: ICD-10-CM

## 2021-08-30 DIAGNOSIS — M25.561 PAIN IN BOTH KNEES, UNSPECIFIED CHRONICITY: ICD-10-CM

## 2021-08-30 DIAGNOSIS — M25.572 ACUTE BILATERAL ANKLE PAIN: ICD-10-CM

## 2021-08-30 DIAGNOSIS — G56.02 PARTIAL THENAR ATROPHY, LEFT: ICD-10-CM

## 2021-08-30 DIAGNOSIS — M25.571 ACUTE BILATERAL ANKLE PAIN: ICD-10-CM

## 2021-08-30 DIAGNOSIS — G56.01 PARTIAL THENAR ATROPHY, RIGHT: ICD-10-CM

## 2021-08-30 DIAGNOSIS — R29.898 ANKLE WEAKNESS: ICD-10-CM

## 2021-08-30 PROCEDURE — 97530 THERAPEUTIC ACTIVITIES: CPT | Performed by: PHYSICAL THERAPIST

## 2021-08-30 PROCEDURE — 97110 THERAPEUTIC EXERCISES: CPT | Performed by: PHYSICAL THERAPIST

## 2021-08-30 PROCEDURE — 97140 MANUAL THERAPY 1/> REGIONS: CPT | Performed by: PHYSICAL THERAPIST

## 2021-08-30 NOTE — PROGRESS NOTES
Daily Note     Today's date: 2021  Patient name: Wayne Mccarty  : 1950  MRN: [id number]  Referring provider: Khushboo Cai DO  Dx:   Encounter Diagnosis     ICD-10-CM    1  Balance problem  R26 89    2  Bilateral primary osteoarthritis of knee  M17 0    3  Acute bilateral ankle pain  M25 571     M25 572    4  Ankle weakness  R29 898    5  Pain in both knees, unspecified chronicity  M25 561     M25 562    6  Partial thenar atrophy, left  G56 02    7  Partial thenar atrophy, right  G56 01             Subjective: Pt reported that he has been improving with his strength and flexibility, however he believes he has reached a plateau of progress  Objective: See treatment diary below    Assessment: Tolerated treatment well  Patient demonstrated fatigue post treatment and exhibited good technique with therapeutic exercises  D/c to HEP      Plan: D/C     Precautions: standard OA  Manual  7/30 8/3 8/6 8/11 8/16 8/18 8/26 8/30      Thumb jt mobs        JZ      R knee STM, PROM        JZ         Exercise Diary  8/3 8/6 8/11 8/16 8/18 8/26 8/30       Soleus stretch              Calf stretch              S/l thoracic rotation              LP low in PF  175/ 3x10 175/ 3x10   175/ 3x10        LP mid in PF  175/ 3x10 175/ 3x10   175/ 3x10        LP high in PF  175/ 3x10 175/ 3x10   175/ 3x10        LP u/l high in PF              LP u/l mid in PF              LP u/l low in PF              Step down     6" 3x10   6" 3x20        Lateral step down  8" 3x10   6" 3x10   6" 3x10        Step up 8" 3x10   8" 3x10          TB side step              Hamstring and calf stretch strap :30x3 :30x3 30s x3 :30x3  :30x3 :30x3 :30x3       ITB stretch :30x3 :30x3 30s x 3 :30x3 :30x3 :30x3  :30x3       PQS :30x3 :30x3 30s x 3 :30x3 :30x3 :30x3  :30x3       Calf press high  175/ 3x10 175/ 3x10   175/ 3x10        Calf press mid  175/ 3x10  175/ 3x10   175/ 3x10        Calf press Low  175/ 3x10 175/ 3x10   175/ 3x10        U/l HR iso mid K mid A              u/l HR              Bicep curl              Hope row              Fransisco punch              Sh flexion              Sh abduction               press              S/l thoracic rottaion       5# 2x10       Fransisco ER 90-90               Sh Y               Pallof press               Pallof Rotation              Squat overhead press DB 20# 2x10             Shallow crunch              90/90/ tap              Bird dog               Flutter kicks               S/l plank              Plank               Straight leg RDL               Lunge with 2 handrail 0 HHA 20x ea  1 HHA deep 20x ea  1 HHA deep 20x 1 HHA deep 20x  Slider lung 2x10 b/l         Squats 2 hand assist  Mini 3x10  DB 20# Mini 3x10 DB 20# Mini 3x10 DB 20#          Piriformis stretch              Pball pass               TM  5'  5' 5' 5'         TA SLR               PETRA     3x10         PB LTR     "05x20 :05x20         Side bend over table      x20 ea   20x       Seated adductor stretch       :30x3 ea       Pball DKTC               Stand knee flexion stretch       :30x3 ea

## 2021-09-23 ENCOUNTER — TELEPHONE (OUTPATIENT)
Dept: FAMILY MEDICINE CLINIC | Facility: CLINIC | Age: 71
End: 2021-09-23

## 2021-09-23 DIAGNOSIS — E11.65 UNCONTROLLED TYPE 2 DIABETES MELLITUS WITH HYPERGLYCEMIA (HCC): ICD-10-CM

## 2021-09-23 NOTE — TELEPHONE ENCOUNTER
Pt call and said he wants blood work ordered in for quest to get done in [facility]

## 2021-10-08 LAB
EST. AVERAGE GLUCOSE BLD GHB EST-MCNC: 160 (CALC)
EST. AVERAGE GLUCOSE BLD GHB EST-SCNC: 8.9 (CALC)
HBA1C MFR BLD: 7.2 % OF TOTAL HGB

## 2021-10-08 PROCEDURE — 3051F HG A1C>EQUAL 7.0%<8.0%: CPT | Performed by: FAMILY MEDICINE

## 2021-10-11 ENCOUNTER — TELEPHONE (OUTPATIENT)
Dept: FAMILY MEDICINE CLINIC | Facility: CLINIC | Age: 71
End: 2021-10-11

## 2021-10-14 ENCOUNTER — OFFICE VISIT (OUTPATIENT)
Dept: FAMILY MEDICINE CLINIC | Facility: CLINIC | Age: 71
End: 2021-10-14
Payer: COMMERCIAL

## 2021-10-14 VITALS
DIASTOLIC BLOOD PRESSURE: 82 MMHG | HEART RATE: 78 BPM | TEMPERATURE: 97.6 F | HEIGHT: 75 IN | OXYGEN SATURATION: 97 % | WEIGHT: 228.75 LBS | BODY MASS INDEX: 28.44 KG/M2 | SYSTOLIC BLOOD PRESSURE: 124 MMHG

## 2021-10-14 DIAGNOSIS — E11.65 UNCONTROLLED TYPE 2 DIABETES MELLITUS WITH HYPERGLYCEMIA (HCC): Primary | ICD-10-CM

## 2021-10-14 DIAGNOSIS — I10 BENIGN ESSENTIAL HYPERTENSION: ICD-10-CM

## 2021-10-14 PROCEDURE — 99214 OFFICE O/P EST MOD 30 MIN: CPT | Performed by: FAMILY MEDICINE

## 2021-10-14 PROCEDURE — 1160F RVW MEDS BY RX/DR IN RCRD: CPT | Performed by: FAMILY MEDICINE

## 2021-10-14 PROCEDURE — 3008F BODY MASS INDEX DOCD: CPT | Performed by: FAMILY MEDICINE

## 2021-10-14 PROCEDURE — 3079F DIAST BP 80-89 MM HG: CPT | Performed by: FAMILY MEDICINE

## 2021-10-14 PROCEDURE — 1036F TOBACCO NON-USER: CPT | Performed by: FAMILY MEDICINE

## 2021-10-14 PROCEDURE — 3074F SYST BP LT 130 MM HG: CPT | Performed by: FAMILY MEDICINE

## 2021-11-06 ENCOUNTER — IMMUNIZATIONS (OUTPATIENT)
Dept: FAMILY MEDICINE CLINIC | Facility: HOSPITAL | Age: 71
End: 2021-11-06

## 2021-11-06 DIAGNOSIS — Z23 ENCOUNTER FOR IMMUNIZATION: Primary | ICD-10-CM

## 2021-11-06 PROCEDURE — 0001A COVID-19 PFIZER VACC 0.3 ML: CPT

## 2021-11-06 PROCEDURE — 91300 COVID-19 PFIZER VACC 0.3 ML: CPT

## 2022-01-26 DIAGNOSIS — E11.65 UNCONTROLLED TYPE 2 DIABETES MELLITUS WITH HYPERGLYCEMIA (HCC): Primary | ICD-10-CM

## 2022-01-26 NOTE — ASSESSMENT & PLAN NOTE
Lab Results   Component Value Date    HGBA1C 5 9 (H) 03/10/2021   continue metformin 500mg twice a day   Continue dietary modifications and exercise plan
Lab Results   Component Value Date    HGBA1C 5 9 (H) 03/10/2021   foot exam completed today  Neuropathy has not progressed 
2/week(s)

## 2022-02-14 LAB
EST. AVERAGE GLUCOSE BLD GHB EST-MCNC: 154 MG/DL
EST. AVERAGE GLUCOSE BLD GHB EST-SCNC: 8.5 MMOL/L
HBA1C MFR BLD: 7 % OF TOTAL HGB

## 2022-02-14 PROCEDURE — 3051F HG A1C>EQUAL 7.0%<8.0%: CPT | Performed by: FAMILY MEDICINE

## 2022-02-15 ENCOUNTER — TELEPHONE (OUTPATIENT)
Dept: FAMILY MEDICINE CLINIC | Facility: CLINIC | Age: 72
End: 2022-02-15

## 2022-02-15 NOTE — TELEPHONE ENCOUNTER
----- Message from Gen TishDO sent at 2/15/2022  2:14 PM EST -----  Sugar is a little better   Cont with diet and meds

## 2022-02-23 ENCOUNTER — OFFICE VISIT (OUTPATIENT)
Dept: FAMILY MEDICINE CLINIC | Facility: CLINIC | Age: 72
End: 2022-02-23
Payer: COMMERCIAL

## 2022-02-23 VITALS
SYSTOLIC BLOOD PRESSURE: 152 MMHG | WEIGHT: 235 LBS | HEART RATE: 77 BPM | BODY MASS INDEX: 29.22 KG/M2 | OXYGEN SATURATION: 97 % | TEMPERATURE: 98 F | DIASTOLIC BLOOD PRESSURE: 90 MMHG | RESPIRATION RATE: 20 BRPM | HEIGHT: 75 IN

## 2022-02-23 DIAGNOSIS — E11.65 UNCONTROLLED TYPE 2 DIABETES MELLITUS WITH HYPERGLYCEMIA (HCC): ICD-10-CM

## 2022-02-23 DIAGNOSIS — E11.40 TYPE 2 DIABETES MELLITUS WITH DIABETIC NEUROPATHY, WITHOUT LONG-TERM CURRENT USE OF INSULIN (HCC): Primary | ICD-10-CM

## 2022-02-23 DIAGNOSIS — I10 BENIGN ESSENTIAL HYPERTENSION: ICD-10-CM

## 2022-02-23 PROCEDURE — 1036F TOBACCO NON-USER: CPT | Performed by: FAMILY MEDICINE

## 2022-02-23 PROCEDURE — 3080F DIAST BP >= 90 MM HG: CPT | Performed by: FAMILY MEDICINE

## 2022-02-23 PROCEDURE — 1160F RVW MEDS BY RX/DR IN RCRD: CPT | Performed by: FAMILY MEDICINE

## 2022-02-23 PROCEDURE — 3077F SYST BP >= 140 MM HG: CPT | Performed by: FAMILY MEDICINE

## 2022-02-23 PROCEDURE — 3008F BODY MASS INDEX DOCD: CPT | Performed by: FAMILY MEDICINE

## 2022-02-23 PROCEDURE — 99213 OFFICE O/P EST LOW 20 MIN: CPT | Performed by: FAMILY MEDICINE

## 2022-02-23 NOTE — PROGRESS NOTES
Subjective:   Chief Complaint   Patient presents with    Follow-up        Patient ID: Claudell Cornea is a 70 y o  male  Here for check up  a1c is 7 0 he is not following his diet  Started exercising      The following portions of the patient's history were reviewed and updated as appropriate: allergies, current medications, past family history, past medical history, past social history, past surgical history and problem list     Review of Systems   Constitutional: Negative for activity change, appetite change, chills, diaphoresis, fatigue and unexpected weight change  HENT: Negative for congestion, ear discharge, ear pain, hearing loss, nosebleeds and rhinorrhea  Eyes: Negative for pain, redness, itching and visual disturbance  Respiratory: Negative for cough, choking, chest tightness and shortness of breath  Cardiovascular: Negative for chest pain and leg swelling  Gastrointestinal: Negative for abdominal pain, blood in stool, constipation, diarrhea and nausea  Endocrine: Negative for cold intolerance, polydipsia and polyphagia  Genitourinary: Negative for dysuria, frequency, hematuria and urgency  Musculoskeletal: Negative for arthralgias, back pain, gait problem, joint swelling, neck pain and neck stiffness  Skin: Negative for color change and rash  Allergic/Immunologic: Negative for environmental allergies and food allergies  Neurological: Positive for numbness  Negative for dizziness, tremors, seizures, speech difficulty and headaches  Hematological: Negative for adenopathy  Does not bruise/bleed easily  Psychiatric/Behavioral: Negative for behavioral problems, dysphoric mood, hallucinations and self-injury               Objective:  Vitals:    02/23/22 1102   BP: 152/90   BP Location: Left arm   Patient Position: Sitting   Cuff Size: Standard   Pulse: 77   Resp: 20   Temp: 98 °F (36 7 °C)   SpO2: 97%   Weight: 107 kg (235 lb)   Height: 6' 3" (1 905 m)      Physical Exam  Constitutional:       General: He is not in acute distress  Appearance: He is well-developed  He is not diaphoretic  HENT:      Head: Normocephalic and atraumatic  Right Ear: External ear normal       Left Ear: External ear normal       Nose: Nose normal       Mouth/Throat:      Pharynx: No oropharyngeal exudate  Eyes:      General: No scleral icterus  Right eye: No discharge  Left eye: No discharge  Conjunctiva/sclera: Conjunctivae normal       Pupils: Pupils are equal, round, and reactive to light  Neck:      Thyroid: No thyromegaly  Cardiovascular:      Rate and Rhythm: Normal rate and regular rhythm  Heart sounds: Normal heart sounds  No murmur heard  Pulmonary:      Effort: Pulmonary effort is normal       Breath sounds: Normal breath sounds  No wheezing or rales  Abdominal:      General: Bowel sounds are normal       Palpations: Abdomen is soft  There is no mass  Tenderness: There is no abdominal tenderness  There is no guarding  Musculoskeletal:         General: No tenderness  Normal range of motion  Cervical back: Normal range of motion and neck supple  Lymphadenopathy:      Cervical: No cervical adenopathy  Skin:     General: Skin is warm and dry  Neurological:      Mental Status: He is alert and oriented to person, place, and time  Deep Tendon Reflexes: Reflexes are normal and symmetric  Psychiatric:         Thought Content: Thought content normal          Judgment: Judgment normal            Assessment/Plan:    No problem-specific Assessment & Plan notes found for this encounter         Diagnoses and all orders for this visit:    Type 2 diabetes mellitus with diabetic neuropathy, without long-term current use of insulin ([address] )    Uncontrolled type 2 diabetes mellitus with hyperglycemia (HCC)    Benign essential hypertension        [facility]

## 2022-03-24 DIAGNOSIS — E11.9 TYPE 2 DIABETES MELLITUS WITHOUT COMPLICATION, WITHOUT LONG-TERM CURRENT USE OF INSULIN (HCC): ICD-10-CM

## 2022-06-05 DIAGNOSIS — E11.9 TYPE 2 DIABETES MELLITUS WITHOUT COMPLICATION, WITHOUT LONG-TERM CURRENT USE OF INSULIN (HCC): ICD-10-CM

## 2022-06-10 DIAGNOSIS — E11.9 TYPE 2 DIABETES MELLITUS WITHOUT COMPLICATION, WITHOUT LONG-TERM CURRENT USE OF INSULIN (HCC): ICD-10-CM

## 2022-06-22 ENCOUNTER — RA CDI HCC (OUTPATIENT)
Dept: OTHER | Facility: HOSPITAL | Age: 72
End: 2022-06-22

## 2022-06-22 NOTE — PROGRESS NOTES
[facility]  coding opportunities       Chart reviewed, no opportunity found: CHART REVIEWED, NO OPPORTUNITY FOUND        Patients Insurance        Commercial Insurance: [employer]

## 2022-06-23 ENCOUNTER — TELEPHONE (OUTPATIENT)
Dept: FAMILY MEDICINE CLINIC | Facility: CLINIC | Age: 72
End: 2022-06-23

## 2022-06-23 NOTE — TELEPHONE ENCOUNTER
----- Message from Richard Leyva DO sent at 6/23/2022 10:39 AM EDT -----  Labs are stable but blood sugar is elevated  Hemoglobin A1c is up to 7 2    Make an appointment to go over

## 2022-06-29 ENCOUNTER — OFFICE VISIT (OUTPATIENT)
Dept: FAMILY MEDICINE CLINIC | Facility: CLINIC | Age: 72
End: 2022-06-29
Payer: COMMERCIAL

## 2022-06-29 VITALS
HEART RATE: 71 BPM | WEIGHT: 228 LBS | TEMPERATURE: 97.1 F | SYSTOLIC BLOOD PRESSURE: 122 MMHG | OXYGEN SATURATION: 98 % | HEIGHT: 75 IN | DIASTOLIC BLOOD PRESSURE: 90 MMHG | BODY MASS INDEX: 28.35 KG/M2

## 2022-06-29 DIAGNOSIS — M17.0 PRIMARY OSTEOARTHRITIS OF BOTH KNEES: ICD-10-CM

## 2022-06-29 DIAGNOSIS — I10 BENIGN ESSENTIAL HYPERTENSION: ICD-10-CM

## 2022-06-29 DIAGNOSIS — E11.9 TYPE 2 DIABETES MELLITUS WITHOUT COMPLICATION, WITHOUT LONG-TERM CURRENT USE OF INSULIN (HCC): Primary | ICD-10-CM

## 2022-06-29 LAB
LEFT EYE DIABETIC RETINOPATHY: NORMAL
RIGHT EYE DIABETIC RETINOPATHY: NORMAL
SEVERITY (EYE EXAM): NORMAL

## 2022-06-29 PROCEDURE — 3725F SCREEN DEPRESSION PERFORMED: CPT | Performed by: FAMILY MEDICINE

## 2022-06-29 PROCEDURE — 99214 OFFICE O/P EST MOD 30 MIN: CPT | Performed by: FAMILY MEDICINE

## 2022-06-29 PROCEDURE — 1036F TOBACCO NON-USER: CPT | Performed by: FAMILY MEDICINE

## 2022-06-29 PROCEDURE — 3288F FALL RISK ASSESSMENT DOCD: CPT | Performed by: FAMILY MEDICINE

## 2022-06-29 PROCEDURE — 3008F BODY MASS INDEX DOCD: CPT | Performed by: FAMILY MEDICINE

## 2022-06-29 PROCEDURE — 1160F RVW MEDS BY RX/DR IN RCRD: CPT | Performed by: FAMILY MEDICINE

## 2022-06-29 PROCEDURE — 3080F DIAST BP >= 90 MM HG: CPT | Performed by: FAMILY MEDICINE

## 2022-06-29 PROCEDURE — 1101F PT FALLS ASSESS-DOCD LE1/YR: CPT | Performed by: FAMILY MEDICINE

## 2022-06-29 PROCEDURE — 3074F SYST BP LT 130 MM HG: CPT | Performed by: FAMILY MEDICINE

## 2022-06-29 RX ORDER — LANCETS 30 GAUGE
EACH MISCELLANEOUS 2 TIMES DAILY
COMMUNITY
Start: 2022-06-11

## 2022-06-29 RX ORDER — BLOOD SUGAR DIAGNOSTIC
STRIP MISCELLANEOUS 2 TIMES DAILY
COMMUNITY
Start: 2022-06-06

## 2022-06-29 NOTE — PROGRESS NOTES
Patient's shoes and socks removed  Right Foot/Ankle   Right Foot Inspection  Skin Exam: skin normal and skin intact  No dry skin, no warmth, no callus, no erythema, no maceration, no abnormal color, no pre-ulcer, no ulcer and no callus  Toe Exam: ROM and strength within normal limits  Sensory   Vibration: diminished  Proprioception: diminished  Monofilament testing: diminished    Vascular  Capillary refills: < 3 seconds  The right DP pulse is 1+  The right PT pulse is 1+  Left Foot/Ankle  Left Foot Inspection  Skin Exam: skin normal and skin intact  No dry skin, no warmth, no erythema, no maceration, normal color, no pre-ulcer, no ulcer and no callus  Toe Exam: ROM and strength within normal limits  Sensory   Vibration: diminished  Proprioception: diminished  Monofilament testing: diminished    Vascular  Capillary refills: < 3 seconds  The left DP pulse is 1+  The left PT pulse is 1+       Assign Risk Category  No deformity present  Loss of protective sensation  No weak pulses  Risk: 1

## 2022-06-29 NOTE — PROGRESS NOTES
Subjective:   Chief Complaint   Patient presents with    Diabetes     Pt here for diabetic f/u, foot exam done today        Patient ID: Berta Mckeon is a 70 y o  male  Here for check up  a1c is 7 2      The following portions of the patient's history were reviewed and updated as appropriate: allergies, current medications, past family history, past medical history, past social history, past surgical history and problem list     Review of Systems   Constitutional: Negative for activity change, appetite change, chills, diaphoresis, fatigue and unexpected weight change  HENT: Negative for congestion, ear discharge, ear pain, hearing loss, nosebleeds and rhinorrhea  Eyes: Negative for pain, redness, itching and visual disturbance  Respiratory: Negative for cough, choking, chest tightness and shortness of breath  Cardiovascular: Negative for chest pain and leg swelling  Gastrointestinal: Negative for abdominal pain, blood in stool, constipation, diarrhea and nausea  Endocrine: Negative for cold intolerance, polydipsia and polyphagia  Genitourinary: Negative for dysuria, frequency, hematuria and urgency  Musculoskeletal: Negative for arthralgias, back pain, gait problem, joint swelling, neck pain and neck stiffness  Skin: Negative for color change and rash  Allergic/Immunologic: Negative for environmental allergies and food allergies  Neurological: Negative for dizziness, tremors, seizures, speech difficulty, numbness and headaches  Hematological: Negative for adenopathy  Does not bruise/bleed easily  Psychiatric/Behavioral: Negative for behavioral problems, dysphoric mood, hallucinations and self-injury  Objective:  Vitals:    06/29/22 0910   BP: 122/90   Pulse: 71   Temp: (!) 97 1 °F (36 2 °C)   SpO2: 98%   Weight: 103 kg (228 lb)   Height: 6' 3" (1 905 m)      Physical Exam  Constitutional:       General: He is not in acute distress       Appearance: He is well-developed  He is not diaphoretic  HENT:      Head: Normocephalic and atraumatic  Right Ear: External ear normal       Left Ear: External ear normal       Nose: Nose normal       Mouth/Throat:      Pharynx: No oropharyngeal exudate  Eyes:      General: No scleral icterus  Right eye: No discharge  Left eye: No discharge  Conjunctiva/sclera: Conjunctivae normal       Pupils: Pupils are equal, round, and reactive to light  Neck:      Thyroid: No thyromegaly  Cardiovascular:      Rate and Rhythm: Normal rate and regular rhythm  Heart sounds: Normal heart sounds  No murmur heard  Pulmonary:      Effort: Pulmonary effort is normal       Breath sounds: Normal breath sounds  No wheezing or rales  Abdominal:      General: Bowel sounds are normal       Palpations: Abdomen is soft  There is no mass  Tenderness: There is no abdominal tenderness  There is no guarding  Musculoskeletal:         General: No tenderness  Normal range of motion  Cervical back: Normal range of motion and neck supple  Lymphadenopathy:      Cervical: No cervical adenopathy  Skin:     General: Skin is warm and dry  Neurological:      Mental Status: He is alert and oriented to person, place, and time  Deep Tendon Reflexes: Reflexes are normal and symmetric  Psychiatric:         Thought Content: Thought content normal          Judgment: Judgment normal            Assessment/Plan:    No problem-specific Assessment & Plan notes found for this encounter  Diagnoses and all orders for this visit:    Type 2 diabetes mellitus without complication, without long-term current use of insulin (HCC)  -     metFORMIN (GLUCOPHAGE) 500 mg tablet; Take 2 tabs twice daily    Primary osteoarthritis of both knees  -     Ambulatory Referral to Physical Therapy;  Future    Benign essential hypertension    Other orders  -     OneTouch Ultra test strip; 2 (two) times a day  -     Lancets (OneTouch Delica Plus [id number]) MISC; 2 (two) times a day        Increase metformin to 4 tabs per day

## 2022-10-03 DIAGNOSIS — E11.9 TYPE 2 DIABETES MELLITUS WITHOUT COMPLICATION, WITHOUT LONG-TERM CURRENT USE OF INSULIN (HCC): Primary | ICD-10-CM

## 2022-10-04 ENCOUNTER — TELEPHONE (OUTPATIENT)
Dept: FAMILY MEDICINE CLINIC | Facility: CLINIC | Age: 72
End: 2022-10-04

## 2022-10-04 LAB
EST. AVERAGE GLUCOSE BLD GHB EST-MCNC: 131 MG/DL
EST. AVERAGE GLUCOSE BLD GHB EST-SCNC: 7.3 MMOL/L
HBA1C MFR BLD: 6.2 % OF TOTAL HGB

## 2022-10-04 NOTE — TELEPHONE ENCOUNTER
----- Message from Maine Bryant DO sent at 10/4/2022  1:08 PM EDT -----  Sugar is better    Continue current Rx

## 2022-10-05 ENCOUNTER — OFFICE VISIT (OUTPATIENT)
Dept: FAMILY MEDICINE CLINIC | Facility: CLINIC | Age: 72
End: 2022-10-05
Payer: COMMERCIAL

## 2022-10-05 VITALS
HEART RATE: 85 BPM | DIASTOLIC BLOOD PRESSURE: 84 MMHG | TEMPERATURE: 97.3 F | OXYGEN SATURATION: 96 % | HEIGHT: 74 IN | WEIGHT: 232 LBS | BODY MASS INDEX: 29.77 KG/M2 | SYSTOLIC BLOOD PRESSURE: 136 MMHG

## 2022-10-05 DIAGNOSIS — E11.9 TYPE 2 DIABETES MELLITUS WITHOUT COMPLICATION, WITHOUT LONG-TERM CURRENT USE OF INSULIN (HCC): Primary | ICD-10-CM

## 2022-10-05 DIAGNOSIS — E11.40 TYPE 2 DIABETES MELLITUS WITH DIABETIC NEUROPATHY, WITHOUT LONG-TERM CURRENT USE OF INSULIN (HCC): ICD-10-CM

## 2022-10-05 DIAGNOSIS — E78.2 MIXED HYPERLIPIDEMIA: ICD-10-CM

## 2022-10-05 DIAGNOSIS — I10 BENIGN ESSENTIAL HYPERTENSION: ICD-10-CM

## 2022-10-05 DIAGNOSIS — R05.1 ACUTE COUGH: ICD-10-CM

## 2022-10-05 PROCEDURE — 99213 OFFICE O/P EST LOW 20 MIN: CPT | Performed by: FAMILY MEDICINE

## 2022-10-05 RX ORDER — PROMETHAZINE HYDROCHLORIDE AND CODEINE PHOSPHATE 6.25; 1 MG/5ML; MG/5ML
5 SYRUP ORAL EVERY 4 HOURS PRN
Qty: 240 ML | Refills: 0 | Status: SHIPPED | OUTPATIENT
Start: 2022-10-05

## 2022-10-05 NOTE — PROGRESS NOTES
Subjective:   Chief Complaint   Patient presents with    Follow-up     3 month diabetic check         Patient ID: Amarilis Cramer is a 70 y o  male  Here for check up and review labs      The following portions of the patient's history were reviewed and updated as appropriate: allergies, current medications, past family history, past medical history, past social history, past surgical history and problem list     Review of Systems   Constitutional: Negative for activity change, appetite change, chills, diaphoresis, fatigue and unexpected weight change  HENT: Negative for congestion, ear discharge, ear pain, hearing loss, nosebleeds and rhinorrhea  Eyes: Negative for pain, redness, itching and visual disturbance  Respiratory: Negative for cough, choking, chest tightness and shortness of breath  Cardiovascular: Negative for chest pain and leg swelling  Gastrointestinal: Negative for abdominal pain, blood in stool, constipation, diarrhea and nausea  Endocrine: Negative for cold intolerance, polydipsia and polyphagia  Genitourinary: Negative for dysuria, frequency, hematuria and urgency  Musculoskeletal: Negative for arthralgias, back pain, gait problem, joint swelling, neck pain and neck stiffness  Skin: Negative for color change and rash  Allergic/Immunologic: Negative for environmental allergies and food allergies  Neurological: Negative for dizziness, tremors, seizures, speech difficulty, numbness and headaches  Hematological: Negative for adenopathy  Does not bruise/bleed easily  Psychiatric/Behavioral: Negative for behavioral problems, dysphoric mood, hallucinations and self-injury  Objective:  Vitals:    10/05/22 1004   BP: 136/84   Pulse: 85   Temp: (!) 97 3 °F (36 3 °C)   TempSrc: Tympanic   SpO2: 96%   Weight: 105 kg (232 lb)   Height: 6' 1 5" (1 867 m)      Physical Exam  Constitutional:       General: He is not in acute distress       Appearance: He is well-developed  He is not diaphoretic  HENT:      Head: Normocephalic and atraumatic  Right Ear: External ear normal       Left Ear: External ear normal       Nose: Nose normal       Mouth/Throat:      Pharynx: No oropharyngeal exudate  Eyes:      General: No scleral icterus  Right eye: No discharge  Left eye: No discharge  Conjunctiva/sclera: Conjunctivae normal       Pupils: Pupils are equal, round, and reactive to light  Neck:      Thyroid: No thyromegaly  Cardiovascular:      Rate and Rhythm: Normal rate and regular rhythm  Heart sounds: Normal heart sounds  No murmur heard  Pulmonary:      Effort: Pulmonary effort is normal       Breath sounds: Normal breath sounds  No wheezing or rales  Abdominal:      General: Bowel sounds are normal       Palpations: Abdomen is soft  There is no mass  Tenderness: There is no abdominal tenderness  There is no guarding  Musculoskeletal:         General: No tenderness  Normal range of motion  Cervical back: Normal range of motion and neck supple  Lymphadenopathy:      Cervical: No cervical adenopathy  Skin:     General: Skin is warm and dry  Neurological:      Mental Status: He is alert and oriented to person, place, and time  Deep Tendon Reflexes: Reflexes are normal and symmetric  Psychiatric:         Thought Content: Thought content normal          Judgment: Judgment normal            Assessment/Plan:    No problem-specific Assessment & Plan notes found for this encounter  Diagnoses and all orders for this visit:    Type 2 diabetes mellitus without complication, without long-term current use of insulin (HCC)    Acute cough  -     promethazine-codeine (PHENERGAN WITH CODEINE) 6 25-10 mg/5 mL syrup;  Take 5 mL by mouth every 4 (four) hours as needed for cough    Type 2 diabetes mellitus with diabetic neuropathy, without long-term current use of insulin (HCC)    Mixed hyperlipidemia    Benign essential hypertension        Cont current Rx

## 2022-10-12 ENCOUNTER — VBI (OUTPATIENT)
Dept: ADMINISTRATIVE | Facility: OTHER | Age: 72
End: 2022-10-12

## 2022-10-12 NOTE — TELEPHONE ENCOUNTER
From Didier Melendez  Upon review of the In Basket request we were able to locate, review, and update the patient chart as requested for Diabetic Eye Exam     Any additional questions or concerns should be emailed to the Practice Liaisons via the appropriate education email address, please do not reply via In Basket      Thank you  Reyes Manjarrez

## 2022-11-01 ENCOUNTER — EVALUATION (OUTPATIENT)
Dept: PHYSICAL THERAPY | Facility: CLINIC | Age: 72
End: 2022-11-01

## 2022-11-01 DIAGNOSIS — M25.561 CHRONIC PAIN OF BOTH KNEES: Primary | ICD-10-CM

## 2022-11-01 DIAGNOSIS — M25.562 CHRONIC PAIN OF BOTH KNEES: Primary | ICD-10-CM

## 2022-11-01 DIAGNOSIS — G89.29 CHRONIC PAIN OF BOTH KNEES: Primary | ICD-10-CM

## 2022-11-01 NOTE — PROGRESS NOTES
PT Evaluation     Today's date: 2022  Patient name: Charly Anderson   : 1950  MRN: [id number]  Referring provider: Rosa Rosales DO  Dx:   Encounter Diagnosis     ICD-10-CM    1  Chronic pain of both knees  M25 561     M25 562     G89 29                   Assessment  Assessment details: Katie Galindo is a 70year old male with a long history of chronic R knee pain and shorter history of L knee pain beginning after a mild slip/fall  He presents with obvious joint and muscular tightness, decreased LE strength, poor quadriceps activation, and decreased motor control  These impairments are contributing to increased difficulty and pain with prolonged walking, stair negotiation, and yard work  He requires PT to address these impairments and achieve set goals  He would benefit most from a graded strengthening program    Impairments: abnormal coordination, abnormal gait, abnormal or restricted ROM, activity intolerance, impaired balance, impaired physical strength, lacks appropriate home exercise program and pain with function  Understanding of Dx/Px/POC: good   Prognosis: good    Goals  Pt will present with 5/5 strength with knee extension to decrease difficulty with stair negotiation by 8 weeks  Pt will ambulate 2 miles without increased knee pain by 8 weeks  Pt will preform 4 hours straight of yard work without increased knee pain by 8 weeks  Pt will be independent with comprehensive home exercise program to facilitate long term success with chronic knee pain by 8 weeks       Plan  Plan details: Emphasis on facilitating independent strengthening program - pt request 3 vs 2 days per week but either is appropriate   Patient would benefit from: skilled physical therapy  Planned modality interventions: cryotherapy  Planned therapy interventions: ADL training, balance, balance/weight bearing training, body mechanics training, flexibility, functional ROM exercises, gait training, graded activity, graded exercise, graded motor, home exercise program, therapeutic exercise, therapeutic activities, stretching, strengthening, patient education, neuromuscular re-education, manual therapy and joint mobilization  Frequency: 3x week  Duration in visits: 18  Duration in weeks: 6  Plan of Care beginning date: 2022  Plan of Care expiration date: 2022        Subjective Evaluation    History of Present Illness  Mechanism of injury: Adam Coleman reports a history of chronic right knee pain for the past year and a half beginning insidiously, increasing gradually, up until previous round of PT  Some overall improvements while in PT, but was not compliant with HEP upon discharge  He also reports a history of left knee pain beginning about a month ago after slipping down small hill with his hip in inversion and his knee flexed  Previously no increased left knee pain, now increased pain with stair ascent/descent  Either knee pain is most severe with yard work and stair descent  Primary complaint is right knee pain and "I would like to develop a plan" which he feels may eventually include surgery  He also reports limitations in walking tolerance of about 2 miles prior to severe increase in right knee swelling and pain       30+ years ago unspecified R knee surgery with "there is metal in my knee"  Quality of life: good    Pain  Current pain ratin  At worst pain rating: 3  Quality: dull ache and throbbing  Aggravating factors: walking and stair climbing  Progression: no change    Treatments  Previous treatment: physical therapy, injection treatment and medication  Patient Goals  Patient goals for therapy: increased strength and decreased pain          Objective     Static Posture     Comments  Moderate genu valgum bilaterally L>R     Passive Range of Motion   Left Knee   Flexion: 126 degrees   Extension: -3 degrees     Right Knee   Flexion: 118 degrees   Extension: -3 degrees     Strength/Myotome Testing     Left Hip   Planes of Motion   Flexion: 5  Abduction: 5    Right Hip   Planes of Motion   Flexion: 5  Abduction: 5    Left Knee   Flexion: 4  Extension: 4    Right Knee   Flexion: 3+  Extension: 3+    Left Ankle/Foot   Plantar flexion: 3+    Right Ankle/Foot   Plantar flexion: 3+    Additional Strength Details  Moderate crepitus with L knee extension strength testing     Ambulation     Ambulation: Stairs     Additional Stairs Ambulation Details  Step up 8" R without UE support with mild unsteadiness, close supervision  Step up 8" L without UE with increased unsteadiness  Step down 8" L or R without UE with poor eccentric control and pain     Comments   Level ground ambulation WNL - no AD     Functional Assessment        Comments  30 Second STS - 14, standard chair no UE     General Comments:      Knee Comments  Moderate hamstring tightness bilaterally L>R   Moderate quadriceps tightness bilaterally              Precautions:       Manuals 11/1                                                                Neuro Re-Ed                                                                                                        Ther Ex             Prone Quadriceps Stretch 3x30 sec            Supine HS Stretch 3x30 sec                         QS with ankle prop  20x            Heel Slides with strap 2x10            Leg Press             RDL             RB/Elliptical              Ther Activity             Step Ups 8" x10 ea            Squats to chair 2x10            Gait Training                                       Modalities

## 2022-11-01 NOTE — LETTER
2022    Jennie [address] [place] [address]    Patient: Dave Silverio   YOB: 1950   Date of Visit: 2022     Encounter Diagnosis     ICD-10-CM    1  Chronic pain of both knees  M25 561     M25 562     G89 29        Dear Dr Carr Presser: Thank you for your recent referral of Dave Silverio  Please review the attached evaluation summary from Dalton's recent visit  Please verify that you agree with the plan of care by signing the attached order  If you have any questions or concerns, please do not hesitate to call  I sincerely appreciate the opportunity to share in the care of one of your patients and hope to have another opportunity to work with you in the near future  Sincerely,    Lubna Burgos, PT      Referring Provider:      I certify that I have read the below Plan of Care and certify the need for these services furnished under this plan of treatment while under my care  DO Saira Gomez [phone] [id number]  [address] [place]          PT Evaluation     Today's date: 2022  Patient name: Dave Silverio   : 1950  MRN: [id number]  Referring provider: Jayden Francis DO  Dx:   Encounter Diagnosis     ICD-10-CM    1  Chronic pain of both knees  M25 561     M25 562     G89 29                   Assessment  Assessment details: Trang Cardoso is a 70year old male with a long history of chronic R knee pain and shorter history of L knee pain beginning after a mild slip/fall  He presents with obvious joint and muscular tightness, decreased LE strength, poor quadriceps activation, and decreased motor control  These impairments are contributing to increased difficulty and pain with prolonged walking, stair negotiation, and yard work  He requires PT to address these impairments and achieve set goals    He would benefit most from a graded strengthening program    Impairments: abnormal coordination, abnormal gait, abnormal or restricted ROM, activity intolerance, impaired balance, impaired physical strength, lacks appropriate home exercise program and pain with function  Understanding of Dx/Px/POC: good   Prognosis: good    Goals  Pt will present with 5/5 strength with knee extension to decrease difficulty with stair negotiation by 8 weeks  Pt will ambulate 2 miles without increased knee pain by 8 weeks  Pt will preform 4 hours straight of yard work without increased knee pain by 8 weeks  Pt will be independent with comprehensive home exercise program to facilitate long term success with chronic knee pain by 8 weeks  Plan  Plan details: Emphasis on facilitating independent strengthening program - pt request 3 vs 2 days per week but either is appropriate   Patient would benefit from: skilled physical therapy  Planned modality interventions: cryotherapy  Planned therapy interventions: ADL training, balance, balance/weight bearing training, body mechanics training, flexibility, functional ROM exercises, gait training, graded activity, graded exercise, graded motor, home exercise program, therapeutic exercise, therapeutic activities, stretching, strengthening, patient education, neuromuscular re-education, manual therapy and joint mobilization  Frequency: 3x week  Duration in visits: 18  Duration in weeks: 6  Plan of Care beginning date: 11/1/2022  Plan of Care expiration date: 12/13/2022        Subjective Evaluation    History of Present Illness  Mechanism of injury: Kali Franco reports a history of chronic right knee pain for the past year and a half beginning insidiously, increasing gradually, up until previous round of PT  Some overall improvements while in PT, but was not compliant with HEP upon discharge  He also reports a history of left knee pain beginning about a month ago after slipping down small hill with his hip in inversion and his knee flexed    Previously no increased left knee pain, now increased pain with stair ascent/descent  Either knee pain is most severe with yard work and stair descent  Primary complaint is right knee pain and "I would like to develop a plan" which he feels may eventually include surgery  He also reports limitations in walking tolerance of about 2 miles prior to severe increase in right knee swelling and pain       30+ years ago unspecified R knee surgery with "there is metal in my knee"  Quality of life: good    Pain  Current pain ratin  At worst pain rating: 3  Quality: dull ache and throbbing  Aggravating factors: walking and stair climbing  Progression: no change    Treatments  Previous treatment: physical therapy, injection treatment and medication  Patient Goals  Patient goals for therapy: increased strength and decreased pain          Objective     Static Posture     Comments  Moderate genu valgum bilaterally L>R     Passive Range of Motion   Left Knee   Flexion: 126 degrees   Extension: -3 degrees     Right Knee   Flexion: 118 degrees   Extension: -3 degrees     Strength/Myotome Testing     Left Hip   Planes of Motion   Flexion: 5  Abduction: 5    Right Hip   Planes of Motion   Flexion: 5  Abduction: 5    Left Knee   Flexion: 4  Extension: 4    Right Knee   Flexion: 3+  Extension: 3+    Left Ankle/Foot   Plantar flexion: 3+    Right Ankle/Foot   Plantar flexion: 3+    Additional Strength Details  Moderate crepitus with L knee extension strength testing     Ambulation     Ambulation: Stairs     Additional Stairs Ambulation Details  Step up 8" R without UE support with mild unsteadiness, close supervision  Step up 8" L without UE with increased unsteadiness  Step down 8" L or R without UE with poor eccentric control and pain     Comments   Level ground ambulation WNL - no AD     Functional Assessment        Comments  30 Second STS - 14, standard chair no UE     General Comments:      Knee Comments  Moderate hamstring tightness bilaterally L>R   Moderate quadriceps tightness bilaterally              Precautions:       Manuals 11/1                                                                Neuro Re-Ed                                                                                                        Ther Ex             Prone Quadriceps Stretch 3x30 sec            Supine HS Stretch 3x30 sec                         QS with ankle prop  20x            Heel Slides with strap 2x10            Leg Press             RDL             RB/Elliptical              Ther Activity             Step Ups 8" x10 ea            Squats to chair 2x10            Gait Training                                       Modalities

## 2022-11-01 NOTE — LETTER
2022    [facility] [address]    Patient: Margi Dowd   YOB: 1950   Date of Visit: 2022     Encounter Diagnosis     ICD-10-CM    1  Chronic pain of both knees  M25 561     M25 562     G89 29        Dear Dr Clif Gagnon: Thank you for your recent referral of Margi Dowd  Please review the attached evaluation summary from Dalton's recent visit  Please verify that you agree with the plan of care by signing the attached order  If you have any questions or concerns, please do not hesitate to call  I sincerely appreciate the opportunity to share in the care of one of your patients and hope to have another opportunity to work with you in the near future  Sincerely,    Ness Ambriz, PT      Referring Provider:      I certify that I have read the below Plan of Care and certify the need for these services furnished under this plan of treatment while under my care  DO Saira [id number]  [address] [place]          PT Evaluation     Today's date: 2022  Patient name: Margi Dowd  : 1950  MRN: [id number]  Referring provider: Luz Hurley DO  Dx:   Encounter Diagnosis     ICD-10-CM    1  Chronic pain of both knees  M25 561     M25 562     G89 29                   Assessment  Assessment details: Hussein Glasgow is a 70year old male with a long history of chronic R knee pain and shorter history of L knee pain beginning after a mild slip/fall  He presents with obvious joint and muscular tightness, decreased LE strength, poor quadriceps activation, and decreased motor control  These impairments are contributing to increased difficulty and pain with prolonged walking, stair negotiation, and yard work  He requires PT to address these impairments and achieve set goals    He would benefit most from a graded strengthening program    Impairments: abnormal coordination, abnormal gait, abnormal or restricted ROM, activity intolerance, impaired balance, impaired physical strength, lacks appropriate home exercise program and pain with function  Understanding of Dx/Px/POC: good   Prognosis: good    Goals  Pt will present with 5/5 strength with knee extension to decrease difficulty with stair negotiation by 8 weeks  Pt will ambulate 2 miles without increased knee pain by 8 weeks  Pt will preform 4 hours straight of yard work without increased knee pain by 8 weeks  Pt will be independent with comprehensive home exercise program to facilitate long term success with chronic knee pain by 8 weeks  Plan  Plan details: Emphasis on facilitating independent strengthening program - pt request 3 vs 2 days per week but either is appropriate   Patient would benefit from: skilled physical therapy  Planned modality interventions: cryotherapy  Planned therapy interventions: ADL training, balance, balance/weight bearing training, body mechanics training, flexibility, functional ROM exercises, gait training, graded activity, graded exercise, graded motor, home exercise program, therapeutic exercise, therapeutic activities, stretching, strengthening, patient education, neuromuscular re-education, manual therapy and joint mobilization  Frequency: 3x week  Duration in visits: 18  Duration in weeks: 6  Plan of Care beginning date: 11/1/2022  Plan of Care expiration date: 12/13/2022        Subjective Evaluation    History of Present Illness  Mechanism of injury: Tuckerman reports a history of chronic right knee pain for the past year and a half beginning insidiously, increasing gradually, up until previous round of PT  Some overall improvements while in PT, but was not compliant with HEP upon discharge  He also reports a history of left knee pain beginning about a month ago after slipping down small hill with his hip in inversion and his knee flexed    Previously no increased left knee pain, now increased pain with stair ascent/descent  Either knee pain is most severe with yard work and stair descent  Primary complaint is right knee pain and "I would like to develop a plan" which he feels may eventually include surgery  He also reports limitations in walking tolerance of about 2 miles prior to severe increase in right knee swelling and pain       30+ years ago unspecified R knee surgery with "there is metal in my knee"  Quality of life: good    Pain  Current pain ratin  At worst pain rating: 3  Quality: dull ache and throbbing  Aggravating factors: walking and stair climbing  Progression: no change    Treatments  Previous treatment: physical therapy, injection treatment and medication  Patient Goals  Patient goals for therapy: increased strength and decreased pain          Objective     Static Posture     Comments  Moderate genu valgum bilaterally L>R     Passive Range of Motion   Left Knee   Flexion: 126 degrees   Extension: -3 degrees     Right Knee   Flexion: 118 degrees   Extension: -3 degrees     Strength/Myotome Testing     Left Hip   Planes of Motion   Flexion: 5  Abduction: 5    Right Hip   Planes of Motion   Flexion: 5  Abduction: 5    Left Knee   Flexion: 4  Extension: 4    Right Knee   Flexion: 3+  Extension: 3+    Left Ankle/Foot   Plantar flexion: 3+    Right Ankle/Foot   Plantar flexion: 3+    Additional Strength Details  Moderate crepitus with L knee extension strength testing     Ambulation     Ambulation: Stairs     Additional Stairs Ambulation Details  Step up 8" R without UE support with mild unsteadiness, close supervision  Step up 8" L without UE with increased unsteadiness  Step down 8" L or R without UE with poor eccentric control and pain     Comments   Level ground ambulation WNL - no AD     Functional Assessment        Comments  30 Second STS - 14, standard chair no UE     General Comments:      Knee Comments  Moderate hamstring tightness bilaterally L>R   Moderate quadriceps tightness bilaterally              Precautions: ***      Manuals 11/1                                                                Neuro Re-Ed                                                                                                        Ther Ex             Prone Quadriceps Stretch 3x30 sec            Supine HS Stretch 3x30 sec                         QS with ankle prop  20x            Heel Slides with strap 2x10            Leg Press             RDL             RB/Elliptical              Ther Activity             Step Ups 8" x10 ea            Squats to chair 2x10            Gait Training                                       Modalities

## 2022-11-01 NOTE — LETTER
2022    [address] [place] [address]    Patient: Dave Silverio   YOB: 1950   Date of Visit: 2022     Encounter Diagnosis     ICD-10-CM    1  Chronic pain of both knees  M25 561     M25 562     G89 29        Dear Dr Lilly Rother: Thank you for your recent referral of Dave Silverio  Please review the attached evaluation summary from Dalton's recent visit  Please verify that you agree with the plan of care by signing the attached order  If you have any questions or concerns, please do not hesitate to call  I sincerely appreciate the opportunity to share in the care of one of your patients and hope to have another opportunity to work with you in the near future  Sincerely,    Lubna Burgos, PT      Referring Provider:      I certify that I have read the below Plan of Care and certify the need for these services furnished under this plan of treatment while under my care  DO Saira Gomez [id number]  [address] [place]          PT Evaluation     Today's date: 2022  Patient name: Dave Silverio  : 1950  MRN: [id number]  Referring provider: Jayden Francis DO  Dx:   Encounter Diagnosis     ICD-10-CM    1  Chronic pain of both knees  M25 561     M25 562     G89 29                   Assessment  Assessment details: Trang Cardoso is a 70year old male with a long history of chronic R knee pain and shorter history of L knee pain beginning after a mild slip/fall  He presents with obvious joint and muscular tightness, decreased LE strength, poor quadriceps activation, and decreased motor control  These impairments are contributing to increased difficulty and pain with prolonged walking, stair negotiation, and yard work  He requires PT to address these impairments and achieve set goals    He would benefit most from a graded strengthening program    Impairments: abnormal coordination, abnormal gait, abnormal or restricted ROM, activity intolerance, impaired balance, impaired physical strength, lacks appropriate home exercise program and pain with function  Understanding of Dx/Px/POC: good   Prognosis: good    Goals  Pt will present with 5/5 strength with knee extension to decrease difficulty with stair negotiation by 8 weeks  Pt will ambulate 2 miles without increased knee pain by 8 weeks  Pt will preform 4 hours straight of yard work without increased knee pain by 8 weeks  Pt will be independent with comprehensive home exercise program to facilitate long term success with chronic knee pain by 8 weeks  Plan  Plan details: Emphasis on facilitating independent strengthening program - pt request 3 vs 2 days per week but either is appropriate   Patient would benefit from: skilled physical therapy  Planned modality interventions: cryotherapy  Planned therapy interventions: ADL training, balance, balance/weight bearing training, body mechanics training, flexibility, functional ROM exercises, gait training, graded activity, graded exercise, graded motor, home exercise program, therapeutic exercise, therapeutic activities, stretching, strengthening, patient education, neuromuscular re-education, manual therapy and joint mobilization  Frequency: 3x week  Duration in visits: 18  Duration in weeks: 6  Plan of Care beginning date: 11/1/2022  Plan of Care expiration date: 12/13/2022        Subjective Evaluation    History of Present Illness  Mechanism of injury: Deja Ochoa reports a history of chronic right knee pain for the past year and a half beginning insidiously, increasing gradually, up until previous round of PT  Some overall improvements while in PT, but was not compliant with HEP upon discharge  He also reports a history of left knee pain beginning about a month ago after slipping down small hill with his hip in inversion and his knee flexed    Previously no increased left knee pain, now increased pain with stair ascent/descent  Either knee pain is most severe with yard work and stair descent  Primary complaint is right knee pain and "I would like to develop a plan" which he feels may eventually include surgery  He also reports limitations in walking tolerance of about 2 miles prior to severe increase in right knee swelling and pain       30+ years ago unspecified R knee surgery with "there is metal in my knee"  Quality of life: good    Pain  Current pain ratin  At worst pain rating: 3  Quality: dull ache and throbbing  Aggravating factors: walking and stair climbing  Progression: no change    Treatments  Previous treatment: physical therapy, injection treatment and medication  Patient Goals  Patient goals for therapy: increased strength and decreased pain          Objective     Static Posture     Comments  Moderate genu valgum bilaterally L>R     Passive Range of Motion   Left Knee   Flexion: 126 degrees   Extension: -3 degrees     Right Knee   Flexion: 118 degrees   Extension: -3 degrees     Strength/Myotome Testing     Left Hip   Planes of Motion   Flexion: 5  Abduction: 5    Right Hip   Planes of Motion   Flexion: 5  Abduction: 5    Left Knee   Flexion: 4  Extension: 4    Right Knee   Flexion: 3+  Extension: 3+    Left Ankle/Foot   Plantar flexion: 3+    Right Ankle/Foot   Plantar flexion: 3+    Additional Strength Details  Moderate crepitus with L knee extension strength testing     Ambulation     Ambulation: Stairs     Additional Stairs Ambulation Details  Step up 8" R without UE support with mild unsteadiness, close supervision  Step up 8" L without UE with increased unsteadiness  Step down 8" L or R without UE with poor eccentric control and pain     Comments   Level ground ambulation WNL - no AD     Functional Assessment        Comments  30 Second STS - 14, standard chair no UE     General Comments:      Knee Comments  Moderate hamstring tightness bilaterally L>R   Moderate quadriceps tightness bilaterally              Precautions: ***      Manuals 11/1                                                                Neuro Re-Ed                                                                                                        Ther Ex             Prone Quadriceps Stretch 3x30 sec            Supine HS Stretch 3x30 sec                         QS with ankle prop  20x            Heel Slides with strap 2x10            Leg Press             RDL             RB/Elliptical              Ther Activity             Step Ups 8" x10 ea            Squats to chair 2x10            Gait Training                                       Modalities

## 2022-11-04 ENCOUNTER — OFFICE VISIT (OUTPATIENT)
Dept: PHYSICAL THERAPY | Facility: CLINIC | Age: 72
End: 2022-11-04

## 2022-11-04 DIAGNOSIS — M25.562 CHRONIC PAIN OF BOTH KNEES: Primary | ICD-10-CM

## 2022-11-04 DIAGNOSIS — M25.561 CHRONIC PAIN OF BOTH KNEES: Primary | ICD-10-CM

## 2022-11-04 DIAGNOSIS — G89.29 CHRONIC PAIN OF BOTH KNEES: Primary | ICD-10-CM

## 2022-11-04 NOTE — PROGRESS NOTES
Daily Note     Today's date: 2022  Patient name: Radha Garrido  : 1950  MRN: [id number]  Referring provider: Shital Nogueira DO  Dx:   Encounter Diagnosis     ICD-10-CM    1  Chronic pain of both knees  M25 561     M25 562     G89 29                   Subjective: Eustis Plane reports slight improvement in b/l knee since performing HEP stretching  Notes most b/l knee discomfort occuring with prolonged flexion or extension position and stairs  Objective: See treatment diary below      Assessment: Dalton tolerated PT treatment well  Passive b/l LE stretching performed  Pt presenting with limited b/l hamstring and ITB flexibility  Added SLR to exercise diary, pt fatiguing by third set  Cues required for proper form with mini squat  Completed leg press activities w/o provocation  Pt would benefit from continued PT to further address impairments and maximize functional level  Plan: Continue per plan of care        Precautions:       Manuals            B/L LE stretch   JW                                                  Neuro Re-Ed                                                                                                        Ther Ex             Prone Quadriceps Stretch 3x30 sec :30x3            Supine HS Stretch 3x30 sec manual                         QS with ankle prop  20x 20x            Heel Slides with strap 2x10            Leg Press  B/L 150 3x10            Calf press  110/ 2x10            RDL             RB/Elliptical   RB 5'            SLR  3x10            Ther Activity             Step Ups 8" x10 ea            Squats to chair 2x10 2x10                                                  Modalities

## 2022-11-06 DIAGNOSIS — I10 ESSENTIAL HYPERTENSION: ICD-10-CM

## 2022-11-06 RX ORDER — OLMESARTAN MEDOXOMIL AND HYDROCHLOROTHIAZIDE 20/12.5 20; 12.5 MG/1; MG/1
TABLET ORAL
Qty: 90 TABLET | Refills: 3 | Status: SHIPPED | OUTPATIENT
Start: 2022-11-06

## 2022-11-08 ENCOUNTER — OFFICE VISIT (OUTPATIENT)
Dept: PHYSICAL THERAPY | Facility: CLINIC | Age: 72
End: 2022-11-08

## 2022-11-08 DIAGNOSIS — M25.561 CHRONIC PAIN OF BOTH KNEES: Primary | ICD-10-CM

## 2022-11-08 DIAGNOSIS — M25.562 CHRONIC PAIN OF BOTH KNEES: Primary | ICD-10-CM

## 2022-11-08 DIAGNOSIS — G89.29 CHRONIC PAIN OF BOTH KNEES: Primary | ICD-10-CM

## 2022-11-08 NOTE — PROGRESS NOTES
Daily Note     Today's date: 2022  Patient name: Payal Moctezuma  : 1950  MRN: [id number]  Referring provider: Evelyn Ivey DO  Dx:   Encounter Diagnosis     ICD-10-CM    1  Chronic pain of both knees  M25 561     M25 562     G89 29           Subjective: Pt reported that he has been doing his HEP after yard work, which has improved his symptoms  Objective: See treatment diary below    Assessment: Tolerated treatment well  Patient demonstrated fatigue post treatment, exhibited good technique with therapeutic exercises and would benefit from continued PT  Plan: Continue per plan of care        Precautions:       Manuals       B/L LE stretch   JW JZ      Pat mobs                            Neuro Re-Ed        Pressure cuff abd brace    mmHg  :30x3      Pressure cuff 90 90 hip/knee iso    mmHg  :30x3                         Educated on core activation for TA and abdominals    5'                         Ther Ex        Prone Quadriceps Stretch 3x30 sec :30x3  :30x3 ea      Supine HS Stretch 3x30 sec manual  Seated, step  :30x3 ea               QS with ankle prop  20x 20x        Heel Slides with strap 2x10        Leg Press b/l  B/L 150 3x10  seat5  150/ 10x  170/ 2x10         Calf press  110/ 2x10  110/ 3x10       RDL         RB/Elliptical   RB 5'  5'      SLR  3x10                                   Ther Activity        Step Ups 8" x10 ea        Squats to chair 2x10 2x10

## 2022-11-11 ENCOUNTER — TELEPHONE (OUTPATIENT)
Dept: FAMILY MEDICINE CLINIC | Facility: CLINIC | Age: 72
End: 2022-11-11

## 2022-11-11 ENCOUNTER — OFFICE VISIT (OUTPATIENT)
Dept: PHYSICAL THERAPY | Facility: CLINIC | Age: 72
End: 2022-11-11

## 2022-11-11 DIAGNOSIS — G89.29 CHRONIC PAIN OF BOTH KNEES: Primary | ICD-10-CM

## 2022-11-11 DIAGNOSIS — M25.562 CHRONIC PAIN OF BOTH KNEES: Primary | ICD-10-CM

## 2022-11-11 DIAGNOSIS — M25.561 CHRONIC PAIN OF BOTH KNEES: Primary | ICD-10-CM

## 2022-11-11 NOTE — TELEPHONE ENCOUNTER
Pt wants to get a script for a singles shot  Can you write one out for him?    cb [phone]    He is going to [employer]

## 2022-11-15 ENCOUNTER — APPOINTMENT (OUTPATIENT)
Dept: PHYSICAL THERAPY | Facility: CLINIC | Age: 72
End: 2022-11-15

## 2022-11-18 ENCOUNTER — OFFICE VISIT (OUTPATIENT)
Dept: PHYSICAL THERAPY | Facility: CLINIC | Age: 72
End: 2022-11-18

## 2022-11-18 DIAGNOSIS — M25.561 CHRONIC PAIN OF BOTH KNEES: Primary | ICD-10-CM

## 2022-11-18 DIAGNOSIS — M25.562 CHRONIC PAIN OF BOTH KNEES: Primary | ICD-10-CM

## 2022-11-18 DIAGNOSIS — G89.29 CHRONIC PAIN OF BOTH KNEES: Primary | ICD-10-CM

## 2022-11-18 NOTE — PROGRESS NOTES
Daily Note     Today's date: 2022  Patient name: Jocelyn Flores  : 1950  MRN: [id number]  Referring provider: Reshma Yadav DO  Dx:   Encounter Diagnosis     ICD-10-CM    1  Chronic pain of both knees  M25 561     M25 562     G89 29                      Subjective: Patient stated that his knees are feeling better  Objective: See treatment diary below      Assessment: Tolerated treatment well  Patient continues to benefit from manuals to address HS extensibility  Quad lag noted during SLR as patient fatigued  Compensatory patterns noted with step ups on RLE, vaulting to clear step  Good power and control with STS, cuing for hip hinge  Patient demonstrated fatigue post session and would benefit from continued PT  Plan: Continue per plan of care         Precautions:       Manuals     B/L LE stretch   JW JZ JW MB    Pat mobs          STM    B/l Hamstring JW  B/l Hamstring- theragun             Neuro Re-Ed      Pressure cuff abd brace    mmHg  :30x3      Pressure cuff 90 90 hip/knee iso    mmHg  :30x3                         Educated on core activation for TA and abdominals    5'                         Ther Ex      Prone Quadriceps Stretch 3x30 sec :30x3  :30x3 ea :30x3 ea :30x3 ea    Supine HS Stretch 3x30 sec manual  Seated, step  :30x3 ea Seated :30x3 ea  Seated :30x3 ea     Leg Press b/l  B/L 150 3x10  seat5  150/ 10x  170/ 2x10    seat5  150/ 10x  170/ 2x10  nv    Calf press  110/ 2x10  110/ 3x10  130/ 3x10 nv    RDL         RB/Elliptical   RB 5'  5'      SLR  3x10   3x10  3x10                               Ther Activity      Step Ups 8" x10 ea   8" 3x10 ea  8" 3x10 ea     Squats to chair 2x10 2x10  STS 3x10  STS 3x10                                     7' at end    Cp

## 2022-11-22 ENCOUNTER — OFFICE VISIT (OUTPATIENT)
Dept: PHYSICAL THERAPY | Facility: CLINIC | Age: 72
End: 2022-11-22

## 2022-11-22 DIAGNOSIS — M25.561 CHRONIC PAIN OF BOTH KNEES: Primary | ICD-10-CM

## 2022-11-22 DIAGNOSIS — G89.29 CHRONIC PAIN OF BOTH KNEES: Primary | ICD-10-CM

## 2022-11-22 DIAGNOSIS — M25.562 CHRONIC PAIN OF BOTH KNEES: Primary | ICD-10-CM

## 2022-11-22 NOTE — PROGRESS NOTES
Daily Note     Today's date: 2022  Patient name: Simba Gordon  : 1950  MRN: [id number]  Referring provider: Joanna Barron DO  Dx:   Encounter Diagnosis     ICD-10-CM    1  Chronic pain of both knees  M25 561     M25 562     G89 29              Subjective: Pt reported that he has been "doing well "      Objective: See treatment diary below    Assessment: Tolerated treatment well  Patient demonstrated fatigue post treatment, exhibited good technique with therapeutic exercises and would benefit from continued PT  Plan: Continue per plan of care        Precautions:       Manuals      B/L LE stretch    JZ JW MB JZ   Pat mobs          STM    B/l Hamstring JW  B/l Hamstring- theragun JDIONICIO            Neuro Re-Ed      Pressure cuff abd brace    mmHg  :30x3      Pressure cuff 90 90 hip/knee iso    mmHg  :30x3                         Educated on core activation for TA and abdominals    5'                         Ther Ex      Prone Quadriceps Stretch   :30x3 ea :30x3 ea :30x3 ea :30x3 ea   Supine HS Stretch   Seated, step  :30x3 ea Seated :30x3 ea  Seated :30x3 ea  Seated :30x3 ea   bridge      3x10    Fig 4 bridge      2x10 ea            RDL                  SLR    3x10  3x10    Prone hip ext with knee flexion      3x10 ea   Bridge                  Ther Activity      Step Ups 8"    8" 3x10 ea  8" 3x10 ea  8" 3x10 ea   Squats to chair    STS 3x10  STS 3x10     Leg Press b/l      seat6   210/ 3x10    Calf press      150/ 3x10    RB/Elliptical      5'                  7' at end    Cp

## 2022-11-25 ENCOUNTER — APPOINTMENT (OUTPATIENT)
Dept: PHYSICAL THERAPY | Facility: CLINIC | Age: 72
End: 2022-11-25

## 2022-11-29 ENCOUNTER — OFFICE VISIT (OUTPATIENT)
Dept: PHYSICAL THERAPY | Facility: CLINIC | Age: 72
End: 2022-11-29

## 2022-11-29 DIAGNOSIS — M25.561 CHRONIC PAIN OF BOTH KNEES: Primary | ICD-10-CM

## 2022-11-29 DIAGNOSIS — M25.562 CHRONIC PAIN OF BOTH KNEES: Primary | ICD-10-CM

## 2022-11-29 DIAGNOSIS — G89.29 CHRONIC PAIN OF BOTH KNEES: Primary | ICD-10-CM

## 2022-11-29 NOTE — PROGRESS NOTES
Daily Note     Today's date: 2022  Patient name: Jose Miller  : 1950  MRN: [id number]  Referring provider: Preeti Ibrahim DO  Dx:   Encounter Diagnosis     ICD-10-CM    1  Chronic pain of both knees  M25 561     M25 562     G89 29              Subjective: Franco Oseguera reports b/l knee pain has improved since starting PT  Pt states prolonged sitting and outdoor work with repetitive squatting are the still bothersome activities  Objective: See treatment diary below    Assessment: Augusto tolerated PT treatment well  Passive b/l LE stretching performed, pt presenting with limited hamstring flexibility L>R  Glute weakness evident throughout exercises, added s/l hip abduction to further address  Pt completed closed and open chain strengthening w/o provocation  Pt would benefit from continued PT to further address impairments and maximize functional level  Plan: Continue per plan of care        Precautions:       Manuals    B/L LE stretch  JW  JZ JW MB JZ   Pat mobs          STM JW   B/l Hamstring JW  B/l Hamstring- theragun JZ            Neuro Re-Ed    Pressure cuff abd brace    mmHg  :30x3      Pressure cuff 90 90 hip/knee iso    mmHg  :30x3                         Educated on core activation for TA and abdominals    5'                         Ther Ex    Prone Quadriceps Stretch :30x3 ea   :30x3 ea :30x3 ea :30x3 ea :30x3 ea   Supine HS Stretch Manual   Seated, step  :30x3 ea Seated :30x3 ea  Seated :30x3 ea  Seated :30x3 ea   bridge      3x10    Fig 4 bridge 3x10 ea      2x10 ea   S/l hib abd 2x10 ea         RDL                  SLR 3x10   3x10  3x10    Prone hip ext with knee flexion      3x10 ea            Ther Activity    Step Ups 8" 8" 3x10 ea   8" 3x10 ea  8" 3x10 ea  8" 3x10 ea   Squats to chair    STS 3x10  STS 3x10     Leg Press b/l seat6   210/ 3x10      seat6   210/ 3x10    Calf press 210/ 3x10      150/ 3x10    RB/Elliptical 5'     5'

## 2022-12-02 ENCOUNTER — OFFICE VISIT (OUTPATIENT)
Dept: PHYSICAL THERAPY | Facility: CLINIC | Age: 72
End: 2022-12-02

## 2022-12-02 DIAGNOSIS — M25.562 CHRONIC PAIN OF BOTH KNEES: Primary | ICD-10-CM

## 2022-12-02 DIAGNOSIS — G89.29 CHRONIC PAIN OF BOTH KNEES: Primary | ICD-10-CM

## 2022-12-02 DIAGNOSIS — M25.561 CHRONIC PAIN OF BOTH KNEES: Primary | ICD-10-CM

## 2022-12-02 NOTE — PROGRESS NOTES
Daily Note     Today's date: 2022  Patient name: Gaudencio Stinson  : 1950  MRN: [id number]  Referring provider: Lorenzo Lamar DO  Dx:   Encounter Diagnosis     ICD-10-CM    1  Chronic pain of both knees  M25 561     M25 562     G89 29                      Subjective: Patient stated that he's making slow and steady progress      Objective: See treatment diary below      Assessment: Tolerated treatment well  Patient continues to respond well to manuals stretching  Patient is appropriately challenged with current set of interventions, more so with hip abd  Continued PT would be beneficial to promote BLE strengthening and extensibility  Plan: Continue per plan of care         Precautions:       Manuals    B/L LE stretch  VAL HURLEY, supine HS, S/L quad and hip flexors JZ JW MB JZ   Pat mobs          STM JW MB -B/l Hamstring roller  B/l Hamstring JW  B/l Hamstring- theragun JZ            Neuro Re-Ed    Pressure cuff abd brace    mmHg  :30x3      Pressure cuff 90 90 hip/knee iso    mmHg  :30x3                         Educated on core activation for TA and abdominals    5'                         Ther Ex    Prone Quadriceps Stretch :30x3 ea  :30x3 ea  :30x3 ea :30x3 ea :30x3 ea :30x3 ea   Supine HS Stretch Manual   Seated, step  :30x3 ea Seated :30x3 ea  Seated :30x3 ea  Seated :30x3 ea   bridge      3x10    Fig 4 bridge 3x10 ea  3x10 ea    2x10 ea   S/l hib abd 2x10 ea  2x10 b/l       RDL                  SLR 3x10 3x10  3x10  3x10    Prone hip ext with knee flexion      3x10 ea            Ther Activity    Step Ups 8" 8" 3x10 ea 8" 3x10 ea  8" 3x10 ea  8" 3x10 ea  8" 3x10 ea   Squats to chair    STS 3x10  STS 3x10     Leg Press b/l seat6   210/ 3x10  seat6   210/ 3x10     seat6   210/ 3x10    Calf press 210/ 3x10  210/ 3x10     150/ 3x10    RB/Elliptical 5' 5' - RsB 5'

## 2022-12-05 ENCOUNTER — OFFICE VISIT (OUTPATIENT)
Dept: PHYSICAL THERAPY | Facility: CLINIC | Age: 72
End: 2022-12-05

## 2022-12-05 DIAGNOSIS — M25.561 CHRONIC PAIN OF BOTH KNEES: Primary | ICD-10-CM

## 2022-12-05 DIAGNOSIS — G89.29 CHRONIC PAIN OF BOTH KNEES: Primary | ICD-10-CM

## 2022-12-05 DIAGNOSIS — M25.562 CHRONIC PAIN OF BOTH KNEES: Primary | ICD-10-CM

## 2022-12-05 NOTE — PROGRESS NOTES
Daily Note     Today's date: 2022  Patient name: Ladonna Crawley  : 1950  MRN: [id number]  Referring provider: Bg Hernandes DO  Dx:   Encounter Diagnosis     ICD-10-CM    1  Chronic pain of both knees  M25 561     M25 562     G89 29                      Subjective: Rogelio Mack reports he is much more active and can see an improvement in b/l LE strength  Objective: See treatment diary below      Assessment: Rogelio Mack tolerated PT treatment well  LE flexibility is gradually improving  Continued with theragun to b/l quad and hamstrings, palpable tone present  Pt is challenged with current exercise program  Fatigues fairly quickly with u/l hip strengthening  He completed closed and open chain strengthening without b/l knee provocation  Pt would benefit from continued PT to further address impairments and maximize functional level  Plan: Continue per plan of care         Precautions:       Manuals    B/L LE stretch  JW MB, supine HS, S/L quad and hip flexors JW  MB JZ   Pat mobs          STM  MB -B/l Hamstring roller JW  B/l Hamstring- theragun JZ            Neuro Re-Ed    Pressure cuff abd brace         Pressure cuff 90 90 hip/knee iso                           Educated on core activation for TA and abdominals                            Ther Ex    Prone Quadriceps Stretch :30x3 ea  :30x3 ea  :30x3 ea   :30x3 ea :30x3 ea   Supine HS Stretch Manual     Seated :30x3 ea  Seated :30x3 ea   bridge      3x10    Fig 4 bridge 3x10 ea  3x10 ea 3x10 b/l   2x10 ea   S/l hib abd 2x10 ea  2x10 b/l 3x10 b/l      RDL                  SLR 3x10 3x10 3x10 b/l   3x10    Prone hip ext with knee flexion      3x10 ea            Ther Activity    Step Ups 8" 8" 3x10 ea 8" 3x10 ea 8" 3x10 ea  8" 3x10 ea  8" 3x10 ea   Squats to chair   STS 3x10   STS 3x10     Leg Press b/l seat6   210/ 3x10  seat6   210/ 3x10  seat6 210/ 3x10    seat6   210/ 3x10    Calf press 210/ 3x10  210/ 3x10  210/ 3x10    150/ 3x10    RB/Elliptical 5' 5' - RsB Ellip 5'    5'

## 2022-12-07 ENCOUNTER — OFFICE VISIT (OUTPATIENT)
Dept: PHYSICAL THERAPY | Facility: CLINIC | Age: 72
End: 2022-12-07

## 2022-12-07 DIAGNOSIS — M25.561 CHRONIC PAIN OF BOTH KNEES: Primary | ICD-10-CM

## 2022-12-07 DIAGNOSIS — G89.29 CHRONIC PAIN OF BOTH KNEES: Primary | ICD-10-CM

## 2022-12-07 DIAGNOSIS — M25.562 CHRONIC PAIN OF BOTH KNEES: Primary | ICD-10-CM

## 2022-12-07 NOTE — PROGRESS NOTES
Daily Note     Today's date: 2022  Patient name: Kindra Glaser  : 1950  MRN: [id number]  Referring provider: Kyaw Meza DO  Dx:   Encounter Diagnosis     ICD-10-CM    1  Chronic pain of both knees  M25 561     M25 562     G89 29                      Subjective: Gómez Samuel reports he is very tired this morning, did not sleep well last night  Pt states he is noticing RLE is weaker than LLE with HEP  Objective: See treatment diary below      Assessment: Gómez Samuel tolerated PT treatment well  Pt is challenged with current exercise program  Glute weakness remains evident throughout exercise  Added u/l HR to address PF strength, pt fatiguing fairly quickly  Continued with passive LE stretching, hamstring and quad flexibility is gradually improving  Held on stair navigation today per pt request, resume NV  Pt would benefit from continued PT to further address impairments and maximize functional level  Plan: Continue per plan of care         Precautions:       Manuals    B/L LE stretch  VAL MB, supine HS, S/L quad and hip flexors JW VAL  JDIONICIO   Pat mobs          STM  MB -B/l Hamstring roller VAL HUNT            Neuro Re-Ed    Pressure cuff abd brace         Pressure cuff 90 90 hip/knee iso                           Educated on core activation for TA and abdominals                            Ther Ex    Prone Quadriceps Stretch :30x3 ea  :30x3 ea  :30x3 ea  :30x3 ea  :30x3 ea   Supine HS Stretch Manual      Seated :30x3 ea   bridge      3x10    Fig 4 bridge 3x10 ea  3x10 ea 3x10 b/l 3x10 b/l  2x10 ea   S/l hib abd 2x10 ea  2x10 b/l 3x10 b/l 3x10 b/l     RDL         U/l HR    3x10 ea      SLR 3x10 3x10 3x10 b/l  3x10 b/l     Prone hip ext with knee flexion      3x10 ea            Ther Activity    Step Ups 8" 8" 3x10 ea 8" 3x10 ea 8" 3x10 ea   8" 3x10 ea   Squats to chair   STS 3x10  STS 3x10      Leg Press b/l seat6   210/ 3x10  seat6   210/ 3x10  seat6   210/ 3x10  seat6   210/ 3x10   seat6   210/ 3x10    Calf press 210/ 3x10  210/ 3x10  210/ 3x10  210/ 3x10   150/ 3x10    RB/Elliptical 5' 5' - RsB Ellip 5'  Ellip 5'   5'

## 2022-12-09 ENCOUNTER — OFFICE VISIT (OUTPATIENT)
Dept: PHYSICAL THERAPY | Facility: CLINIC | Age: 72
End: 2022-12-09

## 2022-12-09 DIAGNOSIS — G89.29 CHRONIC PAIN OF BOTH KNEES: ICD-10-CM

## 2022-12-09 DIAGNOSIS — M79.644 CHRONIC PAIN OF RIGHT THUMB: ICD-10-CM

## 2022-12-09 DIAGNOSIS — M25.531 RIGHT WRIST PAIN: Primary | ICD-10-CM

## 2022-12-09 DIAGNOSIS — M25.561 CHRONIC PAIN OF BOTH KNEES: ICD-10-CM

## 2022-12-09 DIAGNOSIS — M25.562 CHRONIC PAIN OF BOTH KNEES: ICD-10-CM

## 2022-12-09 DIAGNOSIS — G89.29 CHRONIC PAIN OF RIGHT THUMB: ICD-10-CM

## 2022-12-09 NOTE — PROGRESS NOTES
PT Re-Evaluation     Today's date: 2022  Patient name: Mik Reyes   : 1950  MRN: [id number]  Referring provider: Randa Fernandez DO  Dx:   Encounter Diagnosis     ICD-10-CM    1   R wrist pain N25 531    2  R thumb pain M79 644    3  Chronic pain of both knees  M25 561     M25 562     G89 29      Assessment  Assessment details:   B/l Knees: Since beginning PT for b/l knee pain Kishan Stoner reports GROC improvement of 35%  MMT demonstrated some improvement in strength  ROM assessmeent demonstrated improved flexibility  Balance assessment demonstrated improved balance  Functional outcome measures and subjective reports demonstrated improved functional ability  Despite improvements he continues to present with muscular tightness, decreased LE strength, poor quadriceps activation, and decreased motor control  These impairments are contributing to increased difficulty and pain with prolonged walking, stair negotiation, and yard work  He requires PT to address these impairments and achieve set goals  He would benefit most from a graded strengthening program    Impairments: abnormal coordination, abnormal gait, abnormal or restricted ROM, activity intolerance, impaired balance, impaired physical strength, lacks appropriate home exercise program and pain with function    R Hand/Thumb: Pt presents to PT with weakness, pain, decreased ROM, and impaired dexterity  Impairments have resulted in functional limitations including grasping, gripping, pulling, lifting/carrying   dymamometry and MMT demonstrated weakness  ROM assessment demonstrated decreased ROM  Functional outcome measures demonstrated functional limitations  Pt would benefit from skilled outpatient PT in order to maximize his level of functional ability       Understanding of Dx/Px/POC: good   Prognosis: good    Goals   STG to be achieved by 23:  Pt will present with 4-/5 strength with knee extension to decrease difficulty with stair negotiation  Achieved  Pt will ambulate 1 miles without increased knee pain  Achieved  Pt will preform 1 hours straight of yard work without increased knee pain  Achieved  Pt will demonstrate improved  dynamometry by 10 lbs to improve ability to open jar  LTG to be achieved by 2/13/22:  Pt will be able to open several tight jars pain free  Pt will be able to perform 2 hours yard work/house work activities pain free as per PLOF  Pt will be able to turn keys to car pain free  Plan  Plan details: Emphasis on facilitating independent strengthening program - pt request 3 vs 2 days per week but either is appropriate   Patient would benefit from: skilled physical therapy  Planned modality interventions: cryotherapy  Planned therapy interventions: ADL training, balance, balance/weight bearing training, body mechanics training, flexibility, functional ROM exercises, gait training, graded activity, graded exercise, graded motor, home exercise program, therapeutic exercise, therapeutic activities, stretching, strengthening, patient education, neuromuscular re-education, manual therapy and joint mobilization  Frequency: 3x week  Plan of Care beginning date: 11/1/2022  Plan of Care expiration date: 2/13/2022      Subjective Evaluation    History of Present Illness  Mechanism of injury:  B/l Knees: Anival Ferguson reports a history of chronic right knee pain for the past year and a half beginning insidiously, increasing gradually, up until previous round of PT  Some overall improvements while in PT, but was not compliant with HEP upon discharge  He also reports a history of left knee pain beginning about a month ago after slipping down small hill with his hip in inversion and his knee flexed  Previously no increased left knee pain, now increased pain with stair ascent/descent  Either knee pain is most severe with yard work and stair descent    Primary complaint is right knee pain and "I would like to develop a plan" which he feels may eventually include surgery  He also reports limitations in walking tolerance of about 2 miles prior to severe increase in right knee swelling and pain  R thumb/wrist: Pt notes chronic history of R thumb/wrist pain that began a few years ago  Noted that he has had it treated over the past couple years with PT and injections  Noted that he has not been keeping up with his home exercise program and has noticed that the pain has returned       Quality of life: good    Pain  B/l Knees:   Current pain ratin  At worst pain rating: 3  Quality: dull ache and throbbing  Aggravating factors: walking and stair climbing  Progression: no change    R thumb/wrist:  Current pain ratin  At worst pain ratin  Quality: deep soreness, ache, throbbing  Aggravating factors: /grasping, push/pull, lifting/carrying    Treatments  Previous treatment: physical therapy, injection treatment and medication  Patient Goals  Patient goals for therapy: increased strength and decreased pain    Objective   Comments  Moderate genu valgum bilaterally L>R     TTP over R thenar eminence     Passive Range of Motion   Left Knee   Flexion: 128 degrees   Extension: -3 degrees     Right Knee   Flexion: 121 degrees   Extension: -3 degrees     Wrist Flexion:  R: 35 degrees  L: 45 degrees    Wrist extension:  R: 35 degrees  L: 45 degrees    Strength/Myotome Testing     Left Hip   Planes of Motion   Flexion: 5  Abduction: 5    Right Hip   Planes of Motion   Flexion: 5  Abduction: 5    Left Knee   Flexion: 4+  Extension: 4+    Right Knee   Flexion: 4-  Extension: 4-     Dynamometry:  R: 80, 92 lbs  L: 113, 113 lbs     Pinch   R: 19, 18 lbs  L: 23, 22 lbs     Wrist:  Flexion:   R: 4-/5  L: 5/5  Extension:  R: 4-/5  L: 5/5        Ambulation     Ambulation: Stairs   Step up 8" R without UE support with mild unsteadiness, close supervision  Step up 8" L without UE with increased unsteadiness  Step down 8" L or R without UE with poor eccentric control and pain   12/9/22: steps not assessed    Comments   Level ground ambulation WNL - no AD     Functional Assessment  Comments  30 Second STS - 14, standard chair no UE   12/9/22 not assessed     General Comments:       Precautions:   Age    Manuals 11/29 12/2 12/5 12/7 12/9    B/L LE stretch  JW MB, supine HS, S/L quad and hip flexors JW JW     Pat mobs          STM JW MB -B/l Hamstring roller JW  JZ- R thenar    R thumb MTP joint distraction, grade I-II jt mobs      JZ                      Neuro Re-Ed 11/29 12/2 12/5 12/7 12/9                                                                   Ther Ex 11/29 12/2 12/5 12/7 12/9    KNEES         PKFS w/ strap :30x3 ea  :30x3 ea  :30x3 ea  :30x3 ea     Fig 4 bridge 3x10 ea  3x10 ea 3x10 b/l 3x10 b/l     S/l hip abd 2x10 ea  2x10 b/l 3x10 b/l 3x10 b/l     U/l HR    3x10 ea      SLR 3x10 3x10 3x10 b/l  3x10 b/l     Prone hip ext with knee flexion                  WRIST/THUMB         Wrist flexion stretch elbow extended     :30x2    Wrist extension stretch elbow extended      :30x2    Thumb opposition     2x10 pain with ring and pinky    FlexBar Flexion         FlexBar Extension         Putty          Putty thumb to palm         Digi Flex all fingers          Educated on updated HEP      5'               Ther Activity 11/29 12/2 12/5 12/7 12/9    Step Ups 8" 8" 3x10 ea 8" 3x10 ea 8" 3x10 ea      Squats to chair   STS 3x10  STS 3x10      Leg Press b/l seat6   210/ 3x10  seat6   210/ 3x10  seat6   210/ 3x10  seat6   210/ 3x10      Calf press 210/ 3x10  210/ 3x10  210/ 3x10  210/ 3x10      RB/Elliptical 5' 5' - RsB Ellip 5'  Ellip 5'

## 2022-12-12 ENCOUNTER — APPOINTMENT (OUTPATIENT)
Dept: PHYSICAL THERAPY | Facility: CLINIC | Age: 72
End: 2022-12-12

## 2022-12-12 ENCOUNTER — OFFICE VISIT (OUTPATIENT)
Dept: PHYSICAL THERAPY | Facility: CLINIC | Age: 72
End: 2022-12-12

## 2022-12-12 DIAGNOSIS — M25.561 CHRONIC PAIN OF BOTH KNEES: ICD-10-CM

## 2022-12-12 DIAGNOSIS — G89.29 CHRONIC PAIN OF BOTH KNEES: ICD-10-CM

## 2022-12-12 DIAGNOSIS — M25.531 RIGHT WRIST PAIN: Primary | ICD-10-CM

## 2022-12-12 DIAGNOSIS — M79.644 CHRONIC PAIN OF RIGHT THUMB: ICD-10-CM

## 2022-12-12 DIAGNOSIS — G89.29 CHRONIC PAIN OF RIGHT THUMB: ICD-10-CM

## 2022-12-12 DIAGNOSIS — M25.562 CHRONIC PAIN OF BOTH KNEES: ICD-10-CM

## 2022-12-12 NOTE — PROGRESS NOTES
Daily Note     Today's date: 2022  Patient name: Opal Tran  : 1950  MRN: [id number]  Referring provider: Johanne Samson DO  Dx:   Encounter Diagnosis     ICD-10-CM    1  Right wrist pain  M25 531       2  Chronic pain of right thumb  M79 644     G89 29       3  Chronic pain of both knees  M25 561     M25 562     G89 29                      Subjective: Cecil Moctezuma reports b/l knees are feeling "alright"  Objective: See treatment diary below      Assessment: Dalton tolerated PT treatment well  Passive b/l LE stretching performed, hamstring and adductor flexibility is improving as sessions progress  He is challenged with current exercise program  Glute weakness remains evident throughout exercises  Continue to advance functional strength as irritability allows  Pt would benefit from continued PT to further address impairments and maximize functional level  Plan: Continue per plan of care        Precautions:     Manuals    B/L LE stretch  JW MB, supine HS, S/L quad and hip flexors JW JW  JW   Pat mobs          STM  MB -B/l Hamstring roller   JZ- R thenar    R thumb MTP joint distraction, grade I-II jt mobs      JZ                      Neuro Re-Ed                                                                   Ther Ex    KNEES         PKFS w/ strap :30x3 ea  :30x3 ea  :30x3 ea  :30x3 ea  :30x3 ea   Fig 4 bridge 3x10 ea  3x10 ea 3x10 b/l 3x10 b/l  3x10 b/l    S/l hip abd 2x10 ea  2x10 b/l 3x10 b/l 3x10 b/l  3x10 b/l    U/l HR    3x10 ea   3x10 ea    SLR 3x10 3x10 3x10 b/l  3x10 b/l  3x10 b/l    Prone hip ext with knee flexion                  WRIST/THUMB         Wrist flexion stretch elbow extended     :30x2    Wrist extension stretch elbow extended      :30x2    Thumb opposition     2x10 pain with ring and pinky    FlexBar Flexion         FlexBar Extension         Putty          Putty thumb to palm         Digi Flex all fingers          Educated on updated HEP      5'               Ther Activity 11/29 12/2 12/5 12/7 12/9 12/12   Step Ups 8" 8" 3x10 ea 8" 3x10 ea 8" 3x10 ea      Squats to chair   STS 3x10  STS 3x10   STS 3x10    Leg Press b/l seat6   210/ 3x10  seat6   210/ 3x10  seat6   210/ 3x10  seat6   210/ 3x10      Calf press 210/ 3x10  210/ 3x10  210/ 3x10  210/ 3x10      RB/Elliptical 5' 5' - RsB Ellip 5'  Ellip 5'   Ellip 5'

## 2022-12-14 ENCOUNTER — APPOINTMENT (OUTPATIENT)
Dept: PHYSICAL THERAPY | Facility: CLINIC | Age: 72
End: 2022-12-14

## 2022-12-15 ENCOUNTER — OFFICE VISIT (OUTPATIENT)
Dept: PHYSICAL THERAPY | Facility: CLINIC | Age: 72
End: 2022-12-15

## 2022-12-15 DIAGNOSIS — M25.562 CHRONIC PAIN OF BOTH KNEES: ICD-10-CM

## 2022-12-15 DIAGNOSIS — M79.644 CHRONIC PAIN OF RIGHT THUMB: ICD-10-CM

## 2022-12-15 DIAGNOSIS — M25.561 CHRONIC PAIN OF BOTH KNEES: ICD-10-CM

## 2022-12-15 DIAGNOSIS — M25.531 RIGHT WRIST PAIN: Primary | ICD-10-CM

## 2022-12-15 DIAGNOSIS — G89.29 CHRONIC PAIN OF BOTH KNEES: ICD-10-CM

## 2022-12-15 DIAGNOSIS — G89.29 CHRONIC PAIN OF RIGHT THUMB: ICD-10-CM

## 2022-12-15 NOTE — PROGRESS NOTES
Daily Note     Today's date: 12/15/2022  Patient name: Amarilis Cramer  : 1950  MRN: [id number]  Referring provider: Gisela Jang DO  Dx:   Encounter Diagnosis     ICD-10-CM    1  Right wrist pain  M25 531       2  Chronic pain of right thumb  M79 644     G89 29       3  Chronic pain of both knees  M25 561     M25 562     G89 29                      Subjective: Janes Vladerrama states "my knees are feeling the weather", noting increased in b/l knee stiffness and soreness today  Objective: See treatment diary below      Assessment: Dalton tolerated PT treatment well  Pt is approprietly challenged with current exercise program  He performed table hip strengthening for continues repetitions, little rest breaks required  Form declining with fatigue  Added TB side step to further address glute strength  Hamstring flexibility remains limited, passive stretching performed to address  Pt would benefit from continued PT to further address impairments and maximize functional level  Plan: Continue per plan of care        Precautions:     Manuals 12/15 12/2 12/5 12/7 12/9 12/12   B/L LE stretch  JW MB, supine HS, S/L quad and hip flexors JW JW  JW   Pat mobs          STM  MB -B/l Hamstring roller   JZ- R thenar    R thumb MTP joint distraction, grade I-II jt mobs      JZ                      Neuro Re-Ed 12/15 12/2 12/5 12/7 12/9 12/12                                                                  Ther Ex 12/15 12/2 12/5 12/7 12/9 12/12   KNEES         PKFS w/ strap :30x3 :30x3 ea  :30x3 ea  :30x3 ea  :30x3 ea   Fig 4 bridge x30 ea 3x10 ea 3x10 b/l 3x10 b/l  3x10 b/l    S/l hip abd x30 ea 2x10 b/l 3x10 b/l 3x10 b/l  3x10 b/l    U/l HR 3x10 ea   3x10 ea   3x10 ea    SLR x30 ea  3x10 3x10 b/l  3x10 b/l  3x10 b/l    Prone hip ext with knee flexion         TB side step  GTB 3x10         WRIST/THUMB         Wrist flexion stretch elbow extended     :30x2    Wrist extension stretch elbow extended      :30x2    Thumb opposition     2x10 pain with ring and pinky    FlexBar Flexion         FlexBar Extension         Putty          Putty thumb to palm         Digi Flex all fingers          Educated on updated HEP      5'               Ther Activity 12/15 12/2 12/5 12/7 12/9 12/12   Step Ups 8"  8" 3x10 ea 8" 3x10 ea      Squats to chair STS 3x10   STS 3x10  STS 3x10   STS 3x10    Leg Press b/l seat6   210/ 3x10  seat6   210/ 3x10  seat6   210/ 3x10  seat6   210/ 3x10      Calf press 210 3x10  210/ 3x10  210/ 3x10  210/ 3x10      RB/Elliptical Ellip 5' 5' - RsB Ellip 5'  Ellip 5'   Ellip 5'

## 2022-12-16 ENCOUNTER — OFFICE VISIT (OUTPATIENT)
Dept: PHYSICAL THERAPY | Facility: CLINIC | Age: 72
End: 2022-12-16

## 2022-12-16 DIAGNOSIS — M25.531 RIGHT WRIST PAIN: Primary | ICD-10-CM

## 2022-12-16 DIAGNOSIS — M25.562 CHRONIC PAIN OF BOTH KNEES: ICD-10-CM

## 2022-12-16 DIAGNOSIS — G89.29 CHRONIC PAIN OF BOTH KNEES: ICD-10-CM

## 2022-12-16 DIAGNOSIS — M25.561 CHRONIC PAIN OF BOTH KNEES: ICD-10-CM

## 2022-12-16 DIAGNOSIS — G89.29 CHRONIC PAIN OF RIGHT THUMB: ICD-10-CM

## 2022-12-16 DIAGNOSIS — M79.644 CHRONIC PAIN OF RIGHT THUMB: ICD-10-CM

## 2022-12-16 NOTE — PROGRESS NOTES
Daily Note     Today's date: 2022  Patient name: Claudell Cornea  : 1950  MRN: [id number]  Referring provider: Kian Morales DO  Dx:   Encounter Diagnosis     ICD-10-CM    1  Right wrist pain  M25 531       2  Chronic pain of right thumb  M79 644     G89 29       3  Chronic pain of both knees  M25 561     M25 562     G89 29                      Subjective: Trevor Gibbs reports he lost his exercise sheet for his hand, attempted HEP but couldn't remember everything  Notes most thumb aggravation and weakness when opening up a jar item  Objective: See treatment diary below      Assessment: Dalton tolerated PT treatment well  Initiated session with UBE for functional warm up  MTP joint distraction and IASTM to thenar eminence performed, TTP present  Initiated finger dexterity activities and  strengthening  Pt very challenged with thumb helper, fatigues fairly quickly with isolated strengthening  Pt was given updated HEP and putty for home use  He would benefit from continued PT to further address impairments and maximize functional level  Plan: Continue per plan of care        Precautions:     Manuals 12/15 12/16  12/7 12/9 12/12   B/L LE stretch  JW   JW  JW   Pat mobs          STM     JZ- R thenar    R thumb MTP joint distraction, grade I-II jt mobs   JW   JZ                      Neuro Re-Ed 12/15 12/16  12/7 12/9 12/12                                                                  Ther Ex 12/15 12/16  12/7 12/9 12/12   KNEES         PKFS w/ strap :30x3   :30x3 ea  :30x3 ea   Fig 4 bridge x30 ea   3x10 b/l  3x10 b/l    S/l hip abd x30 ea   3x10 b/l  3x10 b/l    U/l HR 3x10 ea   3x10 ea   3x10 ea    SLR x30 ea    3x10 b/l  3x10 b/l    Prone hip ext with knee flexion         TB side step  GTB 3x10         WRIST/THUMB         Wrist flexion stretch elbow extended  :30x3    :30x2    Wrist extension stretch elbow extended   :30x3    :30x2    Thumb opposition  3x10   2x10 pain with ring and pinky FlexBar Flexion/ext  G x30 ea       FlexBar U/R dev  G x30 ea       Putty   P 1'       Putty thumb to palm  P 1'       Digi Flex all fingers   R x10 ea       Thumb helper  Y and R x10        Educated on updated HEP      5'               Ther Activity 12/15 12/16  12/7 12/9 12/12   Step Ups 8"         Squats to chair STS 3x10    STS 3x10   STS 3x10    Leg Press b/l seat6   210/ 3x10    seat6   210/ 3x10      Calf press 210 3x10    210/ 3x10      RB/Elliptical Ellip 5'   Ellip 5'   Ellip 5'    UBE  2'/2'

## 2022-12-19 ENCOUNTER — OFFICE VISIT (OUTPATIENT)
Dept: PHYSICAL THERAPY | Facility: CLINIC | Age: 72
End: 2022-12-19

## 2022-12-19 DIAGNOSIS — M79.644 CHRONIC PAIN OF RIGHT THUMB: ICD-10-CM

## 2022-12-19 DIAGNOSIS — M25.531 RIGHT WRIST PAIN: Primary | ICD-10-CM

## 2022-12-19 DIAGNOSIS — M25.561 CHRONIC PAIN OF BOTH KNEES: ICD-10-CM

## 2022-12-19 DIAGNOSIS — M25.562 CHRONIC PAIN OF BOTH KNEES: ICD-10-CM

## 2022-12-19 DIAGNOSIS — G89.29 CHRONIC PAIN OF RIGHT THUMB: ICD-10-CM

## 2022-12-19 DIAGNOSIS — G89.29 CHRONIC PAIN OF BOTH KNEES: ICD-10-CM

## 2022-12-19 NOTE — PROGRESS NOTES
Daily Note     Today's date: 2022  Patient name: Shanta Correa  : 1950  MRN: [id number]  Referring provider: Shiela Marie DO  Dx:   Encounter Diagnosis     ICD-10-CM    1  Right wrist pain  M25 531       2  Chronic pain of right thumb  M79 644     G89 29       3  Chronic pain of both knees  M25 561     M25 562     G89 29                      Subjective: Eran Dry offers no new complaints regarding b/l knees  Notes he is "feeling stronger" since starting PT  Objective: See treatment diary below      Assessment: Dalton tolerated PT treatment well  Passive stretching performed to b/l LE, flexibility is improving throughout all planes  Added stair descending today, pt very challenged completing lateral step down on RLE  Heavy HHA required to maintain eecnetric control  Continues to be challenged with s/l hip abduction, tactile cues required to correct compensatory movements  He would benefit from continued PT to further address impairments and maximize functional level  Plan: Continue per plan of care        Precautions:     Manuals 12/15 12/16 12/19  12/9 12/12   B/L LE stretch  JW  JW   JW   Pat mobs          STM     JZ- R thenar    R thumb MTP joint distraction, grade I-II jt mobs   JW   JZ                      Neuro Re-Ed 12/15 12/16 12/19  12/9 12/12                                                                  Ther Ex 12/15 12/16 12/19  12/9 12/12   KNEES         PKFS w/ strap :30x3  :30x3 ea   :30x3 ea   Fig 4 bridge x30 ea  x30 ea   3x10 b/l    S/l hip abd x30 ea  x30 ea   3x10 b/l    U/l HR 3x10 ea  3x10    3x10 ea    SLR x30 ea      3x10 b/l    Prone hip ext with knee flexion         TB side step  GTB 3x10         WRIST/THUMB         Wrist flexion stretch elbow extended  :30x3    :30x2    Wrist extension stretch elbow extended   :30x3    :30x2    Thumb opposition  3x10   2x10 pain with ring and pinky    FlexBar Flexion/ext  G x30 ea       FlexBar U/R dev  G x30 ea       Putty   P 1' Putty thumb to palm  P 1'       Digi Flex all fingers   R x10 ea       Thumb helper  Y and R x10        Educated on updated HEP      5'               Ther Activity 12/15 12/16 12/19  12/9 12/12   Step Ups 8"   8" 2x10 ea       Lateral step down    6" 2x10 1 hha       Squats to chair STS 3x10   STS 3x10    STS 3x10    Leg Press b/l seat6   210/ 3x10   seat6   210/ 3x10       Calf press 210 3x10   210/ 3x10       RB/Elliptical Ellip 5'  Ellip 5'   Ellip 5'    UBE  2'/2'

## 2022-12-21 ENCOUNTER — OFFICE VISIT (OUTPATIENT)
Dept: PHYSICAL THERAPY | Facility: CLINIC | Age: 72
End: 2022-12-21

## 2022-12-21 DIAGNOSIS — M25.531 RIGHT WRIST PAIN: Primary | ICD-10-CM

## 2022-12-21 DIAGNOSIS — M79.644 CHRONIC PAIN OF RIGHT THUMB: ICD-10-CM

## 2022-12-21 DIAGNOSIS — M25.562 CHRONIC PAIN OF BOTH KNEES: ICD-10-CM

## 2022-12-21 DIAGNOSIS — M25.561 CHRONIC PAIN OF BOTH KNEES: ICD-10-CM

## 2022-12-21 DIAGNOSIS — G89.29 CHRONIC PAIN OF BOTH KNEES: ICD-10-CM

## 2022-12-21 DIAGNOSIS — G89.29 CHRONIC PAIN OF RIGHT THUMB: ICD-10-CM

## 2022-12-21 NOTE — PROGRESS NOTES
Daily Note     Today's date: 2022  Patient name: Aracely Solano  : 1950  MRN: [id number]  Referring provider: Laura Cruz DO  Dx:   Encounter Diagnosis     ICD-10-CM    1  Right wrist pain  M25 531       2  Chronic pain of right thumb  M79 644     G89 29       3  Chronic pain of both knees  M25 561     M25 562     G89 29                      Subjective: Aki Lindsey reports b/l knees have felt more stiff the last few days, stating "I really noticied the stiffness while going up and down the steps yesterday"  Objective: See treatment diary below      Assessment: Dalton tolerated PT treatment well  Continued with passive stretching to b/l LE, pt with limited quad and hamstring flexibility  Add prone isometric hip extension to further address glute strength, pt very challenged and fatigued fairly quickly  Pt denied b/l knee provocation throughout and post session  Continue to progress functional strengthening  Pt would benefit from continued PT to further address impairments and maximize functional level  Plan: Continue per plan of care        Precautions:     Manuals 12/15 12/16 12/19 12/21  12/12   B/L LE stretch  JW  JW JW  JW   Pat mobs          STM         R thumb MTP joint distraction, grade I-II jt mobs   JW                         Neuro Re-Ed 12/15 12/16 12/19 12/21  12/12                                                                  Ther Ex 12/15 12/16 12/19 12/21  12/12   KNEES         PKFS w/ strap :30x3  :30x3 ea Manual   :30x3 ea   Fig 4 bridge x30 ea  x30 ea x30 ea  3x10 b/l    S/l hip abd x30 ea  x30 ea x30 ea  3x10 b/l    U/l HR 3x10 ea  3x10  3x10 ea   3x10 ea    SLR x30 ea      3x10 b/l    Prone hip ext with knee flexion    :30x3 ea      TB side step  GTB 3x10         WRIST/THUMB         Wrist flexion stretch elbow extended  :30x3        Wrist extension stretch elbow extended   :30x3        Thumb opposition  3x10       FlexBar Flexion/ext  G x30 ea       FlexBar U/R dev  G x30 ea Putty   P 1'       Putty thumb to palm  P 1'       Digi Flex all fingers   R x10 ea       Thumb helper  Y and R x10        Educated on updated HEP                   Ther Activity 12/15 12/16 12/19 12/21  12/12   Step Ups 8"   8" 2x10 ea       Lateral step down    6" 2x10 1 hha       Squats to chair STS 3x10   STS 3x10  STS 3x10   STS 3x10    Leg Press b/l seat6   210/ 3x10   seat6   210/ 3x10       Calf press 210 3x10   210/ 3x10       RB/Elliptical Ellip 5'  Ellip 5' Ellip 5'   Ellip 5'    UBE  2'/2'

## 2022-12-23 ENCOUNTER — OFFICE VISIT (OUTPATIENT)
Dept: PHYSICAL THERAPY | Facility: CLINIC | Age: 72
End: 2022-12-23

## 2022-12-23 DIAGNOSIS — M25.562 CHRONIC PAIN OF BOTH KNEES: ICD-10-CM

## 2022-12-23 DIAGNOSIS — M79.644 CHRONIC PAIN OF RIGHT THUMB: ICD-10-CM

## 2022-12-23 DIAGNOSIS — M25.561 CHRONIC PAIN OF BOTH KNEES: ICD-10-CM

## 2022-12-23 DIAGNOSIS — G89.29 CHRONIC PAIN OF BOTH KNEES: ICD-10-CM

## 2022-12-23 DIAGNOSIS — M25.531 RIGHT WRIST PAIN: Primary | ICD-10-CM

## 2022-12-23 DIAGNOSIS — G89.29 CHRONIC PAIN OF RIGHT THUMB: ICD-10-CM

## 2022-12-23 NOTE — PROGRESS NOTES
Daily Note     Today's date: 2022  Patient name: Lyman Oppenheim  : 1950  MRN: [id number]  Referring provider: Silvana Sommer DO  Dx:   Encounter Diagnosis     ICD-10-CM    1  Right wrist pain  M25 531       2  Chronic pain of right thumb  M79 644     G89 29       3  Chronic pain of both knees  M25 561     M25 562     G89 29              Subjective: Pt reported that he would like to get his knee surgery this coming year  Stated that he is improving with his  strength significantly  Objective: See treatment diary below    Assessment: Tolerated treatment well  Patient demonstrated fatigue post treatment, exhibited good technique with therapeutic exercises and would benefit from continued PT  Plan: Continue per plan of care        Precautions:     Manuals 12/15 12/16 12/19 12/21 12/23    B/L LE stretch  JW  JW VAL JDIONICIO    Pat mobs          STM         R thumb MTP joint distraction, grade I-II jt mobs   VAL HUNT                      Neuro Re-Ed 12/15 12/16 12/19 12/21 12/23    Fitter A/P     B/l 3x10  U/l 3x10 ea    Plank         Pressure cuff abdominal brace      :30x3   mmHg     Pressure cuff abdominal brace 90 90     :30x3   mmHg                                Ther Ex 12/15 12/16 12/19 12/21 12/23    KNEES         PKFS w/ strap :30x3  :30x3 ea Manual      Fig 4 bridge x30 ea  x30 ea x30 ea x30 ea    S/l hip abd x30 ea  x30 ea x30 ea x30 ea    U/l HR 3x10 ea  3x10  3x10 ea  x30 ea    SLR x30 ea         Prone hip ext with knee flexion    :30x3 ea  :30x2 ea    TB side step  GTB 3x10         Seated HS stretch     :30x3 ea                      WRIST/THUMB         Wrist flexion stretch elbow extended  :30x3        Wrist extension stretch elbow extended   :30x3        Thumb opposition  3x10       FlexBar Flexion/ext  G x30 ea       FlexBar U/R dev  G x30 ea       Putty   P 1'       Putty thumb to palm  P 1'       Digi Flex all fingers   R x10 ea       Thumb helper  Y and R x10 Educated on updated HEP                   Ther Activity 12/15 12/16 12/19 12/21 12/23    Step Ups 8"   8" 2x10 ea       Lateral step down    6" 2x10 1 hha       Squats to chair STS 3x10   STS 3x10  STS 3x10  STS 3x10     Leg Press b/l seat6   210/ 3x10   seat6   210/ 3x10       Calf press 210 3x10   210/ 3x10       RB/Elliptical Ellip 5'  Ellip 5' Ellip 5'  Elliptical 5'     UBE  2'/2'        RB     5' lvl8'

## 2022-12-28 ENCOUNTER — OFFICE VISIT (OUTPATIENT)
Dept: PHYSICAL THERAPY | Facility: CLINIC | Age: 72
End: 2022-12-28

## 2022-12-28 DIAGNOSIS — M25.531 RIGHT WRIST PAIN: Primary | ICD-10-CM

## 2022-12-28 DIAGNOSIS — M79.644 CHRONIC PAIN OF RIGHT THUMB: ICD-10-CM

## 2022-12-28 DIAGNOSIS — M25.562 CHRONIC PAIN OF BOTH KNEES: ICD-10-CM

## 2022-12-28 DIAGNOSIS — M25.561 CHRONIC PAIN OF BOTH KNEES: ICD-10-CM

## 2022-12-28 DIAGNOSIS — G89.29 CHRONIC PAIN OF RIGHT THUMB: ICD-10-CM

## 2022-12-28 DIAGNOSIS — G89.29 CHRONIC PAIN OF BOTH KNEES: ICD-10-CM

## 2022-12-28 NOTE — PROGRESS NOTES
Daily Note     Today's date: 2022  Patient name: Al Burgess  : 1950  MRN: [id number]  Referring provider: Zurdo Borden DO  Dx:   Encounter Diagnosis     ICD-10-CM    1  Right wrist pain  M25 531       2  Chronic pain of both knees  M25 561     M25 562     G89 29       3  Chronic pain of right thumb  M79 644     G89 29              Subjective: Tianna Ng reports he got a compression/hot and cold sleeve for knee, offered temporary relief of knee pain  Objective: See treatment diary below    Assessment: Dalton tolerated PT treatment well  Continued with passive LE stretching, hamstring and adductor flexibility is gradually improving  He remains very challenged with glute and hamstring strengthening  Added pball curls to diary to further address  Fatigue evident post session  Pt would benefit from continued PT to further address impairments and maximize functional level  Plan: Continue per plan of care        Precautions:     Manuals 12/15 12/16 12/19 12/21 12/23 12/28   B/L LE stretch  JW  JW VAL JDIONICIO JW   Pat mobs          STM         R thumb MTP joint distraction, grade I-II jt mobs   VAL   JZ                      Neuro Re-Ed 12/15 12/16 12/19 12/21 12/23 12/28   Fitter A/P     B/l 3x10  U/l 3x10 ea    Plank         Pressure cuff abdominal brace      :30x3   mmHg     Pressure cuff abdominal brace 90 90     :30x3   mmHg                                Ther Ex 12/15 12/16 12/19 12/21 12/23 12/28   KNEES         PKFS w/ strap :30x3  :30x3 ea Manual      Fig 4 bridge x30 ea  x30 ea x30 ea x30 ea x30 ea    S/l hip abd x30 ea  x30 ea x30 ea x30 ea x30 ea    U/l HR 3x10 ea  3x10  3x10 ea  x30 ea x30 ea    SLR x30 ea         Prone hip ext with knee flexion    :30x3 ea  :30x2 ea :30x2 ea    TB side step  GTB 3x10         Seated HS stretch     :30x3 ea    pball hamstring curls      2x10             WRIST/THUMB         Wrist flexion stretch elbow extended  :30x3        Wrist extension stretch elbow extended   :30x3        Thumb opposition  3x10       FlexBar Flexion/ext  G x30 ea       FlexBar U/R dev  G x30 ea       Putty   P 1'       Putty thumb to palm  P 1'       Digi Flex all fingers   R x10 ea       Thumb helper  Y and R x10        Educated on updated HEP                   Ther Activity 12/15 12/16 12/19 12/21 12/23 12/28   Step Ups 8"   8" 2x10 ea       Lateral step down    6" 2x10 1 hha       Squats to chair STS 3x10   STS 3x10  STS 3x10  STS 3x10  STS 3x10    Leg Press b/l seat6   210/ 3x10   seat6   210/ 3x10       Calf press 210 3x10   210/ 3x10       RB/Elliptical Ellip 5'  Ellip 5' Ellip 5'  Elliptical 5'  Elliptical 5'    UBE  2'/2'        RB     5' lvl8'

## 2022-12-30 ENCOUNTER — OFFICE VISIT (OUTPATIENT)
Dept: PHYSICAL THERAPY | Facility: CLINIC | Age: 72
End: 2022-12-30

## 2022-12-30 DIAGNOSIS — M25.531 RIGHT WRIST PAIN: Primary | ICD-10-CM

## 2022-12-30 NOTE — PROGRESS NOTES
Daily Note     Today's date: 2022  Patient name: Hawa Bo  : 1950  MRN: [id number]  Referring provider: Katie Carrillo DO  Dx:   Encounter Diagnosis     ICD-10-CM    1  Right wrist pain  M25 531              Subjective: Chaka Lo requested to focus on R wrist/thumb, noting "it feels locked up",     Objective: See treatment diary below    Assessment: Chaka Lo tolerated PT treatment well  STM and IASTM performed to thenar eminence, significant TTP and restriction present throughout musculature  Limited ROM present with thumb extension and radial abduction, passive ROM performed to address  Pt with challenge performing thumb helper and opposition, fatiguing fairly quickly  Pt would benefit from continued PT to further address impairments and maximize functional level  Plan: Continue per plan of care        Precautions:     Manuals    B/L LE stretch    JW JW JZ JW   Pat mobs          STM JW thenar         R thumb MTP joint distraction, grade I-II jt mobs  JW     JZ                      Neuro Re-Ed    Fitter A/P     B/l 3x10  U/l 3x10 ea    Plank         Pressure cuff abdominal brace      :30x3   mmHg     Pressure cuff abdominal brace 90 90     :30x3   mmHg                                Ther Ex    KNEES         PKFS w/ strap   :30x3 ea Manual      Fig 4 bridge   x30 ea x30 ea x30 ea x30 ea    S/l hip abd   x30 ea x30 ea x30 ea x30 ea    U/l HR   3x10  3x10 ea  x30 ea x30 ea    SLR         Prone hip ext with knee flexion    :30x3 ea  :30x2 ea :30x2 ea    TB side step          Seated HS stretch     :30x3 ea    pball hamstring curls      2x10             WRIST/THUMB         Wrist flexion stretch elbow extended Manual         Wrist extension stretch elbow extended  Manual         Thumb opposition 3x10         FlexBar Flexion/ext         FlexBar U/R dev         Putty  :30x2 ea        Putty thumb to palm :30x2 ea        Digi Flex all fingers          Thumb helper 1 Y 1 R   x10 :05        Educated on updated HEP                   Ther Activity 12/30 12/19 12/21 12/23 12/28   Step Ups 8"   8" 2x10 ea       Lateral step down    6" 2x10 1 hha       Squats to chair   STS 3x10  STS 3x10  STS 3x10  STS 3x10    Leg Press b/l   seat6   210/ 3x10       Calf press   210/ 3x10       RB/Elliptical   Ellip 5' Ellip 5'  Elliptical 5'  Elliptical 5'    UBE         RB     5' lvl8'

## 2023-01-04 ENCOUNTER — OFFICE VISIT (OUTPATIENT)
Dept: PHYSICAL THERAPY | Facility: CLINIC | Age: 73
End: 2023-01-04

## 2023-01-04 DIAGNOSIS — M25.531 RIGHT WRIST PAIN: Primary | ICD-10-CM

## 2023-01-04 DIAGNOSIS — G89.29 CHRONIC PAIN OF BOTH KNEES: ICD-10-CM

## 2023-01-04 DIAGNOSIS — M25.562 CHRONIC PAIN OF BOTH KNEES: ICD-10-CM

## 2023-01-04 DIAGNOSIS — M25.561 CHRONIC PAIN OF BOTH KNEES: ICD-10-CM

## 2023-01-04 DIAGNOSIS — M79.644 CHRONIC PAIN OF RIGHT THUMB: ICD-10-CM

## 2023-01-04 DIAGNOSIS — G89.29 CHRONIC PAIN OF RIGHT THUMB: ICD-10-CM

## 2023-01-04 NOTE — PROGRESS NOTES
Daily Note     Today's date: 2023  Patient name: Humza Barr  : 1950  MRN: [id number]  Referring provider: Arabella Rangel DO  Dx:   Encounter Diagnosis     ICD-10-CM    1  Right wrist pain  M25 531       2  Chronic pain of both knees  M25 561     M25 562     G89 29       3  Chronic pain of right thumb  M79 644     G89 29              Subjective: Sushma Stanley reports his new ice pack/massager offers temporary relief of b/l knee pain  Pt states overall he is feeling stronger since the start of PT  Objective: See treatment diary below    Assessment: Dalton tolerated PT treatment well  Continued with b/l LE stretching, flexibility improving as sessions progress  Open and closed chain strengthening performed, good technique displayed throughout therapeutic exercises  Greatest challenged observed with stair navigation, 1 HHA  Added stair descending NV  Pt would benefit from continued PT to further address impairments and maximize functional level  Plan: Continue per plan of care        Precautions:     Manuals    B/L LE stretch   JW  JW JZ JW   Pat mobs          STM JW thenar         R thumb MTP joint distraction, grade I-II jt mobs  JW     JZ                      Neuro Re-Ed    Fitter A/P     B/l 3x10  U/l 3x10 ea    Plank         Pressure cuff abdominal brace      :30x3   mmHg     Pressure cuff abdominal brace 90 90     :30x3   mmHg                                Ther Ex    KNEES  :30x3        PKFS w/ strap  U/l 3x10 ea  Manual      Fig 4 bridge    x30 ea x30 ea x30 ea    S/l hip abd  x30 ea  x30 ea x30 ea x30 ea    U/l HR  3x10 ea  3x10 ea  x30 ea x30 ea    SLR         Prone hip ext with knee flexion  :30x2 ea   :30x3 ea  :30x2 ea :30x2 ea    TB side step          Seated HS stretch  :30x3    :30x3 ea    pball hamstring curls      2x10             WRIST/THUMB         Wrist flexion stretch elbow extended Manual Wrist extension stretch elbow extended  Manual         Thumb opposition 3x10         FlexBar Flexion/ext         FlexBar U/R dev         Putty  :30x2 ea        Putty thumb to palm :30x2 ea        Digi Flex all fingers          Thumb helper 1 Y 1 R   x10 :05        Educated on updated HEP                   Ther Activity 12/30 1/4 12/21 12/23 12/28   Step Ups 8"  8" 2x10 ea        Lateral step down          Squats to chair  STS 3x10   STS 3x10  STS 3x10  STS 3x10    Leg Press b/l         Calf press         RB/Elliptical  Elliptical 5'   Ellip 5'  Elliptical 5'  Elliptical 5'    UBE         RB     5' lvl8'

## 2023-01-06 ENCOUNTER — APPOINTMENT (OUTPATIENT)
Dept: PHYSICAL THERAPY | Facility: CLINIC | Age: 73
End: 2023-01-06

## 2023-01-09 ENCOUNTER — APPOINTMENT (OUTPATIENT)
Dept: PHYSICAL THERAPY | Facility: CLINIC | Age: 73
End: 2023-01-09

## 2023-01-09 NOTE — PROGRESS NOTES
Daily Note     Today's date: 2022  Patient name: Nikita Davis  : 1950  MRN: [id number]  Referring provider: Mckinley Candelario DO  Dx:   Encounter Diagnosis     ICD-10-CM    1  Chronic pain of both knees  M25 561     M25 562     G89 29           Subjective: Wesley Dee denies knee soreness following last PT session  Pt states he is noticing an improvement in b/l knee pain and stiffness with stretching and exercises performed in PT  Objective: See treatment diary below    Assessment: Dalton tolerated PT treatment well  Passive b/l LE stretching performed, pt presenting with limited hamstring flexibility L>R  Mardene Selena performed to address tissue hypomobility throughout b/l distal hamstring  Advanced weight on leg press w/o knee provocation, Cues required with STS for greater eccentric lowering with transfer  Pt would benefit from continued PT to further address impairments and maximize functional level  Plan: Continue per plan of care        Precautions:       Manuals      B/L LE stretch   JW JZ JW     Pat mobs          STM    B/l Hamstring JW               Neuro Re-Ed       Pressure cuff abd brace    mmHg  :30x3      Pressure cuff 90 90 hip/knee iso    mmHg  :30x3                         Educated on core activation for TA and abdominals    5'                         Ther Ex       Prone Quadriceps Stretch 3x30 sec :30x3  :30x3 ea :30x3 ea     Supine HS Stretch 3x30 sec manual  Seated, step  :30x3 ea Seated :30x3 ea      Leg Press b/l  B/L 150 3x10  seat5  150/ 10x  170/ 2x10    seat5  150/ 10x  170/ 2x10      Calf press  110/ 2x10  110/ 3x10  130/ 3x10     RDL         RB/Elliptical   RB 5'  5'      SLR  3x10   3x10                                 Ther Activity       Step Ups 8" x10 ea   8" 3x10 ea      Squats to chair 2x10 2x10  STS 3x10
RADHA moreno

## 2023-01-11 ENCOUNTER — OFFICE VISIT (OUTPATIENT)
Dept: PHYSICAL THERAPY | Facility: CLINIC | Age: 73
End: 2023-01-11

## 2023-01-11 DIAGNOSIS — M25.562 CHRONIC PAIN OF BOTH KNEES: ICD-10-CM

## 2023-01-11 DIAGNOSIS — G89.29 CHRONIC PAIN OF RIGHT THUMB: ICD-10-CM

## 2023-01-11 DIAGNOSIS — M25.561 CHRONIC PAIN OF BOTH KNEES: ICD-10-CM

## 2023-01-11 DIAGNOSIS — M79.644 CHRONIC PAIN OF RIGHT THUMB: ICD-10-CM

## 2023-01-11 DIAGNOSIS — G89.29 CHRONIC PAIN OF BOTH KNEES: ICD-10-CM

## 2023-01-11 DIAGNOSIS — M25.531 RIGHT WRIST PAIN: Primary | ICD-10-CM

## 2023-01-11 NOTE — PROGRESS NOTES
Daily Note     Today's date: 2023  Patient name: Ladonna Crawley  : 1950  MRN: [id number]  Referring provider: Bg Hernandes DO  Dx:   Encounter Diagnosis     ICD-10-CM    1  Right wrist pain  M25 531       2  Chronic pain of both knees  M25 561     M25 562     G89 29       3  Chronic pain of right thumb  M79 644     G89 29              Subjective: Shwetha Davenport reports he has been very busy the last few days moving his son into college, requested to work on R thumb/hand today  Objective: See treatment diary below    Assessment: Dalton tolerated PT treatment well  Tissue restriction present along thenar eminance, IASTM performed to address  Continues to present with limited ROM with thumb extension and radial abduction  Performed  strengthening exercises w/o provocation  Pt would benefit from continued PT to further address impairments and maximize functional level  Plan: Continue per plan of care        Precautions:     Manuals    B/L LE stretch   JW   JZ JW   Pat mobs          STM JW thenar   JW thenar       R thumb MTP joint distraction, grade I-II jt mobs  JW   JW  JZ                      Neuro Re-Ed    Fitter A/P     B/l 3x10  U/l 3x10 ea    Plank         Pressure cuff abdominal brace      :30x3   mmHg     Pressure cuff abdominal brace 90 90     :30x3   mmHg                                Ther Ex    KNEES         PKFS w/ strap  :30x3        Fig 4 bridge  U/l 3x10 ea   x30 ea x30 ea    S/l hip abd  x30 ea   x30 ea x30 ea    U/l HR  3x10 ea   x30 ea x30 ea    SLR         Prone hip ext with knee flexion  :30x2 ea    :30x2 ea :30x2 ea    TB side step          Seated HS stretch  :30x3    :30x3 ea    pball hamstring curls      2x10             WRIST/THUMB         Wrist flexion stretch elbow extended Manual   Manual       Wrist extension stretch elbow extended  Manual   Manual       Thumb opposition 3x10 3x10       FlexBar Flexion/ext   G x30 ea       FlexBar U/R dev   G x30 ea      Putty  :30x2 ea        Putty thumb to palm :30x2 ea        Digi Flex all fingers          Thumb helper 1 Y 1 R   x10 :05  1 Y 1 R   x20 :05      Gripper    Silver 70/ :05x20       Educated on updated HEP                   Ther Activity 12/30 1/4 1/11 12/23 12/28   Step Ups 8"  8" 2x10 ea        Lateral step down          Squats to chair  STS 3x10    STS 3x10  STS 3x10    Leg Press b/l         Calf press         RB/Elliptical  Elliptical 5'    Elliptical 5'  Elliptical 5'    UBE   3'/3'      RB     5' lvl8'

## 2023-01-13 ENCOUNTER — OFFICE VISIT (OUTPATIENT)
Dept: PHYSICAL THERAPY | Facility: CLINIC | Age: 73
End: 2023-01-13

## 2023-01-13 DIAGNOSIS — M25.561 CHRONIC PAIN OF BOTH KNEES: ICD-10-CM

## 2023-01-13 DIAGNOSIS — G89.29 CHRONIC PAIN OF BOTH KNEES: ICD-10-CM

## 2023-01-13 DIAGNOSIS — M25.531 RIGHT WRIST PAIN: Primary | ICD-10-CM

## 2023-01-13 DIAGNOSIS — M25.562 CHRONIC PAIN OF BOTH KNEES: ICD-10-CM

## 2023-01-13 DIAGNOSIS — M79.644 CHRONIC PAIN OF RIGHT THUMB: ICD-10-CM

## 2023-01-13 DIAGNOSIS — G89.29 CHRONIC PAIN OF RIGHT THUMB: ICD-10-CM

## 2023-01-13 NOTE — PROGRESS NOTES
Daily Note     Today's date: 2023  Patient name: Jocelyn Flores  : 1950  MRN: [id number]  Referring provider: Reshma Yadav DO  Dx:   Encounter Diagnosis     ICD-10-CM    1  Right wrist pain  M25 531       2  Chronic pain of both knees  M25 561     M25 562     G89 29       3  Chronic pain of right thumb  M79 644     G89 29              Subjective: Red Pimentel reports he helped moved his daughter into her apartment yesterday, pt did multiple flight of stairs while carrying heavy items  Pt states b/l knee tolerated well, some soreness following  Pt states "being in PT made all the difference, otherwise I would not have been able to do that"  Pt requested to work on R thumb  Objective: See treatment diary below    Assessment: Dalton tolerated PT treatment well  Continues to present with tissue restriction throughout thenar eminance, improving as session progress  Greater ROM achieved with thumb extension and radial abduction  Performed  strengthening exercises w/o provocation  Pt would benefit from continued PT to further address impairments and maximize functional level  Plan: Continue per plan of care        Precautions:     Manuals    B/L LE stretch   JW    JW   Pat mobs          STM JW thenar   JW thenar  JW thenar     R thumb MTP joint distraction, grade I-II jt mobs  JW   JW JW                       Neuro Re-Ed    Fitter A/P         Plank         Pressure cuff abdominal brace          Pressure cuff abdominal brace 90 90                                    Ther Ex    KNEES         PKFS w/ strap  :30x3        Fig 4 bridge  U/l 3x10 ea    x30 ea    S/l hip abd  x30 ea    x30 ea    U/l HR  3x10 ea    x30 ea    SLR         Prone hip ext with knee flexion  :30x2 ea     :30x2 ea    TB side step          Seated HS stretch  :30x3        pball hamstring curls      2x10             WRIST/THUMB        Wrist flexion stretch elbow extended Manual   Manual  Manual      Wrist extension stretch elbow extended  Manual   Manual  Manual      Thumb opposition 3x10   3x10  3x10      FlexBar Flexion/ext   G x30 ea  G x30 ea      FlexBar U/R dev   G x30 ea G x30 ea      Putty  :30x2 ea        Putty thumb to palm :30x2 ea        Digi Flex all fingers          Thumb helper 1 Y 1 R   x10 :05  1 Y 1 R   x20 :05      Gripper    Silver 70/ :05x20  Silver 70/ :05x20      Educated on updated HEP                   Ther Activity 12/30 1/4 1/11 1/13 12/28   Step Ups 8"  8" 2x10 ea        Lateral step down          Squats to chair  STS 3x10     STS 3x10    Leg Press b/l         Calf press         RB/Elliptical  Elliptical 5'     Elliptical 5'    UBE   3'/3'      RB

## 2023-01-16 ENCOUNTER — APPOINTMENT (OUTPATIENT)
Dept: PHYSICAL THERAPY | Facility: CLINIC | Age: 73
End: 2023-01-16

## 2023-01-18 ENCOUNTER — OFFICE VISIT (OUTPATIENT)
Dept: PHYSICAL THERAPY | Facility: CLINIC | Age: 73
End: 2023-01-18

## 2023-01-18 DIAGNOSIS — M25.531 RIGHT WRIST PAIN: Primary | ICD-10-CM

## 2023-01-18 DIAGNOSIS — M25.562 CHRONIC PAIN OF BOTH KNEES: ICD-10-CM

## 2023-01-18 DIAGNOSIS — M25.561 CHRONIC PAIN OF BOTH KNEES: ICD-10-CM

## 2023-01-18 DIAGNOSIS — G89.29 CHRONIC PAIN OF RIGHT THUMB: ICD-10-CM

## 2023-01-18 DIAGNOSIS — G89.29 CHRONIC PAIN OF BOTH KNEES: ICD-10-CM

## 2023-01-18 DIAGNOSIS — M79.644 CHRONIC PAIN OF RIGHT THUMB: ICD-10-CM

## 2023-01-18 NOTE — PROGRESS NOTES
Daily Note     Today's date: 2023  Patient name: Nidhi Pires  : 1950  MRN: [id number]  Referring provider: Atif Purdy DO  Dx:   Encounter Diagnosis     ICD-10-CM    1  Right wrist pain  M25 531       2  Chronic pain of both knees  M25 561     M25 562     G89 29       3  Chronic pain of right thumb  M79 644     G89 29              Subjective: Romel Grooms reports b/l LE are very stiff after moving his daughter into her apartment  Pt states "I did a lot of steps"  Objective: See treatment diary below    Assessment: Dalton tolerated PT treatment well  Initiated session with RB for knee ROM and functional warm up  Passive LE stretching performed, pt presenting with limited hamstring flexibility  Continued with quad and gute strengthening  Pt displays good technique throughout therapeutic exercises  B/l knee aggravation occurring by 3 set of forward step ups  Pt would benefit from continued PT to further address impairments and maximize functional level  Plan: Continue per plan of care        Precautions:     Manuals     B/L LE stretch   JW   JW    Pat mobs          STM JW thenar   JW thenar  JW thenar     R thumb MTP joint distraction, grade I-II jt mobs  JW   JW JW                       Neuro Re-Ed     Fitter A/P         Plank         Pressure cuff abdominal brace          Pressure cuff abdominal brace 90 90                                    Ther Ex     KNEES         PKFS w/ strap  :30x3    Manual     Fig 4 bridge  U/l 3x10 ea   U/l 3x10    S/l hip abd  x30 ea   x30 ea    U/l HR  3x10 ea   3x10 ea    SLR         Prone hip ext with knee flexion  :30x2 ea        TB side step          Seated HS stretch  :30x3        pball hamstring curls                  WRIST/THUMB       Wrist flexion stretch elbow extended Manual   Manual  Manual      Wrist extension stretch elbow extended  Manual   Manual  Manual      Thumb opposition 3x10   3x10  3x10      FlexBar Flexion/ext   G x30 ea  G x30 ea      FlexBar U/R dev   G x30 ea G x30 ea      Putty  :30x2 ea        Putty thumb to palm :30x2 ea        Digi Flex all fingers          Thumb helper 1 Y 1 R   x10 :05  1 Y 1 R   x20 :05      Gripper    Silver 70/ :05x20  Silver 70/ :05x20      Educated on updated HEP                   Ther Activity 12/30 1/4 1/11 1/13 1/18    Step Ups 8"  8" 2x10 ea    8" 3x10    Lateral step down      Lat step up 8" 2x10     Squats to chair  STS 3x10    STS 3x10     Leg Press b/l         Calf press         RB/Elliptical  Elliptical 5'        UBE   3'/3'      RB     5'

## 2023-01-20 ENCOUNTER — OFFICE VISIT (OUTPATIENT)
Dept: PHYSICAL THERAPY | Facility: CLINIC | Age: 73
End: 2023-01-20

## 2023-01-20 DIAGNOSIS — M25.531 RIGHT WRIST PAIN: Primary | ICD-10-CM

## 2023-01-20 NOTE — PROGRESS NOTES
Daily Note     Today's date: 2023  Patient name: Jazmine Gallagher  : 1950  MRN: [id number]  Referring provider: Francine James DO  Dx:   Encounter Diagnosis     ICD-10-CM    1  Right wrist pain  M25 531                      Subjective: Pt reports he is having increased soreness this morning in his thumb and has been working on it by massaging it with some relief  However, he believes the weather has been increasing pain  Pt has to leave 15 min early today  Objective: See treatment diary below      Assessment: Pt completed all interventions but found most benefit from manual techniques  Continue as tolerated  Plan: Continue per plan of care        Precautions:     Manuals    B/L LE stretch   JW   JW    Pat mobs          STM JW thenar   JW thenar  JW thenar  MK   R thumb MTP joint distraction, grade I-II jt mauro Montero Seen  [facility]                     Neuro Re-Ed    Fitter A/P         Plank         Pressure cuff abdominal brace          Pressure cuff abdominal brace 90 90                                    Ther Ex    KNEES         PKFS w/ strap  :30x3    Manual     Fig 4 bridge  U/l 3x10 ea   U/l 3x10    S/l hip abd  x30 ea   x30 ea    U/l HR  3x10 ea   3x10 ea    SLR         Prone hip ext with knee flexion  :30x2 ea        TB side step          Seated HS stretch  :30x3        pball hamstring curls                  WRIST/THUMB      Wrist flexion stretch elbow extended Manual   Manual  Manual      Wrist extension stretch elbow extended  Manual   Manual  Manual      Thumb opposition 3x10   3x10  3x10   3x10    FlexBar Flexion/ext   G x30 ea  G x30 ea   G x30 ea    FlexBar U/R dev   G x30 ea G x30 ea   G x30 ea    Putty  :30x2 ea        Putty thumb to palm :30x2 ea     G x30 ea    Digi Flex all fingers          Thumb helper 1 Y 1 R   x10 :05  1 Y 1 R   x20 :05   1 Y 1 R   x20 :05   Gripper Silver 70/ :05x20  Ramon 70/ :05x20      Educated on updated HEP                   Ther Activity 12/30 1/4 1/11 1/13 1/18    Step Ups 8"  8" 2x10 ea    8" 3x10    Lateral step down      Lat step up 8" 2x10     Squats to chair  STS 3x10    STS 3x10     Leg Press b/l         Calf press         RB/Elliptical  Elliptical 5'        UBE   3'/3'      RB     5'

## 2023-01-23 ENCOUNTER — OFFICE VISIT (OUTPATIENT)
Dept: PHYSICAL THERAPY | Facility: CLINIC | Age: 73
End: 2023-01-23

## 2023-01-23 DIAGNOSIS — G89.29 CHRONIC PAIN OF RIGHT THUMB: ICD-10-CM

## 2023-01-23 DIAGNOSIS — M25.562 CHRONIC PAIN OF BOTH KNEES: ICD-10-CM

## 2023-01-23 DIAGNOSIS — M25.531 RIGHT WRIST PAIN: Primary | ICD-10-CM

## 2023-01-23 DIAGNOSIS — M79.644 CHRONIC PAIN OF RIGHT THUMB: ICD-10-CM

## 2023-01-23 DIAGNOSIS — M25.561 CHRONIC PAIN OF BOTH KNEES: ICD-10-CM

## 2023-01-23 DIAGNOSIS — G89.29 CHRONIC PAIN OF BOTH KNEES: ICD-10-CM

## 2023-01-23 NOTE — PROGRESS NOTES
Daily Note     Today's date: 2023  Patient name: Law Carlin  : 1950  MRN: [id number]  Referring provider: Ilsa Mora DO  Dx:   Encounter Diagnosis     ICD-10-CM    1  Right wrist pain  M25 531       2  Chronic pain of both knees  M25 561     M25 562     G89 29       3  Chronic pain of right thumb  M79 644     G89 29                      Subjective: Sharri Phillips reports d/t rainy weather b/l knees feel more "sore and stiff"  Objective: See treatment diary below      Assessment: Dalton tolerated PT treatment well  Continued with passive stretching, B/L LE flexibility is gradually progressing  To further challenge hip strengthening TB side step and qped hip abd added to diary  Pt requiring cues to correct trunk compensations, d/t glute weakness  He denied b/l knee provocation throughout and post session  Pt would benefit from continued PT to further address impairments and maximize functional level  Plan: Continue per plan of care        Precautions:     Manuals    B/L LE stretch  JW    JW    Pat mobs          STM   JW thenar  JW thenar  MK   R thumb MTP joint distraction, grade I-II jt mobs    JW JW  [facility]                     Neuro Re-Ed    Fitter A/P         Plank         Pressure cuff abdominal brace          Pressure cuff abdominal brace 90 90                                    Ther Ex    KNEES         PKFS w/ strap     Manual     Fig 4 bridge U/l 3x10    U/l 3x10    S/l hip abd x30 ea    x30 ea    U/l HR 3x10 b/l     3x10 ea    SLR         Prone hip ext with knee flexion :30x3         TB side step  BTB x3 laps         Seated HS stretch :30x3         pball hamstring curls         qped fire hydrants  2x10 ea        WRIST/THUMB      Wrist flexion stretch elbow extended   Manual  Manual      Wrist extension stretch elbow extended    Manual  Manual      Thumb opposition   3x10  3x10   3x10    FlexBar Flexion/ext   G x30 ea  G x30 ea   G x30 ea    FlexBar U/R dev   G x30 ea G x30 ea   G x30 ea    Putty          Putty thumb to palm      G x30 ea    Digi Flex all fingers          Thumb helper   1 Y 1 R   x20 :05   1 Y 1 R   x20 :05   Gripper    Silver 70/ :05x20  Silver 70/ :05x20      Educated on updated HEP                   Ther Activity 1/23 1/11 1/13 1/18    Step Ups 8"     8" 3x10    Lateral step down      Lat step up 8" 2x10     Squats to chair     STS 3x10     Leg Press b/l 170/ 3x10 SLOW        Calf press 170/ 3x10         RB/Elliptical ellip 5'         UBE   3'/3'      RB     5'

## 2023-01-25 ENCOUNTER — EVALUATION (OUTPATIENT)
Dept: PHYSICAL THERAPY | Facility: CLINIC | Age: 73
End: 2023-01-25

## 2023-01-25 DIAGNOSIS — G89.29 CHRONIC PAIN OF RIGHT THUMB: ICD-10-CM

## 2023-01-25 DIAGNOSIS — G89.29 CHRONIC PAIN OF BOTH KNEES: ICD-10-CM

## 2023-01-25 DIAGNOSIS — M25.561 CHRONIC PAIN OF BOTH KNEES: ICD-10-CM

## 2023-01-25 DIAGNOSIS — M79.644 CHRONIC PAIN OF RIGHT THUMB: ICD-10-CM

## 2023-01-25 DIAGNOSIS — M25.562 CHRONIC PAIN OF BOTH KNEES: ICD-10-CM

## 2023-01-25 DIAGNOSIS — M25.531 RIGHT WRIST PAIN: Primary | ICD-10-CM

## 2023-01-25 NOTE — PROGRESS NOTES
PT Re-Evaluation     Today's date: 2023  Patient name: Aracely Solano   : 1950  MRN: [id number]  Referring provider: Laura Cruz DO  Dx:   Encounter Diagnosis     ICD-10-CM    1   R wrist pain N25 531    2  R thumb pain M79 644    3  Chronic pain of both knees  M25 561     M25 562     G89 29      Assessment  Assessment details:   B/l Knees:   Since beginning PT for b/l knee pain Dalton reports GROC improvement of 50%  MMT demonstrated some improvement in strength  ROM assessmeent demonstrated improved flexibility  Balance assessment demonstrated improved balance  Functional outcome measures and subjective reports demonstrated improved functional ability  Despite improvements he continues to present with muscular tightness, decreased LE strength, poor quadriceps activation, and decreased motor control  These impairments are contributing to increased difficulty and pain with prolonged walking, stair negotiation, and yard work  He requires PT to address these impairments and achieve set goals  He would benefit most from a graded strengthening program    Impairments: abnormal coordination, abnormal gait, abnormal or restricted ROM, activity intolerance, impaired balance, impaired physical strength, lacks appropriate home exercise program and pain with function    R Hand/Thumb:   Since beginning PT for his R thumb on [address] reports GROC improvement of 30%  Strength assessment demonstrated improved strength with  dynamometry  ROM assessment demonstrated improved flexibility  Functional outcome measures demonstrated improved functional ability  Despite improvements he continues to present with weakness, pain, decreased ROM, and impaired dexterity  Impairments have resulted in functional limitations including grasping, gripping, pulling, lifting/carrying   dymamometry and MMT demonstrated weakness  ROM assessment demonstrated decreased ROM   Functional outcome measures demonstrated functional limitations  Pt would benefit from skilled outpatient PT in order to maximize his level of functional ability  Understanding of Dx/Px/POC: good   Prognosis: good    Goals   R Thumb:   STG to be achieved by 1/6/23:  1  Pt will demonstrate 4+/5 wrist strength to improve ability to turn door knob and steering wheel  Achieved  2  Pt will be able to lift/carry grocery bags in/out of car pain free as per PLOF  Achieved  3  Pt will demonstrate improved  dynamometry by 10 lbs to improve ability to open jar  NOT ACHIEVED  LTG to be achieved by 2/28/23:  1  Pt will be able to open several tight jars pain free  NOT ACHIEVED  2  Pt will be able to turn keys to car pain free  NOT ACHIEVED  B/l knees:  STG to be achieved by 1/6/23:  1  Pt will present with 4-/5 strength with knee extension to decrease difficulty with stair negotiation  Achieved  2  Pt will preform 1 hours straight of yard work without increased knee pain  Achieved  3  Pt will ambulate 1 miles without increased knee pain  Achieved  LTG to be achieved by 2/28/23:  1  Pt will be able to perform 2 hours yard work/house work activities pain free as per PLOF  (1 25 hours achieved)  2  Pt will be able to walk for 3 miles as per PLOF  (1 25 miles achieved)  3  Pt will be able to walk up/down hills on uneven surfaces on his property without pain, instability or fear of falling as per PLOF  (NOT ACHIEVED)  4  Pt will be able to safely walk down stairs with proper mechanics without handrail and without fear of falling  (NOT ACHIEVED)       Plan  Plan details: Emphasis on facilitating independent strengthening program - pt request 3 vs 2 days per week but either is appropriate   Patient would benefit from: skilled physical therapy  Planned modality interventions: cryotherapy  Planned therapy interventions: ADL training, balance, balance/weight bearing training, body mechanics training, flexibility, functional ROM exercises, gait training, graded activity, graded exercise, graded motor, home exercise program, therapeutic exercise, therapeutic activities, stretching, strengthening, patient education, neuromuscular re-education, manual therapy and joint mobilization  Frequency: 3x week  Plan of Care beginning date: 2022  Plan of Care expiration date: 2023      Subjective Evaluation    History of Present Illness  Mechanism of injury:  B/l Knees: Donald Ruiz reports a history of chronic right knee pain for the past year and a half beginning insidiously, increasing gradually, up until previous round of PT  Some overall improvements while in PT, but was not compliant with HEP upon discharge  He also reports a history of left knee pain beginning about a month ago after slipping down small hill with his hip in inversion and his knee flexed  Previously no increased left knee pain, now increased pain with stair ascent/descent  Either knee pain is most severe with yard work and stair descent  Primary complaint is right knee pain and "I would like to develop a plan" which he feels may eventually include surgery  He also reports limitations in walking tolerance of about 2 miles prior to severe increase in right knee swelling and pain  R thumb/wrist: Pt notes chronic history of R thumb/wrist pain that began a few years ago  Noted that he has had it treated over the past couple years with PT and injections  Noted that he has not been keeping up with his home exercise program and has noticed that the pain has returned       Quality of life: good    Pain  B/l Knees:   Current pain ratin  At worst pain rating: 3  Quality: dull ache and throbbing  Aggravating factors: walking and stair climbing  Progression: no change    R thumb/wrist:  Current pain ratin  At worst pain ratin  Quality: deep soreness, ache, throbbing  Aggravating factors: /grasping, push/pull, lifting/carrying    Treatments  Previous treatment: physical therapy, injection treatment and medication  Patient Goals  Patient goals for therapy: increased strength and decreased pain    Objective   Comments  Moderate genu valgum bilaterally L>R     TTP over R thenar eminence     Passive Range of Motion   Left Knee   Flexion: 128 degrees   Extension: 0 degrees     Right Knee   Flexion: 121 degrees   Extension: 0 degrees     Wrist Flexion:  R: 40 degrees  L: 45 degrees    Wrist extension:  R: 40 degrees  L: 45 degrees    Strength/Myotome Testing     Left Hip   Planes of Motion   Flexion: 5  Abduction: 5    Right Hip   Planes of Motion   Flexion: 5  Abduction: 5    Left Knee   Flexion: 4+  Extension: 4+    Right Knee   Flexion: 4  Extension: 4     Dynamometry:  R: 90, 92 lbs  L: 113, 113 lbs     Pinch   R: 16, 17 lbs  L: 23, 22 lbs     Wrist:  Flexion:   R: 4+/5  L: 5/5  Extension:  R: 4+/5  L: 5/5      Ambulation     Ambulation: Stairs   Step up 8" R without UE support with mild unsteadiness, no supervision required  Step up 8" L without UE with improved quad control   Step down 8" L or R without UE with poor eccentric control and pain       Comments   Level ground ambulation WNL - no AD        Precautions:   Age    Manuals 1/23 1/25 1/18 1/20   B/L LE stretch  JW JZ   JW    Pat mobs   JDIONICIO       [facility]  JDIONICIO       R thumb MTP joint distraction, grade I-II jt mauro HUNT    MK                     Neuro Re-Ed 1/23 1/25 1/18 1/20   Fitter A/P         Plank         Pressure cuff abdominal brace          Pressure cuff abdominal brace 90 90                                    Ther Ex 1/23 1/25 1/18 1/20   KNEES         PKFS w/ strap  :30x3   Manual     Fig 4 bridge U/l 3x10    U/l 3x10    S/l hip abd x30 ea    x30 ea    U/l HR 3x10 b/l     3x10 ea    SLR         Prone hip ext with knee flexion :30x3         TB side step  BTB x3 laps         Seated HS stretch :30x3  :30x3        pball hamstring curls         qped fire hydrants  2x10 ea        WRIST/THUMB     1/18 1/20   Wrist flexion stretch elbow extended  :30x3       Wrist extension stretch elbow extended   :30x3        Thumb opposition  2x10     3x10    FlexBar Flexion/ext      G x30 ea    FlexBar U/R dev      G x30 ea    Putty          Putty thumb to palm      G x30 ea    Digi Flex all fingers          Thumb helper      1 Y 1 R   x20 :05   Gripper          REIL  3x10                Ther Activity 1/23 1/25 1/18    Step Ups 8"     8" 3x10    Lateral step down      Lat step up 8" 2x10     Squats to chair     STS 3x10     Leg Press b/l 170/ 3x10 SLOW        Calf press 170/ 3x10         RB/Elliptical ellip 5'         UBE         RB     5'

## 2023-01-27 ENCOUNTER — OFFICE VISIT (OUTPATIENT)
Dept: PHYSICAL THERAPY | Facility: CLINIC | Age: 73
End: 2023-01-27

## 2023-01-27 DIAGNOSIS — M25.561 CHRONIC PAIN OF BOTH KNEES: ICD-10-CM

## 2023-01-27 DIAGNOSIS — G89.29 CHRONIC PAIN OF RIGHT THUMB: ICD-10-CM

## 2023-01-27 DIAGNOSIS — M79.644 CHRONIC PAIN OF RIGHT THUMB: ICD-10-CM

## 2023-01-27 DIAGNOSIS — G89.29 CHRONIC PAIN OF BOTH KNEES: ICD-10-CM

## 2023-01-27 DIAGNOSIS — M25.562 CHRONIC PAIN OF BOTH KNEES: ICD-10-CM

## 2023-01-27 DIAGNOSIS — M25.531 RIGHT WRIST PAIN: Primary | ICD-10-CM

## 2023-01-27 NOTE — PROGRESS NOTES
Daily Note     Today's date: 2023  Patient name: Francisca Her  : 1950  MRN: [id number]  Referring provider: Angel Ulrich DO  Dx:   Encounter Diagnosis     ICD-10-CM    1  Right wrist pain  M25 531       2  Chronic pain of both knees  M25 561     M25 562     G89 29       3  Chronic pain of right thumb  M79 644     G89 29                      Subjective: Terri De Luna requested to focus on thumb d/t time restraints  Pt states R thumb/wrist pain is intermittent, pending activity  Objective: See treatment diary below      Assessment: Dalton tolerated PT treatment well  Limited session performed today d/t pt time restrains  IASTM performed to thenar eminence, restriction present  Pt denied provocation with wrist strengthening, Pt would benefit from continued PT to further address impairments and maximize functional level  Plan: Continue per plan of care        Precautions:     Manuals    B/L LE stretch  JW JZ   JW    Pat mobs   JZ       STM  JZ IASTM [facility]   R thumb MTP joint distraction, grade I-II jt mobs   JZ JW   MK                     Neuro Re-Ed    Fitter A/P         Plank         Pressure cuff abdominal brace          Pressure cuff abdominal brace 90 90                                    Ther Ex    KNEES         PKFS w/ strap  :30x3   Manual     Fig 4 bridge U/l 3x10    U/l 3x10    S/l hip abd x30 ea    x30 ea    U/l HR 3x10 b/l     3x10 ea    SLR         Prone hip ext with knee flexion :30x3         TB side step  BTB x3 laps         Seated HS stretch :30x3  :30x3        pball hamstring curls         qped fire hydrants  2x10 ea        WRIST/THUMB        Wrist flexion stretch elbow extended  :30x3 :30x3      Wrist extension stretch elbow extended   :30x3  :30x3      Thumb opposition  2x10  2x10   3x10    FlexBar Flexion/ext   G x30 ea   G x30 ea    FlexBar U/R dev   G x30 ea   G x30 ea    Putty  Putty thumb to palm      G x30 ea    Digi Flex all fingers          Thumb helper      1 Y 1 R   x20 :05   Gripper          REIL  3x10                Ther Activity 1/23 1/25 1/27 1/18    Step Ups 8"     8" 3x10    Lateral step down      Lat step up 8" 2x10     Squats to chair     STS 3x10     Leg Press b/l 170/ 3x10 SLOW        Calf press 170/ 3x10         RB/Elliptical ellip 5'         UBE         RB     5'

## 2023-01-30 ENCOUNTER — APPOINTMENT (OUTPATIENT)
Dept: PHYSICAL THERAPY | Facility: CLINIC | Age: 73
End: 2023-01-30

## 2023-02-01 ENCOUNTER — APPOINTMENT (OUTPATIENT)
Dept: PHYSICAL THERAPY | Facility: CLINIC | Age: 73
End: 2023-02-01

## 2023-02-03 ENCOUNTER — OFFICE VISIT (OUTPATIENT)
Dept: PHYSICAL THERAPY | Facility: CLINIC | Age: 73
End: 2023-02-03

## 2023-02-03 DIAGNOSIS — G89.29 CHRONIC PAIN OF BOTH KNEES: ICD-10-CM

## 2023-02-03 DIAGNOSIS — G89.29 CHRONIC PAIN OF RIGHT THUMB: ICD-10-CM

## 2023-02-03 DIAGNOSIS — M79.644 CHRONIC PAIN OF RIGHT THUMB: ICD-10-CM

## 2023-02-03 DIAGNOSIS — M25.562 CHRONIC PAIN OF BOTH KNEES: ICD-10-CM

## 2023-02-03 DIAGNOSIS — M25.531 RIGHT WRIST PAIN: Primary | ICD-10-CM

## 2023-02-03 DIAGNOSIS — M25.561 CHRONIC PAIN OF BOTH KNEES: ICD-10-CM

## 2023-02-03 NOTE — PROGRESS NOTES
Daily Note     Today's date: 2/3/2023  Patient name: Aaron Park  : 1950  MRN: [id number]  Referring provider: Lizz Dawson DO  Dx:   Encounter Diagnosis     ICD-10-CM    1  Right wrist pain  M25 531       2  Chronic pain of both knees  M25 561     M25 562     G89 29       3  Chronic pain of right thumb  M79 644     G89 29                      Subjective: Harper Steiner requested to focus on thumb today d/t continued soreness with gripping activities  Pt states "I would like to work on knee starting next week"  Objective: See treatment diary below      Assessment: Dalton tolerated PT treatment well  IASTM performed to thenar eminence, continues to present with increased restriction throughout  Pt able to perform thumb opposition with less difficulty and full ROM  Remains challenged with resisted thumb helper and  strengthening  Pt would benefit from continued PT to further address impairments and maximize functional level  Plan: Continue per plan of care        Precautions:     Manuals 1/23 1/25 1/27 2/3  1/20   B/L LE stretch  JW JZ       Pat mobs   JZ       [facility]  JZ IASTM JW IASTM JW  MK   R thumb MTP joint distraction, grade I-II jt mobs   JZ JW JW  [address]                     Neuro Re-Ed 1/23 1/25 1/27 2/3  1/20   Fitter A/P         Plank         Pressure cuff abdominal brace          Pressure cuff abdominal brace 90 90                                    Ther Ex 1/23 1/25 1/27 2/3  1/20   KNEES         PKFS w/ strap  :30x3       Fig 4 bridge U/l 3x10        S/l hip abd x30 ea        U/l HR 3x10 b/l         SLR         Prone hip ext with knee flexion :30x3         TB side step  BTB x3 laps         Seated HS stretch :30x3  :30x3        pball hamstring curls         qped fire hydrants  2x10 ea        WRIST/THUMB         Wrist flexion stretch elbow extended  :30x3 :30x3 Manual      Wrist extension stretch elbow extended   :30x3  :30x3 Manual      Thumb opposition  2x10  2x10 2x10   3x10    FlexBar Flexion/ext   G x30 ea G x30 ea  G x30 ea    FlexBar U/R dev   G x30 ea G x30 ea  G x30 ea    Putty          Putty thumb to palm      G x30 ea    Digi Flex all fingers     R x10      Thumb helper    1 Y 1 R x20 :05   1 Y 1 R   x20 :05   Gripper     Silver 50/ :05x20      REIL  3x10                Ther Activity 1/23 1/25 1/27 2/3     Step Ups 8"         Lateral step down          Squats to chair         Leg Press b/l 170/ 3x10 SLOW        Calf press 170/ 3x10         RB/Elliptical ellip 5'         UBE         RB

## 2023-02-06 ENCOUNTER — OFFICE VISIT (OUTPATIENT)
Dept: PHYSICAL THERAPY | Facility: CLINIC | Age: 73
End: 2023-02-06

## 2023-02-06 DIAGNOSIS — G89.29 CHRONIC PAIN OF BOTH KNEES: ICD-10-CM

## 2023-02-06 DIAGNOSIS — M25.531 RIGHT WRIST PAIN: Primary | ICD-10-CM

## 2023-02-06 DIAGNOSIS — M79.644 CHRONIC PAIN OF RIGHT THUMB: ICD-10-CM

## 2023-02-06 DIAGNOSIS — G89.29 CHRONIC PAIN OF RIGHT THUMB: ICD-10-CM

## 2023-02-06 DIAGNOSIS — M25.561 CHRONIC PAIN OF BOTH KNEES: ICD-10-CM

## 2023-02-06 DIAGNOSIS — M25.562 CHRONIC PAIN OF BOTH KNEES: ICD-10-CM

## 2023-02-06 NOTE — PROGRESS NOTES
Daily Note     Today's date: 2023  Patient name: Terence Ferro  : 1950  MRN: [id number]  Referring provider: Naveen Puga DO  Dx:   Encounter Diagnosis     ICD-10-CM    1  Right wrist pain  M25 531       2  Chronic pain of both knees  M25 561     M25 562     G89 29       3  Chronic pain of right thumb  M79 644     G89 29                      Subjective: Robert Jones offers no new complaints upon arrival        Objective: See treatment diary below      Assessment: Robert Jones tolerated PT treatment well  Today's session focusing on b/l knee  Passive b/l LE stretching performed, pt presenting with limited hamstring and quad flexibility, R>L  Emphasized glute and quad strengthening, pt challenged with fatigue present by 2-3 set  Denied b/l knee provocation throughout and post session  Pt would benefit from continued PT to further address impairments and maximize functional level  Plan: Continue per plan of care        Precautions:     Manuals  2/3 2/6    B/L LE stretch  JW JZ   JW    Pat mobs   JZ       STM  JZ IASTM JW IASTM JW     R thumb MTP joint distraction, grade I-II jt mobs   JZ JW JW                       Neuro Re-Ed  2/3 2/6    Fitter A/P         Plank         Pressure cuff abdominal brace          Pressure cuff abdominal brace 90 90                                    Ther Ex  2/3 2/6    KNEES         PKFS w/ strap  :30x3   Manual     Fig 4 bridge U/l 3x10        S/l hip abd x30 ea    3x10 ea    U/l HR 3x10 b/l     3x10 b/l     SLR         Prone hip ext with knee flexion :30x3     :30x3     TB side step  BTB x3 laps     BTB x3 laps     Seated HS stretch :30x3  :30x3    Manual     pball hamstring curls         qped fire hydrants  2x10 ea        WRIST/THUMB         Wrist flexion stretch elbow extended  :30x3 :30x3 Manual      Wrist extension stretch elbow extended   :30x3  :30x3 Manual      Thumb opposition  2x10  2x10 2x10      FlexBar Flexion/ext   G x30 ea G x30 ea     FlexBar U/R dev   G x30 ea G x30 ea     Putty          Putty thumb to palm         Digi Flex all fingers     R x10      Thumb helper    1 Y 1 R x20 :05      Gripper     Silver 50/ :05x20      REIL  3x10                Ther Activity 1/23 1/25 1/27 2/3 2/6    Step Ups 8"         Lateral step down          Squats to chair         Leg Press b/l 170/ 3x10 SLOW    170/ 3x10 SLOW    Calf press 170/ 3x10     170/ 3x10     RB/Elliptical ellip 5'         UBE         RB     Lvl 2 5'     STS     3x10

## 2023-02-08 ENCOUNTER — APPOINTMENT (OUTPATIENT)
Dept: PHYSICAL THERAPY | Facility: CLINIC | Age: 73
End: 2023-02-08

## 2023-02-08 DIAGNOSIS — E11.9 TYPE 2 DIABETES MELLITUS WITHOUT COMPLICATION, WITHOUT LONG-TERM CURRENT USE OF INSULIN (HCC): Primary | ICD-10-CM

## 2023-02-10 ENCOUNTER — OFFICE VISIT (OUTPATIENT)
Dept: PHYSICAL THERAPY | Facility: CLINIC | Age: 73
End: 2023-02-10

## 2023-02-10 DIAGNOSIS — M25.562 CHRONIC PAIN OF BOTH KNEES: ICD-10-CM

## 2023-02-10 DIAGNOSIS — G89.29 CHRONIC PAIN OF RIGHT THUMB: ICD-10-CM

## 2023-02-10 DIAGNOSIS — M79.644 CHRONIC PAIN OF RIGHT THUMB: ICD-10-CM

## 2023-02-10 DIAGNOSIS — M25.531 RIGHT WRIST PAIN: Primary | ICD-10-CM

## 2023-02-10 DIAGNOSIS — G89.29 CHRONIC PAIN OF BOTH KNEES: ICD-10-CM

## 2023-02-10 DIAGNOSIS — M25.561 CHRONIC PAIN OF BOTH KNEES: ICD-10-CM

## 2023-02-10 NOTE — PROGRESS NOTES
Daily Note     Today's date: 2/10/2023  Patient name: Francisca Her  : 1950  MRN: [id number]  Referring provider: Angel Ulrich DO  Dx:   Encounter Diagnosis     ICD-10-CM    1  Right wrist pain  M25 531       2  Chronic pain of both knees  M25 561     M25 562     G89 29       3  Chronic pain of right thumb  M79 644     G89 29                      Subjective: Dalton reports R thumb aggravation when gripping various items  Pt states knees felt "really good" after last workout  Objective: See treatment diary below      Assessment: Dalton tolerated PT treatment well  Today's session focusing on R thumb per pt request  Continued with IASTM to thenar eminence  Added DB resisted wrist flexion/extension to diary  Pt with greater challenge performing therabar exercises, observable fatigue present  Pt would benefit from continued PT to further address impairments and maximize functional level  Plan: Continue per plan of care        Precautions:     Manuals  2/3 2/6 2/10   B/L LE stretch  JW JZ   JW    Pat mobs   JZ       STM  JZ IASTM JW IASTM JW  IASTM JW   R thumb MTP joint distraction, grade I-II jt mobs   JZ JW JW  JW ROM                      Neuro Re-Ed  2/3 2/6 2/10   Fitter A/P         Plank         Pressure cuff abdominal brace          Pressure cuff abdominal brace 90 90                                    Ther Ex  2/3 2/6 2/10   KNEES         PKFS w/ strap  :30x3   Manual     Fig 4 bridge U/l 3x10        S/l hip abd x30 ea    3x10 ea    U/l HR 3x10 b/l     3x10 b/l     SLR         Prone hip ext with knee flexion :30x3     :30x3     TB side step  BTB x3 laps     BTB x3 laps     Seated HS stretch :30x3  :30x3    Manual     pball hamstring curls         qped fire hydrants  2x10 ea        WRIST/THUMB         Wrist flexion stretch elbow extended  :30x3 :30x3 Manual   Manual    Wrist extension stretch elbow extended   :30x3  :30x3 Manual   Manual    Thumb opposition  2x10  2x10 2x10   2x10    FlexBar Flexion/ext   G x30 ea G x30 ea  G x30 ea   FlexBar U/R dev   G x30 ea G x30 ea  G x30 ea   Putty          Putty thumb to palm         Digi Flex all fingers     R x10      Thumb helper    1 Y 1 R x20 :05   1 Y 1 R x20 :05    Gripper     Silver 50/ :05x20   Silver 50/ :05x20    REIL  3x10       DB wrist flexion/extension       4# 3x10 ea    Ther Activity 1/23 1/25 1/27 2/3 2/6 2/10   Step Ups 8"         Lateral step down          Squats to chair         Leg Press b/l 170/ 3x10 SLOW    170/ 3x10 SLOW    Calf press 170/ 3x10     170/ 3x10     RB/Elliptical ellip 5'         UBE         RB     Lvl 2 5'     STS     3x10

## 2023-02-13 ENCOUNTER — OFFICE VISIT (OUTPATIENT)
Dept: PHYSICAL THERAPY | Facility: CLINIC | Age: 73
End: 2023-02-13

## 2023-02-13 DIAGNOSIS — G89.29 CHRONIC PAIN OF RIGHT THUMB: ICD-10-CM

## 2023-02-13 DIAGNOSIS — M79.644 CHRONIC PAIN OF RIGHT THUMB: ICD-10-CM

## 2023-02-13 DIAGNOSIS — G89.29 CHRONIC PAIN OF BOTH KNEES: ICD-10-CM

## 2023-02-13 DIAGNOSIS — M25.562 CHRONIC PAIN OF BOTH KNEES: ICD-10-CM

## 2023-02-13 DIAGNOSIS — M25.561 CHRONIC PAIN OF BOTH KNEES: ICD-10-CM

## 2023-02-13 DIAGNOSIS — M25.531 RIGHT WRIST PAIN: Primary | ICD-10-CM

## 2023-02-13 NOTE — PROGRESS NOTES
Daily Note     Today's date: 2023  Patient name: Benita Antonio  : 1950  MRN: [id number]  Referring provider: Nisreen Elias DO  Dx:   Encounter Diagnosis     ICD-10-CM    1  Right wrist pain  M25 531       2  Chronic pain of both knees  M25 561     M25 562     G89 29       3  Chronic pain of right thumb  M79 644     G89 29                      Subjective: Omero Goodman reports recently he has lost some weight, noticing less pain on b/l knees  Pt states he also has noticed improved posture with driving and walking  Objective: See treatment diary below      Assessment: Dalton tolerated PT treatment well  Initiated session with elliptical warm up  Improved hamstring and quad flexibility displayed with self stretching  Greater challenge present with lateral step down on RLE, limited eccentric control  Fatigue evident post session  Continue to address functional strength  Pt would benefit from continued PT to further address impairments and maximize functional level  Plan: Continue per plan of care        Precautions:     Manuals /3 2/6 2/10   B/L LE stretch      JW    Pat mobs          STM   IASTM JW IASTM JW  IASTM JW   R thumb MTP joint distraction, grade I-II jt mobs    JW JW  JW ROM                      Neuro Re-Ed  2/3 2/6 2/10   Fitter A/P         Plank         Pressure cuff abdominal brace          Pressure cuff abdominal brace 90 90                                    Ther Ex  2/3 2/10   KNEES         PKFS w/ strap :30x3     Manual     Fig 4 bridge         S/l hip abd     3x10 ea    U/l HR 3x10 ea    3x10 b/l     SLR         Prone hip ext with knee flexion :30x3     :30x3     TB side step      BTB x3 laps     Seated HS stretch :30x3     Manual     pball hamstring curls 2x10         qped fire hydrants          WRIST/THUMB         Wrist flexion stretch elbow extended   :30x3 Manual   Manual    Wrist extension stretch elbow extended    :30x3 Manual   Manual Thumb opposition   2x10 2x10   2x10    FlexBar Flexion/ext   G x30 ea G x30 ea  G x30 ea   FlexBar U/R dev   G x30 ea G x30 ea  G x30 ea   Putty          Putty thumb to palm         Digi Flex all fingers     R x10      Thumb helper    1 Y 1 R x20 :05   1 Y 1 R x20 :05    Gripper     Silver 50/ :05x20   Silver 50/ :05x20    REIL         DB wrist flexion/extension       4# 3x10 ea    Ther Activity 2/13 1/27 2/3 2/6 2/10   Step Ups 8"         Lateral step down  6" 3x10 ea        Forward step down 4" 3x10 R   6" 3x10 L         Squats to chair         Leg Press b/l     170/ 3x10 SLOW    Calf press     170/ 3x10     RB/Elliptical ellip 5'        UBE         RB     Lvl 2 5'     STS     3x10

## 2023-02-15 ENCOUNTER — APPOINTMENT (OUTPATIENT)
Dept: PHYSICAL THERAPY | Facility: CLINIC | Age: 73
End: 2023-02-15

## 2023-02-17 ENCOUNTER — OFFICE VISIT (OUTPATIENT)
Dept: PHYSICAL THERAPY | Facility: CLINIC | Age: 73
End: 2023-02-17

## 2023-02-17 DIAGNOSIS — G89.29 CHRONIC PAIN OF RIGHT THUMB: ICD-10-CM

## 2023-02-17 DIAGNOSIS — M25.561 CHRONIC PAIN OF BOTH KNEES: ICD-10-CM

## 2023-02-17 DIAGNOSIS — G89.29 CHRONIC PAIN OF BOTH KNEES: ICD-10-CM

## 2023-02-17 DIAGNOSIS — M25.531 RIGHT WRIST PAIN: Primary | ICD-10-CM

## 2023-02-17 DIAGNOSIS — M79.644 CHRONIC PAIN OF RIGHT THUMB: ICD-10-CM

## 2023-02-17 DIAGNOSIS — M25.562 CHRONIC PAIN OF BOTH KNEES: ICD-10-CM

## 2023-02-17 NOTE — PROGRESS NOTES
Daily Note     Today's date: 2023  Patient name: Aaron Park  : 1950  MRN: [id number]  Referring provider: Lizz Dawson DO  Dx:   Encounter Diagnosis     ICD-10-CM    1  Right wrist pain  M25 531       2  Chronic pain of both knees  M25 561     M25 562     G89 29       3  Chronic pain of right thumb  M79 644     G89 29                      Subjective: Harper Steiner reports he has been consistent with HEP for b/l knees this past week  Pt states he plans to make appointment with Ortho MD to further address knee OA  Pt requested to work on R thumb today  Objective: See treatment diary below      Assessment: Dalton tolerated PT treatment well  Continued with IASTM to R thenar eminence  Pt remains challenged with isolated thumb flexion  Able to perform opposition pain free  Pt would benefit from continued PT to further address impairments and maximize functional level  Plan: Continue per plan of care        Precautions:     Manuals 2/13 2/17  2/3 2/6 2/10   B/L LE stretch      JW    Pat mobs          STM  IASTM JW  IASTM JW  IASTM JW   R thumb MTP joint distraction, grade I-II jt mobs   JW  JW  JW ROM                      Neuro Re-Ed 2/13 2/17  2/3 2/6 2/10   Fitter A/P         Plank         Pressure cuff abdominal brace          Pressure cuff abdominal brace 90 90                                    Ther Ex 2/13 2/17  2/3 2/6 2/10   KNEES         PKFS w/ strap :30x3     Manual     Fig 4 bridge         S/l hip abd     3x10 ea    U/l HR 3x10 ea    3x10 b/l     SLR         Prone hip ext with knee flexion :30x3     :30x3     TB side step      BTB x3 laps     Seated HS stretch :30x3     Manual     pball hamstring curls 2x10         qped fire hydrants          WRIST/THUMB         Wrist flexion stretch elbow extended  :30x3  Manual   Manual    Wrist extension stretch elbow extended   :30x3  Manual   Manual    Thumb opposition    2x10   2x10    FlexBar Flexion/ext  G x30 ea  G x30 ea  G x30 ea   FlexBar U/R dev  G x30 ea  G x30 ea  G x30 ea   Putty          Putty thumb to palm         Digi Flex all fingers     R x10      Thumb helper  1 Y 1 R x20 :05   1 Y 1 R x20 :05   1 Y 1 R x20 :05    Gripper   Silver 50/ :05x20   Silver 50/ :05x20   Silver 50/ :05x20    REIL         DB wrist flexion/extension   4# 3x10 ea     4# 3x10 ea    Ther Activity 2/13 2/17  2/3 2/6 2/10   Step Ups 8"         Lateral step down  6" 3x10 ea        Forward step down 4" 3x10 R   6" 3x10 L         Squats to chair         Leg Press b/l     170/ 3x10 SLOW    Calf press     170/ 3x10     RB/Elliptical ellip 5'        UBE         RB     Lvl 2 5'     STS     3x10

## 2023-02-20 ENCOUNTER — OFFICE VISIT (OUTPATIENT)
Dept: PHYSICAL THERAPY | Facility: CLINIC | Age: 73
End: 2023-02-20

## 2023-02-20 DIAGNOSIS — M25.531 RIGHT WRIST PAIN: Primary | ICD-10-CM

## 2023-02-20 DIAGNOSIS — G89.29 CHRONIC PAIN OF RIGHT THUMB: ICD-10-CM

## 2023-02-20 DIAGNOSIS — M79.644 CHRONIC PAIN OF RIGHT THUMB: ICD-10-CM

## 2023-02-20 DIAGNOSIS — M25.561 CHRONIC PAIN OF BOTH KNEES: ICD-10-CM

## 2023-02-20 DIAGNOSIS — G89.29 CHRONIC PAIN OF BOTH KNEES: ICD-10-CM

## 2023-02-20 DIAGNOSIS — M25.562 CHRONIC PAIN OF BOTH KNEES: ICD-10-CM

## 2023-02-20 NOTE — PROGRESS NOTES
Daily Note     Today's date: 2023  Patient name: Jocelyn Flores  : 1950  MRN: [id number]  Referring provider: Reshma Yadav DO  Dx:   Encounter Diagnosis     ICD-10-CM    1  Right wrist pain  M25 531       2  Chronic pain of both knees  M25 561     M25 562     G89 29       3  Chronic pain of right thumb  M79 644     G89 29                      Subjective: Red Pimentel offers no new complaints upon arrival        Objective: See treatment diary below      Assessment: Red Pimentel tolerated PT treatment well  Initiated session with elliptical for functional warm up  Passive LE stretching performed, pt presenting with limited b/l  hamstring flexibility  Tactile cues required for qped hip abduction to correct trunk compensation  Advanced leg press activities to u/l, pt completed w/o provocation  Pt would benefit from continued PT to further address impairments and maximize functional level  Plan: Continue per plan of care        Precautions:     Manuals 2/13 2/17 2/20  2/6 2/10   B/L LE stretch    JW  JW    Pat mobs          STM  IASTM JW    IASTM JW   R thumb MTP joint distraction, grade I-II jt mobs   JW    JW ROM                      Neuro Re-Ed /10   Fitter A/P         Plank         Pressure cuff abdominal brace          Pressure cuff abdominal brace 90 90                                    Ther Ex 10   KNEES         PKFS w/ strap :30x3   :30x3  Manual     Fig 4 bridge         S/l hip abd     3x10 ea    U/l HR 3x10 ea    3x10 b/l     SLR         Prone hip ext with knee flexion :30x3   :30x3 ea   :30x3     TB side step      BTB x3 laps     Seated HS stretch :30x3   :30x3 ea   Manual     pball hamstring curls 2x10         qped fire hydrants    3x10 ea      WRIST/THUMB        Wrist flexion stretch elbow extended  :30x3    Manual    Wrist extension stretch elbow extended   :30x3    Manual    Thumb opposition      2x10    FlexBar Flexion/ext  G x30 ea    G x30 ea FlexBar U/R dev  G x30 ea    G x30 ea   Putty          Putty thumb to palm         Digi Flex all fingers          Thumb helper  1 Y 1 R x20 :05     1 Y 1 R x20 :05    Gripper   Silver 50/ :05x20     Silver 50/ :05x20    REIL         DB wrist flexion/extension   4# 3x10 ea     4# 3x10 ea    Ther Activity 2/13 2/17 2/20 2/6 2/10   Step Ups 8"         Lateral step down  6" 3x10 ea        Forward step down 4" 3x10 R   6" 3x10 L         Squats to chair         Leg Press b/l   U/l 80/ 3x10 ea   170/ 3x10 SLOW    Calf press   U/l 60/ 3x10 ea  170/ 3x10     RB/Elliptical ellip 5'  ellip       UBE         RB     Lvl 2 5'     STS     3x10

## 2023-02-22 ENCOUNTER — APPOINTMENT (OUTPATIENT)
Dept: PHYSICAL THERAPY | Facility: CLINIC | Age: 73
End: 2023-02-22

## 2023-02-27 DIAGNOSIS — E11.40 TYPE 2 DIABETES MELLITUS WITH DIABETIC NEUROPATHY, WITHOUT LONG-TERM CURRENT USE OF INSULIN (HCC): Primary | ICD-10-CM

## 2023-02-27 RX ORDER — BLOOD SUGAR DIAGNOSTIC
1 STRIP MISCELLANEOUS 2 TIMES DAILY
Qty: 200 EACH | Refills: 5 | Status: SHIPPED | OUTPATIENT
Start: 2023-02-27 | End: 2023-03-03 | Stop reason: SDUPTHER

## 2023-03-03 DIAGNOSIS — E11.40 TYPE 2 DIABETES MELLITUS WITH DIABETIC NEUROPATHY, WITHOUT LONG-TERM CURRENT USE OF INSULIN (HCC): ICD-10-CM

## 2023-03-03 RX ORDER — BLOOD SUGAR DIAGNOSTIC
1 STRIP MISCELLANEOUS 2 TIMES DAILY
Qty: 200 EACH | Refills: 5 | Status: SHIPPED | OUTPATIENT
Start: 2023-03-03

## 2023-03-07 ENCOUNTER — TELEPHONE (OUTPATIENT)
Dept: FAMILY MEDICINE CLINIC | Facility: CLINIC | Age: 73
End: 2023-03-07

## 2023-03-07 LAB
EST. AVERAGE GLUCOSE BLD GHB EST-MCNC: 143 MG/DL
EST. AVERAGE GLUCOSE BLD GHB EST-SCNC: 7.9 MMOL/L
HBA1C MFR BLD: 6.6 % OF TOTAL HGB

## 2023-03-08 ENCOUNTER — OFFICE VISIT (OUTPATIENT)
Dept: FAMILY MEDICINE CLINIC | Facility: CLINIC | Age: 73
End: 2023-03-08

## 2023-03-08 VITALS
HEIGHT: 74 IN | WEIGHT: 228.12 LBS | BODY MASS INDEX: 29.28 KG/M2 | OXYGEN SATURATION: 98 % | HEART RATE: 74 BPM | TEMPERATURE: 96.5 F

## 2023-03-08 DIAGNOSIS — I10 BENIGN ESSENTIAL HYPERTENSION: ICD-10-CM

## 2023-03-08 DIAGNOSIS — Z12.5 PROSTATE CANCER SCREENING: ICD-10-CM

## 2023-03-08 DIAGNOSIS — E78.2 MIXED HYPERLIPIDEMIA: ICD-10-CM

## 2023-03-08 DIAGNOSIS — E11.40 TYPE 2 DIABETES MELLITUS WITH DIABETIC NEUROPATHY, WITHOUT LONG-TERM CURRENT USE OF INSULIN (HCC): Primary | ICD-10-CM

## 2023-05-19 ENCOUNTER — RA CDI HCC (OUTPATIENT)
Dept: OTHER | Facility: HOSPITAL | Age: 73
End: 2023-05-19

## 2023-05-19 NOTE — PROGRESS NOTES
[facility]  coding opportunities       Chart reviewed, no opportunity found: CHART REVIEWED, NO OPPORTUNITY FOUND        Patients Insurance        Commercial Insurance: [employer]

## 2023-05-25 ENCOUNTER — OFFICE VISIT (OUTPATIENT)
Dept: FAMILY MEDICINE CLINIC | Facility: CLINIC | Age: 73
End: 2023-05-25

## 2023-05-25 VITALS
HEIGHT: 74 IN | HEART RATE: 70 BPM | BODY MASS INDEX: 29.11 KG/M2 | SYSTOLIC BLOOD PRESSURE: 136 MMHG | TEMPERATURE: 96.3 F | OXYGEN SATURATION: 97 % | DIASTOLIC BLOOD PRESSURE: 88 MMHG | RESPIRATION RATE: 18 BRPM | WEIGHT: 226.8 LBS

## 2023-05-25 DIAGNOSIS — E11.40 TYPE 2 DIABETES MELLITUS WITH DIABETIC NEUROPATHY, WITHOUT LONG-TERM CURRENT USE OF INSULIN (HCC): ICD-10-CM

## 2023-05-25 DIAGNOSIS — M17.0 PRIMARY OSTEOARTHRITIS OF BOTH KNEES: ICD-10-CM

## 2023-05-25 DIAGNOSIS — Z01.818 PREOPERATIVE EVALUATION OF A MEDICAL CONDITION TO RULE OUT SURGICAL CONTRAINDICATIONS (TAR REQUIRED): Primary | ICD-10-CM

## 2023-05-25 DIAGNOSIS — I10 BENIGN ESSENTIAL HYPERTENSION: ICD-10-CM

## 2023-05-25 NOTE — PROGRESS NOTES
Subjective:   Chief Complaint   Patient presents with   • Pre-op Clearance     pre op right knee replacement  preop testting need completed prior mikey   [facility] on Monday June 19th w/ Dr Morenita Claire   Pt had his EKG and BW done in [facility]  physical on 7/5/23        Patient ID: Negro Helms is a 67 y o  male  Here for preoperative evaluation  for planned right TKR      The following portions of the patient's history were reviewed and updated as appropriate: allergies, current medications, past family history, past medical history, past social history, past surgical history and problem list     Review of Systems   Constitutional: Negative for activity change, appetite change, chills, diaphoresis, fatigue and unexpected weight change  HENT: Negative for congestion, ear discharge, ear pain, hearing loss, nosebleeds and rhinorrhea  Eyes: Negative for pain, redness, itching and visual disturbance  Respiratory: Negative for cough, choking, chest tightness and shortness of breath  Cardiovascular: Negative for chest pain and leg swelling  Gastrointestinal: Negative for abdominal pain, blood in stool, constipation, diarrhea and nausea  Endocrine: Negative for cold intolerance, polydipsia and polyphagia  Genitourinary: Negative for dysuria, frequency, hematuria and urgency  Musculoskeletal: Positive for arthralgias and gait problem  Negative for back pain, joint swelling, neck pain and neck stiffness  Skin: Negative for color change and rash  Allergic/Immunologic: Negative for environmental allergies and food allergies  Neurological: Negative for dizziness, tremors, seizures, speech difficulty, numbness and headaches  Hematological: Negative for adenopathy  Does not bruise/bleed easily  Psychiatric/Behavioral: Negative for behavioral problems, dysphoric mood, hallucinations and self-injury               Objective:  Vitals:    05/25/23 1324   BP: 136/88   Pulse: 70   Resp: "18   Temp: (!) 96 3 °F (35 7 °C)   SpO2: 97%   Weight: 103 kg (226 lb 12 8 oz)   Height: 6' 1 5\" (1 867 m)      Physical Exam  Constitutional:       General: He is not in acute distress  Appearance: He is well-developed  He is not diaphoretic  HENT:      Head: Normocephalic and atraumatic  Right Ear: External ear normal       Left Ear: External ear normal       Nose: Nose normal       Mouth/Throat:      Pharynx: No oropharyngeal exudate  Eyes:      General: No scleral icterus  Right eye: No discharge  Left eye: No discharge  Conjunctiva/sclera: Conjunctivae normal       Pupils: Pupils are equal, round, and reactive to light  Neck:      Thyroid: No thyromegaly  Cardiovascular:      Rate and Rhythm: Normal rate and regular rhythm  Heart sounds: Normal heart sounds  No murmur heard  Pulmonary:      Effort: Pulmonary effort is normal       Breath sounds: Normal breath sounds  No wheezing or rales  Abdominal:      General: Bowel sounds are normal       Palpations: Abdomen is soft  There is no mass  Tenderness: There is no abdominal tenderness  There is no guarding  Musculoskeletal:         General: Tenderness and deformity present  Normal range of motion  Cervical back: Normal range of motion and neck supple  Lymphadenopathy:      Cervical: No cervical adenopathy  Skin:     General: Skin is warm and dry  Neurological:      Mental Status: He is alert and oriented to person, place, and time  Deep Tendon Reflexes: Reflexes are normal and symmetric  Psychiatric:         Thought Content: Thought content normal          Judgment: Judgment normal            Assessment/Plan:    No problem-specific Assessment & Plan notes found for this encounter         Diagnoses and all orders for this visit:    Preoperative evaluation of a medical condition to rule out surgical contraindications (TAR required)    Type 2 diabetes mellitus with diabetic neuropathy, without " long-term current use of insulin (HCC)    Primary osteoarthritis of both knees    Benign essential hypertension    Other orders  -     Specialty Vitamins Products (INULOSE BLOOD SUGAR SUPPORT PO); Take by mouth Glucotrust: blood sugar supplement  -     Misc Natural Products (PROSTATE HEALTH PO); Take by mouth ProstaGenix: supports prostate health        Proceed with surgery  Avoid asa and nsaids for 1 week prior to procedure  Forms completed

## 2023-06-02 ENCOUNTER — TELEPHONE (OUTPATIENT)
Dept: FAMILY MEDICINE CLINIC | Facility: CLINIC | Age: 73
End: 2023-06-02

## 2023-06-02 DIAGNOSIS — Z96.651 STATUS POST RIGHT KNEE REPLACEMENT: Primary | ICD-10-CM

## 2023-06-02 NOTE — TELEPHONE ENCOUNTER
Pt call and say he is going to get knee replacement on june19th and want an ordered place in for Physical therapy next door and want to schedule with PT

## 2023-07-12 DIAGNOSIS — E11.40 TYPE 2 DIABETES MELLITUS WITH DIABETIC NEUROPATHY, WITHOUT LONG-TERM CURRENT USE OF INSULIN (HCC): ICD-10-CM

## 2023-07-12 RX ORDER — BLOOD SUGAR DIAGNOSTIC
1 STRIP MISCELLANEOUS 2 TIMES DAILY
Qty: 200 EACH | Refills: 5 | Status: SHIPPED | OUTPATIENT
Start: 2023-07-12

## 2023-07-13 DIAGNOSIS — E11.9 TYPE 2 DIABETES MELLITUS WITHOUT COMPLICATION, WITHOUT LONG-TERM CURRENT USE OF INSULIN (HCC): Primary | ICD-10-CM

## 2023-07-13 RX ORDER — LANCETS 30 GAUGE
EACH MISCELLANEOUS
Qty: 200 EACH | Refills: 3 | Status: SHIPPED | OUTPATIENT
Start: 2023-07-13

## 2023-07-27 DIAGNOSIS — E11.40 TYPE 2 DIABETES MELLITUS WITH DIABETIC NEUROPATHY, WITHOUT LONG-TERM CURRENT USE OF INSULIN (HCC): ICD-10-CM

## 2023-07-27 DIAGNOSIS — E11.9 TYPE 2 DIABETES MELLITUS WITHOUT COMPLICATION, WITHOUT LONG-TERM CURRENT USE OF INSULIN (HCC): ICD-10-CM

## 2023-07-27 RX ORDER — LANCETS 30 GAUGE
EACH MISCELLANEOUS
Qty: 200 EACH | Refills: 3 | Status: SHIPPED | OUTPATIENT
Start: 2023-07-27

## 2023-07-27 RX ORDER — BLOOD SUGAR DIAGNOSTIC
1 STRIP MISCELLANEOUS 2 TIMES DAILY
Qty: 200 EACH | Refills: 5 | Status: SHIPPED | OUTPATIENT
Start: 2023-07-27

## 2023-07-31 ENCOUNTER — OFFICE VISIT (OUTPATIENT)
Dept: PHYSICAL THERAPY | Facility: CLINIC | Age: 73
End: 2023-07-31
Payer: COMMERCIAL

## 2023-07-31 DIAGNOSIS — M17.11 PRIMARY OSTEOARTHRITIS OF RIGHT KNEE: Primary | ICD-10-CM

## 2023-07-31 PROCEDURE — 97110 THERAPEUTIC EXERCISES: CPT | Performed by: PHYSICAL THERAPIST

## 2023-07-31 PROCEDURE — 97162 PT EVAL MOD COMPLEX 30 MIN: CPT | Performed by: PHYSICAL THERAPIST

## 2023-07-31 NOTE — PROGRESS NOTES
PT Evaluation     Today's date: 2023  Patient name: Anuradha Anaya  : 1950  MRN: [id number]  Referring provider: Kristi Vidales DO  Dx:   Encounter Diagnosis     ICD-10-CM    1. Primary osteoarthritis of right knee  M17.11           Assessment  Assessment details: Anuradha Anaya is a 67 y.o. male who presents with pain, decreased strength, decreased ROM, decreased joint mobility, joint effusion, ambulatory dysfunction and balance dysfunction. Due to these impairments, patient has difficulty performing ADL's, recreational activities, engaging in social activities, ambulation, stair negotiation, lifting/carrying, transfers. Patient's clinical presentation is consistent with their referring diagnosis of S/p R/L TKA performed on 24. Patient has been educated in post-op contraindications / precautions, gait training, weight bearing status, home exercise program and plan of care. Patient would benefit from skilled physical therapy services to address their aforementioned functional limitations and progress towards prior level of function and independence with home exercise program.     Impairments: abnormal gait, abnormal or restricted ROM, activity intolerance, impaired balance, impaired physical strength, lacks appropriate home exercise program, pain with function, weight-bearing intolerance, poor posture  and poor body mechanics  Functional limitations: walk/stand, STS, stair negotiation, yard work, walk uneven surfaces   Prognosis: good  Prognosis details: + factors: high motivation levels, active lifestyle  - factors: chronic pain, BMI    Goals  Short Term Goals to be accomplished by 23:  STG1: Pt will be I with HEP. STG2: Pt will demo 0-100 degrees in knee AROM to improve gait. STG3: Pt will demo 4/5 MMT strength in knee to improve stair negotiation.    STG4: Pt will amb community distance without gait deviates due to pain     Long Term Goals to be accomplished by 10/23/23:   LTG1: Pt will be able to descend stairs with reciprocal gait pattern without use of handrail. LTG2: Pt will be able to walk on uneven surfaces on property to do yard work. LTG3: Pt will be able to perform STS with normal mechanics without pain. Plan  Plan details: HEP development, stretching, strengthening, A/AA/PROM, joint mobilizations, posture education, STM/MI as needed to reduce muscle tension, muscle reeducation, PLOC discussed and agreed upon with patient. Patient would benefit from: PT eval and skilled physical therapy  Planned modality interventions: cryotherapy, thermotherapy: hydrocollator packs and unattended electrical stimulation  Planned therapy interventions: manual therapy, neuromuscular re-education, self care, therapeutic activities, therapeutic exercise, home exercise program, patient education, joint mobilization, balance, strengthening, stretching, therapeutic training and flexibility  Frequency: 2x week  Duration in weeks: 12  Plan of Care beginning date: 7/31/2023  Plan of Care expiration date: 10/23/2023  Treatment plan discussed with: patient      Subjective Evaluation    History of Present Illness  Mechanism of injury: Pt is a 68 y/o male who presents to PT s/p R TKA performed on 7/24/23. Had a surgery on March 1st to have hardware removed from his knee from previous ORIF. Then he had the TKA on 7/24/23 Stated that the day of the surgery there was complications with bleeding and the surgeon had to add additional staples. Had home care PT for some visits which improved his symptoms. Since then he has been improving. Notes that he has been walking around his home without AD. Improved symptoms from ice and elevation.    Patient Goals  Patient goals for therapy: increased motion, improved balance, decreased pain, decreased edema, increased strength and independence with ADLs/IADLs  Patient goal: walk/stand, STS, stair negotiation, yard work, walk uneven surfaces  Pain  Current pain rating: 2  At best pain ratin  At worst pain ratin  Location: R TKA   Quality: tight, discomfort and pulling  Aggravating factors: stair climbing, walking, standing and lifting    Treatments  Current treatment: physical therapy      Objective     Observations     Additional Observation Details  Incision: covered with bandage still     Tenderness     Additional Tenderness Details  TTP over R ITB, calf, adductors, HS    Active Range of Motion     Right Knee   Flexion: 80 degrees with pain  Extension: 15 degrees with pain    Passive Range of Motion     Right Knee   Flexion: 90 degrees with pain  Extension: 10 degrees with pain    Strength/Myotome Testing     Right Knee   Flexion: 4-  Extension: 4-    Additional Strength Details  U/l heel raise: NOT TESTED  R: reps  L: reps     Extension Lag: 15 degree       Tests     Right Knee   Negative valgus stress test at 30 degrees and varus stress test at 30 degrees.      Additional Tests Details  TUG: sec NOT TESTED    30 sec STS: reps NOT TESTED    SLS: NOT TESTED   R: sec  L: sec       Precautions:   Past Medical History:   Diagnosis Date   • Hyperplastic colon polyp     last assessed 14   • Osteoarthritis of both knees 2021   • Sleep apnea     last assessed 13       Manuals         STM HS, adductor, ITB, quad         Pat mobs          PROM stretch                  Neuro Re-Ed                                                                        Ther Ex         SLR 10x         Long arc quad  10x         Heel raise 10x         Seated HS stretch stool  :30x         Seated knee flexion stretch 10x                                    Ther Activity

## 2023-08-02 ENCOUNTER — OFFICE VISIT (OUTPATIENT)
Dept: PHYSICAL THERAPY | Facility: CLINIC | Age: 73
End: 2023-08-02
Payer: COMMERCIAL

## 2023-08-02 DIAGNOSIS — M17.11 PRIMARY OSTEOARTHRITIS OF RIGHT KNEE: Primary | ICD-10-CM

## 2023-08-02 PROCEDURE — 97140 MANUAL THERAPY 1/> REGIONS: CPT

## 2023-08-02 PROCEDURE — 97110 THERAPEUTIC EXERCISES: CPT

## 2023-08-02 NOTE — PROGRESS NOTES
Daily Note     Today's date: 2023  Patient name: Anup Joya  : 1950  MRN: [id number]  Referring provider: Vincent Yu  Dx:   Encounter Diagnosis     ICD-10-CM    1. Primary osteoarthritis of right knee  M17.11                      Subjective: Quirino Rhoades reports R knee soreness/stiffness upon arrival, controlling with CP and pain medication. Notes he has been diligent with HEP daily. Objective: See treatment diary below      Assessment: Dalton tolerated PT treatment well. Passive knee stretching performed, flexion and extension ROM are progressing well. STM performed along R quadriceps to address tissue tone. Pt displays good quadriceps engagement throughout therapeutic exercises. Reviewed ambulation with SPC. Pt would benefit from continued PT to further address impairments and maximize functional level. Plan: Continue per plan of care.       Precautions:   Past Medical History:   Diagnosis Date   • Hyperplastic colon polyp     last assessed 14   • Osteoarthritis of both knees 2021   • Sleep apnea     last assessed 13       Manuals        STM HS, adductor, ITB, quad  JW       Pat mobs          PROM stretch  JW                Neuro Re-Ed                                                                       Ther Ex        SLR 10x  2x10       Long arc quad  10x  3x10        Heel raise 10x  3x10        Seated HS stretch stool  :30x  :30x3        Seated knee flexion stretch 10x  Supine SOS :10x10        SAQ  2x10                          Ther Activity

## 2023-08-07 ENCOUNTER — OFFICE VISIT (OUTPATIENT)
Dept: PHYSICAL THERAPY | Facility: CLINIC | Age: 73
End: 2023-08-07
Payer: COMMERCIAL

## 2023-08-07 DIAGNOSIS — M17.11 PRIMARY OSTEOARTHRITIS OF RIGHT KNEE: Primary | ICD-10-CM

## 2023-08-07 PROCEDURE — 97140 MANUAL THERAPY 1/> REGIONS: CPT

## 2023-08-07 PROCEDURE — 97110 THERAPEUTIC EXERCISES: CPT

## 2023-08-07 NOTE — PROGRESS NOTES
Daily Note     Today's date: 2023  Patient name: Rosalee Malone  : 1950  MRN: [id number]  Referring provider: Sidra Goltz  Dx:   Encounter Diagnosis     ICD-10-CM    1. Primary osteoarthritis of right knee  M17.11           Start Time: 1445  Stop Time: 1530  Total time in clinic (min): 45 minutes    Subjective: Rubin Harmon reports he had staples removed today, slight increase in soreness. Notes pain is managable at home, has decreased use of prescribed pain medication. Objective: See treatment diary below      Assessment: Dalton tolerated PT treatment well. Initiated session with RB fernando for knee ROM. STM focusing along R quadriceps to improve tissue mobility. Passive knee stretching performed, ROM progressing appropriately. Quadricepts strength improving each session. Initiated closed chain strengthening NV. Pt would benefit from continued PT to further address impairments and maximize functional level. Plan: Continue per plan of care.       Precautions:   Past Medical History:   Diagnosis Date   • Hyperplastic colon polyp     last assessed 14   • Osteoarthritis of both knees 2021   • Sleep apnea     last assessed 13       Manuals       STM HS, adductor, ITB, quad  JW JW      Pat mobs          PROM stretch  JW JW               Neuro Re-Ed                                                                      Ther Ex       SLR 10x  2x10 2x10       Long arc quad  10x  3x10  3x10       Heel raise 10x  3x10  3x10       Seated HS stretch stool  :30x  :30x3  :30x3       Seated knee flexion stretch 10x  Supine SOS :10x10        SAQ  2x10  2x10       Quad set    :05x20                Ther Activity       RB    Fernando 3'

## 2023-08-09 ENCOUNTER — OFFICE VISIT (OUTPATIENT)
Dept: PHYSICAL THERAPY | Facility: CLINIC | Age: 73
End: 2023-08-09
Payer: COMMERCIAL

## 2023-08-09 DIAGNOSIS — M17.11 PRIMARY OSTEOARTHRITIS OF RIGHT KNEE: Primary | ICD-10-CM

## 2023-08-09 PROCEDURE — 97140 MANUAL THERAPY 1/> REGIONS: CPT

## 2023-08-09 PROCEDURE — 97530 THERAPEUTIC ACTIVITIES: CPT

## 2023-08-09 PROCEDURE — 97110 THERAPEUTIC EXERCISES: CPT

## 2023-08-09 NOTE — PROGRESS NOTES
Daily Note     Today's date: 2023  Patient name: Chino Moeller  : 1950  MRN: [id number]  Referring provider: Brittany Pemberton  Dx:   Encounter Diagnosis     ICD-10-CM    1. Primary osteoarthritis of right knee  M17.11                      Subjective: Jordy Louise reports increased R knee soreness following last PT session. Pt has been controlling pain at home with CP and OTC medication. Objective: See treatment diary below      Assessment: Dalton tolerated PT treatment well. Initiated session with RB rocking for knee ROM. Continued with STM to R quadriceps to address tissue tone, TTP throughout. Knee ROM progressing appropriately at this time. Advanced repetitions with open chain strengthening. Added STS to further challenge functional strength. Pt would benefit from continued PT to further address impairments and maximize functional level. Plan: Continue per plan of care.       Precautions:   Past Medical History:   Diagnosis Date   • Hyperplastic colon polyp     last assessed 14   • Osteoarthritis of both knees 2021   • Sleep apnea     last assessed 13       Manuals      STM HS, adductor, ITB, quad  JW JW JW     Pat mobs          PROM stretch  JW JW JW              Neuro Re-Ed                                                                     Ther Ex      SLR 10x  2x10 2x10  3x10     Long arc quad  10x  3x10  3x10  3x10     Heel raise 10x  3x10  3x10  3x10     Seated HS stretch stool  :30x  :30x3  :30x3  :30x3      Seated knee flexion stretch 10x  Supine SOS :10x10        SAQ  2x10  2x10  3x10     Quad set    :05x20  :05x10               Ther Activity      RB    Rocking 3' Rocking 5'      STS    1 foam x10

## 2023-08-11 ENCOUNTER — OFFICE VISIT (OUTPATIENT)
Dept: PHYSICAL THERAPY | Facility: CLINIC | Age: 73
End: 2023-08-11
Payer: COMMERCIAL

## 2023-08-11 DIAGNOSIS — M17.11 PRIMARY OSTEOARTHRITIS OF RIGHT KNEE: Primary | ICD-10-CM

## 2023-08-11 PROCEDURE — 97140 MANUAL THERAPY 1/> REGIONS: CPT | Performed by: PHYSICAL THERAPIST

## 2023-08-11 PROCEDURE — 97110 THERAPEUTIC EXERCISES: CPT | Performed by: PHYSICAL THERAPIST

## 2023-08-11 PROCEDURE — 97530 THERAPEUTIC ACTIVITIES: CPT | Performed by: PHYSICAL THERAPIST

## 2023-08-11 NOTE — PROGRESS NOTES
Daily Note     Today's date: 2023  Patient name: Yogi Lamb  : 1950  MRN: [id number]  Referring provider: Bekah Gonzalez  Dx:   Encounter Diagnosis     ICD-10-CM    1. Primary osteoarthritis of right knee  M17.11              Subjective: Pt reported that "I am getting better for sure."     Objective: See treatment diary below    Assessment: Tolerated treatment well. Patient demonstrated fatigue post treatment, exhibited good technique with therapeutic exercises and would benefit from continued PT. Plan: Continue per plan of care.       Precautions:   Past Medical History:   Diagnosis Date   • Hyperplastic colon polyp     last assessed 14   • Osteoarthritis of both knees 2021   • Sleep apnea     last assessed 13       Manuals     STM HS, adductor, ITB, quad  JW JW JW JZ    Pat mobs      JZ    PROM stretch  [facility]              Neuro Re-Ed                                                                    Ther Ex     SLR 10x  2x10 2x10  3x10     Long arc quad  10x  3x10  3x10  3x10 MRE 2x10              Heel raise 10x  3x10  3x10  3x10     Seated HS stretch stool  :30x  :30x3  :30x3  :30x3  :30x2    Seated knee flexion stretch 10x  Supine SOS :10x10        SAQ  2x10  2x10  3x10 MRE 2x10    Quad set    :05x20  :05x10      Calf stretch step     :30x3 ea                      Ther Activity     RB    Rocking 3' Rocking 5'  Rocking 3'     STS    1 foam x10      U/l LP     50/ 3x10     U/l calf press     50/ 3x10

## 2023-08-14 ENCOUNTER — OFFICE VISIT (OUTPATIENT)
Dept: PHYSICAL THERAPY | Facility: CLINIC | Age: 73
End: 2023-08-14
Payer: COMMERCIAL

## 2023-08-14 DIAGNOSIS — E11.9 TYPE 2 DIABETES MELLITUS WITHOUT COMPLICATION, WITHOUT LONG-TERM CURRENT USE OF INSULIN (HCC): ICD-10-CM

## 2023-08-14 DIAGNOSIS — M17.11 PRIMARY OSTEOARTHRITIS OF RIGHT KNEE: Primary | ICD-10-CM

## 2023-08-14 PROCEDURE — 97140 MANUAL THERAPY 1/> REGIONS: CPT

## 2023-08-14 PROCEDURE — 97530 THERAPEUTIC ACTIVITIES: CPT

## 2023-08-14 PROCEDURE — 97110 THERAPEUTIC EXERCISES: CPT

## 2023-08-14 NOTE — PROGRESS NOTES
Daily Note     Today's date: 2023  Patient name: Vee Miranda  : 1950  MRN: [id number]  Referring provider: Timothy Yates  Dx:   Encounter Diagnosis     ICD-10-CM    1. Primary osteoarthritis of right knee  M17.11              Subjective: Pt reports he was able to wear a regular shoe today on his right foot today. Objective: See treatment diary below    Assessment: Tolerated treatment well. Patient demonstrated fatigue post treatment, exhibited good technique with therapeutic exercises and would benefit from continued PT. Patient able to complete a full revolution on bike today. Pain is well managed. Progressing with strengthening. Plan: Continue per plan of care.       Precautions:   Past Medical History:   Diagnosis Date   • Hyperplastic colon polyp     last assessed 14   • Osteoarthritis of both knees 2021   • Sleep apnea     last assessed 13       Manuals    STM HS, adductor, ITB, quad  JW  JW JZ ksg   Pat mobs      JZ ksg   PROM stretch  JW JW JW              Neuro Re-Ed                                                                    Ther Ex    SLR 10x  2x10 2x10  3x10     Long arc quad  10x  3x10  3x10  3x10 MRE 2x10  MRE 3x10            Heel raise 10x  3x10  3x10  3x10     Seated HS stretch stool  :30x  :30x3  :30x3  :30x3  :30x2 :30 x3   Seated knee flexion stretch 10x  Supine SOS :10x10        SAQ  2x10  2x10  3x10 MRE 2x10 MRE 3x10:30 x   Quad set    :05x20  :05x10      Calf stretch step     :30x3 ea :30 x3 ea                     Ther Activity    RB    Rocking 3' Rocking 5'  Rocking 3'  Rocking 5'   STS    1 foam x10      U/l LP     50/ 3x10  50/ 3x10   U/l calf press     50/ 3x10  50/3x10

## 2023-08-16 ENCOUNTER — OFFICE VISIT (OUTPATIENT)
Dept: PHYSICAL THERAPY | Facility: CLINIC | Age: 73
End: 2023-08-16
Payer: COMMERCIAL

## 2023-08-16 DIAGNOSIS — M17.11 PRIMARY OSTEOARTHRITIS OF RIGHT KNEE: Primary | ICD-10-CM

## 2023-08-16 PROCEDURE — 97110 THERAPEUTIC EXERCISES: CPT

## 2023-08-16 PROCEDURE — 97530 THERAPEUTIC ACTIVITIES: CPT

## 2023-08-16 PROCEDURE — 97140 MANUAL THERAPY 1/> REGIONS: CPT

## 2023-08-16 NOTE — PROGRESS NOTES
Daily Note     Today's date: 2023  Patient name: Joel Garcia  : 1950  MRN: [id number]  Referring provider: Briana Esteves  Dx:   Encounter Diagnosis     ICD-10-CM    1. Primary osteoarthritis of right knee  M17.11              Subjective: Ronaldo Sportsman reports he had been managing R knee pain at home. Seeing gradual improvement in ROM and strength. Objective: See treatment diary below    Assessment: Dalton tolerated PT treatment well. Passive knee stretching performed, ROM progressing approprietly at this time. STM focusing along quadriceps to address tissue tone. Advanced weight with open chain strengthening. Continue to challenge functional strengthening. Pt would benefit from continued PT to further address impairments and maximize functional level. Plan: Continue per plan of care.       Precautions:   Past Medical History:   Diagnosis Date   • Hyperplastic colon polyp     last assessed 14   • Osteoarthritis of both knees 2021   • Sleep apnea     last assessed 13       Manuals    STM HS, adductor, ITB, quad  JW JW JW JZ ksg JW   Pat mobs      JZ ksg JW   PROM stretch  JW JW JW                Neuro Re-Ed                                                                          Ther Ex    SLR 10x  2x10 2x10  3x10      Long arc quad  10x  3x10  3x10  3x10 MRE 2x10  MRE 3x10 10# 3x10             Heel raise 10x  3x10  3x10  3x10      Seated HS stretch stool  :30x  :30x3  :30x3  :30x3  :30x2 :30 x3    Seated knee flexion stretch 10x  Supine SOS :10x10         SAQ  2x10  2x10  3x10 MRE 2x10 MRE 3x10 10# 3x10    Quad set    :05x20  :05x10       Calf stretch step     :30x3 ea :30 x3 ea                        Ther Activity    RB    Rocking 3' Rocking 5'  Rocking 3'  Rocking 5' Rocking then full rev 5'    STS    1 foam x10       U/l LP     50/ 3x10  50/ 3x10 50/ 3x10   U/l calf press     50/ 3x10  50/3x10 40/ 3x10

## 2023-08-18 ENCOUNTER — OFFICE VISIT (OUTPATIENT)
Dept: PHYSICAL THERAPY | Facility: CLINIC | Age: 73
End: 2023-08-18
Payer: COMMERCIAL

## 2023-08-18 DIAGNOSIS — M17.11 PRIMARY OSTEOARTHRITIS OF RIGHT KNEE: Primary | ICD-10-CM

## 2023-08-18 PROCEDURE — 97110 THERAPEUTIC EXERCISES: CPT

## 2023-08-18 PROCEDURE — 97530 THERAPEUTIC ACTIVITIES: CPT

## 2023-08-18 NOTE — PROGRESS NOTES
Daily Note     Today's date: 2023  Patient name: Kristy See  : 1950  MRN: [id number]  Referring provider: Flora Lanza  Dx:   Encounter Diagnosis     ICD-10-CM    1. Primary osteoarthritis of right knee  M17.11              Subjective: Lenin Arechiga reports continued improvement in R knee ROM, strength, and overall mobility. Notes soreness following last PT session, managing pain at home. Objective: See treatment diary below    Assessment: Dalton tolerated PT treatment well. Continued with passive stretching, knee ROM progressing well at this time. Challenged maintaining eccentric lowering with STS from chair, worsening with fatigue. Advanced weight with u/l leg press. Continue to address quad and glute strength. Pt would benefit from continued PT to further address impairments and maximize functional level. Plan: Continue per plan of care.       Precautions:   Past Medical History:   Diagnosis Date   • Hyperplastic colon polyp     last assessed 14   • Osteoarthritis of both knees 2021   • Sleep apnea     last assessed 13       Manuals    STM HS, adductor, ITB, quad JW  JW JW JZ ksg JW   Pat mobs      JZ ksg    PROM stretch [address]             Neuro Re-Ed                                                                          Ther Ex    SLR 3x10  2x10  3x10      Long arc quad  10# 3x10  3x10  3x10 MRE 2x10  MRE 3x10 10# 3x10             Heel raise   3x10  3x10      Seated HS stretch stool  :30x3   :30x3  :30x3  :30x2 :30 x3    Seated knee flexion stretch          SAQ 10# 3x10   2x10  3x10 MRE 2x10 MRE 3x10 10# 3x10    Quad set    :05x20  :05x10       Calf stretch step     :30x3 ea :30 x3 ea                        Ther Activity    RB  Full revolution 5'  Rocking 3' Rocking 5'  Rocking 3'  Rocking 5' Rocking then full rev 5'    STS 1 foam 3x10   1 foam x10       U/l LP 60/ 3x10    50/ 3x10  50/ 3x10 50/ 3x10   U/l calf press 40/ 3x10     50/ 3x10  50/3x10 40/ 3x10

## 2023-08-21 ENCOUNTER — OFFICE VISIT (OUTPATIENT)
Dept: PHYSICAL THERAPY | Facility: CLINIC | Age: 73
End: 2023-08-21
Payer: COMMERCIAL

## 2023-08-21 DIAGNOSIS — M17.11 PRIMARY OSTEOARTHRITIS OF RIGHT KNEE: Primary | ICD-10-CM

## 2023-08-21 PROCEDURE — 97530 THERAPEUTIC ACTIVITIES: CPT | Performed by: PHYSICAL THERAPIST

## 2023-08-21 PROCEDURE — 97110 THERAPEUTIC EXERCISES: CPT | Performed by: PHYSICAL THERAPIST

## 2023-08-21 PROCEDURE — 97140 MANUAL THERAPY 1/> REGIONS: CPT | Performed by: PHYSICAL THERAPIST

## 2023-08-21 NOTE — PROGRESS NOTES
Daily Note     Today's date: 2023  Patient name: Andrzej Adame  : 1950  MRN: [id number]  Referring provider: Andres Prasad  Dx:   Encounter Diagnosis     ICD-10-CM    1. Primary osteoarthritis of right knee  M17.11              Subjective: Pt reported that he is still having difficulty sleeping due to the pain, however noted that his strength is improving significantly. Objective: See treatment diary below    Assessment: Tolerated treatment well. Patient demonstrated fatigue post treatment, exhibited good technique with therapeutic exercises and would benefit from continued PT. Plan: Continue per plan of care.       Precautions:   Past Medical History:   Diagnosis Date   • Hyperplastic colon polyp     last assessed 14   • Osteoarthritis of both knees 2021   • Sleep apnea     last assessed 13       Manuals    STM HS, adductor, ITB, quad JW    JW   Pat mobs         PROM stretch JW JZ   JW           Neuro Re-Ed                                                            Ther Ex    SLR 3x10       Long arc quad  10# 3x10 MRE 3x10   10# 3x10   bridge  3x10       Heel raise        Seated HS stretch stool  :30x3        Seated knee flexion stretch        SAQ 10# 3x10  MRE 3x10    10# 3x10    Quad set         Calf stretch step  :30x3 ea      Step ups  4" 2x10 ea               Ther Activity    RB  Full revolution 5' Full revolution 5'   Rocking then full rev 5'    STS 1 foam 3x10       U/l LP 60/ 3x10 seat6   70/ 3x10 ea   50/ 3x10   U/l calf press 40/ 3x10  40/ 3x10 ea   40/ 3x10    Lateral step down

## 2023-08-24 ENCOUNTER — OFFICE VISIT (OUTPATIENT)
Dept: PHYSICAL THERAPY | Facility: CLINIC | Age: 73
End: 2023-08-24
Payer: COMMERCIAL

## 2023-08-24 DIAGNOSIS — M17.11 PRIMARY OSTEOARTHRITIS OF RIGHT KNEE: Primary | ICD-10-CM

## 2023-08-24 PROCEDURE — 97110 THERAPEUTIC EXERCISES: CPT

## 2023-08-24 PROCEDURE — 97140 MANUAL THERAPY 1/> REGIONS: CPT

## 2023-08-24 PROCEDURE — 97530 THERAPEUTIC ACTIVITIES: CPT

## 2023-08-24 NOTE — PROGRESS NOTES
Daily Note     Today's date: 2023  Patient name: Filomena Carrasco  : 1950  MRN: [id number]  Referring provider: Cate Mi  Dx:   Encounter Diagnosis     ICD-10-CM    1. Primary osteoarthritis of right knee  M17.11           Start Time: 945  Stop Time: 1030  Total time in clinic (min): 45 minutes    Subjective: Patient states he feels good prior to start of session. Objective: See treatment diary below      Assessment: Session initiated on bike for ROM and LE strength. Mod swelling in R knee but no redness or increased pain. Good tolerance to manuals stretching Tolerated treatment well. Patient demonstrated fatigue post treatment and would benefit from continued PT      Plan: Continue per plan of care.       Precautions:   Past Medical History:   Diagnosis Date   • Hyperplastic colon polyp     last assessed 14   • Osteoarthritis of both knees 2021   • Sleep apnea     last assessed 13       Manuals    STM HS, adductor, ITB, quad JW    JW   Pat mobs         PROM stretch JW JZ LQ  JW           Neuro Re-Ed                                                            Ther Ex    SLR 3x10       Long arc quad  10# 3x10 MRE 3x10 10# 3x10  10# 3x10   bridge  3x10       Heel raise        Seated HS stretch stool  :30x3        Seated knee flexion stretch        SAQ 10# 3x10  MRE 3x10  10x10" Pre- swelling  10# 3x10  10# 3x10    Quad set         Calf stretch step  :30x3 ea :30x4 ea     Step ups  4" 2x10 ea  4" 3x10 ea              Ther Activity    RB  Full revolution 5' Full revolution 5' Full revolution 5'  Rocking then full rev 5'    STS 1 foam 3x10       U/l LP 60/ 3x10 seat6   70/ 3x10 ea seat6   70/ 3x10 ea  50/ 3x10   U/l calf press 40/ 3x10  40/ 3x10 ea 40/ 3x10 ea  40/ 3x10    Lateral step down

## 2023-08-28 ENCOUNTER — EVALUATION (OUTPATIENT)
Dept: PHYSICAL THERAPY | Facility: CLINIC | Age: 73
End: 2023-08-28
Payer: COMMERCIAL

## 2023-08-28 DIAGNOSIS — M17.11 PRIMARY OSTEOARTHRITIS OF RIGHT KNEE: Primary | ICD-10-CM

## 2023-08-28 PROCEDURE — 97110 THERAPEUTIC EXERCISES: CPT | Performed by: PHYSICAL THERAPIST

## 2023-08-28 PROCEDURE — 97530 THERAPEUTIC ACTIVITIES: CPT | Performed by: PHYSICAL THERAPIST

## 2023-08-28 PROCEDURE — 97140 MANUAL THERAPY 1/> REGIONS: CPT | Performed by: PHYSICAL THERAPIST

## 2023-08-28 NOTE — PROGRESS NOTES
PT Re-Evaluation     Today's date: 2023  Patient name: Hany Werner  : 1950  MRN: [id number]  Referring provider: Pauline Gonzales DO  Dx:   Encounter Diagnosis     ICD-10-CM    1. Primary osteoarthritis of right knee  M17.11           Assessment  Assessment details:   Since beginning PT Tammy Soto reports GROC improvement of 20%. MMT demonstratd improved strength. Goniometry demonstrated improved flexibility. Balance assessment demonstrated improvement. Functional outcome measures and subjective reports demonstrated improved functional ability. Despite improvements Tammy Soto continues to present with pain, decreased strength, decreased ROM, decreased joint mobility, joint effusion, ambulatory dysfunction and balance dysfunction. Due to these impairments, patient has difficulty performing ADL's, recreational activities, engaging in social activities, ambulation, stair negotiation, lifting/carrying, transfers. Patient's clinical presentation is consistent with their referring diagnosis of S/p R/L TKA performed on 24. Patient has been educated in post-op contraindications / precautions, gait training, weight bearing status, home exercise program and plan of care. Patient would benefit from skilled physical therapy services to address their aforementioned functional limitations and progress towards prior level of function and independence with home exercise program.     Impairments: abnormal gait, abnormal or restricted ROM, activity intolerance, impaired balance, impaired physical strength, lacks appropriate home exercise program, pain with function, weight-bearing intolerance, poor posture  and poor body mechanics  Functional limitations: walk/stand, STS, stair negotiation, yard work, walk uneven surfaces   Prognosis: good  Prognosis details: + factors: high motivation levels, active lifestyle  - factors: chronic pain, BMI    Goals  Short Term Goals to be accomplished by 23:  STG1: Pt will be I with HEP. Achieved. STG2: Pt will demo 0-100 degrees in knee AROM to improve gait. Achieved. STG3: Pt will demo 4/5 MMT strength in knee to improve stair negotiation. Achieved. STG4: Pt will amb community distance without gait deviates due to pain. (Progressing toward). Long Term Goals to be accomplished by 10/23/23:   LTG1: Pt will be able to descend stairs with reciprocal gait pattern without use of handrail. LTG2: Pt will be able to walk on uneven surfaces on property to do yard work. LTG3: Pt will be able to perform STS with normal mechanics without pain. Plan  Plan details: HEP development, stretching, strengthening, A/AA/PROM, joint mobilizations, posture education, STM/MI as needed to reduce muscle tension, muscle reeducation, PLOC discussed and agreed upon with patient. Patient would benefit from: PT eval and skilled physical therapy  Planned modality interventions: cryotherapy, thermotherapy: hydrocollator packs and unattended electrical stimulation  Planned therapy interventions: manual therapy, neuromuscular re-education, self care, therapeutic activities, therapeutic exercise, home exercise program, patient education, joint mobilization, balance, strengthening, stretching, therapeutic training and flexibility  Frequency: 2x week    Plan of Care beginning date: 7/31/2023  Plan of Care expiration date: 10/23/2023  Treatment plan discussed with: patient      Subjective Evaluation    History of Present Illness  Mechanism of injury: Pt is a 66 y/o male who presents to PT s/p R TKA performed on 7/24/23. Had a surgery on March 1st to have hardware removed from his knee from previous ORIF. Then he had the TKA on 7/24/23 Stated that the day of the surgery there was complications with bleeding and the surgeon had to add additional staples. Had home care PT for some visits which improved his symptoms. Since then he has been improving. Notes that he has been walking around his home without AD. Improved symptoms from ice and elevation. Patient Goals  Patient goals for therapy: increased motion, improved balance, decreased pain, decreased edema, increased strength and independence with ADLs/IADLs  Patient goal: walk/stand, STS, stair negotiation, yard work, walk uneven surfaces  Pain  Current pain ratin  At best pain ratin  At worst pain ratin  Location: R TKA   Quality: tight, discomfort and pulling  Aggravating factors: stair climbing, walking, standing and lifting    Treatments  Current treatment: physical therapy      Objective     Observations     Additional Observation Details  Incision: covered with bandage still     Tenderness     Additional Tenderness Details  TTP over R ITB, calf, adductors, HS    Active Range of Motion     Right Knee   Flexion: 90 degrees with pain  Extension: 5 degrees with pain    Passive Range of Motion     Right Knee   Flexion: 100 degrees with pain  Extension: 0 degrees with pain    Strength/Myotome Testing     Right Knee   Flexion: 4  Extension: 4    Additional Strength Details  U/l heel raise: NOT TESTED  R: reps  L: reps     Extension La degree       Tests     Right Knee   Negative valgus stress test at 30 degrees and varus stress test at 30 degrees.      Additional Tests Details  TUG: sec NOT TESTED    30 sec STS: reps NOT TESTED    SLS: NOT TESTED   R: 5, 6, 7 sec  L: 30, 30 sec       Precautions:   Past Medical History:   Diagnosis Date   • Hyperplastic colon polyp     last assessed 14   • Osteoarthritis of both knees 2021   • Sleep apnea     last assessed 13       Manuals     STM HS, adductor, ITB, quad JW   JZ    Pat mobs         PROM stretch JW JDIONICIO LQ JZ            Neuro Re-Ed     SLS        Tandem                                                 Ther Ex     SLR 3x10       Long arc quad  10# 3x10 MRE 3x10 10# 3x10 MRE 2x10     bridge  3x10       Heel raise        Seated HS stretch stool  :30x3    :30x3     Seated knee flexion stretch        SAQ 10# 3x10  MRE 3x10  10x10" Pre- swelling  10# 3x10 MRE 2x10     Quad set         Calf stretch step  :30x3 ea :30x4 ea :30x3     Step ups  4" 2x10 ea  4" 3x10 ea              Ther Activity 8/18 8/21 8/24 8/28    RB  Full revolution 5' Full revolution 5' Full revolution 5' Full revolution 5'    STS 1 foam 3x10       U/l LP 60/ 3x10 seat6   70/ 3x10 ea seat6   70/ 3x10 ea seat8 (due to pain)   70/ 3x10    U/l calf press 40/ 3x10  40/ 3x10 ea 40/ 3x10 ea 40/ 3x10     Lateral step down

## 2023-08-31 ENCOUNTER — OFFICE VISIT (OUTPATIENT)
Dept: PHYSICAL THERAPY | Facility: CLINIC | Age: 73
End: 2023-08-31
Payer: COMMERCIAL

## 2023-08-31 DIAGNOSIS — M17.11 PRIMARY OSTEOARTHRITIS OF RIGHT KNEE: Primary | ICD-10-CM

## 2023-08-31 PROCEDURE — 97530 THERAPEUTIC ACTIVITIES: CPT

## 2023-08-31 PROCEDURE — 97140 MANUAL THERAPY 1/> REGIONS: CPT

## 2023-08-31 PROCEDURE — 97110 THERAPEUTIC EXERCISES: CPT

## 2023-08-31 NOTE — PROGRESS NOTES
Daily Note     Today's date: 2023  Patient name: Andrzej Adame  : 1950  MRN: [id number]  Referring provider: Andres Prasad  Dx:   Encounter Diagnosis     ICD-10-CM    1. Primary osteoarthritis of right knee  M17.11                      Subjective: Emerita Daniels reports R knee swelling has improved since long drive to [address] last weekend. Pt states he is complaint with HEP daily. Objective: See treatment diary below      Assessment: Dalton tolerated PT treatment well. STM focusing along R distal quad to address tissue tone and edema. Passive knee stretching performed, ROM progressing appropriately. Greatest limitation remaining into flexion secondary to pain. Continued with open and closed chain strengthening to address quad strength. Pt displays good technique throughout therapeutic exercises. Pt would benefit from continued PT to further address impairments and maximize functional level. Plan: Continue per plan of care.             Precautions:   Past Medical History:   Diagnosis Date   • Hyperplastic colon polyp     last assessed 14   • Osteoarthritis of both knees 2021   • Sleep apnea     last assessed 13       Manuals    STM HS, adductor, ITB, quad JW   JZ JW   Pat mobs         PROM stretch JW JZ LQ JZ JW           Neuro Re-Ed    SLS        Tandem                                                 Ther Ex    SLR 3x10    3x10    Long arc quad  10# 3x10 MRE 3x10 10# 3x10 MRE 2x10  10# 3x10    bridge  3x10       Heel raise        Seated HS stretch stool  :30x3    :30x3  Manual    Seated knee flexion stretch        SAQ 10# 3x10  MRE 3x10  10x10" Pre- swelling  10# 3x10 MRE 2x10  10# 3x10    Quad set         Calf stretch step  :30x3 ea :30x4 ea :30x3     Step ups  4" 2x10 ea  4" 3x10 ea              Ther Activity     RB  Full revolution 5' Full revolution 5' Full revolution 5' Full revolution 5' Full revolution 5'   STS 1 foam 3x10       U/l LP 60/ 3x10 seat6   70/ 3x10 ea seat6   70/ 3x10 ea seat8 (due to pain)   70/ 3x10 seat6   70/ 3x10 ea   U/l calf press 40/ 3x10  40/ 3x10 ea 40/ 3x10 ea 40/ 3x10  40/ 3x10    Lateral step down

## 2023-09-05 ENCOUNTER — OFFICE VISIT (OUTPATIENT)
Dept: PHYSICAL THERAPY | Facility: CLINIC | Age: 73
End: 2023-09-05
Payer: COMMERCIAL

## 2023-09-05 DIAGNOSIS — M17.11 PRIMARY OSTEOARTHRITIS OF RIGHT KNEE: Primary | ICD-10-CM

## 2023-09-05 PROCEDURE — 97110 THERAPEUTIC EXERCISES: CPT

## 2023-09-05 PROCEDURE — 97530 THERAPEUTIC ACTIVITIES: CPT

## 2023-09-05 PROCEDURE — 97140 MANUAL THERAPY 1/> REGIONS: CPT

## 2023-09-05 NOTE — PROGRESS NOTES
Daily Note     Today's date: 2023  Patient name: Miguelito Hamilton  : 1950  MRN: [id number]  Referring provider: Barrera Arias  Dx:   Encounter Diagnosis     ICD-10-CM    1. Primary osteoarthritis of right knee  M17.11                      Subjective: Patient states that he has been doing a lot of walking since last visit. He notes some swelling, but states that overall he is doing well. Objective: See treatment diary below      Assessment: Session initiated on bike for ROM and LE strengthening. Patient demonstrates steady gains in ROM. Tolerated treatment well. Patient demonstrated fatigue post treatment and would benefit from continued PT for R knee strength and stability. Plan: Continue per plan of care.       Precautions:   Past Medical History:   Diagnosis Date   • Hyperplastic colon polyp     last assessed 14   • Osteoarthritis of both knees 2021   • Sleep apnea     last assessed 13       Manuals    STM HS, adductor, ITB, quad    JZ JW   Pat mobs         PROM stretch LQ  LQ JZ JW           Neuro Re-Ed      SLS        Tandem                                                 Ther Ex    SLR 3x10    3x10    Long arc quad  10# 3x10   10# 3x10 MRE 2x10  10# 3x10    bridge        Heel raise        Seated HS stretch stool  :30x3    :30x3  Manual    Seated knee flexion stretch        SAQ 10# 3x10   10x10" Pre- swelling  10# 3x10 MRE 2x10  10# 3x10    Quad set         Calf stretch step   :30x4 ea :30x3     Step ups   4" 3x10 ea              Ther Activity       RB  Full revolution 5'  Full revolution 5' Full revolution 5' Full revolution 5'   STS        U/l LP seat6   70/ 3x10 ea  seat6   70/ 3x10 ea seat8 (due to pain)   70/ 3x10 seat6   70/ 3x10 ea   U/l calf press   40/ 3x10 ea 40/ 3x10  40/ 3x10    Lateral step down                        Cold pack

## 2023-09-07 ENCOUNTER — OFFICE VISIT (OUTPATIENT)
Dept: PHYSICAL THERAPY | Facility: CLINIC | Age: 73
End: 2023-09-07
Payer: COMMERCIAL

## 2023-09-07 DIAGNOSIS — M17.11 PRIMARY OSTEOARTHRITIS OF RIGHT KNEE: Primary | ICD-10-CM

## 2023-09-07 PROCEDURE — 97110 THERAPEUTIC EXERCISES: CPT

## 2023-09-07 PROCEDURE — 97530 THERAPEUTIC ACTIVITIES: CPT

## 2023-09-07 NOTE — PROGRESS NOTES
Daily Note     Today's date: 2023  Patient name: Lupe Lopes  : 1950  MRN: [id number]  Referring provider: Ginny Candelaria  Dx:   Encounter Diagnosis     ICD-10-CM    1. Primary osteoarthritis of right knee  M17.11                      Subjective: Fany Stinson reports overall R knee progressing each week. Notes he is still have some pain/stiffness pending activity, but overall doing well. Objective: See treatment diary below      Assessment: Dalton tolerated PT treatment well. Passive B/L knee stretching performed, ROM progressing appropriately at this time. Good quad strength throughout open chain strength. Added STS from chair, completed without foam A. Completed 8" step up with some apprehension, improving with trial. Pt would benefit from continued PT to further address impairments and maximize functional level. Plan: Continue per plan of care.       Precautions:   Past Medical History:   Diagnosis Date   • Hyperplastic colon polyp     last assessed 14   • Osteoarthritis of both knees 2021   • Sleep apnea     last assessed 13       Manuals    STM HS, adductor, ITB, quad    JZ JW   Pat mobs         PROM stretch LQ JW  JZ JW           Neuro Re-Ed       SLS        Tandem                                                 Ther Ex    SLR 3x10    3x10    Long arc quad  10# 3x10  10# 3x10  MRE 2x10  10# 3x10    bridge        Heel raise        Seated HS stretch stool  :30x3  :30x3  :30x3  Manual    Seated knee flexion stretch        SAQ 10# 3x10  10# 3x10  MRE 2x10  10# 3x10    Quad set         Calf stretch step    :30x3     Step ups  8" 2x10 1 hha               Ther Activity        RB  Full revolution 5' Full revolution 5'  Full revolution 5' Full revolution 5'   STS  1 foam x10   0 foam x10       U/l LP seat6   70/ 3x10 ea seat6   70/ 3x10 ea  seat8 (due to pain)   70/ 3x10 seat6   70/ 3x10 ea   U/l calf press    40/ 3x10  40/ 3x10    Lateral step down                        Cold pack

## 2023-09-11 ENCOUNTER — OFFICE VISIT (OUTPATIENT)
Dept: PHYSICAL THERAPY | Facility: CLINIC | Age: 73
End: 2023-09-11
Payer: COMMERCIAL

## 2023-09-11 DIAGNOSIS — M17.11 PRIMARY OSTEOARTHRITIS OF RIGHT KNEE: Primary | ICD-10-CM

## 2023-09-11 PROCEDURE — 97110 THERAPEUTIC EXERCISES: CPT | Performed by: PHYSICAL THERAPIST

## 2023-09-11 PROCEDURE — 97530 THERAPEUTIC ACTIVITIES: CPT | Performed by: PHYSICAL THERAPIST

## 2023-09-11 PROCEDURE — 97112 NEUROMUSCULAR REEDUCATION: CPT | Performed by: PHYSICAL THERAPIST

## 2023-09-11 NOTE — PROGRESS NOTES
Daily Note     Today's date: 2023  Patient name: Lupe Lopes  : 1950  MRN: [id number]  Referring provider: Ginny Candelaria  Dx:   Encounter Diagnosis     ICD-10-CM    1. Primary osteoarthritis of right knee  M17.11                Subjective: Pt reported that he is improving with his strength as sessions continue. Objective: See treatment diary below    Assessment: Tolerated treatment well. Patient demonstrated fatigue post treatment, exhibited good technique with therapeutic exercises and would benefit from continued PT    Plan: Continue per plan of care.       Precautions:   Past Medical History:   Diagnosis Date   • Hyperplastic colon polyp     last assessed 14   • Osteoarthritis of both knees 2021   • Sleep apnea     last assessed 13       Manuals    STM HS, adductor, ITB, quad     JW   Pat mobs         PROM stretch LQ JW   JW           Neuro Re-Ed      SLS   :30x3 ea     Tandem    Green :30x2 ea     2 Cone taps    2x10 ea                                     Ther Ex    SLR 3x10    3x10    Long arc quad  10# 3x10  10# 3x10 MRE 3x10   10# 3x10    bridge        Heel raise        Seated HS stretch stool  :30x3  :30x3 :30x3   Manual    Seated knee flexion stretch        SAQ 10# 3x10  10# 3x10 MRE 3x10   10# 3x10    Quad set         Calf stretch step        Step ups  8" 2x10 1 hha                       Ther Activity        RB  Full revolution 5' Full revolution 5' 5'   Full revolution 5'   STS  1 foam x10   0 foam x10       U/l LP seat6   70/ 3x10 ea seat6   70/ 3x10 ea seat6  90/ 3x10 ea seat6  110/  seat6   70/ 3x10 ea   U/l calf press   50/ 3x10   40/ 3x10    Lateral step down   4" 3x10                     Cold pack

## 2023-09-12 ENCOUNTER — TELEPHONE (OUTPATIENT)
Dept: FAMILY MEDICINE CLINIC | Facility: CLINIC | Age: 73
End: 2023-09-12

## 2023-09-12 LAB
ALBUMIN SERPL-MCNC: 4.6 G/DL (ref 3.6–5.1)
ALBUMIN/GLOB SERPL: 1.8 (CALC) (ref 1–2.5)
ALP SERPL-CCNC: 93 U/L (ref 35–144)
ALT SERPL-CCNC: 20 U/L (ref 9–46)
AST SERPL-CCNC: 17 U/L (ref 10–35)
BASOPHILS # BLD AUTO: 90 CELLS/UL (ref 0–200)
BASOPHILS NFR BLD AUTO: 1.5 %
BILIRUB SERPL-MCNC: 0.6 MG/DL (ref 0.2–1.2)
BUN SERPL-MCNC: 17 MG/DL (ref 7–25)
BUN/CREAT SERPL: ABNORMAL (CALC) (ref 6–22)
CALCIUM SERPL-MCNC: 10.2 MG/DL (ref 8.6–10.3)
CHLORIDE SERPL-SCNC: 101 MMOL/L (ref 98–110)
CHOLEST SERPL-MCNC: 156 MG/DL
CHOLEST/HDLC SERPL: 3.5 (CALC)
CO2 SERPL-SCNC: 31 MMOL/L (ref 20–32)
CREAT SERPL-MCNC: 1.06 MG/DL (ref 0.7–1.28)
EOSINOPHIL # BLD AUTO: 180 CELLS/UL (ref 15–500)
EOSINOPHIL NFR BLD AUTO: 3 %
ERYTHROCYTE [DISTWIDTH] IN BLOOD BY AUTOMATED COUNT: 12.7 % (ref 11–15)
GFR/BSA.PRED SERPLBLD CYS-BASED-ARV: 75 ML/MIN/1.73M2
GLOBULIN SER CALC-MCNC: 2.5 G/DL (CALC) (ref 1.9–3.7)
GLUCOSE SERPL-MCNC: 127 MG/DL (ref 65–99)
HCT VFR BLD AUTO: 44.8 % (ref 38.5–50)
HDLC SERPL-MCNC: 44 MG/DL
HGB BLD-MCNC: 15.2 G/DL (ref 13.2–17.1)
LDLC SERPL CALC-MCNC: 88 MG/DL (CALC)
LYMPHOCYTES # BLD AUTO: 1848 CELLS/UL (ref 850–3900)
LYMPHOCYTES NFR BLD AUTO: 30.8 %
MCH RBC QN AUTO: 32 PG (ref 27–33)
MCHC RBC AUTO-ENTMCNC: 33.9 G/DL (ref 32–36)
MCV RBC AUTO: 94.3 FL (ref 80–100)
MONOCYTES # BLD AUTO: 402 CELLS/UL (ref 200–950)
MONOCYTES NFR BLD AUTO: 6.7 %
NEUTROPHILS # BLD AUTO: 3480 CELLS/UL (ref 1500–7800)
NEUTROPHILS NFR BLD AUTO: 58 %
NONHDLC SERPL-MCNC: 112 MG/DL (CALC)
PLATELET # BLD AUTO: 182 THOUSAND/UL (ref 140–400)
PMV BLD REES-ECKER: 13.4 FL (ref 7.5–12.5)
POTASSIUM SERPL-SCNC: 5.4 MMOL/L (ref 3.5–5.3)
PROT SERPL-MCNC: 7.1 G/DL (ref 6.1–8.1)
PSA FREE MFR SERPL: 15 % (CALC)
PSA FREE SERPL-MCNC: 0.4 NG/ML
PSA SERPL-MCNC: 2.7 NG/ML
RBC # BLD AUTO: 4.75 MILLION/UL (ref 4.2–5.8)
SODIUM SERPL-SCNC: 139 MMOL/L (ref 135–146)
TRIGL SERPL-MCNC: 137 MG/DL
TSH SERPL-ACNC: 2.58 MIU/L (ref 0.4–4.5)
WBC # BLD AUTO: 6 THOUSAND/UL (ref 3.8–10.8)

## 2023-09-12 NOTE — TELEPHONE ENCOUNTER
----- Message from Sofi Manzo DO sent at 9/12/2023  1:33 PM EDT -----  Labs are stable.   Continue the current Rx

## 2023-09-13 ENCOUNTER — OFFICE VISIT (OUTPATIENT)
Dept: FAMILY MEDICINE CLINIC | Facility: CLINIC | Age: 73
End: 2023-09-13
Payer: COMMERCIAL

## 2023-09-13 VITALS
HEIGHT: 73 IN | SYSTOLIC BLOOD PRESSURE: 134 MMHG | WEIGHT: 216 LBS | TEMPERATURE: 96.3 F | DIASTOLIC BLOOD PRESSURE: 80 MMHG | HEART RATE: 54 BPM | OXYGEN SATURATION: 94 % | BODY MASS INDEX: 28.63 KG/M2

## 2023-09-13 DIAGNOSIS — E11.40 TYPE 2 DIABETES MELLITUS WITH DIABETIC NEUROPATHY, WITHOUT LONG-TERM CURRENT USE OF INSULIN (HCC): ICD-10-CM

## 2023-09-13 DIAGNOSIS — M17.0 PRIMARY OSTEOARTHRITIS OF BOTH KNEES: ICD-10-CM

## 2023-09-13 DIAGNOSIS — I10 BENIGN ESSENTIAL HYPERTENSION: ICD-10-CM

## 2023-09-13 DIAGNOSIS — Z00.00 WELL ADULT EXAM: Primary | ICD-10-CM

## 2023-09-13 LAB — SL AMB POCT HEMOGLOBIN AIC: 6.8 (ref ?–6.5)

## 2023-09-13 PROCEDURE — 83036 HEMOGLOBIN GLYCOSYLATED A1C: CPT | Performed by: FAMILY MEDICINE

## 2023-09-13 PROCEDURE — 99397 PER PM REEVAL EST PAT 65+ YR: CPT | Performed by: FAMILY MEDICINE

## 2023-09-13 NOTE — PATIENT INSTRUCTIONS
Weight Management   AMBULATORY CARE:   Why it is important to manage your weight:  Being overweight increases your risk of health conditions such as heart disease, high blood pressure, type 2 diabetes, and certain types of cancer. It can also increase your risk for osteoarthritis, sleep apnea, and other respiratory problems. Aim for a slow, steady weight loss. Even a small amount of weight loss can lower your risk of health problems. Risks of being overweight:  Extra weight can cause many health problems, including the following:  • Diabetes (high blood sugar level)    • High blood pressure or high cholesterol    • Heart disease    • Stroke    • Gallbladder or liver disease    • Cancer of the colon, breast, prostate, liver, or kidney    • Sleep apnea    • Arthritis or gout    Screening  is done to check for health conditions before you have signs or symptoms. If you are 28to 79years old, your blood sugar level may be checked every 3 years for signs of prediabetes or diabetes. Your healthcare provider will check your blood pressure at each visit. High blood pressure can lead to a stroke or other problems. Your provider may check for signs of heart disease, cancer, or other health problems. How to lose weight safely:  A safe and healthy way to lose weight is to eat fewer calories and get regular exercise. • You can lose up about 1 pound a week by decreasing the number of calories you eat by 500 calories each day. You can decrease calories by eating smaller portion sizes or by cutting out high-calorie foods. Read labels to find out how many calories are in the foods you eat. • You can also burn calories with exercise such as walking, swimming, or biking. You will be more likely to keep weight off if you make these changes part of your lifestyle. Exercise at least 30 minutes per day on most days of the week.  You can also fit in more physical activity by taking the stairs instead of the elevator or parking farther away from stores. Ask your healthcare provider about the best exercise plan for you. Healthy meal plan for weight management:  A healthy meal plan includes a variety of foods, contains fewer calories, and helps you stay healthy. A healthy meal plan includes the following:     • Eat whole-grain foods more often. A healthy meal plan should contain fiber. Fiber is the part of grains, fruits, and vegetables that is not broken down by your body. Whole-grain foods are healthy and provide extra fiber in your diet. Some examples of whole-grain foods are whole-wheat breads and pastas, oatmeal, brown rice, and bulgur. • Eat a variety of vegetables every day. Include dark, leafy greens such as spinach, kale, basia greens, and mustard greens. Eat yellow and orange vegetables such as carrots, sweet potatoes, and winter squash. • Eat a variety of fruits every day. Choose fresh or canned fruit (canned in its own juice or light syrup) instead of juice. Fruit juice has very little or no fiber. • Eat low-fat dairy foods. Drink fat-free (skim) milk or 1% milk. Eat fat-free yogurt and low-fat cottage cheese. Try low-fat cheeses such as mozzarella and other reduced-fat cheeses. • Choose meat and other protein foods that are low in fat. Choose beans or other legumes such as split peas or lentils. Choose fish, skinless poultry (chicken or turkey), or lean cuts of red meat (beef or pork). Before you cook meat or poultry, cut off any visible fat. • Use less fat and oil. Try baking foods instead of frying them. Add less fat, such as margarine, sour cream, regular salad dressing and mayonnaise to foods. Eat fewer high-fat foods. Some examples of high-fat foods include french fries, doughnuts, ice cream, and cakes. • Eat fewer sweets. Limit foods and drinks that are high in sugar. This includes candy, cookies, regular soda, and sweetened drinks. Ways to decrease calories:   • Eat smaller portions. ? Use a small plate with smaller servings. ? Do not eat second helpings. ? When you eat at a restaurant, ask for a box and place half of your meal in the box before you eat. ? Share an entrée with someone else. • Replace high-calorie snacks with healthy, low-calorie snacks. ? Choose fresh fruit, vegetables, fat-free rice cakes, or air-popped popcorn instead of potato chips, nuts, or chocolate. ? Choose water or calorie-free drinks instead of soda or sweetened drinks. • Do not shop for groceries when you are hungry. You may be more likely to make unhealthy food choices. Take a grocery list of healthy foods and shop after you have eaten. • Eat regular meals. Do not skip meals. Skipping meals can lead to overeating later in the day. This can make it harder for you to lose weight. Eat a healthy snack in place of a meal if you do not have time to eat a regular meal. Talk with a dietitian to help you create a meal plan and schedule that is right for you. Other things to consider as you try to lose weight:   • Be aware of situations that may give you the urge to overeat, such as eating while watching television. Find ways to avoid these situations. For example, read a book, go for a walk, or do crafts. • Meet with a weight loss support group or friends who are also trying to lose weight. This may help you stay motivated to continue working on your weight loss goals. © Copyright Elda [employer] 2022 Information is for End User's use only and may not be sold, redistributed or otherwise used for commercial purposes. The above information is an  only. It is not intended as medical advice for individual conditions or treatments. Talk to your doctor, nurse or pharmacist before following any medical regimen to see if it is safe and effective for you.

## 2023-09-13 NOTE — PROGRESS NOTES
Diabetic Foot Exam    Patient's shoes and socks removed. Right Foot/Ankle   Right Foot Inspection  Skin Exam: skin normal and skin intact. No dry skin, no warmth, no callus, no erythema, no maceration, no abnormal color, no pre-ulcer, no ulcer and no callus. Toe Exam: ROM and strength within normal limits. Sensory   Vibration: diminished  Proprioception: diminished  Monofilament testing: intact    Vascular  Capillary refills: < 3 seconds  The right DP pulse is 1+. The right PT pulse is 1+. Left Foot/Ankle  Left Foot Inspection  Skin Exam: skin normal and skin intact. No dry skin, no warmth, no erythema, no maceration, normal color, no pre-ulcer, no ulcer and no callus. Toe Exam: ROM and strength within normal limits. Sensory   Vibration: diminished  Proprioception: diminished  Monofilament testing: intact    Vascular  Capillary refills: < 3 seconds  The left DP pulse is 1+. The left PT pulse is 1+.      Assign Risk Category  No deformity present  Loss of protective sensation  No weak pulses  Risk: 1

## 2023-09-13 NOTE — PROGRESS NOTES
Subjective:   Chief Complaint   Patient presents with   • Physical Exam     Hm, foot exam, A1c , urine         Patient ID: Darrian Herr is a 67 y.o. male. Here for well exam.  Had knee replacement. Doing PT. The following portions of the patient's history were reviewed and updated as appropriate: allergies, current medications, past family history, past medical history, past social history, past surgical history and problem list.    Review of Systems   Constitutional: Negative for activity change, appetite change, chills, diaphoresis, fatigue and unexpected weight change. HENT: Negative for congestion, ear discharge, ear pain, hearing loss, nosebleeds and rhinorrhea. Eyes: Negative for pain, redness, itching and visual disturbance. Respiratory: Negative for cough, choking, chest tightness and shortness of breath. Cardiovascular: Negative for chest pain and leg swelling. Gastrointestinal: Negative for abdominal pain, blood in stool, constipation, diarrhea and nausea. Endocrine: Negative for cold intolerance, polydipsia and polyphagia. Genitourinary: Negative for dysuria, frequency, hematuria and urgency. Musculoskeletal: Positive for arthralgias. Negative for back pain, gait problem, joint swelling, neck pain and neck stiffness. Skin: Negative for color change and rash. Allergic/Immunologic: Negative for environmental allergies and food allergies. Neurological: Negative for dizziness, tremors, seizures, speech difficulty, numbness and headaches. Hematological: Negative for adenopathy. Does not bruise/bleed easily. Psychiatric/Behavioral: Negative for behavioral problems, dysphoric mood, hallucinations and self-injury.              Objective:  Vitals:    09/13/23 0932   BP: 134/80   Pulse: (!) 54   Temp: (!) 96.3 °F (35.7 °C)   TempSrc: Tympanic   SpO2: 94%   Weight: 98 kg (216 lb)   Height: 6' 1" (1.854 m)      Physical Exam  Constitutional:       General: He is not in acute distress. Appearance: He is well-developed. He is not diaphoretic. HENT:      Head: Normocephalic and atraumatic. Right Ear: External ear normal.      Left Ear: External ear normal.      Nose: Nose normal.      Mouth/Throat:      Pharynx: No oropharyngeal exudate. Eyes:      General: No scleral icterus. Right eye: No discharge. Left eye: No discharge. Conjunctiva/sclera: Conjunctivae normal.      Pupils: Pupils are equal, round, and reactive to light. Neck:      Thyroid: No thyromegaly. Cardiovascular:      Rate and Rhythm: Normal rate and regular rhythm. Heart sounds: Normal heart sounds. No murmur heard. Pulmonary:      Effort: Pulmonary effort is normal.      Breath sounds: Normal breath sounds. No wheezing or rales. Abdominal:      General: Bowel sounds are normal.      Palpations: Abdomen is soft. There is no mass. Tenderness: There is no abdominal tenderness. There is no guarding. Musculoskeletal:         General: Tenderness present. Normal range of motion. Cervical back: Normal range of motion and neck supple. Lymphadenopathy:      Cervical: No cervical adenopathy. Skin:     General: Skin is warm and dry. Neurological:      Mental Status: He is alert and oriented to person, place, and time. Deep Tendon Reflexes: Reflexes are normal and symmetric. Psychiatric:         Thought Content: Thought content normal.         Judgment: Judgment normal.           Assessment/Plan:    No problem-specific Assessment & Plan notes found for this encounter. Diagnoses and all orders for this visit:    Type 2 diabetes mellitus with diabetic neuropathy, without long-term current use of insulin ([facility])  -     POCT hemoglobin A1c    Benign essential hypertension    Primary osteoarthritis of both knees        He will get back on his usual medication regimen and be rechecked in 3 months with a fingerstick A1c. BMI Counseling: Body mass index is 28.5 kg/m².  The BMI is above normal. Patient referred to nutritionist due to patient being obese.

## 2023-09-14 ENCOUNTER — OFFICE VISIT (OUTPATIENT)
Dept: PHYSICAL THERAPY | Facility: CLINIC | Age: 73
End: 2023-09-14
Payer: COMMERCIAL

## 2023-09-14 DIAGNOSIS — M17.11 PRIMARY OSTEOARTHRITIS OF RIGHT KNEE: Primary | ICD-10-CM

## 2023-09-14 LAB
ALBUMIN/CREAT UR: 3 MCG/MG CREAT
CREAT UR-MCNC: 70 MG/DL (ref 20–320)
MICROALBUMIN UR-MCNC: 0.2 MG/DL

## 2023-09-14 PROCEDURE — 97112 NEUROMUSCULAR REEDUCATION: CPT

## 2023-09-14 PROCEDURE — 97110 THERAPEUTIC EXERCISES: CPT

## 2023-09-14 PROCEDURE — 97530 THERAPEUTIC ACTIVITIES: CPT

## 2023-09-14 NOTE — PROGRESS NOTES
Daily Note     Today's date: 2023  Patient name: Ritesh Lomeli  : 1950  MRN: [id number]  Referring provider: Corey Blizzard  Dx:   Encounter Diagnosis     ICD-10-CM    1. Primary osteoarthritis of right knee  M17.11                Subjective: Radha De Luna reports he is going up/down the steps with reciprocal pattern. Denies pain throughout daily activities. Objective: See treatment diary below    Assessment: Dalton tolerated PT treatment well. Passive R knee stretching performed, tightness remains nearing end range flexion. Advanced weight with u/l leg press, pt challenged by third set. Good stability throughout static balance training. Greater challenge observed with 2 cone taps, cues required for WS onto stance leg and trunk lean correction. Pt would benefit from continued PT to further address impairments and maximize functional level. Plan: Continue per plan of care.       Precautions:   Past Medical History:   Diagnosis Date   • Hyperplastic colon polyp     last assessed 14   • Osteoarthritis of both knees 2021   • Sleep apnea     last assessed 13       Manuals     STM HS, adductor, ITB, quad        Pat mobs         PROM stretch LQ JW              Neuro Re-Ed       SLS   :30x3 ea :30x3 ea    Tandem    Green :30x2 ea Blue :30x2 ea     2 Cone taps    2x10 ea 2x10 ea                                     Ther Ex     SLR 3x10       Long arc quad  10# 3x10  10# 3x10 MRE 3x10  10# 3x10     bridge        Heel raise    U/l 3x10 ea    Seated HS stretch stool  :30x3  :30x3 :30x3      Seated knee flexion stretch        SAQ 10# 3x10  10# 3x10 MRE 3x10  10# 3x10     Quad set         Calf stretch step        Step ups  8" 2x10 1 hha                       Ther Activity       RB  Full revolution 5' Full revolution 5' 5'  Lvl 3 5'    STS  1 foam x10   0 foam x10       U/l LP seat6   70/ 3x10 ea seat6   70/ 3x10 ea seat6  90/ 3x10 ea seat6  110/ 3x10 ea     U/l calf press   50/ 3x10  50/ 3x10 ea    Lateral step down   4" 3x10 NV                    Cold pack

## 2023-09-18 ENCOUNTER — OFFICE VISIT (OUTPATIENT)
Dept: PHYSICAL THERAPY | Facility: CLINIC | Age: 73
End: 2023-09-18
Payer: COMMERCIAL

## 2023-09-18 DIAGNOSIS — M17.11 PRIMARY OSTEOARTHRITIS OF RIGHT KNEE: Primary | ICD-10-CM

## 2023-09-18 PROCEDURE — 97110 THERAPEUTIC EXERCISES: CPT | Performed by: PHYSICAL THERAPIST

## 2023-09-18 PROCEDURE — 97112 NEUROMUSCULAR REEDUCATION: CPT | Performed by: PHYSICAL THERAPIST

## 2023-09-18 PROCEDURE — 97530 THERAPEUTIC ACTIVITIES: CPT | Performed by: PHYSICAL THERAPIST

## 2023-09-20 ENCOUNTER — OFFICE VISIT (OUTPATIENT)
Dept: PHYSICAL THERAPY | Facility: CLINIC | Age: 73
End: 2023-09-20
Payer: COMMERCIAL

## 2023-09-20 DIAGNOSIS — M17.11 PRIMARY OSTEOARTHRITIS OF RIGHT KNEE: Primary | ICD-10-CM

## 2023-09-20 PROCEDURE — 97110 THERAPEUTIC EXERCISES: CPT

## 2023-09-20 PROCEDURE — 97530 THERAPEUTIC ACTIVITIES: CPT

## 2023-09-20 PROCEDURE — 97112 NEUROMUSCULAR REEDUCATION: CPT

## 2023-09-20 NOTE — PROGRESS NOTES
Daily Note     Today's date: 2023  Patient name: Hany Werner  : 1950  MRN: [id number]  Referring provider: Violet Alvarez  Dx:   Encounter Diagnosis     ICD-10-CM    1. Primary osteoarthritis of right knee  M17.11              Subjective: Pt reports that he is doing well today with no complaints of pain in his R knee. Objective: See treatment diary below    Assessment: Tolerated treatment well. Pt did well with his current POC, being mostly challenged with the cones needing HHA at times to maintain balance. Ended session with the leg press today when he was most fatigued. Patient demonstrated fatigue post treatment, exhibited good technique with therapeutic exercises and would benefit from continued PT. Plan: Continue per plan of care.       Precautions:   Past Medical History:   Diagnosis Date   • Hyperplastic colon polyp     last assessed 14   • Osteoarthritis of both knees 2021   • Sleep apnea     last assessed 13       Manuals    STM HS, adductor, ITB, quad        Pat mobs         PROM stretch                Neuro Re-Ed    SLS :30x3 ea  :30x3 ea :30x3 ea :30x3 ea   Tandem    Green :30x2 ea Blue :30x2 ea     2 Cone taps  2x10 ea  2x10 ea 2x10 ea  2x10 ea   Forward step up and over  4" 2x10 ea    4" 2x10 ea                           Ther Ex      SLR        Long arc quad    MRE 3x10  10# 3x10     bridge        Heel raise Step b/l 3x10   U/l 3x10 ea Step b/l   3x10    Seated HS stretch stool  :30x3 ea  :30x3   :30x3 ea   Seated knee flexion stretch        SAQ   MRE 3x10  10# 3x10     Quad set         Calf stretch step        Step ups        Supine PB hamstring curl 3x10    3x10            Ther Activity    RB  Elliptical 5'  5'  Lvl 3 5' Elliptical 5'    STS        U/l LP seat5 110/ 3x10   seat6  90/ 3x10 ea seat6  110/ 3x10 ea  seat5 110/ 3x10    U/l calf press 60/ 3x10  50/ 3x10  50/ 3x10 ea 60/ 3x10 Lateral step down   4" 3x10 NV                    Cold pack

## 2023-09-21 ENCOUNTER — APPOINTMENT (OUTPATIENT)
Dept: PHYSICAL THERAPY | Facility: CLINIC | Age: 73
End: 2023-09-21
Payer: COMMERCIAL

## 2023-09-25 ENCOUNTER — OFFICE VISIT (OUTPATIENT)
Dept: PHYSICAL THERAPY | Facility: CLINIC | Age: 73
End: 2023-09-25
Payer: COMMERCIAL

## 2023-09-25 DIAGNOSIS — M17.11 PRIMARY OSTEOARTHRITIS OF RIGHT KNEE: Primary | ICD-10-CM

## 2023-09-25 PROCEDURE — 97140 MANUAL THERAPY 1/> REGIONS: CPT | Performed by: PHYSICAL THERAPIST

## 2023-09-25 PROCEDURE — 97110 THERAPEUTIC EXERCISES: CPT | Performed by: PHYSICAL THERAPIST

## 2023-09-25 NOTE — PROGRESS NOTES
Daily Note     Today's date: 2023  Patient name: Filomena Carrasco  : 1950  MRN: [id number]  Referring provider: Cate Mi  Dx:   Encounter Diagnosis     ICD-10-CM    1. Primary osteoarthritis of right knee  M17.11              Subjective: Pt reported that "I am doing better with my knee."     Objective: See treatment diary below    Assessment: Tolerated treatment well. Patient demonstrated fatigue post treatment, exhibited good technique with therapeutic exercises and would benefit from continued PT. Plan: Continue per plan of care.       Precautions:   Past Medical History:   Diagnosis Date   • Hyperplastic colon polyp     last assessed 14   • Osteoarthritis of both knees 2021   • Sleep apnea     last assessed 13       Manuals    STM HS, adductor, ITB, quad  JZ      Pat mobs         PROM stretch  JZ              Neuro Re-Ed    SLS :30x3 ea   :30x3 ea :30x3 ea   Tandem     Blue :30x2 ea     2 Cone taps  2x10 ea   2x10 ea  2x10 ea   Forward step up and over  4" 2x10 ea    4" 2x10 ea                           Ther Ex    SLR        Long arc quad     10# 3x10     bridge        Heel raise Step b/l 3x10   U/l 3x10 ea Step b/l   3x10    Seated HS stretch stool  :30x3 ea :30x3 ea   :30x3 ea   Seated knee flexion stretch        SAQ    10# 3x10     Quad set         Calf stretch step  :30x3 ea      Step ups        Supine PB hamstring curl 3x10    3x10    Supine piriformis stretch  :30x3 ea              Ther Activity    RB  Elliptical 5'   Lvl 3 5' Elliptical 5'    STS        U/l LP seat5 110/ 3x10    seat6  110/ 3x10 ea  seat5 110/ 3x10    U/l calf press 60/ 3x10   50/ 3x10 ea 60/ 3x10    Lateral step down    NV                    Cold pack

## 2023-09-28 ENCOUNTER — OFFICE VISIT (OUTPATIENT)
Dept: PHYSICAL THERAPY | Facility: CLINIC | Age: 73
End: 2023-09-28
Payer: COMMERCIAL

## 2023-09-28 DIAGNOSIS — M17.11 PRIMARY OSTEOARTHRITIS OF RIGHT KNEE: Primary | ICD-10-CM

## 2023-09-28 PROCEDURE — 97110 THERAPEUTIC EXERCISES: CPT | Performed by: PHYSICAL THERAPIST

## 2023-09-28 PROCEDURE — 97140 MANUAL THERAPY 1/> REGIONS: CPT | Performed by: PHYSICAL THERAPIST

## 2023-09-28 NOTE — PROGRESS NOTES
Daily Note     Today's date: 2023  Patient name: Mala Rodriguez  : 1950  MRN: [id number]  Referring provider: Stanislaw Short  Dx:   Encounter Diagnosis     ICD-10-CM    1. Primary osteoarthritis of right knee  M17.11           Start Time: 1400  Stop Time: 1438  Total time in clinic (min): 38 minutes    Subjective: Patient reports that lower back/hip are slowly improving. Knee is doing "okay."       Objective: See treatment diary below      Assessment: Patient demonstrated improved tolerance to PT today and was able to resume strengthening exercises without significant aggravation of hip/LB pain. Continue to address proximal hip strengthening and resume balance activities, as able. Plan: Continue per plan of care.       Precautions:   Past Medical History:   Diagnosis Date   • Hyperplastic colon polyp     last assessed 14   • Osteoarthritis of both knees 2021   • Sleep apnea     last assessed 13       Manuals    STM HS, adductor, ITB, quad  JZ JAB     Pat mobs         PROM stretch  JZ JAB             Neuro Re-Ed    SLS :30x3 ea   :30x3 ea :30x3 ea   Tandem     Blue :30x2 ea     2 Cone taps  2x10 ea   2x10 ea  2x10 ea   Forward step up and over  4" 2x10 ea  4" 2x10 ea  4" 2x10 ea                           Ther Ex    SLR        Long arc quad     10# 3x10     bridge        Heel raise Step b/l 3x10   U/l 3x10 ea Step b/l   3x10    Seated HS stretch stool  :30x3 ea :30x3 ea 3x30" ea  :30x3 ea   Seated knee flexion stretch        SAQ    10# 3x10     Quad set         Calf stretch step  :30x3 ea      Step ups        Supine PB hamstring curl 3x10    3x10    Supine piriformis stretch  :30x3 ea Manual              Ther Activity    RB  Elliptical 5'  Lv 3 5'  Lvl 3 5' Elliptical 5'    STS        U/l LP seat5 110/ 3x10   Seat 5 110/ 3x10  seat6  110/ 3x10 ea  seat5 110/ 3x10    U/l calf press 60/ 3x10  60/ 3x10 50/ 3x10 ea 60/ 3x10    Lateral step down    NV                    Cold pack

## 2023-10-02 ENCOUNTER — OFFICE VISIT (OUTPATIENT)
Dept: PHYSICAL THERAPY | Facility: CLINIC | Age: 73
End: 2023-10-02
Payer: COMMERCIAL

## 2023-10-02 DIAGNOSIS — M17.11 PRIMARY OSTEOARTHRITIS OF RIGHT KNEE: Primary | ICD-10-CM

## 2023-10-02 PROCEDURE — 97110 THERAPEUTIC EXERCISES: CPT | Performed by: PHYSICAL THERAPIST

## 2023-10-02 PROCEDURE — 97112 NEUROMUSCULAR REEDUCATION: CPT | Performed by: PHYSICAL THERAPIST

## 2023-10-02 PROCEDURE — 97530 THERAPEUTIC ACTIVITIES: CPT | Performed by: PHYSICAL THERAPIST

## 2023-10-02 NOTE — PROGRESS NOTES
Daily Note     Today's date: 10/2/2023  Patient name: Brandon Pandey  : 1950  MRN: [id number]  Referring provider: Sylvia Mott  Dx:   Encounter Diagnosis     ICD-10-CM    1. Primary osteoarthritis of right knee  M17.11              Subjective: Pt reported that "I am getting better with my strength in my knee, but I am still having trouble with balance."     Objective: See treatment diary below    Assessment: Tolerated treatment well. Patient demonstrated fatigue post treatment, exhibited good technique with therapeutic exercises and would benefit from continued PT. Plan: Continue per plan of care.       Precautions:   Past Medical History:   Diagnosis Date   • Hyperplastic colon polyp     last assessed 14   • Osteoarthritis of both knees 2021   • Sleep apnea     last assessed 13       Manuals  10    STM HS, adductor, ITB, quad  JZ JAB     Pat mobs         PROM stretch  JZ JAB             Neuro Re-Ed 9/20 9/25  10/2    SLS :30x3 ea   :30x3 ea    Tandem         2 Cone taps  2x10 ea   2x10 ea    Forward step up and over  4" 2x10 ea  4" 2x10 ea 4" 3x10 ea                            Ther Ex  10/2    SLR        Long arc quad         bridge        Heel raise Step b/l 3x10   Step 3x10    Seated HS stretch stool  :30x3 ea :30x3 ea 3x30" ea :30x3 ea    Seated knee flexion stretch        SAQ        Quad set         Calf stretch step  :30x3 ea  :30x3 ea    Step ups        Supine PB hamstring curl 3x10       Supine piriformis stretch  :30x3 ea Manual      Ellliptical    5'             Ther Activity 9/20 9/25  10/2    RB  Elliptical 5'  Lv 3 5'      STS        U/l LP seat5 110/ 3x10   Seat 5 110/ 3x10  seat5 110/ 3x10    U/l calf press 60/ 3x10  60/ 3x10  70/ 3x10    Lateral step down        TM    2.3 mph 5'             Cold pack

## 2023-10-05 ENCOUNTER — APPOINTMENT (OUTPATIENT)
Dept: PHYSICAL THERAPY | Facility: CLINIC | Age: 73
End: 2023-10-05
Payer: COMMERCIAL

## 2023-10-06 ENCOUNTER — APPOINTMENT (OUTPATIENT)
Dept: PHYSICAL THERAPY | Facility: CLINIC | Age: 73
End: 2023-10-06
Payer: COMMERCIAL

## 2023-10-09 ENCOUNTER — APPOINTMENT (OUTPATIENT)
Dept: PHYSICAL THERAPY | Facility: CLINIC | Age: 73
End: 2023-10-09
Payer: COMMERCIAL

## 2023-10-12 ENCOUNTER — APPOINTMENT (OUTPATIENT)
Dept: PHYSICAL THERAPY | Facility: CLINIC | Age: 73
End: 2023-10-12
Payer: COMMERCIAL

## 2023-10-16 ENCOUNTER — OFFICE VISIT (OUTPATIENT)
Dept: PHYSICAL THERAPY | Facility: CLINIC | Age: 73
End: 2023-10-16
Payer: COMMERCIAL

## 2023-10-16 DIAGNOSIS — M17.11 PRIMARY OSTEOARTHRITIS OF RIGHT KNEE: Primary | ICD-10-CM

## 2023-10-16 PROCEDURE — 97110 THERAPEUTIC EXERCISES: CPT

## 2023-10-16 PROCEDURE — 97530 THERAPEUTIC ACTIVITIES: CPT

## 2023-10-16 PROCEDURE — 97112 NEUROMUSCULAR REEDUCATION: CPT

## 2023-10-16 NOTE — PROGRESS NOTES
Daily Note     Today's date: 10/16/2023  Patient name: Jovany Nicholson  : 1950  MRN: [id number]  Referring provider: Yaneth Roach  Dx:   Encounter Diagnosis     ICD-10-CM    1. Primary osteoarthritis of right knee  M17.11                Subjective: Sandra Wang reports activity has been limited the last 10 days d/t COVID infection. Notes he had follow up with Ortho, MD pleased with knee ROM. Objective: See treatment diary below    Assessment: Dalton tolerated PT treatment well. Initiated session with RB warm up for knee flexibility/ROM. Passive stretching performed, ROM limited into end range flexion. Greatest challenge with SL balance training, frequent LOB occurring. Steppage strategy used to recover. HHA required with forward step down for eccentric control. Pt would benefit from continued PT to further address impairments and maximize functional level. Plan: Continue per plan of care.       Precautions:   Past Medical History:   Diagnosis Date    Hyperplastic colon polyp     last assessed 14    Osteoarthritis of both knees 2021    Sleep apnea     last assessed 13       Manuals 9/20 9/25 9/28 10/2 10/16   STM HS, adductor, ITB, quad  JZ JAB     Pat mobs         PROM stretch  JZ JAB  JW           Neuro Re-Ed 9/20 9/25  10/2 10/16   SLS :30x3 ea   :30x3 ea    Tandem         2 Cone taps  2x10 ea   2x10 ea 2x10 ea    Forward step up and over  4" 2x10 ea  4" 2x10 ea 4" 3x10 ea 4" 3x10 ea                           Ther Ex 9/20 9/25 9/28 10/2 10/16   SLR        Long arc quad         bridge        Heel raise Step b/l 3x10   Step 3x10    Seated HS stretch stool  :30x3 ea :30x3 ea 3x30" ea :30x3 ea    Seated knee flexion stretch        SAQ        Quad set         Calf stretch step  :30x3 ea  :30x3 ea Wedge :30x3    Step ups        Supine PB hamstring curl 3x10       Supine piriformis stretch  :30x3 ea Manual      Ellliptical    5'             Ther Activity 9/20 9/25  10/2 10/16   RB  Elliptical 5' Lv 3 5'   Lv 3 5'    STS        U/l LP seat5 110/ 3x10   Seat 5 110/ 3x10  seat5 110/ 3x10 seat5 110/ 3x10   U/l calf press 60/ 3x10  60/ 3x10  70/ 3x10 70/ 3x10   Lateral step down        TM    2.3 mph 5'             Cold pack

## 2023-10-19 ENCOUNTER — OFFICE VISIT (OUTPATIENT)
Dept: PHYSICAL THERAPY | Facility: CLINIC | Age: 73
End: 2023-10-19
Payer: COMMERCIAL

## 2023-10-19 DIAGNOSIS — M17.11 PRIMARY OSTEOARTHRITIS OF RIGHT KNEE: Primary | ICD-10-CM

## 2023-10-19 PROCEDURE — 97110 THERAPEUTIC EXERCISES: CPT

## 2023-10-19 PROCEDURE — 97112 NEUROMUSCULAR REEDUCATION: CPT

## 2023-10-19 PROCEDURE — 97530 THERAPEUTIC ACTIVITIES: CPT

## 2023-10-19 NOTE — PROGRESS NOTES
Daily Note     Today's date: 10/19/2023  Patient name: Ebony Ferguson  : 1950  MRN: [id number]  Referring provider: Chloe Eden PA-C  Dx:   Encounter Diagnosis     ICD-10-CM    1. Primary osteoarthritis of right knee  M17.11                  Subjective: Genaro Gifford reports he has minimal pain in R knee throughout most daily activities. Gradually gaining strength as PT sessions progress. Objective: See treatment diary below    Assessment: Dalton tolerated PT treatment well. Passive knee stretching performed, limitation remaining into end range flexion but gradually progressing. Good quad activation displayed throughout open chain strengthening. Fatigues at current weight on LP. Greatest challenge remains with SL balance training. Frequent misstep/LOB occurring with cone taps. Pt would benefit from continued PT to further address impairments and maximize functional level. Plan: Continue per plan of care.       Precautions:   Past Medical History:   Diagnosis Date    Hyperplastic colon polyp     last assessed 14    Osteoarthritis of both knees 2021    Sleep apnea     last assessed 13       Manuals 10/19  9/28 10/2 10/16   STM HS, adductor, ITB, quad   JAB     Pat mobs  JW       PROM stretch JW  JAB  JW           Neuro Re-Ed 10/19   10/2 10/16   SLS    :30x3 ea    Tandem         2 Cone taps  2x10 ea    2x10 ea 2x10 ea    Forward step up and over  4" 3x10 ea   4" 2x10 ea 4" 3x10 ea 4" 3x10 ea                           Ther Ex 10/19  9/28 10/2 10/16   SLR        Long arc quad         bridge        Heel raise U/l 3x10 ea    Step 3x10    Seated HS stretch stool  :30x3 ea   3x30" ea :30x3 ea    Seated knee flexion stretch        SAQ 10# 3x10       Quad set         Calf stretch step Wedge :30x3    :30x3 ea Wedge :30x3    Step ups        Supine PB hamstring curl        Supine piriformis stretch   Manual      Ellliptical    5'             Ther Activity 10/19   10/2 10/16   RB  Lv 3 5'   Lv 3 5'   Lv 3 5'    STS        U/l LP seat5 110/ 3x10  Seat 5 110/ 3x10  seat5 110/ 3x10 seat5 110/ 3x10   U/l calf press 70/ 3x10  60/ 3x10  70/ 3x10 70/ 3x10   Lateral step down        TM    2.3 mph 5'             Cold pack

## 2023-10-23 ENCOUNTER — APPOINTMENT (OUTPATIENT)
Dept: PHYSICAL THERAPY | Facility: CLINIC | Age: 73
End: 2023-10-23
Payer: COMMERCIAL

## 2023-10-24 ENCOUNTER — OFFICE VISIT (OUTPATIENT)
Dept: PHYSICAL THERAPY | Facility: CLINIC | Age: 73
End: 2023-10-24
Payer: COMMERCIAL

## 2023-10-24 DIAGNOSIS — M17.11 PRIMARY OSTEOARTHRITIS OF RIGHT KNEE: Primary | ICD-10-CM

## 2023-10-24 PROCEDURE — 97110 THERAPEUTIC EXERCISES: CPT

## 2023-10-24 PROCEDURE — 97112 NEUROMUSCULAR REEDUCATION: CPT

## 2023-10-24 PROCEDURE — 97530 THERAPEUTIC ACTIVITIES: CPT

## 2023-10-24 NOTE — PROGRESS NOTES
Daily Note     Today's date: 10/24/2023  Patient name: Jesica Zavala  : 1950  MRN: [id number]  Referring provider: Carlton Manrique PA-C  Dx:   Encounter Diagnosis     ICD-10-CM    1. Primary osteoarthritis of right knee  M17.11                  Subjective: Tamir Paz denies R knee soreness following last PT session. Continues to note improvement in strength and overall mobility. Objective: See treatment diary below    Assessment: Dalton tolerated PT treatment well. End range tightness remains into flexion with passive stretching, extension ROM is WFL at this time. Advanced step height with forward step down, challenged maintaining eccentric control. Advanced dynamic balance with lateral cone step over. Fatigue evident post session. Pt would benefit from continued PT to further address impairments and maximize functional level. Plan: Continue per plan of care.       Precautions:   Past Medical History:   Diagnosis Date    Hyperplastic colon polyp     last assessed 14    Osteoarthritis of both knees 2021    Sleep apnea     last assessed 13       Manuals 10/19 10/24  10/2 10/16   STM HS, adductor, ITB, quad        Pat mobs  JW JW      PROM stretch [address]           Neuro Re-Ed 10/19 10/24  10/2 10/16   SLS    :30x3 ea    Tandem         2 Cone taps  2x10 ea  2x10 ea   2x10 ea 2x10 ea    Forward step up and over  4" 3x10 ea  4" 2x10   6" x10   4" 3x10 ea 4" 3x10 ea   Lateral cone step over  15" 2x10 ea                      Ther Ex 10/19 10/24  10/2 10/16   SLR        Long arc quad         bridge        Heel raise U/l 3x10 ea  U/l 3x10 ea   Step 3x10    Seated HS stretch stool  :30x3 ea    :30x3 ea    Seated knee flexion stretch        SAQ 10# 3x10 20# 3x10 :05      Quad set         Calf stretch step Wedge :30x3    :30x3 ea Wedge :30x3    Step ups        Supine PB hamstring curl        Supine piriformis stretch        Ellliptical    5'             Ther Activity 10/19 10/24  10/2 10/16   RB  Lv 3 5'     Lv 3 5'    STS        U/l LP seat5 110/ 3x10 seat5 110/ 3x10  seat5 110/ 3x10 seat5 110/ 3x10   U/l calf press 70/ 3x10 70/ 3x10  70/ 3x10 70/ 3x10   Lateral step down        TM  2.3 mph 5'   2.3 mph 5'             Cold pack

## 2023-10-26 ENCOUNTER — OFFICE VISIT (OUTPATIENT)
Dept: PHYSICAL THERAPY | Facility: CLINIC | Age: 73
End: 2023-10-26
Payer: COMMERCIAL

## 2023-10-26 DIAGNOSIS — M17.11 PRIMARY OSTEOARTHRITIS OF RIGHT KNEE: Primary | ICD-10-CM

## 2023-10-26 PROCEDURE — 97112 NEUROMUSCULAR REEDUCATION: CPT

## 2023-10-26 PROCEDURE — 97110 THERAPEUTIC EXERCISES: CPT

## 2023-10-26 PROCEDURE — 97530 THERAPEUTIC ACTIVITIES: CPT

## 2023-10-26 NOTE — PROGRESS NOTES
Daily Note     Today's date: 10/26/2023  Patient name: Balbir Tay  : 1950  MRN: [id number]  Referring provider: Marcelina Klinefelter, PA-C  Dx:   Encounter Diagnosis     ICD-10-CM    1. Primary osteoarthritis of right knee  M17.11                    Subjective: Monserrat Barrett denies R knee soreness following last PT session. Offers no new complaints upon arrival.     Objective: See treatment diary below    Assessment: Dalton tolerated PT treatment well. Passive knee stretching performed, emphasizing end range flexion. Pt completed prescribed exercises with good technique. Greatest challenge observed with forward step down d/t limited eccentric quad control, HHA required with 6" step. Advanced weight with u/l leg press activities today. Fatigue evident post session. Pt would benefit from continued PT to further address impairments and maximize functional level. Plan: Continue per plan of care.       Precautions:   Past Medical History:   Diagnosis Date    Hyperplastic colon polyp     last assessed 14    Osteoarthritis of both knees 2021    Sleep apnea     last assessed 13       Manuals 10/19 10/24 10/26  10/16   STM HS, adductor, ITB, quad        Pat mobs  JW JW JW     PROM stretch [address]           Neuro Re-Ed 10/19 10/24 10/26  10/16   SLS        Tandem         2 Cone taps  2x10 ea  2x10 ea    2x10 ea    Forward step up and over  4" 3x10 ea  4" 2x10   6" x10  6" 3x10 1 hha   4" 3x10 ea   Lateral cone step over  15" 2x10 ea 15" 2x10 ea                     Ther Ex 10/19 10/24 10/26  10/16   SLR        Long arc quad    20# 3x10 :05     bridge        Heel raise U/l 3x10 ea  U/l 3x10 ea  U/l 3x10 ea      Seated HS stretch stool  :30x3 ea        Seated knee flexion stretch        SAQ 10# 3x10 20# 3x10 :05 20# 3x10 :05     Quad set         Calf stretch step Wedge :30x3     Wedge :30x3    Step ups        Supine PB hamstring curl        Supine piriformis stretch        Ellliptical                Ther Activity 10/19 10/24 10/26  10/16   RB  Lv 3 5'   Lv 3 5'   Lv 3 5'    STS        U/l LP seat5 110/ 3x10 seat5 110/ 3x10 seat5 120/ 2x10  seat5 110/ 3x10   U/l calf press 70/ 3x10 70/ 3x10 90/ 3x10  70/ 3x10   Lateral step down        TM  2.3 mph 5'               Cold pack

## 2023-10-30 ENCOUNTER — OFFICE VISIT (OUTPATIENT)
Dept: PHYSICAL THERAPY | Facility: CLINIC | Age: 73
End: 2023-10-30
Payer: COMMERCIAL

## 2023-10-30 DIAGNOSIS — M17.11 PRIMARY OSTEOARTHRITIS OF RIGHT KNEE: Primary | ICD-10-CM

## 2023-10-30 PROCEDURE — 97110 THERAPEUTIC EXERCISES: CPT

## 2023-10-30 PROCEDURE — 97530 THERAPEUTIC ACTIVITIES: CPT

## 2023-10-30 PROCEDURE — 97112 NEUROMUSCULAR REEDUCATION: CPT

## 2023-10-30 NOTE — PROGRESS NOTES
Daily Note     Today's date: 10/30/2023  Patient name: Kirti Burns  : 1950  MRN: [id number]  Referring provider: Giovanny Matias PA-C  Dx:   Encounter Diagnosis     ICD-10-CM    1. Primary osteoarthritis of right knee  M17.11                      Subjective: Chapin Lee reports R knee soreness following last PT session, contributes to passive stretching into flexion. Continues to note improvement in overall strength and mobility. Objective: See treatment diary below    Assessment: Dalton tolerated PT treatment well. Knee ROM is progressing appropriately, end range tightness remains into flexion. Greatest challenge observed with forward step down d/t limited eccentric quad control, HHA required with 6" step. Pt was able to complete all interventions today without any limitations except fatigue. He would benefit from continued PT to further address impairments and maximize functional level. Plan: Continue per plan of care.       Precautions:   Past Medical History:   Diagnosis Date    Hyperplastic colon polyp     last assessed 14    Osteoarthritis of both knees 2021    Sleep apnea     last assessed 13       Manuals 10/19 10/24 10/26 10/30    STM HS, adductor, ITB, quad        Pat mobs  JW JW JW JW    PROM stretch [address] [facility]            Neuro Re-Ed 10/19 10/24 10/26 10/30    SLS        Tandem         2 Cone taps  2x10 ea  2x10 ea       Forward step up and over  4" 3x10 ea  4" 2x10   6" x10  6" 3x10 1 hha  6" 2x10 fwd step down heel x10 ea fw/bkw    Lateral cone step over  15" 2x10 ea 15" 2x10 ea 15" 2x10 ea                    Ther Ex 10/19 10/24 10/26 10/30    SLR        Long arc quad    20# 3x10 :05 20# 3x10 :05    bridge        Heel raise U/l 3x10 ea  U/l 3x10 ea  U/l 3x10 ea  U/l 3x10 ea     Seated HS stretch stool  :30x3 ea        Seated knee flexion stretch        SAQ 10# 3x10 20# 3x10 :05 20# 3x10 :05 20# 3x10 :05    Quad set         Calf stretch step Wedge :30x3        Step ups Supine PB hamstring curl        Supine piriformis stretch        Ellliptical                Ther Activity 10/19 10/24 10/26 10/30    RB  Lv 3 5'   Lv 3 5'  Lv 3 5'     STS        U/l LP seat5 110/ 3x10 seat5 110/ 3x10 seat5 120/ 2x10 seat5 120/ 2x10    U/l calf press 70/ 3x10 70/ 3x10 90/ 3x10 90/ 3x10     Lateral step down        TM  2.3 mph 5'               Cold pack

## 2023-11-01 ENCOUNTER — EVALUATION (OUTPATIENT)
Dept: PHYSICAL THERAPY | Facility: CLINIC | Age: 73
End: 2023-11-01
Payer: COMMERCIAL

## 2023-11-01 DIAGNOSIS — M17.11 PRIMARY OSTEOARTHRITIS OF RIGHT KNEE: Primary | ICD-10-CM

## 2023-11-01 PROCEDURE — 97112 NEUROMUSCULAR REEDUCATION: CPT | Performed by: PHYSICAL THERAPIST

## 2023-11-01 PROCEDURE — 97530 THERAPEUTIC ACTIVITIES: CPT | Performed by: PHYSICAL THERAPIST

## 2023-11-01 PROCEDURE — 97110 THERAPEUTIC EXERCISES: CPT | Performed by: PHYSICAL THERAPIST

## 2023-11-01 PROCEDURE — 97140 MANUAL THERAPY 1/> REGIONS: CPT | Performed by: PHYSICAL THERAPIST

## 2023-11-01 NOTE — PROGRESS NOTES
PT Re-Evaluation     Today's date: 2023  Patient name: Andrzej Adame  : 1950  MRN: [id number]  Referring provider: Daryn Melendez DO  Dx:   Encounter Diagnosis     ICD-10-CM    1. Primary osteoarthritis of right knee  M17.11           Assessment  Assessment details:   Since beginning PT Emerita Daniels reports GROC improvement of 70%. MMT demonstratd improved strength. Goniometry demonstrated improved flexibility. Balance assessment demonstrated improvement. Functional outcome measures and subjective reports demonstrated improved functional ability. Despite improvements Emerita Daniels continues to present with pain, decreased strength, decreased ROM, decreased joint mobility, joint effusion, ambulatory dysfunction and balance dysfunction. Due to these impairments, patient has difficulty performing ADL's, recreational activities, engaging in social activities, ambulation, stair negotiation, lifting/carrying, transfers. Patient's clinical presentation is consistent with their referring diagnosis of S/p R/L TKA performed on 24. Patient has been educated in post-op contraindications / precautions, gait training, weight bearing status, home exercise program and plan of care. Patient would benefit from skilled physical therapy services to address their aforementioned functional limitations and progress towards prior level of function and independence with home exercise program.     Impairments: abnormal gait, abnormal or restricted ROM, activity intolerance, impaired balance, impaired physical strength, lacks appropriate home exercise program, pain with function, weight-bearing intolerance, poor posture  and poor body mechanics  Functional limitations: walk/stand, STS, stair negotiation, yard work, walk uneven surfaces   Prognosis: good  Prognosis details: + factors: high motivation levels, active lifestyle  - factors: chronic pain, BMI    Goals  Short Term Goals to be accomplished by 23:  STG1: Pt will be I with HEP. Achieved. STG2: Pt will demo 0-100 degrees in knee AROM to improve gait. Achieved. STG3: Pt will demo 4/5 MMT strength in knee to improve stair negotiation. Achieved. STG4: Pt will amb community distance without gait deviates due to pain. (Progressing toward). Long Term Goals to be accomplished by 11/30/23:   Radha Herculeser: Pt will be able to descend stairs with reciprocal gait pattern without use of handrail. LTG2: Pt will be able to walk on uneven surfaces on property to do yard work. LTG3: Pt will be able to perform STS with normal mechanics without pain. Plan  Plan details: HEP development, stretching, strengthening, A/AA/PROM, joint mobilizations, posture education, STM/MI as needed to reduce muscle tension, muscle reeducation, PLOC discussed and agreed upon with patient. Patient would benefit from: PT eval and skilled physical therapy  Planned modality interventions: cryotherapy, thermotherapy: hydrocollator packs and unattended electrical stimulation  Planned therapy interventions: manual therapy, neuromuscular re-education, self care, therapeutic activities, therapeutic exercise, home exercise program, patient education, joint mobilization, balance, strengthening, stretching, therapeutic training and flexibility  Frequency: 2x week    Plan of Care beginning date: 7/31/2023  Plan of Care expiration date: 11/30/2023  Treatment plan discussed with: patient      Subjective Evaluation    History of Present Illness  Mechanism of injury: Pt is a 68 y/o male who presents to PT s/p R TKA performed on 7/24/23. Had a surgery on March 1st to have hardware removed from his knee from previous ORIF. Then he had the TKA on 7/24/23 Stated that the day of the surgery there was complications with bleeding and the surgeon had to add additional staples. Had home care PT for some visits which improved his symptoms. Since then he has been improving. Notes that he has been walking around his home without AD. Improved symptoms from ice and elevation. Patient Goals  Patient goals for therapy: increased motion, improved balance, decreased pain, decreased edema, increased strength and independence with ADLs/IADLs  Patient goal: walk/stand, STS, stair negotiation, yard work, walk uneven surfaces  Pain  Current pain ratin  At best pain ratin  At worst pain rating: 3  Location: R TKA   Quality: tight, discomfort and pulling  Aggravating factors: stair climbing, walking, standing and lifting    Treatments  Current treatment: physical therapy      Objective     Observations     Additional Observation Details  Incision: covered with bandage still     Tenderness     Additional Tenderness Details  TTP over R ITB, calf, adductors, HS    Active Range of Motion     Right Knee   Flexion: 110 degrees with pain  Extension: 0 degrees with pain    Passive Range of Motion     Right Knee   Flexion: 115 degrees with pain  Extension: 0 degrees with pain    Strength/Myotome Testing     Right Knee   Flexion: 4+  Extension: 4+    Additional Strength Details  U/l heel raise:   R: 20 reps  L: 20 reps     Extension La degree       Tests     Right Knee   Negative valgus stress test at 30 degrees and varus stress test at 30 degrees.      Additional Tests Details  TU sec     30 sec STS: reps     SLS:   R: 30, 30 sec  L: 30, 30 sec       Precautions:   Past Medical History:   Diagnosis Date    Hyperplastic colon polyp     last assessed 14    Osteoarthritis of both knees 2021    Sleep apnea     last assessed 13     Manuals 10/19 10/24 10/26 10/30 11/1   STM HS, adductor, ITB, quad        Pat mobs  JW JW JW JW JZ   PROM stretch [address] [facility]           Neuro Re-Ed 10/19 10/24 10/26 10/30 11/1   SLS        Tandem         2 Cone taps  2x10 ea  2x10 ea    2x10    Forward step up and over  4" 3x10 ea  4" 2x10   6" x10  6" 3x10 1 hha  6" 2x10 fwd step down heel x10 ea fw/bkw 6" forward step down   Lateral cone step over  15" 2x10 ea 15" 2x10 ea 15" 2x10 ea 15" 2x10 ea                   Ther Ex 10/19 10/24 10/26 10/30 11/1   SLR        Long arc quad    20# 3x10 :05 20# 3x10 :05 20# 3x10    bridge        Heel raise U/l 3x10 ea  U/l 3x10 ea  U/l 3x10 ea  U/l 3x10 ea  U/l 3x10 ea   Seated HS stretch stool  :30x3 ea        Seated knee flexion stretch        SAQ 10# 3x10 20# 3x10 :05 20# 3x10 :05 20# 3x10 :05    Quad set         Calf stretch step Wedge :30x3        Heel rasie on step     3x10    Prone knee flexion stretch     :30x3 ea   Supine piriformis stretch        Ellliptical                Ther Activity 10/19 10/24 10/26 10/30 11/1   RB  Lv 3 5'   Lv 3 5'  Lv 3 5'  Lvl3 5'    STS        U/l LP seat5 110/ 3x10 seat5 110/ 3x10 seat5 120/ 2x10 seat5 120/ 2x10 Seat5 130/ 3x10 ea   U/l calf press 70/ 3x10 70/ 3x10 90/ 3x10 90/ 3x10  90/ 3x10 ea   Lateral step down        TM  2.3 mph 5'               Cold pack

## 2023-11-01 NOTE — LETTER
2023    Lex Paks, [address] [place] [address]    Patient: Lynne Marrero   YOB: 1950   Date of Visit: 2023     Encounter Diagnosis     ICD-10-CM    1. Primary osteoarthritis of right knee  M17.11           Dear Dr. Jermaine Hendrix: Thank you for your recent referral of Lynne Marrero. Please review the attached evaluation summary from Dalton's recent visit. Please verify that you agree with the plan of care by signing the attached order. If you have any questions or concerns, please do not hesitate to call. I sincerely appreciate the opportunity to share in the care of one of your patients and hope to have another opportunity to work with you in the near future. Sincerely,    Pa Samson, PT      Referring Provider:      I certify that I have read the below Plan of Care and certify the need for these services furnished under this plan of treatment while under my care. eLx Silva PA-C  [address] [place] [address]  Via Fax: [phone]          PT Re-Evaluation     Today's date: 2023  Patient name: Lynne Marrero  : 1950  MRN: [id number]  Referring provider: Karsten Benitez DO  Dx:   Encounter Diagnosis     ICD-10-CM    1. Primary osteoarthritis of right knee  M17.11           Assessment  Assessment details:   Since beginning PT Janes Duran reports GROC improvement of 70%. MMT demonstratd improved strength. Goniometry demonstrated improved flexibility. Balance assessment demonstrated improvement. Functional outcome measures and subjective reports demonstrated improved functional ability. Despite improvements Janes Duran continues to present with pain, decreased strength, decreased ROM, decreased joint mobility, joint effusion, ambulatory dysfunction and balance dysfunction.  Due to these impairments, patient has difficulty performing ADL's, recreational activities, engaging in social activities, ambulation, stair negotiation, lifting/carrying, transfers. Patient's clinical presentation is consistent with their referring diagnosis of S/p R/L TKA performed on 7/24/24. Patient has been educated in post-op contraindications / precautions, gait training, weight bearing status, home exercise program and plan of care. Patient would benefit from skilled physical therapy services to address their aforementioned functional limitations and progress towards prior level of function and independence with home exercise program.     Impairments: abnormal gait, abnormal or restricted ROM, activity intolerance, impaired balance, impaired physical strength, lacks appropriate home exercise program, pain with function, weight-bearing intolerance, poor posture  and poor body mechanics  Functional limitations: walk/stand, STS, stair negotiation, yard work, walk uneven surfaces   Prognosis: good  Prognosis details: + factors: high motivation levels, active lifestyle  - factors: chronic pain, BMI    Goals  Short Term Goals to be accomplished by 8/28/23:  STG1: Pt will be I with HEP. Achieved. STG2: Pt will demo 0-100 degrees in knee AROM to improve gait. Achieved. STG3: Pt will demo 4/5 MMT strength in knee to improve stair negotiation. Achieved. STG4: Pt will amb community distance without gait deviates due to pain. (Progressing toward). Long Term Goals to be accomplished by 11/30/23:   Pleasant Raja: Pt will be able to descend stairs with reciprocal gait pattern without use of handrail. LTG2: Pt will be able to walk on uneven surfaces on property to do yard work. LTG3: Pt will be able to perform STS with normal mechanics without pain. Plan  Plan details: HEP development, stretching, strengthening, A/AA/PROM, joint mobilizations, posture education, STM/MI as needed to reduce muscle tension, muscle reeducation, PLOC discussed and agreed upon with patient.       Patient would benefit from: PT eval and skilled physical therapy  Planned modality interventions: cryotherapy, thermotherapy: hydrocollator packs and unattended electrical stimulation  Planned therapy interventions: manual therapy, neuromuscular re-education, self care, therapeutic activities, therapeutic exercise, home exercise program, patient education, joint mobilization, balance, strengthening, stretching, therapeutic training and flexibility  Frequency: 2x week    Plan of Care beginning date: 2023  Plan of Care expiration date: 2023  Treatment plan discussed with: patient      Subjective Evaluation    History of Present Illness  Mechanism of injury: Pt is a 68 y/o male who presents to PT s/p R TKA performed on 23. Had a surgery on  to have hardware removed from his knee from previous ORIF. Then he had the TKA on 23 Stated that the day of the surgery there was complications with bleeding and the surgeon had to add additional staples. Had home care PT for some visits which improved his symptoms. Since then he has been improving. Notes that he has been walking around his home without AD. Improved symptoms from ice and elevation.    Patient Goals  Patient goals for therapy: increased motion, improved balance, decreased pain, decreased edema, increased strength and independence with ADLs/IADLs  Patient goal: walk/stand, STS, stair negotiation, yard work, walk uneven surfaces  Pain  Current pain ratin  At best pain ratin  At worst pain rating: 3  Location: R TKA   Quality: tight, discomfort and pulling  Aggravating factors: stair climbing, walking, standing and lifting    Treatments  Current treatment: physical therapy      Objective     Observations     Additional Observation Details  Incision: covered with bandage still     Tenderness     Additional Tenderness Details  TTP over R ITB, calf, adductors, HS    Active Range of Motion     Right Knee   Flexion: 110 degrees with pain  Extension: 0 degrees with pain    Passive Range of Motion     Right Knee   Flexion: 115 degrees with pain  Extension: 0 degrees with pain    Strength/Myotome Testing     Right Knee   Flexion: 4+  Extension: 4+    Additional Strength Details  U/l heel raise:   R: 20 reps  L: 20 reps     Extension La degree       Tests     Right Knee   Negative valgus stress test at 30 degrees and varus stress test at 30 degrees.      Additional Tests Details  TU sec     30 sec STS: reps     SLS:   R: 30, 30 sec  L: 30, 30 sec       Precautions:   Past Medical History:   Diagnosis Date   • Hyperplastic colon polyp     last assessed 14   • Osteoarthritis of both knees 2021   • Sleep apnea     last assessed 13     Manuals 10/19 10/24 10/26 10/30 11/1   STM HS, adductor, ITB, quad        Pat mobs  [address] [place] [address]   PROM stretch [address] [place] [address]           Neuro Re-Ed 10/19 10/24 10/26 10/30 11/1   SLS        Tandem         2 Cone taps  2x10 ea  2x10 ea    2x10    Forward step up and over  4" 3x10 ea  4" 2x10   6" x10  6" 3x10 1 hha  6" 2x10 fwd step down heel x10 ea fw/bkw 6" forward step down   Lateral cone step over  15" 2x10 ea 15" 2x10 ea 15" 2x10 ea 15" 2x10 ea                   Ther Ex 10/19 10/24 10/26 10/30 11/1   SLR        Long arc quad    20# 3x10 :05 20# 3x10 :05 20# 3x10    bridge        Heel raise U/l 3x10 ea  U/l 3x10 ea  U/l 3x10 ea  U/l 3x10 ea  U/l 3x10 ea   Seated HS stretch stool  :30x3 ea        Seated knee flexion stretch        SAQ 10# 3x10 20# 3x10 :05 20# 3x10 :05 20# 3x10 :05    Quad set         Calf stretch step Wedge :30x3        Heel rasie on step     3x10    Prone knee flexion stretch     :30x3 ea   Supine piriformis stretch        Ellliptical                Ther Activity 10/19 10/24 10/26 10/30 11/1   RB  Lv 3 5'   Lv 3 5'  Lv 3 5'  Lvl3 5'    STS        U/l LP seat5 110/ 3x10 seat5 110/ 3x10 seat5 120/ 2x10 seat5 120/ 2x10 Seat5 130/ 3x10 ea   U/l calf press 70/ 3x10 70/ 3x10 90/ 3x10 90/ 3x10  90/ 3x10 ea   Lateral step down TM  2.3 mph 5'               Cold pack

## 2023-11-03 NOTE — ASSESSMENT & PLAN NOTE
[facility]     Progress Note    Patient Name: Charlette Rai  : 1937  MRN: [id number]  Primary Care Physician:  Rafael Lyons MD  Date of admission: 10/29/2023    Pulmonary / Critical Care Progress Note      Patient Name: Charlette Rai  : 1937  MRN: [id number]  Attending:  Rafael Lyons MD   Date of admission: 10/29/2023    Subjective   Subjective   Follow-up for hypercapnic respiratory failure, CKD stage IV, volume overload    Extubated 10/31/2023  Slowly improving  On 1 L of oxygen  On antibiotics for Pseudomonas pneumonia  Doing well with oral diuretics  Up in chair today  Dyspnea improved  Has scant dry cough with no sputum production or hemoptysis  Weak and fatigue  No nausea, fevers or chills      Objective   Objective     Vitals:   Vital signs for last 24 hours:  Temp:  [98.2 °F (36.8 °C)-98.8 °F (37.1 °C)] 98.3 °F (36.8 °C)  Heart Rate:  [66-80] 71  Resp:  [16-18] 16  BP: (143-165)/(45-53) 143/47    Intake/Output last 3 shifts:  I/O last 3 completed shifts:  In: 120 [P.O.:120]  Out: -   Intake/Output this shift:  No intake/output data recorded.    Vent settings for last 24 hours:       Physical Exam   Vital Signs Reviewed  WDWN, Alert, NAD.    HEENT:  PERRL, EOMI.  OP, nares clear  Chest:  good aeration, decreasing rhonchi bilaterally, tympanic to percussion bilaterally, no work of breathing noted  CV: Systolic ejection murmur no MGR, pulses 2+, equal.  Abd:  Soft, NT, ND, + BS, no HSM  EXT:  no clubbing, no cyanosis, scant BLE edema  Neuro:  A&Ox3, CN grossly intact, no focal deficits.  Skin: No rashes or lesions noted    Result Review    Result Review:  I have personally reviewed the results from the time of this admission to 11/3/2023 08:05 EDT and agree with these findings:  [x]  Laboratory  [x]  Microbiology  [x]  Radiology  []  EKG/Telemetry   []  Cardiology/Vascular   []  Pathology  []  Old records  []  Other:  Most notable findings include:       Lab 23  0541 23  0553  Continues PT this past year without adequate relief of his OA  Is now wanting to pursue right TKR next year  10/31/23  0313 10/30/23  0801 10/30/23  0249 10/29/23  0754 10/29/23  0450 10/29/23  0448   WBC 9.12 8.42 9.66  --  9.39  --   --  14.71*   HEMOGLOBIN 9.5* 9.8* 8.7*  --  7.9*  --   --  9.5*   HEMATOCRIT 30.7* 31.8* 28.0*  --  25.6*  --   --  32.2*   PLATELETS 221 230 231  --  209  --   --  307   SODIUM 143 146* 142  --  138  --   --  133*   SODIUM, ARTERIAL  --   --   --  139.3  --  133.9* 135.9*  --    POTASSIUM 4.2 3.4* 3.6  --  4.5  --   --  5.4*   CHLORIDE 103 103 106  --  107  --   --  100   CO2 29.9* 30.8* 23.8  --  17.6*  --   --  21.9*   BUN 77* 80* 87*  --  78*  --   --  78*   CREATININE 2.07* 2.17* 2.53*  --  2.48*  --   --  2.68*   GLUCOSE 123* 146* 113*  --  198*  --   --  572*   GLUCOSE, ARTERIAL  --   --   --  193*  --  509* 555*  --    CALCIUM 8.8 8.6 8.1*  --  8.2*  --   --  8.4*   PHOSPHORUS 3.8 3.8 3.7  --   --   --   --  5.9*   TOTAL PROTEIN  --   --   --   --  5.6*  --   --  7.5   ALBUMIN 3.2*  --  3.3*  --  3.0*  --   --  4.2   GLOBULIN  --   --   --   --  2.6  --   --  3.3     Sputum culture growing Pseudomonas    Results for orders placed during the hospital encounter of 10/29/23    Adult Transthoracic Echo Complete W/ Cont if Necessary Per Protocol    Interpretation Summary    Left ventricular systolic function is normal. Calculated left ventricular EF = 56.8%    Left ventricular wall thickness is consistent with mild asymmetric hypertrophy.    Left ventricular diastolic function is consistent with (grade I) impaired relaxation.    Left atrial volume is moderately increased.    Moderate aortic valve stenosis is present.    Estimated right ventricular systolic pressure from tricuspid regurgitation is normal (<35 mmHg).    Mild dilation of the aortic root is present.        Assessment & Plan   Assessment / Plan     Active Hospital Problems:  Active Hospital Problems    Diagnosis     **Acute respiratory failure with hypoxia and hypercapnia     COPD exacerbation     Anemia of chronic disease      Respiratory failure     Bilateral pneumonia     COPD with acute exacerbation     Pulmonary edema     Hyperkalemia     Type 2 diabetes mellitus without complication     Stage 4 chronic kidney disease      Impression:  Acute hypoxic hypercarbic respiratory failure requiring mechanical vent  COPD with acute exacerbation  Acute decompensated diastolic congestive heart failure  Acute on chronic hypoxic respiratory failure  Pseudomonas pneumonia  Therapeutic drug monitoring of antibiotics  History of lung cancer s/p lobectomy on the left side  Type 2 diabetes with hyperglycemia  Urinary tract infection  Elevated proBNP  Bilateral lower extremity edema  Anemia of CKD  CKD stage IV  Hyperkalemia resolved now with hypokalemia  Hyponatremia, clinically insignificant  Hypertension  Leukocytosis    Plan:  Continue oral diuretics.  Nephrology following.  Trend renal panel and electrolytes  On day 3 of cefepime.  Given sensitivities, at discharge can change over to Levaquin.  Complete 7 days of antibiotics in total for Pseudomonas pneumonia  Continue beta-blocker and other antihypertensives  Continue current insulin regimen  Continue nebulizers and bronchopulmonary hygiene  Encourage activity and ambulate halls  Wean O2 to keep SPO2 greater than 90%    DVT prophylaxis:  Medical DVT prophylaxis orders are present.    CODE STATUS:   Code Status (Patient has no pulse and is not breathing): CPR (Attempt to Resuscitate)  Medical Interventions (Patient has pulse or is breathing): Full Support    Should be suitable for discharge this weekend  Follow-up with us as an outpatient      Labs, imaging, microbiology, notes and medications personally reviewed  Discussed with primary    Electronically signed by Clement Motta MD, 11/03/23, 10:21 AM EDT.

## 2023-11-06 ENCOUNTER — APPOINTMENT (OUTPATIENT)
Dept: PHYSICAL THERAPY | Facility: CLINIC | Age: 73
End: 2023-11-06
Payer: COMMERCIAL

## 2023-11-08 ENCOUNTER — APPOINTMENT (OUTPATIENT)
Dept: PHYSICAL THERAPY | Facility: CLINIC | Age: 73
End: 2023-11-08
Payer: COMMERCIAL

## 2023-11-09 ENCOUNTER — OFFICE VISIT (OUTPATIENT)
Dept: PHYSICAL THERAPY | Facility: CLINIC | Age: 73
End: 2023-11-09
Payer: COMMERCIAL

## 2023-11-09 DIAGNOSIS — M17.11 PRIMARY OSTEOARTHRITIS OF RIGHT KNEE: Primary | ICD-10-CM

## 2023-11-09 PROCEDURE — 97112 NEUROMUSCULAR REEDUCATION: CPT

## 2023-11-09 PROCEDURE — 97530 THERAPEUTIC ACTIVITIES: CPT

## 2023-11-09 PROCEDURE — 97110 THERAPEUTIC EXERCISES: CPT

## 2023-11-09 PROCEDURE — 97140 MANUAL THERAPY 1/> REGIONS: CPT

## 2023-11-09 NOTE — PROGRESS NOTES
Daily Note     Today's date: 2023  Patient name: Symone Shaver  : 1950  MRN: [id number]  Referring provider: Izabella Carmichael PA-C  Dx:   Encounter Diagnosis     ICD-10-CM    1. Primary osteoarthritis of right knee  M17.11                      Subjective: Laisha Lee reports he was completing yard work all day, no complaints of R knee during or post activity. Objective: See treatment diary below      Assessment: Dalton tolerated PT treatment well. Passive R knee stretching performed, end range tightness remaining into flexion. Pt was able to complete all interventions today without any limitations except fatigue. Greatest challenge remains with forward step down, limited eccentric quad control. Pt would benefit from continued PT to further address impairments and maximize functional level. Plan: Continue per plan of care.       Precautions:   Past Medical History:   Diagnosis Date    Hyperplastic colon polyp     last assessed 14    Osteoarthritis of both knees 2021    Sleep apnea     last assessed 13       Manuals 11/9  10/26 10/30 11/1   STM HS, adductor, ITB, quad        Pat mobs  JW  JW JW JZ   PROM stretch [address]           Neuro Re-Ed 11/9  10/26 10/30 11/1   SLS        Tandem         2 Cone taps      2x10    Forward step up and over  6" forward step down  6" 3x10 1 hha  6" 2x10 fwd step down heel x10 ea fw/bkw 6" forward step down   Lateral cone step over 15" 2x10 ea  15" 2x10 ea 15" 2x10 ea 15" 2x10 ea                   Ther Ex 11/9  10/26 10/30 11/1   SLR        Long arc quad  20# 3x10   20# 3x10 :05 20# 3x10 :05 20# 3x10    bridge        Heel raise U/l 3x10 ea   U/l 3x10 ea  U/l 3x10 ea  U/l 3x10 ea   Seated HS stretch stool         Seated knee flexion stretch        SAQ 20# 3x10 :05  20# 3x10 :05 20# 3x10 :05    Quad set         Calf stretch step        Heel rasie on step     3x10    Prone knee flexion stretch     :30x3 ea   Supine piriformis stretch        Ellliptical Ther Activity 11/9  10/26 10/30 11/1   RB  Lvl3 5'   Lv 3 5'  Lv 3 5'  Lvl3 5'    STS        U/l LP Seat5 130/ 3x10 ea  seat5 120/ 2x10 seat5 120/ 2x10 Seat5 130/ 3x10 ea   U/l calf press 90/ 3x10 ea  90/ 3x10 90/ 3x10  90/ 3x10 ea   Lateral step down        TM                Cold pack

## 2023-11-10 ENCOUNTER — OFFICE VISIT (OUTPATIENT)
Dept: PHYSICAL THERAPY | Facility: CLINIC | Age: 73
End: 2023-11-10
Payer: COMMERCIAL

## 2023-11-10 DIAGNOSIS — M17.11 PRIMARY OSTEOARTHRITIS OF RIGHT KNEE: Primary | ICD-10-CM

## 2023-11-10 PROCEDURE — 97110 THERAPEUTIC EXERCISES: CPT | Performed by: PHYSICAL THERAPIST

## 2023-11-10 PROCEDURE — 97140 MANUAL THERAPY 1/> REGIONS: CPT | Performed by: PHYSICAL THERAPIST

## 2023-11-10 PROCEDURE — 97112 NEUROMUSCULAR REEDUCATION: CPT | Performed by: PHYSICAL THERAPIST

## 2023-11-10 PROCEDURE — 97530 THERAPEUTIC ACTIVITIES: CPT | Performed by: PHYSICAL THERAPIST

## 2023-11-10 NOTE — PROGRESS NOTES
Daily Note     Today's date: 11/10/2023  Patient name: Harvey Villagran  : 1950  MRN: [id number]  Referring provider: Kera Ron PA-C  Dx:   Encounter Diagnosis     ICD-10-CM    1. Primary osteoarthritis of right knee  M17.11                      Subjective: Feeling good! Objective: See treatment diary below      Assessment: Tolerated treatment well. Patient would benefit from continued PT. 118 degrees PROM today though was very restricted initially. Pt reported notable improvements in knee flexion w/ leg press today. Educated on doing knee flexion stretch at home to maximize ROM as limitations persist.       Plan: Continue per plan of care.       Precautions:   Past Medical History:   Diagnosis Date    Hyperplastic colon polyp     last assessed 14    Osteoarthritis of both knees 2021    Sleep apnea     last assessed 13       Manuals 11/9 11/10  10/30 11/1   STM HS, adductor, ITB, quad        Pat mobs  JW Inf glide G3-4  JW JZ   PROM stretch JW JE  JW JZ           Neuro Re-Ed 11/9   10/30 11/1   SLS        Tandem         2 Cone taps      2x10    Forward step up and over  6" forward step down 6" forward step down  2x10  6" 2x10 fwd step down heel x10 ea fw/bkw 6" forward step down   Lateral cone step over 15" 2x10 ea   15" 2x10 ea 15" 2x10 ea                   Ther Ex 11/9   10/30 11/1   SLR        Long arc quad  20# 3x10  20# 2x15   20# 3x10 :05 20# 3x10    bridge        Heel raise U/l 3x10 ea    U/l 3x10 ea  U/l 3x10 ea   Seated HS stretch stool         Seated knee flexion stretch  On step 10x5 sec HEP (w/ self inf patellar glide)       SAQ 20# 3x10 :05   20# 3x10 :05    Quad set         Calf stretch step        Heel rasie on step     3x10    Prone knee flexion stretch  :30x3 ea HEP   :30x3 ea   Supine piriformis stretch        Ellliptical                Ther Activity 11/9   10/30 11/1   RB  Lvl3 5'  Lvl3 5'   Lv 3 5'  Lvl3 5'    STS        U/l LP Seat5 130/ 3x10 ea Seat5 130/ 3x10 ea seat5 120/ 2x10 Seat5 130/ 3x10 ea   U/l calf press 90/ 3x10 ea   90/ 3x10  90/ 3x10 ea   Lateral step down        TM                Cold pack

## 2023-11-13 ENCOUNTER — OFFICE VISIT (OUTPATIENT)
Dept: PHYSICAL THERAPY | Facility: CLINIC | Age: 73
End: 2023-11-13
Payer: COMMERCIAL

## 2023-11-13 DIAGNOSIS — M17.11 PRIMARY OSTEOARTHRITIS OF RIGHT KNEE: Primary | ICD-10-CM

## 2023-11-13 PROCEDURE — 97112 NEUROMUSCULAR REEDUCATION: CPT

## 2023-11-13 PROCEDURE — 97530 THERAPEUTIC ACTIVITIES: CPT

## 2023-11-13 PROCEDURE — 97110 THERAPEUTIC EXERCISES: CPT

## 2023-11-13 NOTE — PROGRESS NOTES
Daily Note     Today's date: 2023  Patient name: Mackenzie Palomino  : 1950  MRN: [id number]  Referring provider: Pa Cortez PA-C  Dx:   Encounter Diagnosis     ICD-10-CM    1. Primary osteoarthritis of right knee  M17.11                        Subjective: Chandni Lopez reports he has been more diligent with flexion stretching, "I do not want to regress". Objective: See treatment diary below      Assessment: Dalton tolerated PT treatment well. Passive knee stretching performed, end range tightness remaining into flexion. ROM improving following manual interventions. Continued with emphasis on eccentric quad strengthening, gradually improving with stair navigation. Pt would benefit from continued PT to further address impairments and maximize functional level. Plan: Continue per plan of care.       Precautions:   Past Medical History:   Diagnosis Date    Hyperplastic colon polyp     last assessed 14    Osteoarthritis of both knees 2021    Sleep apnea     last assessed 13       Manuals 11/9 11/10 11/13  11/1   STM HS, adductor, ITB, quad        Pat mobs  JW Inf glide G3-4 JW  JZ   PROM stretch JW JE JW  JZ           Neuro Re-Ed    SLS        Tandem         2 Cone taps      2x10    Forward step up and over  6" forward step down 6" forward step down  2x10 6" forward step down 3x10  6" forward step down   Lateral cone step over 15" 2x10 ea  15" 2x10 ea  15" 2x10 ea                   Ther Ex    SLR        Long arc quad  20# 3x10  20# 2x15  20# 3x10   20# 3x10    bridge        Heel raise U/l 3x10 ea     U/l 3x10 ea   Seated HS stretch stool         Seated knee flexion stretch  On step 10x5 sec HEP (w/ self inf patellar glide)       SAQ 20# 3x10 :05       Quad set         Calf stretch step        Heel rasie on step     3x10    Prone knee flexion stretch  :30x3 ea HEP :30x3 ea   :30x3 ea   Supine piriformis stretch        Ellliptical                Ther Activity 11/9 11/1   RB  Lvl3 5'  Lvl3 5'  Lvl3 5'   Lvl3 5'    STS        U/l LP Seat5 130/ 3x10 ea Seat5 130/ 3x10 ea Seat5 130/ 3x10 ea  Seat5 130/ 3x10 ea   U/l calf press 90/ 3x10 ea    90/ 3x10 ea   Lateral step down        TM        Lateral step down    6" 3x10      Cold pack

## 2023-11-15 ENCOUNTER — APPOINTMENT (OUTPATIENT)
Dept: PHYSICAL THERAPY | Facility: CLINIC | Age: 73
End: 2023-11-15
Payer: COMMERCIAL

## 2023-11-16 ENCOUNTER — OFFICE VISIT (OUTPATIENT)
Dept: PHYSICAL THERAPY | Facility: CLINIC | Age: 73
End: 2023-11-16
Payer: COMMERCIAL

## 2023-11-16 DIAGNOSIS — M17.11 PRIMARY OSTEOARTHRITIS OF RIGHT KNEE: Primary | ICD-10-CM

## 2023-11-16 PROCEDURE — 97530 THERAPEUTIC ACTIVITIES: CPT

## 2023-11-16 PROCEDURE — 97112 NEUROMUSCULAR REEDUCATION: CPT

## 2023-11-16 PROCEDURE — 97110 THERAPEUTIC EXERCISES: CPT

## 2023-11-16 NOTE — PROGRESS NOTES
Daily Note     Today's date: 2023  Patient name: Ritesh Lomeli  : 1950  MRN: [id number]  Referring provider: Corey Blizzard, PA-C  Dx:   Encounter Diagnosis     ICD-10-CM    1. Primary osteoarthritis of right knee  M17.11               Start Time: 0815  Stop Time: 09  Total time in clinic (min): 47 minutes    Subjective: Radha De Luna offers no new complaints upon arrival.       Objective: See treatment diary below      Assessment: Radha De Luna tolerated PT treatment well. Continued with passive knee stretching, flexion emphasis. End range tightness remaining. Added slider lunges to diary, cueing required for proper WS distribution. Eccentric quad control improving with stair navigation. Pt would benefit from continued PT to further address impairments and maximize functional level. Plan: Continue per plan of care.       Precautions:   Past Medical History:   Diagnosis Date    Hyperplastic colon polyp     last assessed 14    Osteoarthritis of both knees 2021    Sleep apnea     last assessed 13       Manuals 11/9 11/10 11/13 11/16    STM HS, adductor, ITB, quad        Pat mobs  JW Inf glide G3-4 JW JW    PROM stretch JW JE JW JW            Neuro Re-Ed        SLS        Tandem         2 Cone taps         Forward step up and over  6" forward step down 6" forward step down  2x10 6" forward step down 3x10 6" forward step down 3x10    Lateral cone step over 15" 2x10 ea  15" 2x10 ea 15" 2x10 ea  Fwd/bkw 2x10 ea                     Ther Ex     SLR        Long arc quad  20# 3x10  20# 2x15  20# 3x10      Slider lunge    2x10 ea retro and lateral     bridge        Heel raise U/l 3x10 ea        Seated HS stretch stool         Seated knee flexion stretch  On step 10x5 sec HEP (w/ self inf patellar glide)       SAQ 20# 3x10 :05       Quad set         Calf stretch step        Heel rasie on step        Prone knee flexion stretch  :30x3 ea HEP :30x3 ea  :30x3 ea     Supine piriformis stretch Ellliptical                Ther Activity 11/9       RB  Lvl3 5'  Lvl3 5'  Lvl3 5'  Lvl3 5'     STS        U/l LP Seat5 130/ 3x10 ea Seat5 130/ 3x10 ea Seat5 130/ 3x10 ea Seat5 130/ 3x10 ea    U/l calf press 90/ 3x10 ea       Lateral step down        TM        Lateral step down    6" 3x10      Cold pack

## 2023-11-20 ENCOUNTER — OFFICE VISIT (OUTPATIENT)
Dept: PHYSICAL THERAPY | Facility: CLINIC | Age: 73
End: 2023-11-20
Payer: COMMERCIAL

## 2023-11-20 DIAGNOSIS — M17.11 PRIMARY OSTEOARTHRITIS OF RIGHT KNEE: Primary | ICD-10-CM

## 2023-11-20 PROCEDURE — 97530 THERAPEUTIC ACTIVITIES: CPT

## 2023-11-20 PROCEDURE — 97112 NEUROMUSCULAR REEDUCATION: CPT

## 2023-11-20 PROCEDURE — 97110 THERAPEUTIC EXERCISES: CPT

## 2023-11-20 NOTE — PROGRESS NOTES
Daily Note     Today's date: 2023  Patient name: Leo Cohen  : 1950  MRN: [id number]  Referring provider: Jennifer Melo PA-C  Dx:   Encounter Diagnosis     ICD-10-CM    1. Primary osteoarthritis of right knee  M17.11                          Subjective: Thaddeus Butterfield reports low back and R knee are very stiff [email or web address]. He notes "I wasn't able to do my exercises". Objective: See treatment diary below      Assessment: Dalton tolerated PT treatment well. Continued with passive knee stretching, flexion emphasis. Added lumbar/hip mobility exercises to address subjective complaints. Pt more fatigued throughout exercises today. He denied R knee provocation throughout and post session. Pt would benefit from continued PT to further address impairments and maximize functional level. Plan: Continue per plan of care.       Precautions:   Past Medical History:   Diagnosis Date    Hyperplastic colon polyp     last assessed 14    Osteoarthritis of both knees 2021    Sleep apnea     last assessed 13       Manuals 11/9 11/10 11/13 11/16 11/20   STM HS, adductor, ITB, quad        Pat mobs  JW Inf glide G3-4 JW JW JW   PROM stretch [address]           Neuro Re-Ed    SLS        Tandem         2 Cone taps         Forward step up and over  6" forward step down 6" forward step down  2x10 6" forward step down 3x10 6" forward step down 3x10 6" forward step down 3x10   Lateral cone step over 15" 2x10 ea  15" 2x10 ea 15" 2x10 ea  Fwd/bkw 2x10 ea                     Ther Ex    SLR        Long arc quad  20# 3x10  20# 2x15  20# 3x10      Slider lunge    2x10 ea retro and lateral  2x10 ea retro and lateral    bridge        Heel raise U/l 3x10 ea        Seated HS stretch stool         Seated knee flexion stretch  On step 10x5 sec HEP (w/ self inf patellar glide)       SAQ 20# 3x10 :05       Quad set Calf stretch step        Heel rasie on step        Prone knee flexion stretch  :30x3 ea HEP :30x3 ea  :30x3 ea  :30x3 ea    Supine piriformis stretch        Ellliptical        PETRA     3x10    LTR     :05x10 ea   Ther Activity 11/9       RB  Lvl3 5'  Lvl3 5'  Lvl3 5'  Lvl3 5'  Lvl3 5'    STS        U/l LP Seat5 130/ 3x10 ea Seat5 130/ 3x10 ea Seat5 130/ 3x10 ea Seat5 130/ 3x10 ea Seat5 130/ 3x10 ea   U/l calf press 90/ 3x10 ea       Lateral step down        TM        Lateral step down    6" 3x10      Cold pack

## 2023-11-22 ENCOUNTER — APPOINTMENT (OUTPATIENT)
Dept: PHYSICAL THERAPY | Facility: CLINIC | Age: 73
End: 2023-11-22
Payer: COMMERCIAL

## 2023-11-24 ENCOUNTER — APPOINTMENT (OUTPATIENT)
Dept: PHYSICAL THERAPY | Facility: CLINIC | Age: 73
End: 2023-11-24
Payer: COMMERCIAL

## 2023-11-27 ENCOUNTER — APPOINTMENT (OUTPATIENT)
Dept: PHYSICAL THERAPY | Facility: CLINIC | Age: 73
End: 2023-11-27
Payer: COMMERCIAL

## 2023-11-29 ENCOUNTER — OFFICE VISIT (OUTPATIENT)
Dept: PHYSICAL THERAPY | Facility: CLINIC | Age: 73
End: 2023-11-29
Payer: COMMERCIAL

## 2023-11-29 DIAGNOSIS — M17.11 PRIMARY OSTEOARTHRITIS OF RIGHT KNEE: Primary | ICD-10-CM

## 2023-11-29 PROCEDURE — 97530 THERAPEUTIC ACTIVITIES: CPT

## 2023-11-29 PROCEDURE — 97110 THERAPEUTIC EXERCISES: CPT

## 2023-11-29 PROCEDURE — 97112 NEUROMUSCULAR REEDUCATION: CPT

## 2023-11-29 NOTE — PROGRESS NOTES
Daily Note     Today's date: 2023  Patient name: Mala Rodriguez  : 1950  MRN: [id number]  Referring provider: Stanislaw Short PA-C  Dx:   Encounter Diagnosis     ICD-10-CM    1. Primary osteoarthritis of right knee  M17.11                          Subjective: Altagracia Pitts reports he feels stiff this AM, spent significant time cutting down fallen tree in yard this past weekend. Objective: See treatment diary below      Assessment: Dalton tolerated PT treatment well. Continued with passive knee stretching, flexion emphasis. Pt able to achieve about 123 degrees of flexion with passive stretch. Eccentric quad strength gradually improving, but still limited evident with stair navigation. He denied R knee provocation throughout and post session. Pt would benefit from continued PT to further address impairments and maximize functional level. Plan: Continue per plan of care.       Precautions:   Past Medical History:   Diagnosis Date    Hyperplastic colon polyp     last assessed 14    Osteoarthritis of both knees 2021    Sleep apnea     last assessed 13       Manuals    STM HS, adductor, ITB, quad        Pat mobs  JW  JW JW JW   PROM stretch [address]           Neuro Re-Ed    SLS        Tandem         2 Cone taps         Forward step up and over  6" forward step down 3x10  6" forward step down 3x10 6" forward step down 3x10 6" forward step down 3x10   Lateral cone step over 15" 2x10 ea  Fwd/bkw 2x10 ea   15" 2x10 ea 15" 2x10 ea  Fwd/bkw 2x10 ea                     Ther Ex    SLR        Long arc quad    20# 3x10      Slider lunge 2x10 ea retro and lateral    2x10 ea retro and lateral  2x10 ea retro and lateral    bridge        Heel raise        Seated HS stretch stool         Seated knee flexion stretch        SAQ        Quad set         Calf stretch step        Heel rasie on step        Prone knee flexion stretch :30x3 R :30x3 ea  :30x3 ea  :30x3 ea    Supine piriformis stretch        Ellliptical        PETRA     3x10    LTR     :05x10 ea   Ther Activity 11/29       RB  Lvl 3 5'  Lvl3 5'  Lvl3 5'  Lvl3 5'    STS        U/l LP Seat5 130/ 3x10 ea  Seat5 130/ 3x10 ea Seat5 130/ 3x10 ea Seat5 130/ 3x10 ea   U/l calf press 95/ 3x10        Lateral step down        TM        Lateral step down  6" 3x10   6" 3x10

## 2023-12-01 ENCOUNTER — APPOINTMENT (OUTPATIENT)
Dept: PHYSICAL THERAPY | Facility: CLINIC | Age: 73
End: 2023-12-01
Payer: COMMERCIAL

## 2023-12-01 ENCOUNTER — OFFICE VISIT (OUTPATIENT)
Dept: PHYSICAL THERAPY | Facility: CLINIC | Age: 73
End: 2023-12-01
Payer: COMMERCIAL

## 2023-12-01 DIAGNOSIS — M17.11 PRIMARY OSTEOARTHRITIS OF RIGHT KNEE: Primary | ICD-10-CM

## 2023-12-01 PROCEDURE — 97140 MANUAL THERAPY 1/> REGIONS: CPT | Performed by: PHYSICAL THERAPIST

## 2023-12-01 PROCEDURE — 97530 THERAPEUTIC ACTIVITIES: CPT | Performed by: PHYSICAL THERAPIST

## 2023-12-01 PROCEDURE — 97112 NEUROMUSCULAR REEDUCATION: CPT | Performed by: PHYSICAL THERAPIST

## 2023-12-01 NOTE — PROGRESS NOTES
Daily Note     Today's date: 2023  Patient name: Miguelito Hamilton  : 1950  MRN: [id number]  Referring provider: Barrera Arias PA-C  Dx:   Encounter Diagnosis     ICD-10-CM    1. Primary osteoarthritis of right knee  M17.11              Subjective: Pt reported that "I am getting better, but I am starting to have some difficulty with my left knee."    Objective: See treatment diary below    Assessment: Tolerated treatment well. Patient demonstrated fatigue post treatment, exhibited good technique with therapeutic exercises, and would benefit from continued PT    Plan: Continue per plan of care.       Precautions:   Past Medical History:   Diagnosis Date    Hyperplastic colon polyp     last assessed 14    Osteoarthritis of both knees 2021    Sleep apnea     last assessed 13       Manuals    STM HS, adductor, ITB, quad        Pat mobs  JW JZ  JW JW   PROM stretch [address]           Neuro Re-Ed    SLS        Tandem         2 Cone taps         Forward step up and over  6" forward step down 3x10   6" forward step down 3x10 6" forward step down 3x10   Lateral cone step over 15" 2x10 ea  Fwd/bkw 2x10 ea    15" 2x10 ea  Fwd/bkw 2x10 ea                     Ther Ex    SLR        Long arc quad         Slider lunge 2x10 ea retro and lateral    2x10 ea retro and lateral  2x10 ea retro and lateral    bridge        Heel raise        Seated HS stretch stool   :30x3 ea      Seated knee flexion stretch table  :30x3      SAQ        Quad set         Calf stretch step        Heel rasie on step        Prone knee flexion stretch :30x3 R :30x3 ea  :30x3 ea  :30x3 ea    Supine piriformis stretch                PETRA  3x10   3x10    LTR     :05x10 ea                   Ther Activity       RB  Lvl 3 5'   Lvl3 5'  Lvl3 5'    STS        U/l LP Seat5 130/ 3x10 ea Seat5 100/ 3x10 ea  Seat5 130/ 3x10 ea Seat5 130/ 3x10 ea   U/l calf press 95/ 3x10  100/ 3x10      Lateral step down        TM  Elliptical 5'       Lateral step down  6" 3x10  6" 3x10

## 2023-12-07 ENCOUNTER — OFFICE VISIT (OUTPATIENT)
Dept: PHYSICAL THERAPY | Facility: CLINIC | Age: 73
End: 2023-12-07
Payer: COMMERCIAL

## 2023-12-07 DIAGNOSIS — M17.11 PRIMARY OSTEOARTHRITIS OF RIGHT KNEE: Primary | ICD-10-CM

## 2023-12-07 PROCEDURE — 97110 THERAPEUTIC EXERCISES: CPT

## 2023-12-07 PROCEDURE — 97140 MANUAL THERAPY 1/> REGIONS: CPT

## 2023-12-07 PROCEDURE — 97530 THERAPEUTIC ACTIVITIES: CPT

## 2023-12-07 NOTE — PROGRESS NOTES
Daily Note     Today's date: 2023  Patient name: Mala Rodriguez  : 1950  MRN: [id number]  Referring provider: Stanislaw Short PA-C  Dx:   Encounter Diagnosis     ICD-10-CM    1. Primary osteoarthritis of right knee  M17.11              Subjective: Altagracia Pitts reports "I haven't done much exercise I am home, I am very busy". Pt offers no new complaints regarding R knee since LV. Objective: See treatment diary below    Assessment: Dalton tolerated PT treatment well. Continued to address knee flexion ROM deficits with passive and self stretching. He displays good technique throughout therapeutic exercises. Denied knee provocation with closed chain strengthening. Pt would benefit from continued PT to further address impairments and maximize functional level. Plan: Continue per plan of care.       Precautions:   Past Medical History:   Diagnosis Date    Hyperplastic colon polyp     last assessed 14    Osteoarthritis of both knees 2021    Sleep apnea     last assessed 13       Manuals    STM HS, adductor, ITB, quad        Pat mobs  JW JZ JW  JW   PROM stretch [facility]           Neuro Re-Ed    SLS        Tandem         2 Cone taps         Forward step up and over  6" forward step down 3x10    6" forward step down 3x10   Lateral cone step over 15" 2x10 ea  Fwd/bkw 2x10 ea                        Ther Ex    SLR        Long arc quad         Slider lunge 2x10 ea retro and lateral     2x10 ea retro and lateral    bridge        Heel raise        Seated HS stretch stool   :30x3 ea :30x3 ea      Seated knee flexion stretch table  :30x3 :30x3      SAQ        Quad set         Calf stretch step        Heel rasie on step        Prone knee flexion stretch :30x3 R :30x3 ea :30x3 ea   :30x3 ea    Supine piriformis stretch                PETRA  3x10   3x10    LTR     :05x10 ea                   Ther Activity      RB  Lvl 3 5'  Lvl 3 5'   Lvl3 5'    STS        U/l LP Seat5 130/ 3x10 ea Seat5 100/ 3x10 ea Seat5 100/ 3x10 ea  Seat5 130/ 3x10 ea   U/l calf press 95/ 3x10  100/ 3x10 100/ 3x10     Lateral step down        TM  Elliptical 5'       Lateral step down  6" 3x10  6" 3x10 6" 3x10 lat/fwd ea

## 2023-12-09 DIAGNOSIS — I10 ESSENTIAL HYPERTENSION: ICD-10-CM

## 2023-12-10 RX ORDER — OLMESARTAN MEDOXOMIL AND HYDROCHLOROTHIAZIDE 20/12.5 20; 12.5 MG/1; MG/1
TABLET ORAL
Qty: 90 TABLET | Refills: 3 | Status: SHIPPED | OUTPATIENT
Start: 2023-12-10

## 2023-12-11 ENCOUNTER — OFFICE VISIT (OUTPATIENT)
Dept: PHYSICAL THERAPY | Facility: CLINIC | Age: 73
End: 2023-12-11
Payer: COMMERCIAL

## 2023-12-11 DIAGNOSIS — M17.11 PRIMARY OSTEOARTHRITIS OF RIGHT KNEE: Primary | ICD-10-CM

## 2023-12-11 PROCEDURE — 97140 MANUAL THERAPY 1/> REGIONS: CPT

## 2023-12-11 PROCEDURE — 97530 THERAPEUTIC ACTIVITIES: CPT

## 2023-12-11 PROCEDURE — 97112 NEUROMUSCULAR REEDUCATION: CPT

## 2023-12-11 PROCEDURE — 97110 THERAPEUTIC EXERCISES: CPT

## 2023-12-11 NOTE — PROGRESS NOTES
Daily Note     Today's date: 2023  Patient name: Dima Glass  : 1950  MRN: [id number]  Referring provider: Surekha Graham PA-C  Dx:   Encounter Diagnosis     ICD-10-CM    1. Primary osteoarthritis of right knee  M17.11                Subjective: Myke Bucks offers no new complaints upon arrival, R knee continues to gradually improve with strength and ROM. Objective: See treatment diary below    Assessment: Dalton tolerated PT treatment well. Initiated session with elliptical for functional warm up. Knee flexion ROM is gradually increasing, end range tightness present. Continues to have greatest challenge with maintaining eccentric quad control with forward step down, fatigues fairly quickly. Improved stability observed with SL balance training. Pt would benefit from continued PT to further address impairments and maximize functional level. Plan: Continue per plan of care.       Precautions:   Past Medical History:   Diagnosis Date    Hyperplastic colon polyp     last assessed 14    Osteoarthritis of both knees 2021    Sleep apnea     last assessed 13       Manuals     STM HS, adductor, ITB, quad        Pat mobs  JW JZ JW JW    PROM stretch [address] [facility]            Neuro Re-Ed     SLS    :30x3 ea     Tandem         2 Cone taps         Forward step up and over  6" forward step down 3x10       Lateral cone step over 15" 2x10 ea  Fwd/bkw 2x10 ea    15" 2x10 ea  Fwd/bkw 2x10 ea                     Ther Ex     SLR        Long arc quad         Slider lunge 2x10 ea retro and lateral    Retro 2x10 ea     bridge        Heel raise    U/l 30x ea    Seated HS stretch stool   :30x3 ea :30x3 ea  :30x3 ea     Seated knee flexion stretch table  :30x3 :30x3  :30x3    SAQ        Quad set         Calf stretch step        Heel rasie on step        Prone knee flexion stretch :30x3 R :30x3 ea :30x3 ea  :30x3 ea     Supine piriformis stretch PETRA  3x10      LTR                        Ther Activity 11/29 12/1 12/7 12/11    RB  Lvl 3 5'  Lvl 3 5'  Elliptical 5'    STS        U/l LP Seat5 130/ 3x10 ea Seat5 100/ 3x10 ea Seat5 100/ 3x10 ea Seat5 100/ 3x10 ea    U/l calf press 95/ 3x10  100/ 3x10 100/ 3x10 100/ 3x10 ea    Lateral step down        TM  Elliptical 5'       Lateral step down  6" 3x10  6" 3x10 6" 3x10 lat/fwd ea 6" 3x10 lat/fwd ea

## 2023-12-14 ENCOUNTER — OFFICE VISIT (OUTPATIENT)
Dept: PHYSICAL THERAPY | Facility: CLINIC | Age: 73
End: 2023-12-14
Payer: COMMERCIAL

## 2023-12-14 DIAGNOSIS — M17.11 PRIMARY OSTEOARTHRITIS OF RIGHT KNEE: Primary | ICD-10-CM

## 2023-12-14 PROCEDURE — 97530 THERAPEUTIC ACTIVITIES: CPT

## 2023-12-14 PROCEDURE — 97110 THERAPEUTIC EXERCISES: CPT

## 2023-12-14 NOTE — PROGRESS NOTES
Daily Note     Today's date: 2023  Patient name: Elizabeth Yang  : 1950  MRN: [id number]  Referring provider: Samantha Talbert PA-C  Dx:   Encounter Diagnosis     ICD-10-CM    1. Primary osteoarthritis of right knee  M17.11                  Subjective: Lilian Cervantes reports he order SOS strap for home use, continues to work on knee flexion. Denies R knee discomfort throughout daily activities. Objective: See treatment diary below    Assessment: Dalton tolerated PT treatment well. Continued to address flexion ROM deficits with passive and self stretching. Eccentric quad control improving with stair navigation. Added deep squat to program, pt completed without knee provocation. He displays good technique throughout therapeutic exercises. Pt would benefit from continued PT to further address impairments and maximize functional level. Plan: Continue per plan of care.       Precautions:   Past Medical History:   Diagnosis Date    Hyperplastic colon polyp     last assessed 14    Osteoarthritis of both knees 2021    Sleep apnea     last assessed 13       Manuals    STM HS, adductor, ITB, quad        Pat mobs   [address] [facility]   PROM stretch [facility]           Neuro Re-Ed    SLS    :30x3 ea     Tandem         2 Cone taps         Forward step up and over  6" forward step down 3x10       Lateral cone step over 15" 2x10 ea  Fwd/bkw 2x10 ea    15" 2x10 ea  Fwd/bkw 2x10 ea                     Ther Ex    SLR        Long arc quad         Slider lunge 2x10 ea retro and lateral    Retro 2x10 ea  Retro 3x10 ea    bridge        Heel raise    U/l 30x ea    Seated HS stretch stool   :30x3 ea :30x3 ea  :30x3 ea     Seated knee flexion stretch table  :30x3 :30x3  :30x3 :30x3    SAQ        Quad set         Calf stretch step        Heel rasie on step        Prone knee flexion stretch :30x3 R :30x3 ea :30x3 ea  :30x3 ea  :30x3 Supine piriformis stretch                PETRA  3x10      LTR                        Ther Activity 11/29 12/1 12/7 12/11 12/14   RB  Lvl 3 5'  Lvl 3 5'  Elliptical 5' Elliptical 5'   STS     B/L squat 2x10    U/l LP Seat5 130/ 3x10 ea Seat5 100/ 3x10 ea Seat5 100/ 3x10 ea Seat5 100/ 3x10 ea Seat5 100/ 3x10 ea   U/l calf press 95/ 3x10  100/ 3x10 100/ 3x10 100/ 3x10 ea 100/ 3x10 ea   Lateral step down        TM  Elliptical 5'       Lateral step down  6" 3x10  6" 3x10 6" 3x10 lat/fwd ea 6" 3x10 lat/fwd ea 6" 3x10 lat/fwd ea

## 2023-12-21 ENCOUNTER — OFFICE VISIT (OUTPATIENT)
Dept: PHYSICAL THERAPY | Facility: CLINIC | Age: 73
End: 2023-12-21
Payer: COMMERCIAL

## 2023-12-21 DIAGNOSIS — M17.11 PRIMARY OSTEOARTHRITIS OF RIGHT KNEE: Primary | ICD-10-CM

## 2023-12-21 PROCEDURE — 97110 THERAPEUTIC EXERCISES: CPT

## 2023-12-21 PROCEDURE — 97530 THERAPEUTIC ACTIVITIES: CPT

## 2023-12-21 NOTE — PROGRESS NOTES
"Daily Note     Today's date: 2023  Patient name: Dalton Vail  : 1950  MRN: [id number]  Referring provider: Devorah Candelaria PA-C  Dx:   Encounter Diagnosis     ICD-10-CM    1. Primary osteoarthritis of right knee  M17.11                    Subjective: Dalton reports continued improvement with R knee. Pt states he was able to get up from low surface without challenge or pain.     Objective: See treatment diary below    Assessment: Dalton tolerated PT treatment well. Continued with passive and self stretching to address end range flexion ROM deficits. He displays good technique throughout therapeutic exercises. Fatigue evident post session. Pt would benefit from continued PT to further address impairments and maximize functional level.       Plan: Continue per plan of care.      Precautions:   Past Medical History:   Diagnosis Date    Hyperplastic colon polyp     last assessed 14    Osteoarthritis of both knees 2021    Sleep apnea     last assessed 13       Manuals    STM HS, adductor, ITB, quad        Pat mobs  JW  JW JW JW   PROM stretch JW  JW JW JW           Neuro Re-Ed    SLS    :30x3 ea     Tandem         2 Cone taps         Forward step up and over         Lateral cone step over    15\" 2x10 ea  Fwd/bkw 2x10 ea                     Ther Ex    SLR        Long arc quad         Slider lunge Retro 3x10 ea    Retro 2x10 ea  Retro 3x10 ea    bridge        Heel raise U/l 30x ea    U/l 30x ea    Seated HS stretch stool    :30x3 ea  :30x3 ea     Seated knee flexion stretch table :30x3   :30x3  :30x3 :30x3    SAQ        Quad set         Calf stretch step        Heel rasie on step        Prone knee flexion stretch :30x3   :30x3 ea  :30x3 ea  :30x3    Supine piriformis stretch                PETRA        LTR                        Ther Activity    RB  Elliptical 5'  Lvl 3 5'  Elliptical 5' Elliptical 5'   STS " "B/L squat 3x10 tap chair     B/L squat 2x10    U/l LP Seat5 100/ 3x10 ea  Seat5 100/ 3x10 ea Seat5 100/ 3x10 ea Seat5 100/ 3x10 ea   U/l calf press 100/ 3x10 ea  100/ 3x10 100/ 3x10 ea 100/ 3x10 ea   Lateral step down        TM        Lateral step down    6\" 3x10 lat/fwd ea 6\" 3x10 lat/fwd ea 6\" 3x10 lat/fwd ea            "

## 2023-12-28 ENCOUNTER — OFFICE VISIT (OUTPATIENT)
Dept: PHYSICAL THERAPY | Facility: CLINIC | Age: 73
End: 2023-12-28
Payer: COMMERCIAL

## 2023-12-28 DIAGNOSIS — M17.11 PRIMARY OSTEOARTHRITIS OF RIGHT KNEE: Primary | ICD-10-CM

## 2023-12-28 PROCEDURE — 97110 THERAPEUTIC EXERCISES: CPT

## 2023-12-28 PROCEDURE — 97140 MANUAL THERAPY 1/> REGIONS: CPT

## 2023-12-28 PROCEDURE — 97530 THERAPEUTIC ACTIVITIES: CPT

## 2023-12-28 NOTE — PROGRESS NOTES
"Daily Note     Today's date: 2023  Patient name: Dalton Vail  : 1950  MRN: [id number]  Referring provider: Devorah Candelaria PA-C  Dx:   Encounter Diagnosis     ICD-10-CM    1. Primary osteoarthritis of right knee  M17.11                    Subjective: Dalton offers no new complaints upon arrival.     Objective: See treatment diary below    Assessment: Dalton tolerated PT treatment well. Continued with passive and self stretching to address knee ROM, able to achieve 118 degrees of flexion today. Challenged with current exercise program, good technique display throughout. Fatigue evident post session. Pt would benefit from continued PT to further address impairments and maximize functional level.       Plan: Continue per plan of care.      Precautions:   Past Medical History:   Diagnosis Date    Hyperplastic colon polyp     last assessed 14    Osteoarthritis of both knees 2021    Sleep apnea     last assessed 13       Manuals    STM HS, adductor, ITB, quad  JW      Pat mobs  JW JW  JW JW   PROM stretch JW JW  JW JW           Neuro Re-Ed    SLS    :30x3 ea     Tandem         2 Cone taps         Forward step up and over         Lateral cone step over    15\" 2x10 ea  Fwd/bkw 2x10 ea                     Ther Ex    SLR        Long arc quad         Slider lunge Retro 3x10 ea  Retro 3x10 ea   Lateral 3x10 ea   Retro 2x10 ea  Retro 3x10 ea    bridge        Heel raise U/l 30x ea  U/l 3x10 ea  U/l 30x ea    Seated HS stretch stool     :30x3 ea     Seated knee flexion stretch table :30x3  :30x3   :30x3 :30x3    SAQ        Quad set         Calf stretch step        Heel rasie on step        Prone knee flexion stretch :30x3  :30x3   :30x3 ea  :30x3    Supine piriformis stretch                PETRA        LTR                        Ther Activity    RB  Elliptical 5' Elliptical 5'  Elliptical 5' Elliptical 5'   STS " "B/L squat 3x10 tap chair  B/L squat 3x10 tap chair    B/L squat 2x10    U/l LP Seat5 100/ 3x10 ea Seat5 100/ 3x10 ea  Seat5 100/ 3x10 ea Seat5 100/ 3x10 ea   U/l calf press 100/ 3x10 ea 100/ 3x10 ea  100/ 3x10 ea 100/ 3x10 ea   Lateral step down        TM        Lateral step down     6\" 3x10 lat/fwd ea 6\" 3x10 lat/fwd ea            "

## 2024-01-02 ENCOUNTER — EVALUATION (OUTPATIENT)
Dept: PHYSICAL THERAPY | Facility: CLINIC | Age: 74
End: 2024-01-02
Payer: COMMERCIAL

## 2024-01-02 DIAGNOSIS — M17.11 PRIMARY OSTEOARTHRITIS OF RIGHT KNEE: Primary | ICD-10-CM

## 2024-01-02 PROCEDURE — 97110 THERAPEUTIC EXERCISES: CPT | Performed by: PHYSICAL THERAPIST

## 2024-01-02 PROCEDURE — 97140 MANUAL THERAPY 1/> REGIONS: CPT | Performed by: PHYSICAL THERAPIST

## 2024-01-02 PROCEDURE — 97530 THERAPEUTIC ACTIVITIES: CPT | Performed by: PHYSICAL THERAPIST

## 2024-01-02 NOTE — LETTER
2024    Devorah Candelaria PA-C  [facility], [address] [place] [id number]    Patient: Dalton Vail   YOB: 1950   Date of Visit: 2024     Encounter Diagnosis     ICD-10-CM    1. Primary osteoarthritis of right knee  M17.11           Dear Dr. Candelaria:    Thank you for your recent referral of Dalton Vail. Please review the attached evaluation summary from Dalton's recent visit.     Please verify that you agree with the plan of care by signing the attached order.     If you have any questions or concerns, please do not hesitate to call.     I sincerely appreciate the opportunity to share in the care of one of your patients and hope to have another opportunity to work with you in the near future.       Sincerely,    Giacomo Yao, PT      Referring Provider:      I certify that I have read the below Plan of Care and certify the need for these services furnished under this plan of treatment while under my care.                    Devorah Candelaria PA-C  [facility], [address] [address]  Via Fax: [phone]          PT Re-Evaluation     Today's date: 2024  Patient name: Dalton Vail  : 1950  MRN: [id number]  Referring provider: Ronn Palmer DO  Dx:   Encounter Diagnosis     ICD-10-CM    1. Primary osteoarthritis of right knee  M17.11           Assessment  Assessment details:   Since beginning PT Dalton reports GROC improvement of 80%. MMT demonstratd improved strength. Goniometry demonstrated improved flexibility. Balance assessment demonstrated improvement. Functional outcome measures and subjective reports demonstrated improved functional ability. Despite improvements Dalton continues to present with pain, decreased strength, decreased ROM, decreased joint mobility, joint effusion, ambulatory dysfunction and balance dysfunction. Due to these impairments, patient has difficulty performing ADL's, recreational activities, engaging in social activities,  ambulation, stair negotiation, lifting/carrying, transfers. Patient's clinical presentation is consistent with their referring diagnosis of S/p R/L TKA performed on 7/24/24. Patient has been educated in post-op contraindications / precautions, gait training, weight bearing status, home exercise program and plan of care. Patient would benefit from skilled physical therapy services to address their aforementioned functional limitations and progress towards prior level of function and independence with home exercise program.     Impairments: abnormal gait, abnormal or restricted ROM, activity intolerance, impaired balance, impaired physical strength, lacks appropriate home exercise program, pain with function, weight-bearing intolerance, poor posture  and poor body mechanics  Functional limitations: walk/stand, STS, stair negotiation, yard work, walk uneven surfaces   Prognosis: good  Prognosis details: + factors: high motivation levels, active lifestyle  - factors: chronic pain, BMI    Goals  Short Term Goals to be accomplished by 8/28/23:  STG1: Pt will be I with HEP. Achieved.   STG2: Pt will demo 0-100 degrees in knee AROM to improve gait. Achieved.   STG3: Pt will demo 4/5 MMT strength in knee to improve stair negotiation. Achieved.   STG4: Pt will amb community distance without gait deviates due to pain. (Progressing toward).      Long Term Goals to be accomplished by 2/30/24:   LTG1: Pt will be able to descend stairs with reciprocal gait pattern without use of handrail. (50% achieved)  LTG2: Pt will be able to walk on uneven surfaces on property to do yard work. (50% achieved)  LTG3: Pt will be able to perform STS with normal mechanics without pain. (50% achieved)      Plan  Plan details: HEP development, stretching, strengthening, A/AA/PROM, joint mobilizations, posture education, STM/MI as needed to reduce muscle tension, muscle reeducation, PLOC discussed and agreed upon with patient.      Patient would  benefit from: PT eval and skilled physical therapy  Planned modality interventions: cryotherapy, thermotherapy: hydrocollator packs and unattended electrical stimulation  Planned therapy interventions: manual therapy, neuromuscular re-education, self care, therapeutic activities, therapeutic exercise, home exercise program, patient education, joint mobilization, balance, strengthening, stretching, therapeutic training and flexibility  Frequency: 2x week    Plan of Care beginning date: 2023  Plan of Care expiration date:   Treatment plan discussed with: patient      Subjective Evaluation    History of Present Illness  Mechanism of injury: Pt is a 71 y/o male who presents to PT s/p R TKA performed on 23. Had a surgery on  to have hardware removed from his knee from previous ORIF. Then he had the TKA on 23 Stated that the day of the surgery there was complications with bleeding and the surgeon had to add additional staples.   Had home care PT for some visits which improved his symptoms.   Since then he has been improving. Notes that he has been walking around his home without AD.   Improved symptoms from ice and elevation.   Patient Goals  Patient goals for therapy: increased motion, improved balance, decreased pain, decreased edema, increased strength and independence with ADLs/IADLs  Patient goal: walk/stand, STS, stair negotiation, yard work, walk uneven surfaces  Pain  Current pain ratin  At best pain ratin  At worst pain rating: 3  Location: R TKA   Quality: tight, discomfort and pulling  Aggravating factors: stair climbing, walking, standing and lifting    Treatments  Current treatment: physical therapy      Objective     Observations     Additional Observation Details  Incision: covered with bandage still     Tenderness     Additional Tenderness Details  TTP over R ITB, calf, adductors, HS    Active Range of Motion     Right Knee   Flexion: 115 degrees with  pain  Extension: 0 degrees with pain    Passive Range of Motion     Right Knee   Flexion: 118 degrees with pain  Extension: 0 degrees with pain    Strength/Myotome Testing     Right Knee   Flexion: 5  Extension: 5    Additional Strength Details  U/l heel raise:   R: 20 reps  L: 20 reps     Extension La degree       Tests     Right Knee   Negative valgus stress test at 30 degrees and varus stress test at 30 degrees.     Additional Tests Details  TU sec     30 sec STS: 8 reps     SLS:   R: 30, 30 sec  L: 30, 30 sec       Precautions:   Past Medical History:   Diagnosis Date   • Hyperplastic colon polyp     last assessed 14   • Osteoarthritis of both knees 2021   • Sleep apnea     last assessed 13     Manuals    STM HS, adductor, ITB, quad  JW JZ     Pat mobs  JW JW JDIONICIO  JW   PROM stretch JW JW MARILEE  JW           Neuro Re-Ed    SLS        Tandem         2 Cone taps         Forward step up and over         Lateral cone step over                        Ther Ex    SLR        Long arc quad         Slider lunge Retro 3x10 ea  Retro 3x10 ea   Lateral 3x10 ea    Retro 3x10 ea    bridge        Heel raise U/l 30x ea  U/l 3x10 ea Step 3x15     Seated HS stretch stool    Stand :30x3 ea     Seated knee flexion stretch table :30x3  :30x3    :30x3    SAQ        Quad set         Calf stretch step        Heel rasie on step        Prone knee flexion stretch :30x3  :30x3  :30x3   :30x3            Stand knee flexion stretch step    :30x3      PETRA   2x10      LTR                        Ther Activity    RB  Elliptical 5' Elliptical 5' Elliptical 5'   Elliptical 5'   STS B/L squat 3x10 tap chair  B/L squat 3x10 tap chair    B/L squat 2x10    U/l LP Seat5 100/ 3x10 ea Seat5 100/ 3x10 ea Seat5 100/ 3x10   Seat5 100/ 3x10 ea   U/l calf press 100/ 3x10 ea 100/ 3x10 ea 100/ 3x10 ea  100/ 3x10 ea   Lateral step down        TM       "  Lateral step down      6\" 3x10 lat/fwd ea                     "

## 2024-01-02 NOTE — PROGRESS NOTES
PT Re-Evaluation     Today's date: 2024  Patient name: Dalton Vail  : 1950  MRN: [id number]  Referring provider: Ronn Palmer DO  Dx:   Encounter Diagnosis     ICD-10-CM    1. Primary osteoarthritis of right knee  M17.11           Assessment  Assessment details:   Since beginning PT Dalton reports GROC improvement of 80%. MMT demonstratd improved strength. Goniometry demonstrated improved flexibility. Balance assessment demonstrated improvement. Functional outcome measures and subjective reports demonstrated improved functional ability. Despite improvements Dalton continues to present with pain, decreased strength, decreased ROM, decreased joint mobility, joint effusion, ambulatory dysfunction and balance dysfunction. Due to these impairments, patient has difficulty performing ADL's, recreational activities, engaging in social activities, ambulation, stair negotiation, lifting/carrying, transfers. Patient's clinical presentation is consistent with their referring diagnosis of S/p R/L TKA performed on 24. Patient has been educated in post-op contraindications / precautions, gait training, weight bearing status, home exercise program and plan of care. Patient would benefit from skilled physical therapy services to address their aforementioned functional limitations and progress towards prior level of function and independence with home exercise program.     Impairments: abnormal gait, abnormal or restricted ROM, activity intolerance, impaired balance, impaired physical strength, lacks appropriate home exercise program, pain with function, weight-bearing intolerance, poor posture  and poor body mechanics  Functional limitations: walk/stand, STS, stair negotiation, yard work, walk uneven surfaces   Prognosis: good  Prognosis details: + factors: high motivation levels, active lifestyle  - factors: chronic pain, BMI    Goals  Short Term Goals to be accomplished by 23:  STG1: Pt will be I with HEP.  Achieved.   STG2: Pt will demo 0-100 degrees in knee AROM to improve gait. Achieved.   STG3: Pt will demo 4/5 MMT strength in knee to improve stair negotiation. Achieved.   STG4: Pt will amb community distance without gait deviates due to pain. (Progressing toward).      Long Term Goals to be accomplished by 2/30/24:   LTG1: Pt will be able to descend stairs with reciprocal gait pattern without use of handrail. (50% achieved)  LTG2: Pt will be able to walk on uneven surfaces on property to do yard work. (50% achieved)  LTG3: Pt will be able to perform STS with normal mechanics without pain. (50% achieved)      Plan  Plan details: HEP development, stretching, strengthening, A/AA/PROM, joint mobilizations, posture education, STM/MI as needed to reduce muscle tension, muscle reeducation, PLOC discussed and agreed upon with patient.      Patient would benefit from: PT eval and skilled physical therapy  Planned modality interventions: cryotherapy, thermotherapy: hydrocollator packs and unattended electrical stimulation  Planned therapy interventions: manual therapy, neuromuscular re-education, self care, therapeutic activities, therapeutic exercise, home exercise program, patient education, joint mobilization, balance, strengthening, stretching, therapeutic training and flexibility  Frequency: 2x week    Plan of Care beginning date: 7/31/2023  Plan of Care expiration date: 2/30/2024  Treatment plan discussed with: patient      Subjective Evaluation    History of Present Illness  Mechanism of injury: Pt is a 71 y/o male who presents to PT s/p R TKA performed on 7/24/23. Had a surgery on March 1st to have hardware removed from his knee from previous ORIF. Then he had the TKA on 7/24/23 Stated that the day of the surgery there was complications with bleeding and the surgeon had to add additional staples.   Had home care PT for some visits which improved his symptoms.   Since then he has been improving. Notes that he has  been walking around his home without AD.   Improved symptoms from ice and elevation.   Patient Goals  Patient goals for therapy: increased motion, improved balance, decreased pain, decreased edema, increased strength and independence with ADLs/IADLs  Patient goal: walk/stand, STS, stair negotiation, yard work, walk uneven surfaces  Pain  Current pain ratin  At best pain ratin  At worst pain rating: 3  Location: R TKA   Quality: tight, discomfort and pulling  Aggravating factors: stair climbing, walking, standing and lifting    Treatments  Current treatment: physical therapy      Objective     Observations     Additional Observation Details  Incision: covered with bandage still     Tenderness     Additional Tenderness Details  TTP over R ITB, calf, adductors, HS    Active Range of Motion     Right Knee   Flexion: 115 degrees with pain  Extension: 0 degrees with pain    Passive Range of Motion     Right Knee   Flexion: 118 degrees with pain  Extension: 0 degrees with pain    Strength/Myotome Testing     Right Knee   Flexion: 5  Extension: 5    Additional Strength Details  U/l heel raise:   R: 20 reps  L: 20 reps     Extension La degree       Tests     Right Knee   Negative valgus stress test at 30 degrees and varus stress test at 30 degrees.     Additional Tests Details  TU sec     30 sec STS: 8 reps     SLS:   R: 30, 30 sec  L: 30, 30 sec       Precautions:   Past Medical History:   Diagnosis Date    Hyperplastic colon polyp     last assessed 14    Osteoarthritis of both knees 2021    Sleep apnea     last assessed 13     Manuals    STM HS, adductor, ITB, quad  VAL HUNT     Pat mobs  JW JW MARILEE  JW   PROM stretch JW VAL HUNT  JW           Neuro Re-Ed    SLS        Tandem         2 Cone taps         Forward step up and over         Lateral cone step over                        Ther Ex    SLR        Long arc quad         Slider  "lunge Retro 3x10 ea  Retro 3x10 ea   Lateral 3x10 ea    Retro 3x10 ea    bridge        Heel raise U/l 30x ea  U/l 3x10 ea Step 3x15     Seated HS stretch stool    Stand :30x3 ea     Seated knee flexion stretch table :30x3  :30x3    :30x3    SAQ        Quad set         Calf stretch step        Heel rasie on step        Prone knee flexion stretch :30x3  :30x3  :30x3   :30x3            Stand knee flexion stretch step    :30x3      PETRA   2x10      LTR                        Ther Activity 12/21 12/28 1/2 12/14   RB  Elliptical 5' Elliptical 5' Elliptical 5'   Elliptical 5'   STS B/L squat 3x10 tap chair  B/L squat 3x10 tap chair    B/L squat 2x10    U/l LP Seat5 100/ 3x10 ea Seat5 100/ 3x10 ea Seat5 100/ 3x10   Seat5 100/ 3x10 ea   U/l calf press 100/ 3x10 ea 100/ 3x10 ea 100/ 3x10 ea  100/ 3x10 ea   Lateral step down        TM        Lateral step down      6\" 3x10 lat/fwd ea     "

## 2024-01-05 ENCOUNTER — OFFICE VISIT (OUTPATIENT)
Dept: PHYSICAL THERAPY | Facility: CLINIC | Age: 74
End: 2024-01-05
Payer: COMMERCIAL

## 2024-01-05 DIAGNOSIS — M17.11 PRIMARY OSTEOARTHRITIS OF RIGHT KNEE: Primary | ICD-10-CM

## 2024-01-05 PROCEDURE — 97140 MANUAL THERAPY 1/> REGIONS: CPT | Performed by: PHYSICAL THERAPIST

## 2024-01-05 PROCEDURE — 97110 THERAPEUTIC EXERCISES: CPT | Performed by: PHYSICAL THERAPIST

## 2024-01-05 PROCEDURE — 97530 THERAPEUTIC ACTIVITIES: CPT | Performed by: PHYSICAL THERAPIST

## 2024-01-05 NOTE — PROGRESS NOTES
"Daily Note     Today's date: 2024  Patient name: Dalton Vail  : 1950  MRN: [id number]  Referring provider: Devorah Candelaria PA-C  Dx:   Encounter Diagnosis     ICD-10-CM    1. Primary osteoarthritis of right knee  M17.11              Subjective: Pt reported that \"I have been doing well. Getting stronger.\"     Objective: See treatment diary below    Assessment: Tolerated treatment well. Patient demonstrated fatigue post treatment, exhibited good technique with therapeutic exercises, and would benefit from continued PT    Plan: Continue per plan of care.      Precautions:   Past Medical History:   Diagnosis Date    Hyperplastic colon polyp     last assessed 14    Osteoarthritis of both knees 2021    Sleep apnea     last assessed 13       Manuals    STM HS, adductor, ITB, quad  JW JZ     Pat mobs  JW JW JZ  JW   PROM stretch JW JW JZ  JW           Neuro Re-Ed    SLS        Tandem         2 Cone taps         Forward step up and over         Lateral cone step over                        Ther Ex    SLR        Long arc quad         Slider lunge Retro 3x10 ea  Retro 3x10 ea   Lateral 3x10 ea  Retro 3x10 ea  Lateral   Retro 3x10 ea    bridge        Heel raise U/l 30x ea  U/l 3x10 ea Step 3x15      Seated HS stretch stool         Seated knee flexion stretch table :30x3  :30x3  Stand :30x3   :30x3    SAQ        Quad set         Calf stretch step   :30x3      Heel rasie on step        Prone knee flexion stretch :30x3  :30x3    :30x3    Supine piriformis stretch                PETRA        LTR                        Ther Activity    RB  Elliptical 5' Elliptical 5' Elliptical 5'   Elliptical 5'   STS B/L squat 3x10 tap chair  B/L squat 3x10 tap chair  B/l squat 3x10  B/L squat 2x10    U/l LP Seat5 100/ 3x10 ea Seat5 100/ 3x10 ea Seat5  110/ 3x10 ea  Seat5 100/ 3x10 ea   b/l calf press 100/ 3x10 ea 100/ 3x10 ea 110/ " "3x10 ea  100/ 3x10 ea   Lateral step down        TM        Lateral step down      6\" 3x10 lat/fwd ea              "

## 2024-01-09 ENCOUNTER — OFFICE VISIT (OUTPATIENT)
Dept: PHYSICAL THERAPY | Facility: CLINIC | Age: 74
End: 2024-01-09
Payer: COMMERCIAL

## 2024-01-09 DIAGNOSIS — M17.11 PRIMARY OSTEOARTHRITIS OF RIGHT KNEE: Primary | ICD-10-CM

## 2024-01-09 PROCEDURE — 97110 THERAPEUTIC EXERCISES: CPT

## 2024-01-09 PROCEDURE — 97530 THERAPEUTIC ACTIVITIES: CPT

## 2024-01-09 NOTE — PROGRESS NOTES
"Daily Note     Today's date: 2024  Patient name: Dalton Vail  : 1950  MRN: [id number]  Referring provider: Devorah Candelaria PA-C  Dx:   Encounter Diagnosis     ICD-10-CM    1. Primary osteoarthritis of right knee  M17.11              Subjective: Dalton reports \"my low back and both knees are very stiff today, after the long car ride to [place]\".     Objective: See treatment diary below    Assessment: Dalton tolerated PT treatment well. Continued with passive stretching to R knee to address flexion deficits. Pt is challenged with current exercise program. Cues required with B/L squat to keep upright posture. Pt would benefit from continued PT to further address impairments and maximize functional level.      Plan: Continue per plan of care.      Precautions:   Past Medical History:   Diagnosis Date    Hyperplastic colon polyp     last assessed 14    Osteoarthritis of both knees 2021    Sleep apnea     last assessed 13       Manuals     STM HS, adductor, ITB, quad  JW JZ JW    Pat mobs  JW JW JZ JW    PROM stretch JW JW JZ JW            Neuro Re-Ed     SLS        Tandem         2 Cone taps         Forward step up and over         Lateral cone step over                        Ther Ex     SLR        Long arc quad         Slider lunge Retro 3x10 ea  Retro 3x10 ea   Lateral 3x10 ea  Retro 3x10 ea  Lateral  Retro 3x10 ea  Lateral     bridge        Heel raise U/l 30x ea  U/l 3x10 ea Step 3x15  Step 3x15     Seated HS stretch stool         Seated knee flexion stretch table :30x3  :30x3  Stand :30x3  :30x3     SAQ        Quad set         Calf stretch step   :30x3      Heel rasie on step        Prone knee flexion stretch :30x3  :30x3   :30x3     Supine piriformis stretch                PETRA        LTR                        Ther Activity     RB  Elliptical 5' Elliptical 5' Elliptical 5'  Elliptical 5'     STS B/L squat 3x10 " tap chair  B/L squat 3x10 tap chair  B/l squat 3x10 B/l squat 3x10    U/l LP Seat5 100/ 3x10 ea Seat5 100/ 3x10 ea Seat5  110/ 3x10 ea eat5  110/ 3x10 ea    b/l calf press 100/ 3x10 ea 100/ 3x10 ea 110/ 3x10 ea 110/ 3x10 ea    Lateral step down        TM        Lateral step down

## 2024-01-12 ENCOUNTER — OFFICE VISIT (OUTPATIENT)
Dept: PHYSICAL THERAPY | Facility: CLINIC | Age: 74
End: 2024-01-12
Payer: COMMERCIAL

## 2024-01-12 DIAGNOSIS — M17.11 PRIMARY OSTEOARTHRITIS OF RIGHT KNEE: Primary | ICD-10-CM

## 2024-01-12 PROCEDURE — 97530 THERAPEUTIC ACTIVITIES: CPT | Performed by: PHYSICAL THERAPIST

## 2024-01-12 PROCEDURE — 97140 MANUAL THERAPY 1/> REGIONS: CPT | Performed by: PHYSICAL THERAPIST

## 2024-01-12 PROCEDURE — 97110 THERAPEUTIC EXERCISES: CPT | Performed by: PHYSICAL THERAPIST

## 2024-01-12 NOTE — PROGRESS NOTES
"Daily Note     Today's date: 2024  Patient name: Dalton Vail  : 1950  MRN: [id number]  Referring provider: Devorah Candelaria PA-C  Dx:   Encounter Diagnosis     ICD-10-CM    1. Primary osteoarthritis of right knee  M17.11              Subjective: Pt reported that \"I am getting better, I am more limited by my left knee now.\"     Objective: See treatment diary below    Assessment: Tolerated treatment well. Patient demonstrated fatigue post treatment, exhibited good technique with therapeutic exercises, and would benefit from continued PT    Plan: Continue per plan of care.      Precautions:   Past Medical History:   Diagnosis Date    Hyperplastic colon polyp     last assessed 14    Osteoarthritis of both knees 2021    Sleep apnea     last assessed 13       Manuals     STM HS, adductor, ITB, quad  JW JZ JW JZ   Pat mobs   JW JZ JW JZ   PROM stretch  JW JDIONICIO JW JZ           Neuro Re-Ed     SLS        Tandem         2 Cone taps         Forward step up and over         Lateral cone step over                        Ther Ex     SLR        Long arc quad         Slider lunge  Retro 3x10 ea   Lateral 3x10 ea  Retro 3x10 ea  Lateral  Retro 3x10 ea  Lateral  Retro 3x10 ea  Lateral    bridge        Heel raise  U/l 3x10 ea Step 3x15  Step 3x15  Step 3x15    Seated HS stretch stool      :30x3 ea   Seated knee flexion stretch table  :30x3  Stand :30x3  :30x3  Stand :30x3 ea   SAQ        Quad set         Calf stretch step   :30x3   :30x3 ea   Heel rasie on step        Prone knee flexion stretch  :30x3   :30x3  :30x3 ea    Supine piriformis stretch                PETRA        LTR                        Ther Activity     RB   Elliptical 5' Elliptical 5'  Elliptical 5'  Elliptical 5'    STS  B/L squat 3x10 tap chair  B/l squat 3x10 B/l squat 3x10 B/l squat 3x10   U/l LP  Seat5 100/ 3x10 ea Seat5  110/ 3x10 ea seat5  110/ 3x10 ea Seat5  110/ " 3x10 ea   b/l calf press  100/ 3x10 ea 110/ 3x10 ea 110/ 3x10 ea 110/ 3x10 ea   Lateral step down        TM        Lateral step down

## 2024-01-16 ENCOUNTER — APPOINTMENT (OUTPATIENT)
Dept: PHYSICAL THERAPY | Facility: CLINIC | Age: 74
End: 2024-01-16
Payer: COMMERCIAL

## 2024-01-19 ENCOUNTER — OFFICE VISIT (OUTPATIENT)
Dept: PHYSICAL THERAPY | Facility: CLINIC | Age: 74
End: 2024-01-19
Payer: COMMERCIAL

## 2024-01-19 DIAGNOSIS — M17.11 PRIMARY OSTEOARTHRITIS OF RIGHT KNEE: Primary | ICD-10-CM

## 2024-01-19 PROCEDURE — 97110 THERAPEUTIC EXERCISES: CPT

## 2024-01-19 PROCEDURE — 97530 THERAPEUTIC ACTIVITIES: CPT

## 2024-01-19 NOTE — PROGRESS NOTES
"Daily Note     Today's date: 2024  Patient name: Dalton Vail  : 1950  MRN: [id number]  Referring provider: Devorah Candelaria PA-C  Dx:   Encounter Diagnosis     ICD-10-CM    1. Primary osteoarthritis of right knee  M17.11                Subjective: Dalton reports he slipped on ice, \"jerked\" L knee. Overall R knee feeling \"pretty good\", would like to get a bit more bend.     Objective: See treatment diary below    Assessment: Dalton tolerated PT treatment well. Continued with passive stretching to address end range flexion ROM. Pt is challenged with current exercise program. Limited in LLE throughout most closed chain strengthening. Pt would benefit from continued PT to further address impairments and maximize functional level.      Plan: Continue per plan of care.      Precautions:   Past Medical History:   Diagnosis Date    Hyperplastic colon polyp     last assessed 14    Osteoarthritis of both knees 2021    Sleep apnea     last assessed 13       Manuals    STM HS, adductor, ITB, quad   JZ JW JZ   Pat mobs  JW  JZ JW JZ   PROM stretch JW  JZ JW JZ           Neuro Re-Ed    SLS        Tandem         2 Cone taps         Forward step up and over         Lateral cone step over                        Ther Ex    SLR        Long arc quad         Slider lunge Retro 3x10 ea  Lateral   Retro 3x10 ea  Lateral  Retro 3x10 ea  Lateral  Retro 3x10 ea  Lateral    bridge        Heel raise Step 3x15   Step 3x15  Step 3x15  Step 3x15    Seated HS stretch stool  :30x3 ea    :30x3 ea   Seated knee flexion stretch table :30x3   Stand :30x3  :30x3  Stand :30x3 ea   SAQ        Quad set         Calf stretch step   :30x3   :30x3 ea   Heel rasie on step        Prone knee flexion stretch :30x3    :30x3  :30x3 ea    Supine piriformis stretch                PETRA        LTR                        Ther Activity    RB  Elliptical 5'   Elliptical 5'  " Elliptical 5'  Elliptical 5'    STS B/L squat 3x10   B/l squat 3x10 B/l squat 3x10 B/l squat 3x10   U/l LP Seat5  110/ 3x10 ea  Seat5  110/ 3x10 ea seat5  110/ 3x10 ea Seat5  110/ 3x10 ea   b/l calf press Seat5  110/ 3x10   110/ 3x10 ea 110/ 3x10 ea 110/ 3x10 ea   Lateral step down        TM        Lateral step down

## 2024-01-23 ENCOUNTER — APPOINTMENT (OUTPATIENT)
Dept: PHYSICAL THERAPY | Facility: CLINIC | Age: 74
End: 2024-01-23
Payer: COMMERCIAL

## 2024-01-26 ENCOUNTER — OFFICE VISIT (OUTPATIENT)
Dept: PHYSICAL THERAPY | Facility: CLINIC | Age: 74
End: 2024-01-26
Payer: COMMERCIAL

## 2024-01-26 DIAGNOSIS — M17.11 PRIMARY OSTEOARTHRITIS OF RIGHT KNEE: Primary | ICD-10-CM

## 2024-01-26 PROCEDURE — 97140 MANUAL THERAPY 1/> REGIONS: CPT

## 2024-01-26 PROCEDURE — 97110 THERAPEUTIC EXERCISES: CPT

## 2024-01-26 PROCEDURE — 97530 THERAPEUTIC ACTIVITIES: CPT

## 2024-01-26 NOTE — PROGRESS NOTES
Daily Note     Today's date: 2024  Patient name: Dalton Vail  : 1950  MRN: [id number]  Referring provider: Devorah Candelaria PA-C  Dx:   Encounter Diagnosis     ICD-10-CM    1. Primary osteoarthritis of right knee  M17.11           Start Time: 0815  Stop Time: 0900  Total time in clinic (min): 45 minutes    Subjective: Pt denies any medical changes since his last visit. He reports pain is minimal     Objective: See treatment diary below      Assessment: Tolerated treatment well. He is given minor cues on hold times and reps for each exercise. He exhibits good knowledge of program and presents with min to no compensation with exercises completed. Patient demonstrated fatigue post treatment, exhibited good technique with therapeutic exercises, and would benefit from continued PT      Plan: Continue per plan of care.      Precautions:   Past Medical History:   Diagnosis Date    Hyperplastic colon polyp     last assessed 14    Osteoarthritis of both knees 2021    Sleep apnea     last assessed 13       Manuals    STM HS, adductor, ITB, quad   JZ JW JZ   Pat mobs  [facility] JZ JW JZ   PROM stretch [facility] JZ JW JZ           Neuro Re-Ed    SLS        Tandem         2 Cone taps         Forward step up and over         Lateral cone step over                        Ther Ex    SLR        Long arc quad         Slider lunge Retro 3x10 ea  Lateral  Retro 3x10 ea  Lateral  Retro 3x10 ea  Lateral  Retro 3x10 ea  Lateral  Retro 3x10 ea  Lateral    bridge        Heel raise Step 3x15   Step 3x15  Step 3x15  Step 3x15    Seated HS stretch stool  :30x3 ea    :30x3 ea   Seated knee flexion stretch table :30x3  30''x3 Stand :30x3  :30x3  Stand :30x3 ea   SAQ        Quad set         Calf stretch step   :30x3   :30x3 ea   Heel rasie on step        Prone knee flexion stretch :30x3  30''x3  :30x3  :30x3 ea    Supine piriformis stretch                PETRA         LTR                        Ther Activity 1/19 1/5 1/9 1/12   RB  Elliptical 5'  Elliptical 5'  Elliptical 5'  Elliptical 5'  Elliptical 5'    STS B/L squat 3x10  B/L squat 3x10  B/l squat 3x10 B/l squat 3x10 B/l squat 3x10   U/l LP Seat5  110/ 3x10 ea Seat5  110/ 3x10 ea Seat5  110/ 3x10 ea seat5  110/ 3x10 ea Seat5  110/ 3x10 ea   b/l calf press Seat5  110/ 3x10  Seat5  110/ 3x10  110/ 3x10 ea 110/ 3x10 ea 110/ 3x10 ea   Lateral step down        TM        Lateral step down

## 2024-01-29 ENCOUNTER — OFFICE VISIT (OUTPATIENT)
Dept: PHYSICAL THERAPY | Facility: CLINIC | Age: 74
End: 2024-01-29
Payer: COMMERCIAL

## 2024-01-29 DIAGNOSIS — M17.11 PRIMARY OSTEOARTHRITIS OF RIGHT KNEE: Primary | ICD-10-CM

## 2024-01-29 PROCEDURE — 97110 THERAPEUTIC EXERCISES: CPT | Performed by: PHYSICAL THERAPIST

## 2024-01-29 PROCEDURE — 97530 THERAPEUTIC ACTIVITIES: CPT | Performed by: PHYSICAL THERAPIST

## 2024-01-29 PROCEDURE — 97140 MANUAL THERAPY 1/> REGIONS: CPT | Performed by: PHYSICAL THERAPIST

## 2024-01-29 NOTE — PROGRESS NOTES
Daily Note     Today's date: 2024  Patient name: Dalton Vail  : 1950  MRN: [id number]  Referring provider: Devorah Candelaria PA-C  Dx:   Encounter Diagnosis     ICD-10-CM    1. Primary osteoarthritis of right knee  M17.11                      Subjective: Patient reports to be feeling stiffness in his back today.      Objective: See treatment diary below      Assessment: Tolerated treatment well. Patient demonstrated fatigue post treatment and would benefit from continued PT. Felt some discomofrt in low back after squatting. Adjusted form and performed PETRA, back pain resolved.       Plan: Continue per plan of care.      Precautions:   Past Medical History:   Diagnosis Date    Hyperplastic colon polyp     last assessed 14    Osteoarthritis of both knees 2021    Sleep apnea     last assessed 13       Manuals    STM HS, adductor, ITB, quad   JZ JW JZ    Pat mobs  JW  JZ JW JZ    PROM stretch [facility] JZ JW JZ KK            Neuro Re-Ed    SLS         Tandem          2 Cone taps          Forward step up and over          Lateral cone step over                           Ther Ex    SLR         Long arc quad          Slider lunge Retro 3x10 ea  Lateral  Retro 3x10 ea  Lateral  Retro 3x10 ea  Lateral  Retro 3x10 ea  Lateral  Retro 3x10 ea  Lateral  Retro 3x15 ea  Lateral 10x3   bridge         Heel raise Step 3x15   Step 3x15  Step 3x15  Step 3x15  Step 3x15    Seated HS stretch stool  :30x3 ea    :30x3 ea NT   Seated knee flexion stretch table :30x3  30''x3 Stand :30x3  :30x3  Stand :30x3 ea NT   SAQ         Quad set          Calf stretch step   :30x3   :30x3 ea NT   Heel rasie on step         Prone knee flexion stretch :30x3  30''x3  :30x3  :30x3 ea  30''x3   Supine piriformis stretch                  PETRA      x30   LTR                           Ther Activity    RB  Elliptical 5'  Elliptical 5'   Elliptical 5'  Elliptical 5'  Elliptical 5'  Elliptical 5'   STS B/L squat 3x10  B/L squat 3x10  B/l squat 3x10 B/l squat 3x10 B/l squat 3x10 B/l squat 3x15   U/l LP Seat5  110/ 3x10 ea Seat5  110/ 3x10 ea Seat5  110/ 3x10 ea seat5  110/ 3x10 ea Seat5  110/ 3x10 ea Seat5  119/3x10   b/l calf press Seat5  110/ 3x10  Seat5  110/ 3x10  110/ 3x10 ea 110/ 3x10 ea 110/ 3x10 ea 110/3x10   Lateral step down         TM         Lateral step down

## 2024-01-30 ENCOUNTER — APPOINTMENT (OUTPATIENT)
Dept: PHYSICAL THERAPY | Facility: CLINIC | Age: 74
End: 2024-01-30
Payer: COMMERCIAL

## 2024-02-02 ENCOUNTER — APPOINTMENT (OUTPATIENT)
Dept: PHYSICAL THERAPY | Facility: CLINIC | Age: 74
End: 2024-02-02
Payer: COMMERCIAL

## 2024-02-02 ENCOUNTER — TELEPHONE (OUTPATIENT)
Dept: FAMILY MEDICINE CLINIC | Facility: CLINIC | Age: 74
End: 2024-02-02

## 2024-02-02 ENCOUNTER — CLINICAL SUPPORT (OUTPATIENT)
Dept: FAMILY MEDICINE CLINIC | Facility: CLINIC | Age: 74
End: 2024-02-02
Payer: COMMERCIAL

## 2024-02-02 DIAGNOSIS — R35.0 URINARY FREQUENCY: Primary | ICD-10-CM

## 2024-02-02 LAB
SL AMB  POCT GLUCOSE, UA: NEGATIVE
SL AMB LEUKOCYTE ESTERASE,UA: NEGATIVE
SL AMB POCT BILIRUBIN,UA: NEGATIVE
SL AMB POCT BLOOD,UA: ABNORMAL
SL AMB POCT CLARITY,UA: ABNORMAL
SL AMB POCT COLOR,UA: YELLOW
SL AMB POCT KETONES,UA: NEGATIVE
SL AMB POCT NITRITE,UA: NEGATIVE
SL AMB POCT PH,UA: 6.5
SL AMB POCT SPECIFIC GRAVITY,UA: 1.02
SL AMB POCT URINE PROTEIN: ABNORMAL
SL AMB POCT UROBILINOGEN: 0.2

## 2024-02-02 PROCEDURE — 81002 URINALYSIS NONAUTO W/O SCOPE: CPT

## 2024-02-02 RX ORDER — SULFAMETHOXAZOLE AND TRIMETHOPRIM 800; 160 MG/1; MG/1
1 TABLET ORAL 2 TIMES DAILY
Qty: 14 TABLET | Refills: 0 | Status: SHIPPED | OUTPATIENT
Start: 2024-02-02 | End: 2024-02-09

## 2024-02-02 NOTE — TELEPHONE ENCOUNTER
Called Pt. Spoke to Pt, and Pt advised on results. Dr. Palmer says Urine dip shows blood in the urine. We will await the results of the culture. He should schedule to come see me to review this and Ann sent RX for bactrim to his pharmacy

## 2024-02-02 NOTE — TELEPHONE ENCOUNTER
Dr. Palmer's patient having UTI symptoms urine frequency, started 3 days ago, culture sent - trace protein and moderate blood     Component 2/2/24 10:33 AM   LEUKOCYTE ESTERASE,UA NEGATIVE   NITRITE,UA NEGATIVE   SL AMB POCT UROBILINOGEN 0.2   POCT URINE PROTEIN TRACE    PH,UA 6.5   BLOOD,UA MODERATE   SPECIFIC GRAVITY,UA 1.025   KETONES,UA NEGATIVE   BILIRUBIN,UA NEGATIVE   GLUCOSE, UA NEGATIVE    COLOR,UA YELLOW   CLARITY,UA CLOUDY

## 2024-02-02 NOTE — TELEPHONE ENCOUNTER
----- Message from Ronn Palmer DO sent at 2/2/2024 10:48 AM EST -----  Urine dip shows blood in the urine.  We will await the results of the culture.  He should schedule to come see me to review this

## 2024-02-03 LAB — SPECIMEN SOURCE: NORMAL

## 2024-02-05 ENCOUNTER — TELEPHONE (OUTPATIENT)
Dept: FAMILY MEDICINE CLINIC | Facility: CLINIC | Age: 74
End: 2024-02-05

## 2024-02-05 NOTE — TELEPHONE ENCOUNTER
Called Pt. Spoke to Pt, and Pt advised on results.   Patient has appt for 3 month A1C and can review then

## 2024-02-05 NOTE — TELEPHONE ENCOUNTER
----- Message from Ronn Palmer DO sent at 2/4/2024 12:15 PM EST -----  Urine culture is negative.  He should come see me to go over further evaluation of blood in his urine

## 2024-02-14 ENCOUNTER — OFFICE VISIT (OUTPATIENT)
Dept: FAMILY MEDICINE CLINIC | Facility: CLINIC | Age: 74
End: 2024-02-14
Payer: COMMERCIAL

## 2024-02-14 VITALS
TEMPERATURE: 95.8 F | OXYGEN SATURATION: 98 % | DIASTOLIC BLOOD PRESSURE: 80 MMHG | BODY MASS INDEX: 29.16 KG/M2 | SYSTOLIC BLOOD PRESSURE: 128 MMHG | HEART RATE: 75 BPM | HEIGHT: 73 IN | WEIGHT: 220 LBS

## 2024-02-14 DIAGNOSIS — R31.21 ASYMPTOMATIC MICROSCOPIC HEMATURIA: ICD-10-CM

## 2024-02-14 DIAGNOSIS — R73.01 IMPAIRED FASTING GLUCOSE: ICD-10-CM

## 2024-02-14 DIAGNOSIS — E11.40 TYPE 2 DIABETES MELLITUS WITH DIABETIC NEUROPATHY, WITHOUT LONG-TERM CURRENT USE OF INSULIN (HCC): Primary | ICD-10-CM

## 2024-02-14 LAB — SL AMB POCT HEMOGLOBIN AIC: 6.5 (ref ?–6.5)

## 2024-02-14 PROCEDURE — 83036 HEMOGLOBIN GLYCOSYLATED A1C: CPT | Performed by: FAMILY MEDICINE

## 2024-02-14 PROCEDURE — 99214 OFFICE O/P EST MOD 30 MIN: CPT | Performed by: FAMILY MEDICINE

## 2024-02-14 NOTE — PROGRESS NOTES
Subjective:   Chief Complaint   Patient presents with    Follow-up     Diabetic check  A1c   Had blood in his urine         Patient ID: Dalton Vail is a 73 y.o. male.    Patient is here for 3-month checkup regarding type 2 diabetes.  He had a recent urine specimen which demonstrated the presence of microscopic hematuria.  Urine culture was negative.  He had some symptoms such as frequency and dysuria at the end of his stream.        The following portions of the patient's history were reviewed and updated as appropriate: allergies, current medications, past family history, past medical history, past social history, past surgical history and problem list.    Review of Systems   Constitutional:  Negative for activity change, appetite change, chills, diaphoresis, fatigue and unexpected weight change.   HENT:  Negative for congestion, ear discharge, ear pain, hearing loss, nosebleeds and rhinorrhea.    Eyes:  Negative for pain, redness, itching and visual disturbance.   Respiratory:  Negative for cough, choking, chest tightness and shortness of breath.    Cardiovascular:  Negative for chest pain and leg swelling.   Gastrointestinal:  Negative for abdominal pain, blood in stool, constipation, diarrhea and nausea.   Endocrine: Negative for cold intolerance, polydipsia and polyphagia.   Genitourinary:  Positive for hematuria. Negative for dysuria, frequency and urgency.   Musculoskeletal:  Negative for arthralgias, back pain, gait problem, joint swelling, neck pain and neck stiffness.   Skin:  Negative for color change and rash.   Allergic/Immunologic: Negative for environmental allergies and food allergies.   Neurological:  Negative for dizziness, tremors, seizures, speech difficulty, numbness and headaches.   Hematological:  Negative for adenopathy. Does not bruise/bleed easily.   Psychiatric/Behavioral:  Negative for behavioral problems, dysphoric mood, hallucinations and self-injury.       "        Objective:  Vitals:    02/14/24 0929   BP: 128/80   Pulse: 75   Temp: (!) 95.8 °F (35.4 °C)   TempSrc: Tympanic   SpO2: 98%   Weight: 99.8 kg (220 lb)   Height: 6' 1\" (1.854 m)      Physical Exam  Constitutional:       General: He is not in acute distress.     Appearance: He is well-developed. He is not diaphoretic.   HENT:      Head: Normocephalic and atraumatic.      Right Ear: External ear normal.      Left Ear: External ear normal.      Nose: Nose normal.      Mouth/Throat:      Pharynx: No oropharyngeal exudate.   Eyes:      General: No scleral icterus.        Right eye: No discharge.         Left eye: No discharge.      Conjunctiva/sclera: Conjunctivae normal.      Pupils: Pupils are equal, round, and reactive to light.   Neck:      Thyroid: No thyromegaly.   Cardiovascular:      Rate and Rhythm: Normal rate and regular rhythm.      Heart sounds: Normal heart sounds. No murmur heard.  Pulmonary:      Effort: Pulmonary effort is normal.      Breath sounds: Normal breath sounds. No wheezing or rales.   Abdominal:      General: Bowel sounds are normal.      Palpations: Abdomen is soft. There is no mass.      Tenderness: There is no abdominal tenderness. There is no guarding.   Musculoskeletal:         General: No tenderness. Normal range of motion.      Cervical back: Normal range of motion and neck supple.   Lymphadenopathy:      Cervical: No cervical adenopathy.   Skin:     General: Skin is warm and dry.   Neurological:      Mental Status: He is alert and oriented to person, place, and time.      Deep Tendon Reflexes: Reflexes are normal and symmetric.   Psychiatric:         Thought Content: Thought content normal.         Judgment: Judgment normal.           Assessment/Plan:    No problem-specific Assessment & Plan notes found for this encounter.       Diagnoses and all orders for this visit:    Type 2 diabetes mellitus with diabetic neuropathy, without long-term current use of insulin (HCC)  -     " POCT hemoglobin A1c    Impaired fasting glucose  -     POCT hemoglobin A1c    Asymptomatic microscopic hematuria  -     CT abdomen pelvis w wo contrast; Future     He will see urology.  He will get a CAT scan of the abdomen and pelvis with and without contrast prior to the visit

## 2024-02-21 ENCOUNTER — TELEPHONE (OUTPATIENT)
Dept: FAMILY MEDICINE CLINIC | Facility: CLINIC | Age: 74
End: 2024-02-21

## 2024-02-21 DIAGNOSIS — F41.9 ANXIETY: Primary | ICD-10-CM

## 2024-02-21 RX ORDER — LORAZEPAM 0.5 MG/1
TABLET ORAL
Qty: 2 TABLET | Refills: 0 | Status: SHIPPED | OUTPATIENT
Start: 2024-02-21

## 2024-02-21 NOTE — TELEPHONE ENCOUNTER
Pt calling and asking for medication when he goes get the Cat scan done saying he will need it to calm him down when he goes for the cat scan       Pharmacy [facility] the one on file        [phone]

## 2024-02-26 ENCOUNTER — EVALUATION (OUTPATIENT)
Dept: PHYSICAL THERAPY | Facility: CLINIC | Age: 74
End: 2024-02-26
Payer: COMMERCIAL

## 2024-02-26 ENCOUNTER — TELEPHONE (OUTPATIENT)
Dept: FAMILY MEDICINE CLINIC | Facility: CLINIC | Age: 74
End: 2024-02-26

## 2024-02-26 DIAGNOSIS — E11.9 TYPE 2 DIABETES MELLITUS WITHOUT COMPLICATION, WITHOUT LONG-TERM CURRENT USE OF INSULIN (HCC): Primary | ICD-10-CM

## 2024-02-26 DIAGNOSIS — M17.11 PRIMARY OSTEOARTHRITIS OF RIGHT KNEE: Primary | ICD-10-CM

## 2024-02-26 DIAGNOSIS — M25.562 LEFT KNEE PAIN, UNSPECIFIED CHRONICITY: ICD-10-CM

## 2024-02-26 PROCEDURE — 97110 THERAPEUTIC EXERCISES: CPT | Performed by: PHYSICAL THERAPIST

## 2024-02-26 PROCEDURE — 97164 PT RE-EVAL EST PLAN CARE: CPT | Performed by: PHYSICAL THERAPIST

## 2024-02-26 NOTE — TELEPHONE ENCOUNTER
Patient calling for labs order to be place for [facility]. Patient having a CT abdomen pelvis w wo contrast with contrast and needed updated labs,

## 2024-02-26 NOTE — PROGRESS NOTES
PT Re-Evaluation     Today's date: 2024  Patient name: Dalton Vail  : 1950  MRN: [id number]  Referring provider: Ronn Palmer DO  Dx:   Encounter Diagnosis     ICD-10-CM    1. Primary osteoarthritis of right knee  M17.11    2.      Left knee pain                                                M25.562       Assessment  Assessment details:   Dalton has returned to PT due to an increase in R knee pain since discontinuing PT, he also stated that he has been feeling some pain on his left knee and has noted some crepitus beginning on his L knee. MMT demonstrated weakness. ROM assessment demonstrated ROM restriction. Balance assessment demonstrated poor balance. Due to these impairments, patient has difficulty performing ADL's, recreational activities, engaging in social activities, ambulation, stair negotiation, lifting/carrying, transfers. Patient's clinical presentation is consistent with their referring diagnosis of S/p R/L TKA performed on 24 and left knee pain. Patient has been educated in post-op contraindications / precautions, gait training, weight bearing status, home exercise program and plan of care. Patient would benefit from skilled physical therapy services to address their aforementioned functional limitations and progress towards prior level of function and independence with home exercise program.     Impairments: abnormal gait, abnormal or restricted ROM, activity intolerance, impaired balance, impaired physical strength, lacks appropriate home exercise program, pain with function, weight-bearing intolerance, poor posture  and poor body mechanics  Functional limitations: walk/stand, STS, stair negotiation, yard work, walk uneven surfaces   Prognosis: good  Prognosis details: + factors: high motivation levels, active lifestyle  - factors: chronic pain, BMI    Goals  Short Term Goals to be accomplished by 3/25/24:  STG1: Pt will be I with HEP.   STG2: Pt will demo 0-130 degrees in knee  AROM to improve gait.   STG3: Pt will demo 5/5 MMT strength in knee to improve stair negotiation.   STG4: Pt will amb community distance without gait deviates due to pain.      Long Term Goals to be accomplished by 5/30/24:   LTG1: Pt will be able to descend stairs with reciprocal gait pattern without use of handrail.   LTG2: Pt will be able to walk on uneven surfaces on property to do yard work.   LTG3: Pt will be able to perform STS with normal mechanics without pain.       Plan  Plan details: HEP development, stretching, strengthening, A/AA/PROM, joint mobilizations, posture education, STM/MI as needed to reduce muscle tension, muscle reeducation, PLOC discussed and agreed upon with patient.      Patient would benefit from: PT eval and skilled physical therapy  Planned modality interventions: cryotherapy, thermotherapy: hydrocollator packs and unattended electrical stimulation  Planned therapy interventions: manual therapy, neuromuscular re-education, self care, therapeutic activities, therapeutic exercise, home exercise program, patient education, joint mobilization, balance, strengthening, stretching, therapeutic training and flexibility  Frequency: 2x week    Plan of Care beginning date: 3/25/2024  Plan of Care expiration date: 5/30/2024  Treatment plan discussed with: patient      Subjective Evaluation    History of Present Illness  Mechanism of injury: Pt is a 73 y/o male who presents to PT s/p R TKA performed on 7/24/23. Had a surgery on March 1st to have hardware removed from his knee from previous ORIF. Then he had the TKA on 7/24/23 Stated that the day of the surgery there was complications with bleeding and the surgeon had to add additional staples.   Had home care PT for some visits which improved his symptoms.   Since then he has been improving. Notes that he has been walking around his home without AD.   Improved symptoms from ice and elevation.   Patient Goals  Patient goals for therapy: increased  motion, improved balance, decreased pain, decreased edema, increased strength and independence with ADLs/IADLs  Patient goal: walk/stand, STS, stair negotiation, yard work, walk uneven surfaces  Pain  Current pain ratin  At best pain ratin  At worst pain ratin  Location: R TKA and left knee   Quality: tight, discomfort and pulling  Aggravating factors: stair climbing, walking, standing and lifting    Treatments  Current treatment: physical therapy      Objective     Observations     Additional Observation Details  Incision: covered with bandage still     Tenderness     Additional Tenderness Details  TTP over R ITB, calf, adductors, HS    Active Range of Motion     Right Knee   Flexion: 118 degrees   Extension: 0 degrees     Left knee:  Flexion: 125 degrees   Extension: 0 degrees     Passive Range of Motion     Right Knee   Flexion: 120 degrees   Extension: 0 degrees     Left Knee   Flexion: 127 degrees   Extension: 0 degrees     Strength/Myotome Testing     Right Knee   Flexion: 4+  Extension: 4+    Left knee  Flexion: 4+  Extension: 4+        Additional Strength Details  U/l heel raise:   R: 20 reps  L: 20 reps       Tests     Right Knee   Negative valgus stress test at 30 degrees and varus stress test at 30 degrees.     Additional Tests Details  TU sec     SLS:   R: 7, 4 sec  L: 5, 6 sec       Precautions:   Past Medical History:   Diagnosis Date    Hyperplastic colon polyp     last assessed 14    Osteoarthritis of both knees 2021    Sleep apnea     last assessed 13     Manuals         PROM stretch                                    Neuro Re-Ed                                             Ther Ex         SLR 10x        Long arc quad          bridge         Heel raise step 10x         Seated HS stretch stool  :30x        stand knee flexion stretch step :30x        S/l hip abd 10x                 Calf stretch step                  Prone knee flexion stretch :30x                           Ther Activity 2/26        RB          STS         U/l LP         b/l calf press

## 2024-02-26 NOTE — LETTER
2024    Devorah Candelaria PA-C  [address] [facility], [address] [place] [id number]    Patient: Dalton Vail   YOB: 1950   Date of Visit: 2024     Encounter Diagnosis     ICD-10-CM    1. Primary osteoarthritis of right knee  M17.11       2. Left knee pain, unspecified chronicity  M25.562           Dear Dr. Candelaria:    Thank you for your recent referral of Dalton Vail. Please review the attached evaluation summary from Dalton's recent visit.     Please verify that you agree with the plan of care by signing the attached order.     If you have any questions or concerns, please do not hesitate to call.     I sincerely appreciate the opportunity to share in the care of one of your patients and hope to have another opportunity to work with you in the near future.       Sincerely,    Giacomo Yao, PT      Referring Provider:      I certify that I have read the below Plan of Care and certify the need for these services furnished under this plan of treatment while under my care.                    Devorah Candelaria PA-C  [address] [facility], [address] [address]  Via Fax: [phone]          PT Re-Evaluation     Today's date: 2024  Patient name: Dalton Vail  : 1950  MRN: [id number]  Referring provider: Ronn Palmer DO  Dx:   Encounter Diagnosis     ICD-10-CM    1. Primary osteoarthritis of right knee  M17.11    2.      Left knee pain                                                M25.562       Assessment  Assessment details:   Dalton has returned to PT due to an increase in R knee pain since discontinuing PT, he also stated that he has been feeling some pain on his left knee and has noted some crepitus beginning on his L knee. MMT demonstrated weakness. ROM assessment demonstrated ROM restriction. Balance assessment demonstrated poor balance. Due to these impairments, patient has difficulty performing ADL's, recreational activities, engaging in social activities,  ambulation, stair negotiation, lifting/carrying, transfers. Patient's clinical presentation is consistent with their referring diagnosis of S/p R/L TKA performed on 7/24/24. Patient has been educated in post-op contraindications / precautions, gait training, weight bearing status, home exercise program and plan of care. Patient would benefit from skilled physical therapy services to address their aforementioned functional limitations and progress towards prior level of function and independence with home exercise program.     Impairments: abnormal gait, abnormal or restricted ROM, activity intolerance, impaired balance, impaired physical strength, lacks appropriate home exercise program, pain with function, weight-bearing intolerance, poor posture  and poor body mechanics  Functional limitations: walk/stand, STS, stair negotiation, yard work, walk uneven surfaces   Prognosis: good  Prognosis details: + factors: high motivation levels, active lifestyle  - factors: chronic pain, BMI    Goals  Short Term Goals to be accomplished by 3/25/24:  STG1: Pt will be I with HEP.   STG2: Pt will demo 0-100 degrees in knee AROM to improve gait.   STG3: Pt will demo 4/5 MMT strength in knee to improve stair negotiation.   STG4: Pt will amb community distance without gait deviates due to pain.      Long Term Goals to be accomplished by 5/30/24:   LTG1: Pt will be able to descend stairs with reciprocal gait pattern without use of handrail.   LTG2: Pt will be able to walk on uneven surfaces on property to do yard work.   LTG3: Pt will be able to perform STS with normal mechanics without pain.       Plan  Plan details: HEP development, stretching, strengthening, A/AA/PROM, joint mobilizations, posture education, STM/MI as needed to reduce muscle tension, muscle reeducation, PLOC discussed and agreed upon with patient.      Patient would benefit from: PT eval and skilled physical therapy  Planned modality interventions: cryotherapy,  thermotherapy: hydrocollator packs and unattended electrical stimulation  Planned therapy interventions: manual therapy, neuromuscular re-education, self care, therapeutic activities, therapeutic exercise, home exercise program, patient education, joint mobilization, balance, strengthening, stretching, therapeutic training and flexibility  Frequency: 2x week    Plan of Care beginning date: 3/25/2024  Plan of Care expiration date: 2024  Treatment plan discussed with: patient      Subjective Evaluation    History of Present Illness  Mechanism of injury: Pt is a 71 y/o male who presents to PT s/p R TKA performed on 23. Had a surgery on  to have hardware removed from his knee from previous ORIF. Then he had the TKA on 23 Stated that the day of the surgery there was complications with bleeding and the surgeon had to add additional staples.   Had home care PT for some visits which improved his symptoms.   Since then he has been improving. Notes that he has been walking around his home without AD.   Improved symptoms from ice and elevation.   Patient Goals  Patient goals for therapy: increased motion, improved balance, decreased pain, decreased edema, increased strength and independence with ADLs/IADLs  Patient goal: walk/stand, STS, stair negotiation, yard work, walk uneven surfaces  Pain  Current pain ratin  At best pain ratin  At worst pain ratin  Location: R TKA and left knee   Quality: tight, discomfort and pulling  Aggravating factors: stair climbing, walking, standing and lifting    Treatments  Current treatment: physical therapy      Objective     Observations     Additional Observation Details  Incision: covered with bandage still     Tenderness     Additional Tenderness Details  TTP over R ITB, calf, adductors, HS    Active Range of Motion     Right Knee   Flexion: 118 degrees   Extension: 0 degrees     Left knee:  Flexion: 125 degrees   Extension: 0 degrees     Passive Range  of Motion     Right Knee   Flexion: 120 degrees   Extension: 0 degrees     Left Knee   Flexion: 127 degrees   Extension: 0 degrees     Strength/Myotome Testing     Right Knee   Flexion: 4+  Extension: 4+    Left knee  Flexion: 4+  Extension: 4+        Additional Strength Details  U/l heel raise:   R: 20 reps  L: 20 reps       Tests     Right Knee   Negative valgus stress test at 30 degrees and varus stress test at 30 degrees.     Additional Tests Details  TU sec     SLS:   R: 7, 4 sec  L: 5, 6 sec       Precautions:   Past Medical History:   Diagnosis Date    Hyperplastic colon polyp     last assessed 14    Osteoarthritis of both knees 2021    Sleep apnea     last assessed 13     Manuals         PROM stretch                                    Neuro Re-Ed                                             Ther Ex         SLR 10x        Long arc quad          bridge         Heel raise step 10x         Seated HS stretch stool  :30x        stand knee flexion stretch step :30x        S/l hip abd 10x                 Calf stretch step                  Prone knee flexion stretch :30x                          Ther Activity         RB          STS         U/l LP         b/l calf press

## 2024-02-29 ENCOUNTER — OFFICE VISIT (OUTPATIENT)
Dept: PHYSICAL THERAPY | Facility: CLINIC | Age: 74
End: 2024-02-29
Payer: COMMERCIAL

## 2024-02-29 DIAGNOSIS — M17.11 PRIMARY OSTEOARTHRITIS OF RIGHT KNEE: Primary | ICD-10-CM

## 2024-02-29 DIAGNOSIS — M25.562 LEFT KNEE PAIN, UNSPECIFIED CHRONICITY: ICD-10-CM

## 2024-02-29 PROCEDURE — 97530 THERAPEUTIC ACTIVITIES: CPT

## 2024-02-29 PROCEDURE — 97110 THERAPEUTIC EXERCISES: CPT

## 2024-02-29 NOTE — PROGRESS NOTES
"Daily Note     Today's date: 2024  Patient name: Dalton Vail  : 1950  MRN: [id number]  Referring provider: Devorah Candelaria PA-C  Dx:   Encounter Diagnosis     ICD-10-CM    1. Primary osteoarthritis of right knee  M17.11       2. Left knee pain, unspecified chronicity  M25.562                      Subjective: Patient reports his left knee is doing well.  Continues with crepitus in right knee    Objective: See treatment diary below      Assessment: Tolerated treatment well. Patient demonstrated fatigue post treatment, exhibited good technique with therapeutic exercises, and would benefit from continued PT.  Appropriate fatigue reported without increase in symptoms.        Plan: Continue per plan of care.      Precautions:   Past Medical History:   Diagnosis Date    Hyperplastic colon polyp     last assessed 14    Osteoarthritis of both knees 2021    Sleep apnea     last assessed 13           Manuals            PROM stretch                                                               Neuro Re-Ed                                                                            Ther Ex            SLR 10x  3x10           Long arc quad     3x10           bridge    3x10           Heel raise step 10x  x30           Seated HS stretch stool  :30x  30\" x3           stand knee flexion stretch step :30x  30\" x3           S/l hip abd 10x  3x10                           Calf stretch step    30\" x3                           Prone knee flexion stretch :30x  30\" x3  Right LE                                           Ther Activity            RB                STS    2x10           U/l LP    70# 3x10 ea           b/l calf press    75# 3x10            ellipitical    5'                                             "

## 2024-03-04 ENCOUNTER — APPOINTMENT (OUTPATIENT)
Dept: PHYSICAL THERAPY | Facility: CLINIC | Age: 74
End: 2024-03-04
Payer: COMMERCIAL

## 2024-03-04 LAB
BUN SERPL-MCNC: 16 MG/DL (ref 7–25)
BUN/CREAT SERPL: ABNORMAL (CALC) (ref 6–22)
CALCIUM SERPL-MCNC: 9.3 MG/DL (ref 8.6–10.3)
CHLORIDE SERPL-SCNC: 104 MMOL/L (ref 98–110)
CO2 SERPL-SCNC: 28 MMOL/L (ref 20–32)
CREAT SERPL-MCNC: 0.95 MG/DL (ref 0.7–1.28)
GFR/BSA.PRED SERPLBLD CYS-BASED-ARV: 85 ML/MIN/1.73M2
GLUCOSE SERPL-MCNC: 121 MG/DL (ref 65–99)
POTASSIUM SERPL-SCNC: 4.2 MMOL/L (ref 3.5–5.3)
SODIUM SERPL-SCNC: 140 MMOL/L (ref 135–146)

## 2024-03-05 ENCOUNTER — TELEPHONE (OUTPATIENT)
Dept: FAMILY MEDICINE CLINIC | Facility: CLINIC | Age: 74
End: 2024-03-05

## 2024-03-06 NOTE — TELEPHONE ENCOUNTER
Patient aware on results, saw mychart message from me to them on Mychart      Me   to Dalton Vail   TC      3/5/24  1:07 PM  Kidney function is normal        Last read by Dalton Vail at  3:13 AM on 3/6/2024.

## 2024-03-07 ENCOUNTER — OFFICE VISIT (OUTPATIENT)
Dept: PHYSICAL THERAPY | Facility: CLINIC | Age: 74
End: 2024-03-07
Payer: COMMERCIAL

## 2024-03-07 DIAGNOSIS — M17.11 PRIMARY OSTEOARTHRITIS OF RIGHT KNEE: Primary | ICD-10-CM

## 2024-03-07 DIAGNOSIS — M25.562 LEFT KNEE PAIN, UNSPECIFIED CHRONICITY: ICD-10-CM

## 2024-03-07 PROCEDURE — 97110 THERAPEUTIC EXERCISES: CPT

## 2024-03-07 PROCEDURE — 97530 THERAPEUTIC ACTIVITIES: CPT

## 2024-03-07 NOTE — PROGRESS NOTES
"Daily Note     Today's date: 3/7/2024  Patient name: Dalton Vail  : 1950  MRN: [id number]  Referring provider: Devorah Candelaria PA-C  Dx:   Encounter Diagnosis     ICD-10-CM    1. Primary osteoarthritis of right knee  M17.11       2. Left knee pain, unspecified chronicity  M25.562                        Subjective:  Patient states that LB is painful prior to start of session. He states he was lifting heavy tree branches and one hit him in R leg. He states he has swelling R upper leg but swelling has since decreased. Patient denies increased knee pain t/o session.    Objective: See treatment diary below      Assessment: Patient responded well to treatment. LAQ isometric added this session for quad strengthening - good tolerance self reported. Good response to PETRA to inc tolerance to activity. Patient would benefit from continued PT for knee strengthening.    Plan: Continue per plan of care.      Precautions:   Past Medical History:   Diagnosis Date    Hyperplastic colon polyp     last assessed 14    Osteoarthritis of both knees 2021    Sleep apnea     last assessed 13           Manuals 2/26  2/29  3/7         PROM stretch                                                               Neuro Re-Ed 2/26  2/29  3/7                                                                         Ther Ex 2/26  2/29  3/7         SLR 10x  3x10  3x10         Long arc quad     3x10  2#, 3x10    5# iso 20sec x3         bridge    3x10  3x10         Heel raise step 10x  x30  x30         Seated HS stretch stool  :30x  30\" x3  30\" x3ea         stand knee flexion stretch step :30x  30\" x3  NV         S/l hip abd 10x  3x10  3x10ea                         Calf stretch step    30\" x3                           Prone knee flexion stretch :30x  30\" x3  Right LE  30\" x3  Right LE          PETRA      3x10 to address l-s pain in subjective                          Ther Activity   3         RB                STS    " 2x10 2x10 (squats)         U/l LP    70# 3x10 ea  75# 3x10ea         b/l calf press    75# 3x10  75# 3x10          ellipitical    5'  5'

## 2024-03-08 ENCOUNTER — HOSPITAL ENCOUNTER (OUTPATIENT)
Dept: HOSPITAL 99 - RCS | Age: 74
End: 2024-03-08
Payer: COMMERCIAL

## 2024-03-08 DIAGNOSIS — Z01.818: Primary | ICD-10-CM

## 2024-03-11 ENCOUNTER — APPOINTMENT (OUTPATIENT)
Dept: PHYSICAL THERAPY | Facility: CLINIC | Age: 74
End: 2024-03-11
Payer: COMMERCIAL

## 2024-03-14 ENCOUNTER — APPOINTMENT (OUTPATIENT)
Dept: PHYSICAL THERAPY | Facility: CLINIC | Age: 74
End: 2024-03-14
Payer: COMMERCIAL

## 2024-03-18 ENCOUNTER — HOSPITAL ENCOUNTER (OUTPATIENT)
Dept: CT IMAGING | Facility: HOSPITAL | Age: 74
Discharge: HOME | End: 2024-03-18
Payer: COMMERCIAL

## 2024-03-18 ENCOUNTER — APPOINTMENT (OUTPATIENT)
Dept: PHYSICAL THERAPY | Facility: CLINIC | Age: 74
End: 2024-03-18
Payer: COMMERCIAL

## 2024-03-18 DIAGNOSIS — R31.21 ASYMPTOMATIC MICROSCOPIC HEMATURIA: ICD-10-CM

## 2024-03-18 PROCEDURE — G1004 CDSM NDSC: HCPCS

## 2024-03-18 PROCEDURE — 74178 CT ABD&PLV WO CNTR FLWD CNTR: CPT

## 2024-03-18 RX ADMIN — IOHEXOL 100 ML: 350 INJECTION, SOLUTION INTRAVENOUS at 07:15

## 2024-03-19 ENCOUNTER — APPOINTMENT (OUTPATIENT)
Dept: PHYSICAL THERAPY | Facility: CLINIC | Age: 74
End: 2024-03-19
Payer: COMMERCIAL

## 2024-03-21 ENCOUNTER — OFFICE VISIT (OUTPATIENT)
Dept: FAMILY MEDICINE CLINIC | Facility: CLINIC | Age: 74
End: 2024-03-21
Payer: COMMERCIAL

## 2024-03-21 ENCOUNTER — APPOINTMENT (OUTPATIENT)
Dept: PHYSICAL THERAPY | Facility: CLINIC | Age: 74
End: 2024-03-21
Payer: COMMERCIAL

## 2024-03-21 VITALS
BODY MASS INDEX: 30.48 KG/M2 | WEIGHT: 230 LBS | SYSTOLIC BLOOD PRESSURE: 134 MMHG | HEART RATE: 73 BPM | DIASTOLIC BLOOD PRESSURE: 80 MMHG | HEIGHT: 73 IN | TEMPERATURE: 96.4 F | OXYGEN SATURATION: 98 %

## 2024-03-21 DIAGNOSIS — E11.40 TYPE 2 DIABETES MELLITUS WITH DIABETIC NEUROPATHY, WITHOUT LONG-TERM CURRENT USE OF INSULIN (HCC): ICD-10-CM

## 2024-03-21 DIAGNOSIS — G47.30 SLEEP APNEA, UNSPECIFIED TYPE: ICD-10-CM

## 2024-03-21 DIAGNOSIS — M17.12 PRIMARY OSTEOARTHRITIS OF LEFT KNEE: ICD-10-CM

## 2024-03-21 DIAGNOSIS — I10 BENIGN ESSENTIAL HYPERTENSION: ICD-10-CM

## 2024-03-21 DIAGNOSIS — Z01.818 PRE-OP EXAMINATION: Primary | ICD-10-CM

## 2024-03-21 PROCEDURE — 99214 OFFICE O/P EST MOD 30 MIN: CPT | Performed by: NURSE PRACTITIONER

## 2024-03-21 NOTE — PATIENT INSTRUCTIONS
Hold metformin and BP medication the morning of surgery  Hold all vitamins and supplements 1 week before surgery  Hold NSAIDs 1 week before surgery

## 2024-03-21 NOTE — PROGRESS NOTES
Name: Dalton Vail      : 1950      MRN: [id number]  Encounter Provider: ALIVIA Soler  Encounter Date: 3/21/2024   Encounter department: [facility]    Assessment & Plan     1. Pre-op examination  Assessment & Plan:  Medically cleared for left total knee arthroplasty with Dr Crocker on 24.     EKG reviewed- sinus rhythm with 1st degree AV block  Labs reviewed from 3/27/24: CMP normal except glucose 148. He had A1c 24 at 6.5. CBC normal    Hold metformin and BP medication the morning of surgery  Hold all vitamins and supplements 1 week before surgery  Hold NSAIDs 1 week before surgery  No chest pain, no shortness of breath, no palpitations  Functional METS score >4      2. Primary osteoarthritis of left knee    3. Benign essential hypertension  Assessment & Plan:  Stable on current medication   Recommend holding medication morning of surgery due to diuretic      4. Sleep apnea, unspecified type  Assessment & Plan:  Tried CPAP but didn't tolerate, manageable with weight loss        5. Type 2 diabetes mellitus with diabetic neuropathy, without long-term current use of insulin (Formerly [facility])  Assessment & Plan:  Stable  Hold metformin the morning of surgery  Can resume when home and eating    Lab Results   Component Value Date    HGBA1C 6.5 2024         Depression Screening and Follow-up Plan: Patient was screened for depression during today's encounter. They screened negative with a PHQ-2 score of 0.        Tomas Hoffman presents today for pre operative clearance for left total knee arthroplasty with Dr Crocker on 24.   Left knee pain worsening, had right knee done     No history of adverse reactions to anesthesia. No family history of adverse reactions to anesthesia  Medications reviewed  No known allergies to medications.    He has completed his labs and EKG.  EKG reviewed- sinus rhythm with 1st degree AV block  Labs reviewed from 3/27/24: CMP normal except  glucose 148. He had A1c 2/14/24 at 6.5. CBC normal    No chest pain, no shortness of breath, no palpitations  Functional METS score >4    Had CT 3/18/24 for hematuria- showed enlarged prostate- will be seeing urology pre operatively.     No loose or cracked teeth  Doesn't wear contact lenses          Review of Systems   Constitutional:  Negative for activity change, appetite change, chills, diaphoresis, fatigue and unexpected weight change.   HENT:  Negative for congestion, ear discharge, ear pain, hearing loss, nosebleeds and rhinorrhea.    Eyes:  Negative for pain, redness, itching and visual disturbance.   Respiratory:  Negative for cough, choking, chest tightness and shortness of breath.    Cardiovascular:  Negative for chest pain and leg swelling.   Gastrointestinal:  Negative for abdominal pain, blood in stool, constipation, diarrhea and nausea.   Endocrine: Negative for cold intolerance, polydipsia and polyphagia.   Genitourinary:  Positive for hematuria. Negative for dysuria, frequency and urgency.   Musculoskeletal:  Positive for arthralgias and gait problem. Negative for back pain, joint swelling, neck pain and neck stiffness.   Skin:  Negative for color change and rash.   Allergic/Immunologic: Negative for environmental allergies and food allergies.   Neurological:  Negative for dizziness, tremors, seizures, speech difficulty, numbness and headaches.   Hematological:  Negative for adenopathy. Does not bruise/bleed easily.   Psychiatric/Behavioral:  Negative for behavioral problems, dysphoric mood, hallucinations and self-injury.        Current Outpatient Medications on File Prior to Visit   Medication Sig   • ascorbic acid (VITAMIN C) 500 mg tablet Take 500 mg by mouth daily   • Cholecalciferol 125 MCG (5000 UT) TABS Take by mouth   • Lancets (OneTouch Delica Plus [id number]) MISC fro bid blood sugar monitoring   • Liver Extract (LIVER PO) Take by mouth   • metFORMIN (GLUCOPHAGE) 500 mg tablet take 2  "tablets by mouth twice a day   • Misc Natural Products (PROSTATE HEALTH PO) Take by mouth Pro[employer]ix: supports prostate health   • Multiple Vitamins-Minerals (VISION HEALTH PO) Take 2 capsules by mouth Bold Vision Supplement   • olmesartan-hydrochlorothiazide (BENICAR HCT) 20-12.5 MG per tablet take 1 tablet by mouth once daily   • Omega-3 Fatty Acids (OMEGA-3 FISH OIL PO) Take 2 capsules by mouth   • OneTouch Ultra test strip Use 1 each 2 (two) times a day   • PREBIOTIC PRODUCT PO Take by mouth daily   • Probiotic Product (PROBIOTIC-10 PO) Take by mouth   • Red Yeast Rice 600 MG CAPS Take by mouth daily   • THYROID PO Take 4 capsules by mouth \"Thyroid Activator\"       Objective     /80   Pulse 73   Temp (!) 96.4 °F (35.8 °C) (Tympanic)   Ht 6' 1\" (1.854 m)   Wt 104 kg (230 lb)   SpO2 98%   BMI 30.34 kg/m²     Physical Exam  Vitals reviewed.   Constitutional:       General: He is not in acute distress.     Appearance: Normal appearance. He is well-developed. He is obese. He is not diaphoretic.   HENT:      Head: Normocephalic and atraumatic.      Right Ear: Tympanic membrane, ear canal and external ear normal.      Left Ear: Tympanic membrane, ear canal and external ear normal.      Nose: Nose normal.      Mouth/Throat:      Mouth: Mucous membranes are moist.      Pharynx: Oropharynx is clear. No oropharyngeal exudate.   Eyes:      General: No scleral icterus.        Right eye: No discharge.         Left eye: No discharge.      Conjunctiva/sclera: Conjunctivae normal.      Pupils: Pupils are equal, round, and reactive to light.   Neck:      Thyroid: No thyromegaly.   Cardiovascular:      Rate and Rhythm: Normal rate and regular rhythm.      Heart sounds: Normal heart sounds. No murmur heard.  Pulmonary:      Effort: Pulmonary effort is normal.      Breath sounds: Normal breath sounds. No wheezing or rales.   Abdominal:      General: Bowel sounds are normal.      Palpations: Abdomen is soft. There is no " mass.      Tenderness: There is no abdominal tenderness. There is no guarding.   Musculoskeletal:         General: Tenderness and deformity present. Normal range of motion.      Cervical back: Normal range of motion and neck supple.   Lymphadenopathy:      Cervical: No cervical adenopathy.   Skin:     General: Skin is warm and dry.   Neurological:      Mental Status: He is alert and oriented to person, place, and time.      Deep Tendon Reflexes: Reflexes are normal and symmetric.   Psychiatric:         Thought Content: Thought content normal.         Judgment: Judgment normal.       ALIVIA Soler

## 2024-03-21 NOTE — ASSESSMENT & PLAN NOTE
Stable  Hold metformin the morning of surgery  Can resume when home and eating    Lab Results   Component Value Date    HGBA1C 6.5 02/14/2024

## 2024-03-22 ENCOUNTER — TELEPHONE (OUTPATIENT)
Dept: FAMILY MEDICINE CLINIC | Facility: CLINIC | Age: 74
End: 2024-03-22

## 2024-03-22 NOTE — TELEPHONE ENCOUNTER
----- Message from Ronn Palmer DO sent at 3/22/2024 10:54 AM EDT -----    CT scan shows enlarged prostate.  Neck step is a visit with urology

## 2024-03-25 ENCOUNTER — APPOINTMENT (OUTPATIENT)
Dept: PHYSICAL THERAPY | Facility: CLINIC | Age: 74
End: 2024-03-25
Payer: COMMERCIAL

## 2024-03-26 ENCOUNTER — APPOINTMENT (OUTPATIENT)
Dept: PHYSICAL THERAPY | Facility: CLINIC | Age: 74
End: 2024-03-26
Payer: COMMERCIAL

## 2024-03-27 DIAGNOSIS — Z01.818 PRE-OP EXAMINATION: Primary | ICD-10-CM

## 2024-03-28 ENCOUNTER — APPOINTMENT (OUTPATIENT)
Dept: PHYSICAL THERAPY | Facility: CLINIC | Age: 74
End: 2024-03-28
Payer: COMMERCIAL

## 2024-03-28 PROBLEM — M17.12 PRIMARY OSTEOARTHRITIS OF LEFT KNEE: Status: ACTIVE | Noted: 2024-03-28

## 2024-03-28 PROBLEM — Z01.818 PRE-OP EXAMINATION: Status: ACTIVE | Noted: 2024-03-28

## 2024-03-28 NOTE — ASSESSMENT & PLAN NOTE
Medically cleared for left total knee arthroplasty with Dr Crocker on 4/8/24.     EKG reviewed- sinus rhythm with 1st degree AV block  Labs reviewed from 3/27/24: CMP normal except glucose 148. He had A1c 2/14/24 at 6.5. CBC normal    Hold metformin and BP medication the morning of surgery  Hold all vitamins and supplements 1 week before surgery  Hold NSAIDs 1 week before surgery  No chest pain, no shortness of breath, no palpitations  Functional METS score >4

## 2024-04-11 ENCOUNTER — OFFICE VISIT (OUTPATIENT)
Dept: PHYSICAL THERAPY | Facility: CLINIC | Age: 74
End: 2024-04-11
Payer: COMMERCIAL

## 2024-04-11 DIAGNOSIS — M17.12 PRIMARY OSTEOARTHRITIS OF LEFT KNEE: Primary | ICD-10-CM

## 2024-04-11 PROCEDURE — 97110 THERAPEUTIC EXERCISES: CPT | Performed by: PHYSICAL THERAPIST

## 2024-04-11 PROCEDURE — 97161 PT EVAL LOW COMPLEX 20 MIN: CPT | Performed by: PHYSICAL THERAPIST

## 2024-04-11 NOTE — LETTER
2024    Cary Estrada PA-C  [address]  [facility] [address]    Patient: Dalton Vail   YOB: 1950   Date of Visit: 2024     Encounter Diagnosis     ICD-10-CM    1. Primary osteoarthritis of left knee  M17.12           Dear Dr. Estrada:    Thank you for your recent referral of Dalton Vail. Please review the attached evaluation summary from Dalton's recent visit.     Please verify that you agree with the plan of care by signing the attached order.     If you have any questions or concerns, please do not hesitate to call.     I sincerely appreciate the opportunity to share in the care of one of your patients and hope to have another opportunity to work with you in the near future.       Sincerely,    Giacomo Yao, PT      Referring Provider:      I certify that I have read the below Plan of Care and certify the need for these services furnished under this plan of treatment while under my care.                    Cary Estrada PA-C  [address]  [facility] [address]  Via Fax: [phone]          PT Evaluation     Today's date: 2024  Patient name: Dalton Vail  : 1950  MRN: [id number]  Referring provider: Cary Estrada PA-C  Dx:   Encounter Diagnosis     ICD-10-CM    1. Primary osteoarthritis of left knee  M17.12           Assessment  Assessment details: Dalton Vail is a 73 y.o. male who presents with pain, decreased strength, decreased ROM, decreased joint mobility, joint effusion, ambulatory dysfunction, and balance dysfunction. Due to these impairments, patient has difficulty performing ADL's, recreational activities, engaging in social activities, ambulation, stair negotiation, lifting/carrying, transfers. Patient's clinical presentation is consistent with their referring diagnosis of Primary osteoarthritis of left knee  (primary encounter diagnosis). Patient has been educated in post-op contraindications / precautions, gait training, weight bearing status, home  exercise program, and plan of care. Patient would benefit from skilled physical therapy services to address their aforementioned functional limitations and progress towards prior level of function and independence with home exercise program.     Impairments: abnormal gait, abnormal or restricted ROM, activity intolerance, impaired physical strength, lacks appropriate home exercise program, pain with function, weight-bearing intolerance, poor posture  and poor body mechanics  Functional limitations: stairs, squat, STS, balance, gait, yard work, in/out car   Prognosis: good  Prognosis details: + factors: high motivation levels, active lifestyle  - factors: chronic pain, age, BMI    Goals  Short Term Goals to be accomplished by 5/9/24:  STG1: Pt will be I with HEP.  STG2: Pt will demo 0-100 degrees in knee AROM to improve gait.   STG3: Pt will demo 4/5 MMT strength in knee to improve stair negotiation.   STG4: Pt will amb community distance without gait deviates due to pain.     Long Term Goals to be accomplished by 7/4/24:   LTG1: Pt will be able to go up/down stairs with reciprocal gait and without use of handrail.  LTG2: Pt will be able to perform STS with normal mechanics and no pain and no use of arm rests .  LTG3: Pt will return to household ADLs and work duties pain free as per PLOF      Plan  Plan details: HEP development, stretching, strengthening, A/AA/PROM, joint mobilizations, posture education, STM/MI as needed to reduce muscle tension, muscle reeducation, PLOC discussed and agreed upon with patient.      Patient would benefit from: PT eval and skilled physical therapy  Planned modality interventions: cryotherapy, thermotherapy: hydrocollator packs and unattended electrical stimulation  Planned therapy interventions: manual therapy, neuromuscular re-education, self care, therapeutic activities, therapeutic exercise, home exercise program, patient education, joint mobilization, balance, strengthening,  stretching and therapeutic training  Frequency: 2x week  Duration in weeks: 12  Plan of Care beginning date: 2024  Plan of Care expiration date: 2024  Treatment plan discussed with: patient      Subjective Evaluation    History of Present Illness  Mechanism of injury: L TKA performed on 24. Went home same day. Had homecare PT for 2 days. Cleared for OPPT. Walking with walker in public, however walking without AD at home. He stated that he has been frequent elevating and walking in order to avoid swelling.     Patient Goals  Patient goals for therapy: decreased pain, decreased edema, improved balance, increased motion, increased strength, independence with ADLs/IADLs and return to sport/leisure activities  Patient goal: stairs, squat, STS, balance, gait, yard work, in/out car  Pain  Current pain rating: 3  At best pain rating: 3  At worst pain ratin  Location: L knee  Quality: pressure, discomfort, pulling and tight  Relieving factors: relaxation and rest  Aggravating factors: walking, standing, stair climbing and lifting    Treatments  Current treatment: physical therapy      Objective     Observations     Additional Observation Details  Gait: lacks extension during stance phase    Incision: aquacell present, no s/s of infection, no redness outside of wound, no bleeding through aquacell present    Active Range of Motion   Left Knee   Flexion: 85 degrees with pain  Extension: 7 degrees with pain    Passive Range of Motion   Left Knee   Flexion: 90 degrees with pain  Extension: 5 degrees with pain    Strength/Myotome Testing     Left Knee   Flexion: 3-  Extension: 3-  Quadriceps contraction: fair    Right Knee   Flexion: 5  Extension: 5  Quadriceps contraction: good    Tests     Left Knee   Negative valgus stress test at 30 degrees and varus stress test at 30 degrees.        Precautions:   Past Medical History:   Diagnosis Date   • Hyperplastic colon polyp     last assessed 14   • Osteoarthritis  of both knees 6/30/2021   • Sleep apnea     last assessed 4/13/13         Manuals 4/11        PROM stretch         Pat mobs                            Neuro Re-Ed 4/11        Quad set:                                                                Ther Ex 4/11        Seated knee extension stretch :30x2         Seated knee flexion stretch :30x2         LAQ                                    Reviewed HEP  From Homecare PT 10'                  Ther Activity 4/11        RB - partial range         U/l LP

## 2024-04-11 NOTE — PROGRESS NOTES
PT Evaluation     Today's date: 2024  Patient name: Dalton Vail  : 1950  MRN: [id number]  Referring provider: Cary Estrada PA-C  Dx:   Encounter Diagnosis     ICD-10-CM    1. Primary osteoarthritis of left knee  M17.12           Assessment  Assessment details: Dalton Vail is a 73 y.o. male who presents with pain, decreased strength, decreased ROM, decreased joint mobility, joint effusion, ambulatory dysfunction, and balance dysfunction. Due to these impairments, patient has difficulty performing ADL's, recreational activities, engaging in social activities, ambulation, stair negotiation, lifting/carrying, transfers. Patient's clinical presentation is consistent with their referring diagnosis of Primary osteoarthritis of left knee  (primary encounter diagnosis). Patient has been educated in post-op contraindications / precautions, gait training, weight bearing status, home exercise program, and plan of care. Patient would benefit from skilled physical therapy services to address their aforementioned functional limitations and progress towards prior level of function and independence with home exercise program.     Impairments: abnormal gait, abnormal or restricted ROM, activity intolerance, impaired physical strength, lacks appropriate home exercise program, pain with function, weight-bearing intolerance, poor posture  and poor body mechanics  Functional limitations: stairs, squat, STS, balance, gait, yard work, in/out car   Prognosis: good  Prognosis details: + factors: high motivation levels, active lifestyle  - factors: chronic pain, age, BMI    Goals  Short Term Goals to be accomplished by 24:  STG1: Pt will be I with HEP.  STG2: Pt will demo 0-100 degrees in knee AROM to improve gait.   STG3: Pt will demo 4/5 MMT strength in knee to improve stair negotiation.   STG4: Pt will amb community distance without gait deviates due to pain.     Long Term Goals to be accomplished by 24:   LTG1: Pt  will be able to go up/down stairs with reciprocal gait and without use of handrail.  LTG2: Pt will be able to perform STS with normal mechanics and no pain and no use of arm rests .  LTG3: Pt will return to household ADLs and work duties pain free as per PLOF      Plan  Plan details: HEP development, stretching, strengthening, A/AA/PROM, joint mobilizations, posture education, STM/MI as needed to reduce muscle tension, muscle reeducation, PLOC discussed and agreed upon with patient.      Patient would benefit from: PT eval and skilled physical therapy  Planned modality interventions: cryotherapy, thermotherapy: hydrocollator packs and unattended electrical stimulation  Planned therapy interventions: manual therapy, neuromuscular re-education, self care, therapeutic activities, therapeutic exercise, home exercise program, patient education, joint mobilization, balance, strengthening, stretching and therapeutic training  Frequency: 2x week  Duration in weeks: 12  Plan of Care beginning date: 2024  Plan of Care expiration date: 2024  Treatment plan discussed with: patient      Subjective Evaluation    History of Present Illness  Mechanism of injury: L TKA performed on 24. Went home same day. Had homecare PT for 2 days. Cleared for OPPT. Walking with walker in public, however walking without AD at home. He stated that he has been frequent elevating and walking in order to avoid swelling.     Patient Goals  Patient goals for therapy: decreased pain, decreased edema, improved balance, increased motion, increased strength, independence with ADLs/IADLs and return to sport/leisure activities  Patient goal: stairs, squat, STS, balance, gait, yard work, in/out car  Pain  Current pain rating: 3  At best pain rating: 3  At worst pain ratin  Location: L knee  Quality: pressure, discomfort, pulling and tight  Relieving factors: relaxation and rest  Aggravating factors: walking, standing, stair climbing and  lifting    Treatments  Current treatment: physical therapy      Objective     Observations     Additional Observation Details  Gait: lacks extension during stance phase    Incision: aquacell present, no s/s of infection, no redness outside of wound, no bleeding through aquacell present    Active Range of Motion   Left Knee   Flexion: 85 degrees with pain  Extension: 7 degrees with pain    Passive Range of Motion   Left Knee   Flexion: 90 degrees with pain  Extension: 5 degrees with pain    Strength/Myotome Testing     Left Knee   Flexion: 3-  Extension: 3-  Quadriceps contraction: fair    Right Knee   Flexion: 5  Extension: 5  Quadriceps contraction: good    Tests     Left Knee   Negative valgus stress test at 30 degrees and varus stress test at 30 degrees.        Precautions:   Past Medical History:   Diagnosis Date    Hyperplastic colon polyp     last assessed 4/26/14    Osteoarthritis of both knees 6/30/2021    Sleep apnea     last assessed 4/13/13         Manuals 4/11        PROM stretch         Pat mobs                            Neuro Re-Ed 4/11        Quad set:                                                                Ther Ex 4/11        Seated knee extension stretch :30x2         Seated knee flexion stretch :30x2         LAQ                                    Reviewed HEP  From Homecare PT 10'                  Ther Activity 4/11        RB - partial range         U/l LP

## 2024-04-15 ENCOUNTER — OFFICE VISIT (OUTPATIENT)
Dept: PHYSICAL THERAPY | Facility: CLINIC | Age: 74
End: 2024-04-15
Payer: COMMERCIAL

## 2024-04-15 DIAGNOSIS — M17.12 PRIMARY OSTEOARTHRITIS OF LEFT KNEE: Primary | ICD-10-CM

## 2024-04-15 PROCEDURE — 97140 MANUAL THERAPY 1/> REGIONS: CPT

## 2024-04-15 PROCEDURE — 97110 THERAPEUTIC EXERCISES: CPT

## 2024-04-15 NOTE — PROGRESS NOTES
"Daily Note     Today's date: 4/15/2024  Patient name: Dalton Vail  : 1950  MRN: [id number]  Referring provider: Devorah Candelaria PA-C  Dx:   Encounter Diagnosis     ICD-10-CM    1. Primary osteoarthritis of left knee  M17.12           Start Time: 1000  Stop Time: 1045  Total time in clinic (min): 45 minutes    Subjective: Dalton states that he is mod painful in L knee. He states he was not expecting the amount of pain and has been doing as much of HEP as he can tolerate.      Objective: See treatment diary below      Assessment: initiated first treat post IE in supine with quad sets. Ice used t/o session to control swelling and inc tolerance to activity. Good tolerance to ROM, flex and extension. Session end on bike for ROM development flex/ extension. Patient would benefit from PT for L knee ROM and strength.      Plan: Continue per plan of care.      Precautions:   Past Medical History:   Diagnosis Date    Hyperplastic colon polyp     last assessed 14    Osteoarthritis of both knees 2021    Sleep apnea     last assessed 13         Manuals 4/11 4/15       PROM stretch  LQ       Pat mobs   LQ                         Neuro Re-Ed 4/11 4/15       Quad set:   10\"x20 W/ CP                                                             Ther Ex 4/11 4/15       Seated knee extension stretch :30x2  5\" x20       Seated knee flexion stretch :30x2  5\" x20       LAQ  SAQ 15, 5\" w/ CP                                  Reviewed HEP  From Homecare PT 10'                  Ther Activity 4/11 4/15       RB - partial range         U/l LP                           CP  With activities                                       "

## 2024-04-17 ENCOUNTER — OFFICE VISIT (OUTPATIENT)
Dept: PHYSICAL THERAPY | Facility: CLINIC | Age: 74
End: 2024-04-17
Payer: COMMERCIAL

## 2024-04-17 DIAGNOSIS — M17.12 PRIMARY OSTEOARTHRITIS OF LEFT KNEE: Primary | ICD-10-CM

## 2024-04-17 PROCEDURE — 97530 THERAPEUTIC ACTIVITIES: CPT | Performed by: PHYSICAL THERAPIST

## 2024-04-17 PROCEDURE — 97110 THERAPEUTIC EXERCISES: CPT | Performed by: PHYSICAL THERAPIST

## 2024-04-17 PROCEDURE — 97140 MANUAL THERAPY 1/> REGIONS: CPT | Performed by: PHYSICAL THERAPIST

## 2024-04-17 NOTE — PROGRESS NOTES
"Daily Note     Today's date: 2024  Patient name: Dalton Vail  : 1950  MRN: [id number]  Referring provider: Devorah Candelaria PA-C  Dx:   Encounter Diagnosis     ICD-10-CM    1. Primary osteoarthritis of left knee  M17.12              Subjective: Pt reported that he has been doing his HEP as prescribed. Notes no significant increase in pain.     Objective: See treatment diary below    Assessment: Tolerated treatment well. Patient demonstrated fatigue post treatment, exhibited good technique with therapeutic exercises, and would benefit from continued PT    Plan: Continue per plan of care.      Precautions:   Past Medical History:   Diagnosis Date    Hyperplastic colon polyp     last assessed 14    Osteoarthritis of both knees 2021    Sleep apnea     last assessed 13         Manuals 4/11 4/15 4/17      PROM stretch  LQ JZ      Pat mobs   LQ JZ                        Neuro Re-Ed 4/11 4/15 4/17      Quad set:   10\"x20 W/ CP                                                             Ther Ex 4/11 4/15 4/17      Seated knee extension stretch :30x2  5\" x20 5\" 10x      Seated knee flexion stretch :30x2  5\" x20 5\" 10x       LAQ  SAQ 15, 5\" w/ CP LAQ 20x:05      Calf stretch    :30x3 ea                        Reviewed HEP  From Homecare PT 10'   15' HEP, activity modification, use of SPC               Ther Activity 4/11 4/15 4/17      RB - partial range   5'  partial ROM      U/l LP   Seat7  20/ 2x10       U/l calf press   Seat8  20/ 2x10                CP  With activities                                         "

## 2024-04-22 ENCOUNTER — OFFICE VISIT (OUTPATIENT)
Dept: PHYSICAL THERAPY | Facility: CLINIC | Age: 74
End: 2024-04-22
Payer: COMMERCIAL

## 2024-04-22 DIAGNOSIS — M17.12 PRIMARY OSTEOARTHRITIS OF LEFT KNEE: Primary | ICD-10-CM

## 2024-04-22 PROCEDURE — 97530 THERAPEUTIC ACTIVITIES: CPT

## 2024-04-22 PROCEDURE — 97110 THERAPEUTIC EXERCISES: CPT

## 2024-04-22 NOTE — PROGRESS NOTES
"Daily Note     Today's date: 2024  Patient name: Dalton Vail  : 1950  MRN: [id number]  Referring provider: Devorah Candelaria PA-C  Dx:   Encounter Diagnosis     ICD-10-CM    1. Primary osteoarthritis of left knee  M17.12         Start Time: 1000  Stop Time: 1035  Total time in clinic (min): 35 minutes      Subjective: Pt reported that he drove out [place] over the weekend. He states he has mod swelling in the L knee into L foot. He had his post surgical F/U. Surgeon was happy with progress. Mid session patient expressed that he has nausea. He states that he did not sleep a lot last night    Objective: See treatment diary below.  BP post SAQ mid session: 110/70 mmHg    Assessment: Tolerated treatment fair. Session ended early 2* to nausea. Patient demonstrated fatigue post treatment, exhibited good technique with therapeutic exercises, and would benefit from continued PT    Plan: Continue per plan of care.      Precautions:   Past Medical History:   Diagnosis Date    Hyperplastic colon polyp     last assessed 14    Osteoarthritis of both knees 2021    Sleep apnea     last assessed 13         Manuals 4/11 4/15 4/17 4/22     PROM stretch  LQ JZ declined     Pat mobs   LQ JZ declined                       Neuro Re-Ed 4/11 4/15 4/17 4/22     Quad set:   10\"x20 W/ CP  10\"x20 W/ CP                                                           Ther Ex 4/11 4/15 4/17      Seated knee extension stretch :30x2  5\" x20 5\" 10x Quad sets     Seated knee flexion stretch :30x2  5\" x20 5\" 10x  Supine 2x10, 10\"     LAQ  SAQ 15, 5\" w/ CP LAQ 20x:05 SAQ      Calf stretch    :30x3 ea                        Reviewed HEP  From Homecare PT 10'   15' HEP, activity modification, use of SPC               Ther Activity 4/11 4/15 4/17      RB - partial range   5'  partial ROM 5' partial ROM     U/l LP   Seat7  20/ 2x10  Seat7  20/ 2x10      U/l calf press   Seat8  20/ 2x10  Seat8  20/ 2x10                CP  With activities  " With quad set

## 2024-04-24 ENCOUNTER — OFFICE VISIT (OUTPATIENT)
Dept: PHYSICAL THERAPY | Facility: CLINIC | Age: 74
End: 2024-04-24
Payer: COMMERCIAL

## 2024-04-24 DIAGNOSIS — M17.12 PRIMARY OSTEOARTHRITIS OF LEFT KNEE: Primary | ICD-10-CM

## 2024-04-24 PROCEDURE — 97110 THERAPEUTIC EXERCISES: CPT

## 2024-04-24 PROCEDURE — 97530 THERAPEUTIC ACTIVITIES: CPT

## 2024-04-24 PROCEDURE — 97140 MANUAL THERAPY 1/> REGIONS: CPT

## 2024-04-24 NOTE — PROGRESS NOTES
"Daily Note     Today's date: 2024  Patient name: Dalton Vail  : 1950  MRN: [id number]  Referring provider: Devorah Candelaria PA-C  Dx:   Encounter Diagnosis     ICD-10-CM    1. Primary osteoarthritis of left knee  M17.12           Start Time: 1530  Stop Time: 1619  Total time in clinic (min): 49 minutes      Subjective: Pt states that he has mod challenge sleeping 2* to knee pain. He states he is feeling better than LV, but has needed to use his prescribed pain medication.     Objective: See treatment diary below.      Assessment: Tolerated treatment well. Patient demonstrated improve tolerance to program this visit. Most restriction noted in extension. Mod cues utilized for TKE t/o session. Session ended with ice, with knee in extension. Patient demonstrated fatigue post treatment, exhibited good technique with therapeutic exercises, and would benefit from continued PT    Plan: Continue per plan of care.      Precautions:   Past Medical History:   Diagnosis Date    Hyperplastic colon polyp     last assessed 14    Osteoarthritis of both knees 2021    Sleep apnea     last assessed 13         Manuals 4/11 4/15 4/17 4/22 4/24    PROM stretch  LQ JZ declined LQ    Pat mobs   LQ JZ declined                       Neuro Re-Ed 4/11 4/15 4/17 4/22 4/24    Quad set:   10\"x20 W/ CP  10\"x20 W/ CP                                                           Ther Ex 4/11 4/15 4/17 4/22 4/24    Seated knee extension stretch :30x2  5\" x20 5\" 10x Quad sets 3x30\"    Seated knee flexion stretch :30x2  5\" x20 5\" 10x  Supine 2x10, 10\" Seated with slider 2x10\"     LAQ  SAQ 15, 5\" w/ CP LAQ 20x:05 SAQ  LAQ 20x:05    Calf stretch    :30x3 ea                        Reviewed HEP  From Homecare PT 10'   15' HEP, activity modification, use of SPC               Ther Activity 4/11 4/15 4/17      RB - partial range   5'  partial ROM 5' partial ROM 5' partial ROM SciFit    U/l LP   Seat7  20/ 2x10  Seat7  20/ 2x10  " Seat7  40/3x10    U/l calf press   Seat8  20/ 2x10  Seat8  20/ 2x10   Seat8  20/ 3x10               CP  With activities  With quad set

## 2024-04-29 ENCOUNTER — OFFICE VISIT (OUTPATIENT)
Dept: PHYSICAL THERAPY | Facility: CLINIC | Age: 74
End: 2024-04-29
Payer: COMMERCIAL

## 2024-04-29 DIAGNOSIS — M17.12 PRIMARY OSTEOARTHRITIS OF LEFT KNEE: Primary | ICD-10-CM

## 2024-04-29 PROCEDURE — 97110 THERAPEUTIC EXERCISES: CPT

## 2024-04-29 PROCEDURE — 97530 THERAPEUTIC ACTIVITIES: CPT

## 2024-04-29 PROCEDURE — 97140 MANUAL THERAPY 1/> REGIONS: CPT

## 2024-04-29 NOTE — PROGRESS NOTES
"Daily Note     Today's date: 2024  Patient name: Dalton Vail  : 1950  MRN: [id number]  Referring provider: Devorah Candelaria PA-C  Dx:   Encounter Diagnosis     ICD-10-CM    1. Primary osteoarthritis of left knee  M17.12             Start Time: 1010  Stop Time: 1110  Total time in clinic (min): 60 minutes      Subjective: Patient states that he has been having mod challenge with pain at knee cap with extension at night. He states he has not been able to sleep at night.     Objective: See treatment diary below.      Assessment: Tolerated treatment well. TKE cues continues this session. Most challenge with quad activation in TKE. Patient demonstrated fatigue post treatment, exhibited good technique with therapeutic exercises, and would benefit from continued PT    Plan: Continue per plan of care.      Precautions:   Past Medical History:   Diagnosis Date    Hyperplastic colon polyp     last assessed 14    Osteoarthritis of both knees 2021    Sleep apnea     last assessed 13         Manuals 4/11 4/15 4/17 4/22 4/24 4/29   PROM stretch  LQ JZ declined LQ LQ   Pat mobs   LQ JZ declined  LQ                     Neuro Re-Ed 4/11 4/15 4/17 4/22 4/24 4/29   Quad set:   10\"x20 W/ CP  10\"x20 W/ CP                                                           Ther Ex 4/11 4/15 4/17 4/22 4/24 4/29   Seated knee extension stretch :30x2  5\" x20 5\" 10x Quad sets 3x30\" 3 min on stool 10sec hold   Seated knee flexion stretch :30x2  5\" x20 5\" 10x  Supine 2x10, 10\" Seated with slider 2x10\"  3 min slider     1 min with OP   LAQ  SAQ 15, 5\" w/ CP LAQ 20x:05 SAQ  LAQ 20x:05 LAQ 30x 3 sec cues for TKE   Calf stretch    :30x3 ea                        Reviewed HEP  From Home[facility] PT 10'   15' HEP, activity modification, use of SPC               Ther Activity 4/11 4/15 4/17      RB - partial range   5'  partial ROM 5' partial ROM 5' partial ROM SciFit 5' partial ROM SciFit   U/l LP   Seat7  20/ 2x10  Seat7  20/ 2x10  " Seat7  40/3x10 Seat7  40/3x10 TKE cue    U/l calf press   Seat8  20/ 2x10  Seat8  20/ 2x10   Seat8  20/ 3x10   Seat8  20/ 3x10   TKE cue            CP  With activities  With quad set  End of session, knee in extension 8 min

## 2024-05-01 ENCOUNTER — OFFICE VISIT (OUTPATIENT)
Dept: PHYSICAL THERAPY | Facility: CLINIC | Age: 74
End: 2024-05-01
Payer: COMMERCIAL

## 2024-05-01 DIAGNOSIS — M17.12 PRIMARY OSTEOARTHRITIS OF LEFT KNEE: Primary | ICD-10-CM

## 2024-05-01 PROCEDURE — 97530 THERAPEUTIC ACTIVITIES: CPT

## 2024-05-01 PROCEDURE — 97110 THERAPEUTIC EXERCISES: CPT

## 2024-05-01 PROCEDURE — 97140 MANUAL THERAPY 1/> REGIONS: CPT

## 2024-05-01 NOTE — PROGRESS NOTES
"Daily Note     Today's date: 2024  Patient name: Dalton Vail  : 1950  MRN: [id number]  Referring provider: Devorah Candelaria PA-C  Dx:   Encounter Diagnosis     ICD-10-CM    1. Primary osteoarthritis of left knee  M17.12               Start Time: 1005  Stop Time: 1055  Total time in clinic (min): 50 minutes      Subjective: Patient states although he has been have challenges sleeping at night his overall pain level is improving.    Objective: See treatment diary below.      Assessment: Tolerated treatment well. Session initiated on sci fit for ROM. Program advanced this session to include further quad strengthening and abductor strengthening.  Patient demonstrated fatigue post treatment, exhibited good technique with therapeutic exercises, and would benefit from continued PT    Plan: Continue per plan of care.      Precautions:   Past Medical History:   Diagnosis Date    Hyperplastic colon polyp     last assessed 14    Osteoarthritis of both knees 2021    Sleep apnea     last assessed 13         Manuals    PROM stretch LQ  JZ declined LQ LQ   Pat mobs  LQ  JZ declined  LQ                     Neuro Re-Ed    Quad set:     10\"x20 W/ CP                                                           Ther Ex    Seated knee extension stretch   5\" 10x Quad sets 3x30\" 3 min on stool 10sec hold   Seated knee flexion stretch   5\" 10x  Supine 2x10, 10\" Seated with slider 2x10\"  3 min slider     1 min with OP   SL Abduction 2x10        SLR 2x10 cues for TKE        LAQ LAQ 30x 3 sec cues for TKE  LAQ 20x:05 SAQ  LAQ 20x:05 LAQ 30x 3 sec cues for TKE   Calf stretch    :30x3 ea      Standing knee flexion on stool 3x30\"ea        Seated HS stretch 5' partial ROM SciFit        Reviewed HEP    15' HEP, activity modification, use of SPC               Ther Activity         RB - partial range 5' partial ROM SciFit  5'  partial ROM 5' partial " ROM 5' partial ROM SciFit 5' partial ROM SciFit   U/l LP Seat7  40/5ea  45/3x10 TKE cue   Seat7  20/ 2x10  Seat7  20/ 2x10  Seat7  40/3x10 Seat7  40/3x10 TKE cue    U/l calf press Seat 7  45/3 x10    Seat8  20/ 2x10  Seat8  20/ 2x10   Seat8  20/ 3x10   Seat8  20/ 3x10   TKE cue            CP    With quad set  End of session, knee in extension 8 min

## 2024-05-06 ENCOUNTER — EVALUATION (OUTPATIENT)
Dept: PHYSICAL THERAPY | Facility: CLINIC | Age: 74
End: 2024-05-06
Payer: COMMERCIAL

## 2024-05-06 DIAGNOSIS — M17.12 PRIMARY OSTEOARTHRITIS OF LEFT KNEE: Primary | ICD-10-CM

## 2024-05-06 PROCEDURE — 97530 THERAPEUTIC ACTIVITIES: CPT | Performed by: PHYSICAL THERAPIST

## 2024-05-06 PROCEDURE — 97140 MANUAL THERAPY 1/> REGIONS: CPT | Performed by: PHYSICAL THERAPIST

## 2024-05-06 PROCEDURE — 97110 THERAPEUTIC EXERCISES: CPT | Performed by: PHYSICAL THERAPIST

## 2024-05-06 NOTE — LETTER
May 6, 2024    Devorah Candelaria PA-C  [address] [facility], [address] [place] [id number]    Patient: Dalton Vail   YOB: 1950   Date of Visit: 2024     Encounter Diagnosis     ICD-10-CM    1. Primary osteoarthritis of left knee  M17.12           Dear Dr. Candelaria:    Thank you for your recent referral of Dalton Vail. Please review the attached evaluation summary from Dalton's recent visit.     Please verify that you agree with the plan of care by signing the attached order.     If you have any questions or concerns, please do not hesitate to call.     I sincerely appreciate the opportunity to share in the care of one of your patients and hope to have another opportunity to work with you in the near future.       Sincerely,    Giacomo Yao, PT      Referring Provider:      I certify that I have read the below Plan of Care and certify the need for these services furnished under this plan of treatment while under my care.                    Devorah Candelaria PA-C  [address] [place] [address], [address] [address]  Via Fax: [phone]          PT Re-Evaluation     Today's date: 2024  Patient name: Dalton Vail  : 1950  MRN: [id number]  Referring provider: Ronn Palmer DO  Dx:   Encounter Diagnosis     ICD-10-CM    1. Left knee pain     M25.562    2. Primary osteoarthritis of right knee  M17.11                                                             Assessment  Assessment details:   Since starting PT post op L TKA Dalton reports GROC improvement of 20%. MMT demonstrated improved strength. ROM assessment demonstrated loss of motion, however he reported that this is because he had to drive to [place] and back in one day, and ever since then he has had increased knee stiffness. Balance assessment demonstrated improved SLS. Functional outcome measures and subjective reports demonstrated functional ability. Despite improvements he continues to present with pain, weakness decreased  ROM, impaired balance.. Due to these impairments, patient has difficulty performing ADL's, recreational activities, engaging in social activities, ambulation, stair negotiation, lifting/carrying, transfers. Patient's clinical presentation is consistent with their referring diagnosis of S/p R/L TKA performed on 7/24/24 and left knee pain. Patient has been educated in post-op contraindications / precautions, gait training, weight bearing status, home exercise program and plan of care. Patient would benefit from skilled physical therapy services to address their aforementioned functional limitations and progress towards prior level of function and independence with home exercise program.     Impairments: abnormal gait, abnormal or restricted ROM, activity intolerance, impaired balance, impaired physical strength, lacks appropriate home exercise program, pain with function, weight-bearing intolerance, poor posture  and poor body mechanics  Functional limitations: walk/stand, STS, stair negotiation, yard work, walk uneven surfaces   Prognosis: good  Prognosis details: + factors: high motivation levels, active lifestyle  - factors: chronic pain, BMI    Goals  Short Term Goals to be accomplished by 3/25/24:  STG1: Pt will be I with HEP. Achieved.   STG2: Pt will demo 0-130 degrees in knee AROM to improve gait. (Previously achieved, however regression due to long drive).   STG3: Pt will demo 5/5 MMT strength in knee to improve stair negotiation. (Previously achieved, however regression due to long drive).  STG4: Pt will amb community distance without gait deviates due to pain. Achieved.      Long Term Goals to be accomplished by 6/30/24:   LTG1: Pt will be able to descend stairs with reciprocal gait pattern without use of handrail.   LTG2: Pt will be able to walk on uneven surfaces on property to do yard work.   LTG3: Pt will be able to perform STS with normal mechanics without pain.       Plan  Plan details: HEP  development, stretching, strengthening, A/AA/PROM, joint mobilizations, posture education, STM/MI as needed to reduce muscle tension, muscle reeducation, PLOC discussed and agreed upon with patient.      Patient would benefit from: PT eval and skilled physical therapy  Planned modality interventions: cryotherapy, thermotherapy: hydrocollator packs and unattended electrical stimulation  Planned therapy interventions: manual therapy, neuromuscular re-education, self care, therapeutic activities, therapeutic exercise, home exercise program, patient education, joint mobilization, balance, strengthening, stretching, therapeutic training and flexibility  Frequency: 2x week    Plan of Care beginning date: 3/25/2024  Plan of Care expiration date: 2024  Treatment plan discussed with: patient      Subjective Evaluation    History of Present Illness  Mechanism of injury: Pt is a 71 y/o male who presents to PT s/p R TKA performed on 23. Had a surgery on  to have hardware removed from his knee from previous ORIF. Then he had the TKA on 23 Stated that the day of the surgery there was complications with bleeding and the surgeon had to add additional staples.   Had home care PT for some visits which improved his symptoms.   Since then he has been improving. Notes that he has been walking around his home without AD.   Improved symptoms from ice and elevation.   Patient Goals  Patient goals for therapy: increased motion, improved balance, decreased pain, decreased edema, increased strength and independence with ADLs/IADLs  Patient goal: walk/stand, STS, stair negotiation, yard work, walk uneven surfaces  Pain  Current pain ratin  At best pain ratin  At worst pain ratin  Location: R TKA and left knee   Quality: tight, discomfort and pulling  Aggravating factors: stair climbing, walking, standing and lifting    Treatments  Current treatment: physical therapy      Objective     Observations  "    Additional Observation Details  Incision: covered with bandage still     Tenderness     Additional Tenderness Details  TTP over R ITB, calf, adductors, HS    Active Range of Motion     Right Knee   Flexion: 118 degrees   Extension: 0 degrees     Left knee:  Flexion: 105 degrees   Extension: 10 degrees     Passive Range of Motion     Right Knee   Flexion: 120 degrees   Extension: 0 degrees     Left Knee   Flexion: 115 degrees   Extension: 0 degrees     Strength/Myotome Testing     Right Knee   Flexion: 4+  Extension: 4+    Left knee  Flexion: 4+  Extension: 4+        Additional Strength Details  U/l heel raise:   R: 20 reps  L: 20 reps       Tests     Right Knee   Negative valgus stress test at 30 degrees and varus stress test at 30 degrees.     Additional Tests Details  TU sec     SLS:   R: 8, 9 sec  L: 7, 7 sec       Precautions:   Past Medical History:   Diagnosis Date   • Hyperplastic colon polyp     last assessed 14   • Osteoarthritis of both knees 2021   • Sleep apnea     last assessed 13     Manuals    PROM stretch LQ JZ  declined LQ LQ   Pat mobs  LQ JZ  declined  LQ                     Neuro Re-Ed    Quad set:     10\"x20 W/ CP                                                           Ther Ex    Seated knee extension stretch    Quad sets 3x30\" 3 min on stool 10sec hold   Seated knee flexion stretch    Supine 2x10, 10\" Seated with slider 2x10\"  3 min slider     1 min with OP   SL Abduction 2x10 3x10        SLR 2x10 cues for TKE        LAQ LAQ 30x 3 sec cues for TKE   SAQ  LAQ 20x:05 LAQ 30x 3 sec cues for TKE   Prone knee flexion stretch strap  :30x3       Standing knee flexion on stool 3x30\"ea        Seated HS stretch 5' partial ROM SciFit :30x3        Reviewed HEP   5'                Ther Activity         RB - partial range 5' partial ROM SciFit 5' partial ROM SciFit  5' partial ROM 5' partial ROM SciFit 5' partial " ROM SciFit   U/l LP Seat7  40/5ea  45/3x10 TKE cue  Seat7  40/ 3x10  50/ 2x10    Seat7  20/ 2x10  Seat7  40/3x10 Seat7  40/3x10 TKE cue    U/l calf press Seat 7  45/3 x10   Seat7   40/ 3x10 ea  Seat8  20/ 2x10   Seat8  20/ 3x10   Seat8  20/ 3x10   TKE cue            CP    With quad set  End of session, knee in extension 8 min

## 2024-05-06 NOTE — PROGRESS NOTES
PT Re-Evaluation     Today's date: 2024  Patient name: Dalton Vail  : 1950  MRN: [id number]  Referring provider: Ronn Palmer DO  Dx:   Encounter Diagnosis     ICD-10-CM    1. Left knee pain     M25.562    2. Primary osteoarthritis of right knee  M17.11                                                             Assessment  Assessment details:   Since starting PT post op L TKA Dalton reports GROC improvement of 20%. MMT demonstrated improved strength. ROM assessment demonstrated loss of motion, however he reported that this is because he had to drive to [place] and back in one day, and ever since then he has had increased knee stiffness. Balance assessment demonstrated improved SLS. Functional outcome measures and subjective reports demonstrated functional ability. Despite improvements he continues to present with pain, weakness decreased ROM, impaired balance.. Due to these impairments, patient has difficulty performing ADL's, recreational activities, engaging in social activities, ambulation, stair negotiation, lifting/carrying, transfers. Patient's clinical presentation is consistent with their referring diagnosis of S/p R/L TKA performed on 24 and left knee pain. Patient has been educated in post-op contraindications / precautions, gait training, weight bearing status, home exercise program and plan of care. Patient would benefit from skilled physical therapy services to address their aforementioned functional limitations and progress towards prior level of function and independence with home exercise program.     Impairments: abnormal gait, abnormal or restricted ROM, activity intolerance, impaired balance, impaired physical strength, lacks appropriate home exercise program, pain with function, weight-bearing intolerance, poor posture  and poor body mechanics  Functional limitations: walk/stand, STS, stair negotiation, yard work, walk uneven surfaces   Prognosis: good  Prognosis details:  + factors: high motivation levels, active lifestyle  - factors: chronic pain, BMI    Goals  Short Term Goals to be accomplished by 3/25/24:  STG1: Pt will be I with HEP. Achieved.   STG2: Pt will demo 0-130 degrees in knee AROM to improve gait. (Previously achieved, however regression due to long drive).   STG3: Pt will demo 5/5 MMT strength in knee to improve stair negotiation. (Previously achieved, however regression due to long drive).  STG4: Pt will amb community distance without gait deviates due to pain. Achieved.      Long Term Goals to be accomplished by 6/30/24:   LTG1: Pt will be able to descend stairs with reciprocal gait pattern without use of handrail.   LTG2: Pt will be able to walk on uneven surfaces on property to do yard work.   LTG3: Pt will be able to perform STS with normal mechanics without pain.       Plan  Plan details: HEP development, stretching, strengthening, A/AA/PROM, joint mobilizations, posture education, STM/MI as needed to reduce muscle tension, muscle reeducation, PLOC discussed and agreed upon with patient.      Patient would benefit from: PT eval and skilled physical therapy  Planned modality interventions: cryotherapy, thermotherapy: hydrocollator packs and unattended electrical stimulation  Planned therapy interventions: manual therapy, neuromuscular re-education, self care, therapeutic activities, therapeutic exercise, home exercise program, patient education, joint mobilization, balance, strengthening, stretching, therapeutic training and flexibility  Frequency: 2x week    Plan of Care beginning date: 3/25/2024  Plan of Care expiration date: 6/30/2024  Treatment plan discussed with: patient      Subjective Evaluation    History of Present Illness  Mechanism of injury: Pt is a 73 y/o male who presents to PT s/p R TKA performed on 7/24/23. Had a surgery on March 1st to have hardware removed from his knee from previous ORIF. Then he had the TKA on 7/24/23 Stated that the day of  the surgery there was complications with bleeding and the surgeon had to add additional staples.   Had home care PT for some visits which improved his symptoms.   Since then he has been improving. Notes that he has been walking around his home without AD.   Improved symptoms from ice and elevation.   Patient Goals  Patient goals for therapy: increased motion, improved balance, decreased pain, decreased edema, increased strength and independence with ADLs/IADLs  Patient goal: walk/stand, STS, stair negotiation, yard work, walk uneven surfaces  Pain  Current pain ratin  At best pain ratin  At worst pain ratin  Location: R TKA and left knee   Quality: tight, discomfort and pulling  Aggravating factors: stair climbing, walking, standing and lifting    Treatments  Current treatment: physical therapy      Objective     Observations     Additional Observation Details  Incision: covered with bandage still     Tenderness     Additional Tenderness Details  TTP over R ITB, calf, adductors, HS    Active Range of Motion     Right Knee   Flexion: 118 degrees   Extension: 0 degrees     Left knee:  Flexion: 105 degrees   Extension: 10 degrees     Passive Range of Motion     Right Knee   Flexion: 120 degrees   Extension: 0 degrees     Left Knee   Flexion: 115 degrees   Extension: 0 degrees     Strength/Myotome Testing     Right Knee   Flexion: 4+  Extension: 4+    Left knee  Flexion: 4+  Extension: 4+        Additional Strength Details  U/l heel raise:   R: 20 reps  L: 20 reps       Tests     Right Knee   Negative valgus stress test at 30 degrees and varus stress test at 30 degrees.     Additional Tests Details  TU sec     SLS:   R: 8, 9 sec  L: 7, 7 sec       Precautions:   Past Medical History:   Diagnosis Date    Hyperplastic colon polyp     last assessed 14    Osteoarthritis of both knees 2021    Sleep apnea     last assessed 13     Manuals    PROM stretch LQ JZ  declined LQ  "LQ   Pat mobs  LQ JZ  declined  LQ                     Neuro Re-Ed 5/1 5/6 4/22 4/24 4/29   Quad set:     10\"x20 W/ CP                                                           Ther Ex 5/1 5/6 4/22 4/24 4/29   Seated knee extension stretch    Quad sets 3x30\" 3 min on stool 10sec hold   Seated knee flexion stretch    Supine 2x10, 10\" Seated with slider 2x10\"  3 min slider     1 min with OP   SL Abduction 2x10 3x10        SLR 2x10 cues for TKE        LAQ LAQ 30x 3 sec cues for TKE   SAQ  LAQ 20x:05 LAQ 30x 3 sec cues for TKE   Prone knee flexion stretch strap  :30x3       Standing knee flexion on stool 3x30\"ea        Seated HS stretch 5' partial ROM SciFit :30x3        Reviewed HEP   5'                Ther Activity  5/6       RB - partial range 5' partial ROM SciFit 5' partial ROM SciFit  5' partial ROM 5' partial ROM SciFit 5' partial ROM SciFit   U/l LP Seat7  40/5ea  45/3x10 TKE cue  Seat7  40/ 3x10  50/ 2x10    Seat7  20/ 2x10  Seat7  40/3x10 Seat7  40/3x10 TKE cue    U/l calf press Seat 7  45/3 x10   Seat7   40/ 3x10 ea  Seat8  20/ 2x10   Seat8  20/ 3x10   Seat8  20/ 3x10   TKE cue            CP    With quad set  End of session, knee in extension 8 min                                 "

## 2024-05-08 ENCOUNTER — OFFICE VISIT (OUTPATIENT)
Dept: PHYSICAL THERAPY | Facility: CLINIC | Age: 74
End: 2024-05-08
Payer: COMMERCIAL

## 2024-05-08 DIAGNOSIS — M17.12 PRIMARY OSTEOARTHRITIS OF LEFT KNEE: Primary | ICD-10-CM

## 2024-05-08 PROCEDURE — 97110 THERAPEUTIC EXERCISES: CPT

## 2024-05-08 PROCEDURE — 97530 THERAPEUTIC ACTIVITIES: CPT

## 2024-05-08 PROCEDURE — 97140 MANUAL THERAPY 1/> REGIONS: CPT

## 2024-05-08 NOTE — PROGRESS NOTES
"Daily Note     Today's date: 2024  Patient name: Dalton Vail  : 1950  MRN: [id number]  Referring provider: Devorah Candelaria PA-C  Dx:   Encounter Diagnosis     ICD-10-CM    1. Primary osteoarthritis of left knee  M17.12                      Subjective: Dalton reports he continues to have high levels of pain in L knee, contributing to difficulty sleeping. Rates pain 4/10 upon arrival.       Objective: See treatment diary below      Assessment: Dalton tolerated PT treatment well. Initiated session with RB rocking to address knee ROM. Passive stretching performed, flexion and extension ROM limited at this time. Challenged with current weight on u/l leg press. Added STS from chair, 1 foam A required. Fatigue evident post session. Continue to address RO deficits and functional strength. Pt would benefit from continued PT to further address impairments and maximize functional level.      Plan: Continue per plan of care.      Precautions:   Past Medical History:   Diagnosis Date    Hyperplastic colon polyp     last assessed 14    Osteoarthritis of both knees 2021    Sleep apnea     last assessed 13         Manuals    PROM stretch LQ JZ JW  LQ LQ   Pat mobs  LQ JZ JW   LQ                     Neuro Re-Ed    Quad set:                                                                Ther Ex    Seated knee extension stretch     3x30\" 3 min on stool 10sec hold   Seated knee flexion stretch     Seated with slider 2x10\"  3 min slider     1 min with OP   SL Abduction 2x10 3x10  3x10 ea       SLR 2x10 cues for TKE        LAQ LAQ 30x 3 sec cues for TKE    LAQ 20x:05 LAQ 30x 3 sec cues for TKE   Prone knee flexion stretch strap  :30x3 :30x3       Standing knee flexion on stool 3x30\"ea        Seated HS stretch 5' partial ROM SciFit :30x3  :30x3 ea       Reviewed HEP   5'                Ther Activity         RB - partial range 5' partial ROM " SciFit 5' partial ROM SciFit 5' partial ROM SciFit  5' partial ROM SciFit 5' partial ROM SciFit   U/l LP Seat7  40/5ea  45/3x10 TKE cue  Seat7  40/ 3x10  50/ 2x10   Seat7  40/ 3x10    Seat7  40/3x10 Seat7  40/3x10 TKE cue    U/l calf press Seat 7  45/3 x10   Seat7   40/ 3x10 ea Seat7   40/ 3x10 ea  Seat8  20/ 3x10   Seat8  20/ 3x10   TKE cue   STS   1 foam x10       CP      End of session, knee in extension 8 min

## 2024-05-13 ENCOUNTER — APPOINTMENT (OUTPATIENT)
Dept: PHYSICAL THERAPY | Facility: CLINIC | Age: 74
End: 2024-05-13
Payer: COMMERCIAL

## 2024-05-15 ENCOUNTER — OFFICE VISIT (OUTPATIENT)
Dept: PHYSICAL THERAPY | Facility: CLINIC | Age: 74
End: 2024-05-15
Payer: COMMERCIAL

## 2024-05-15 DIAGNOSIS — M17.12 PRIMARY OSTEOARTHRITIS OF LEFT KNEE: Primary | ICD-10-CM

## 2024-05-15 PROCEDURE — 97530 THERAPEUTIC ACTIVITIES: CPT

## 2024-05-15 PROCEDURE — 97110 THERAPEUTIC EXERCISES: CPT

## 2024-05-15 PROCEDURE — 97140 MANUAL THERAPY 1/> REGIONS: CPT

## 2024-05-15 NOTE — PROGRESS NOTES
"Daily Note     Today's date: 5/15/2024  Patient name: Dalton Vail  : 1950  MRN: [id number]  Referring provider: Devorah Candelaria PA-C  Dx:   Encounter Diagnosis     ICD-10-CM    1. Primary osteoarthritis of left knee  M17.12                        Subjective: Dalton reports over the weekend he moved son into home in [facility], increased L knee stiffness occurring following long car ride.         Objective: See treatment diary below      Assessment: Dalton tolerated PT treatment well. Initiated session with RB rocking to address knee ROM. Continued with passive stretching to address flexion and extension ROM deficits. Greatest challenge with STS from chair, 1 foam A required. Pt would benefit from continued PT to further address impairments and maximize functional level.      Plan: Continue per plan of care.      Precautions:   Past Medical History:   Diagnosis Date         Hyperplastic colon polyp     last assessed 14    Osteoarthritis of both knees 2021    Sleep apnea     last assessed 13         Manuals 5/1 5/6 5/8 5/15  4/29   PROM stretch LQ JZ JW JW  LQ   Pat mobs  LQ JZ JW JW  LQ                     Neuro Re-Ed 5/1 5/6 5/8 5/15  4/29   Quad set:                                                                Ther Ex 5/1 5/6 5/8 5/15  4/29   Seated knee extension stretch      3 min on stool 10sec hold   Seated knee flexion stretch      3 min slider     1 min with OP   SL Abduction 2x10 3x10  3x10 ea  3x10      SLR 2x10 cues for TKE        LAQ LAQ 30x 3 sec cues for TKE     LAQ 30x 3 sec cues for TKE   Prone knee flexion stretch strap  :30x3 :30x3  :30x3      Standing knee flexion on stool 3x30\"ea        Seated HS stretch 5' partial ROM SciFit :30x3  :30x3 ea  :30x3      Reviewed HEP   5'                Ther Activity         RB - partial range 5' partial ROM SciFit 5' partial ROM SciFit 5' partial ROM SciFit 5' partial ROM SciFit  5' partial ROM SciFit   U/l LP Seat7  40/5ea  45/3x10 TKE cue  " Seat7  40/ 3x10  50/ 2x10   Seat7  40/ 3x10   Seat7  40/ 3x10  Seat7  40/3x10 TKE cue    U/l calf press Seat 7  45/3 x10   Seat7   40/ 3x10 ea Seat7   40/ 3x10 ea Seat7  40/ 3x10  Seat8  20/ 3x10   TKE cue   STS   1 foam x10  1 foam 2x10      CP      End of session, knee in extension 8 min

## 2024-05-17 ENCOUNTER — OFFICE VISIT (OUTPATIENT)
Dept: PHYSICAL THERAPY | Facility: CLINIC | Age: 74
End: 2024-05-17
Payer: COMMERCIAL

## 2024-05-17 DIAGNOSIS — M17.12 PRIMARY OSTEOARTHRITIS OF LEFT KNEE: Primary | ICD-10-CM

## 2024-05-17 PROCEDURE — 97530 THERAPEUTIC ACTIVITIES: CPT | Performed by: PHYSICAL THERAPIST

## 2024-05-17 PROCEDURE — 97140 MANUAL THERAPY 1/> REGIONS: CPT | Performed by: PHYSICAL THERAPIST

## 2024-05-17 PROCEDURE — 97110 THERAPEUTIC EXERCISES: CPT | Performed by: PHYSICAL THERAPIST

## 2024-05-17 NOTE — PROGRESS NOTES
"Daily Note     Today's date: 2024  Patient name: Dalton Vail  : 1950  MRN: [id number]  Referring provider: Devorah Candelaria PA-C  Dx:   Encounter Diagnosis     ICD-10-CM    1. Primary osteoarthritis of left knee  M17.12              Subjective: Pt reported that he has not been keeping up with his HEP as prescribed.     Objective: See treatment diary below    Assessment: Tolerated treatment well. Patient demonstrated fatigue post treatment, exhibited good technique with therapeutic exercises, and would benefit from continued PT    Plan: Continue per plan of care.      Precautions:   Past Medical History:   Diagnosis Date         Hyperplastic colon polyp     last assessed 14    Osteoarthritis of both knees 2021    Sleep apnea     last assessed 13         Manuals 5/1 5/6 5/8 5/15 5/17    PROM stretch LQ JZ JW JW JZ    Pat mobs  LQ JZ JW JW JZ                      Neuro Re-Ed 5/1 5/6 5/8 5/15 5/17    Quad set:                                                                Ther Ex 5/1 5/6 5/8 5/15 5/17    Seated knee extension stretch         Seated knee flexion stretch     Stand :30x3     SL Abduction 2x10 3x10  3x10 ea  3x10  3x10  glute med  3x10  TFL     SLR 2x10 cues for TKE        LAQ LAQ 30x 3 sec cues for TKE        Prone knee flexion stretch strap  :30x3 :30x3  :30x3  :30x4     Standing knee flexion on stool 3x30\"ea        Seated HS stretch 5' partial ROM SciFit :30x3  :30x3 ea  :30x3  :30x3     Reviewed HEP   5'                Ther Activity      RB - partial range 5' partial ROM SciFit 5' partial ROM SciFit 5' partial ROM SciFit 5' partial ROM SciFit 5' partial ROM SciFit    U/l LP Seat7  40/5ea  45/3x10 TKE cue  Seat7  40/ 3x10  50/ 2x10   Seat7  40/ 3x10   Seat7  40/ 3x10 Seat6  70/ 3x10 ea    B/l calf press Seat 7  45/3 x10   Seat7   40/ 3x10 ea Seat7   40/ 3x10 ea Seat7  40/ 3x10 Seat7 b/l calf press   110/ 3x10     STS   1 foam x10  1 foam 2x10      CP                "

## 2024-05-20 ENCOUNTER — OFFICE VISIT (OUTPATIENT)
Dept: PHYSICAL THERAPY | Facility: CLINIC | Age: 74
End: 2024-05-20
Payer: COMMERCIAL

## 2024-05-20 DIAGNOSIS — M17.12 PRIMARY OSTEOARTHRITIS OF LEFT KNEE: Primary | ICD-10-CM

## 2024-05-20 PROCEDURE — 97530 THERAPEUTIC ACTIVITIES: CPT | Performed by: PHYSICAL THERAPIST

## 2024-05-20 PROCEDURE — 97140 MANUAL THERAPY 1/> REGIONS: CPT | Performed by: PHYSICAL THERAPIST

## 2024-05-20 PROCEDURE — 97110 THERAPEUTIC EXERCISES: CPT | Performed by: PHYSICAL THERAPIST

## 2024-05-20 NOTE — PROGRESS NOTES
"Daily Note     Today's date: 2024  Patient name: Dalton Vail  : 1950  MRN: [id number]  Referring provider: Devorah Candelaria PA-C  Dx:   Encounter Diagnosis     ICD-10-CM    1. Primary osteoarthritis of left knee  M17.12              Subjective: Pt reported that he has started religiously doing his exercises. Notes that \"I already notice an improvement in my motion.\"    Objective: See treatment diary below    Assessment: Tolerated treatment well. Patient demonstrated fatigue post treatment, exhibited good technique with therapeutic exercises, and would benefit from continued PT    Plan: Continue per plan of care.      Precautions:   Past Medical History:   Diagnosis Date         Hyperplastic colon polyp     last assessed 14    Osteoarthritis of both knees 2021    Sleep apnea     last assessed 13         Manuals 5/1 5/6 5/8 5/15 5/17 5/20   PROM stretch LQ JZ JW JW JZ JZ   Pat mobs  LQ JZ JW JW JZ JZ                     Neuro Re-Ed 5/1 5/6 5/8 5/15 5/17 5/20   Quad set:                                                                Ther Ex 5/1 5/6 5/8 5/15 5/17 5/20   Seated knee extension stretch         Seated knee flexion stretch     Stand :30x3  Stand :30x3    SL Abduction 2x10 3x10  3x10 ea  3x10  3x10  glute med  3x10  TFL  2x10 ea TFL  2x10 glute med    SLR 2x10 cues for TKE        LAQ LAQ 30x 3 sec cues for TKE        Prone knee flexion stretch strap  :30x3 :30x3  :30x3  :30x4  :30x3    Standing knee flexion on stool 3x30\"ea        Seated HS stretch 5' partial ROM SciFit :30x3  :30x3 ea  :30x3  :30x3     Reviewed HEP   5'                Ther Activity     RB - partial range 5' partial ROM SciFit 5' partial ROM SciFit 5' partial ROM SciFit 5' partial ROM SciFit 5' partial ROM SciFit 5' full ROM SciFit   U/l LP Seat7  40/5ea  45/3x10 TKE cue  Seat7  40/ 3x10  50/ 2x10   Seat7  40/ 3x10   Seat7  40/ 3x10 Seat6  70/ 3x10 ea Seat6  70/ 4x10 ea   B/l calf press Seat 7  45/3 " x10   Seat7   40/ 3x10 ea Seat7   40/ 3x10 ea Seat7  40/ 3x10 Seat7 b/l calf press   110/ 3x10  Seat7   110/ 3x10   STS   1 foam x10  1 foam 2x10   1 foam 3x10    CP

## 2024-05-22 ENCOUNTER — OFFICE VISIT (OUTPATIENT)
Dept: PHYSICAL THERAPY | Facility: CLINIC | Age: 74
End: 2024-05-22
Payer: COMMERCIAL

## 2024-05-22 DIAGNOSIS — M17.12 PRIMARY OSTEOARTHRITIS OF LEFT KNEE: Primary | ICD-10-CM

## 2024-05-22 PROCEDURE — 97140 MANUAL THERAPY 1/> REGIONS: CPT | Performed by: PHYSICAL THERAPIST

## 2024-05-22 PROCEDURE — 97530 THERAPEUTIC ACTIVITIES: CPT | Performed by: PHYSICAL THERAPIST

## 2024-05-22 PROCEDURE — 97110 THERAPEUTIC EXERCISES: CPT | Performed by: PHYSICAL THERAPIST

## 2024-05-22 NOTE — PROGRESS NOTES
"Daily Note     Today's date: 2024  Patient name: Dalton Vail  : 1950  MRN: [id number]  Referring provider: Devorah Candelaria PA-C  Dx:   Encounter Diagnosis     ICD-10-CM    1. Primary osteoarthritis of left knee  M17.12              Subjective: Pt reported that he continues to improve as sessions continue. Notes that he has follow up with surgeon next month.     Objective: See treatment diary below    Assessment: Tolerated treatment well. Patient demonstrated fatigue post treatment, exhibited good technique with therapeutic exercises, and would benefit from continued PT    Plan: Continue per plan of care.      Precautions:   Past Medical History:   Diagnosis Date         Hyperplastic colon polyp     last assessed 14    Osteoarthritis of both knees 2021    Sleep apnea     last assessed 13         Manuals 5/22  5/8 5/15 5/17 5/20   PROM stretch JZ  JW JW JZ JZ   Pat mobs  JZ  JW JW JZ JZ                     Neuro Re-Ed 5/22  5/8 5/15 5/17 5/20   Quad set:                                                                Ther Ex 5/22  5/8 5/15 5/17 5/20   SL Abduction 2x10 ea TFL  2x10 glute med   3x10 ea  3x10  3x10  glute med  3x10  TFL  2x10 ea TFL  2x10 glute med    SLR         LAQ         Prone knee flexion stretch strap :30x3   :30x3  :30x3  :30x4  :30x3    Standing knee flexion on stool 14\" step :30x3    :30x3 :30x3   Seated HS stretch :30x3   :30x3 ea  :30x3  :30x3     Reviewed HEP                                     Ther Activity    RB  5' lvl3   5' partial ROM SciFit 5' partial ROM SciFit 5' partial ROM SciFit 5' full ROM SciFit   U/l LP Seat6  70/ 3x10   Seat7  40/ 3x10   Seat7  40/ 3x10 Seat6  70/ 3x10 ea Seat6  70/ 4x10 ea   B/l calf press Seat7  70/ 3x10   Seat7   40/ 3x10 ea Seat7  40/ 3x10 Seat7 b/l calf press   110/ 3x10  Seat7   110/ 3x10   STS   1 foam x10  1 foam 2x10   1 foam 3x10    CP                                                     "

## 2024-05-29 ENCOUNTER — OFFICE VISIT (OUTPATIENT)
Dept: PHYSICAL THERAPY | Facility: CLINIC | Age: 74
End: 2024-05-29
Payer: COMMERCIAL

## 2024-05-29 DIAGNOSIS — M17.12 PRIMARY OSTEOARTHRITIS OF LEFT KNEE: Primary | ICD-10-CM

## 2024-05-29 PROCEDURE — 97140 MANUAL THERAPY 1/> REGIONS: CPT

## 2024-05-29 PROCEDURE — 97110 THERAPEUTIC EXERCISES: CPT

## 2024-05-29 PROCEDURE — 97530 THERAPEUTIC ACTIVITIES: CPT

## 2024-05-29 NOTE — PROGRESS NOTES
"Daily Note     Today's date: 2024  Patient name: Dalton Vail  : 1950  MRN: [id number]  Referring provider: Devorah Candelaria PA-C  Dx:   Encounter Diagnosis     ICD-10-CM    1. Primary osteoarthritis of left knee  M17.12                Subjective: Dalton reports he is disappointed that flexibility in L knee is not as good as the R knee, however able to do most activities throughout the day without limitation.     Objective: See treatment diary below    Assessment: Dalton tolerated PT treatment well. Continued with passive stretching to address flexion and extension ROM deficits. Pt is challenged with current weight on u/l leg press, fatigue evident by third set. He displays good technique throughout therapeutic exercises. Pt would benefit from continued PT in effort to further improve ROM and maximize function with reduced pain/frequency of symptoms.      Plan: Continue per plan of care.      Precautions:   Past Medical History:   Diagnosis Date         Hyperplastic colon polyp     last assessed 14    Osteoarthritis of both knees 2021    Sleep apnea     last assessed 13         Manuals 5/22 5/29  5/15 5/17 5/20   PROM stretch JZ JW  JW JZ JZ   Pat mobs  JZ JW  JW JZ JZ                     Neuro Re-Ed 5/22 5/29  5/15 5/17 5/20   Quad set:                                                                Ther Ex 5/22 5/29  5/15 5/17 5/20   SL Abduction 2x10 ea TFL  2x10 glute med  2x10 ea TFL  2x10 glute med   3x10  3x10  glute med  3x10  TFL  2x10 ea TFL  2x10 glute med    SLR         LAQ         Prone knee flexion stretch strap :30x3  :30x3   :30x3  :30x4  :30x3    Standing knee flexion on stool 14\" step :30x3 14\" step :30x3   :30x3 :30x3   Seated HS stretch :30x3    :30x3  :30x3     Reviewed HEP                                     Ther Activity    RB  5' lvl3  5' lvl3   5' partial ROM SciFit 5' partial ROM SciFit 5' full ROM SciFit   U/l LP Seat6  70/ 3x10  Seat6  70/ 3x10  "  Seat7  40/ 3x10 Seat6  70/ 3x10 ea Seat6  70/ 4x10 ea   B/l calf press Seat7  70/ 3x10  Seat6  70/ 3x10   Seat7  40/ 3x10 Seat7 b/l calf press   110/ 3x10  Seat7   110/ 3x10   STS    1 foam 2x10   1 foam 3x10    CP

## 2024-05-31 ENCOUNTER — OFFICE VISIT (OUTPATIENT)
Dept: PHYSICAL THERAPY | Facility: CLINIC | Age: 74
End: 2024-05-31
Payer: COMMERCIAL

## 2024-05-31 DIAGNOSIS — M17.12 PRIMARY OSTEOARTHRITIS OF LEFT KNEE: Primary | ICD-10-CM

## 2024-05-31 PROCEDURE — 97110 THERAPEUTIC EXERCISES: CPT | Performed by: PHYSICAL THERAPIST

## 2024-05-31 PROCEDURE — 97140 MANUAL THERAPY 1/> REGIONS: CPT | Performed by: PHYSICAL THERAPIST

## 2024-05-31 PROCEDURE — 97530 THERAPEUTIC ACTIVITIES: CPT | Performed by: PHYSICAL THERAPIST

## 2024-05-31 NOTE — PROGRESS NOTES
"Daily Note     Today's date: 2024  Patient name: Dalton Vail  : 1950  MRN: [id number]  Referring provider: Devorah Candelaria PA-C  Dx:   Encounter Diagnosis     ICD-10-CM    1. Primary osteoarthritis of left knee  M17.12              Subjective: Pt reported that he was ripping out bamboo shoots in his yard, which \"was a lot of exercise.\"     Objective: See treatment diary below    Assessment: Tolerated treatment well. Patient demonstrated fatigue post treatment, exhibited good technique with therapeutic exercises, and would benefit from continued PT    Plan: Continue per plan of care.      Precautions:   Past Medical History:   Diagnosis Date         Hyperplastic colon polyp     last assessed 14    Osteoarthritis of both knees 2021    Sleep apnea     last assessed 13         Manuals    PROM stretch JZ JW JZ  JZ JZ   Pat mobs  JZ JW JDIONICIO  JDIONICIO JZ                     Neuro Re-Ed    Quad set:                                                                Ther Ex    SL Abduction 2x10 ea TFL  2x10 glute med  2x10 ea TFL  2x10 glute med  3x10 ea  3x10  glute med  3x10  TFL  2x10 ea TFL  2x10 glute med    SLR         LAQ         Prone knee flexion stretch strap :30x3  :30x3  :30x3   :30x4  :30x3    Standing knee flexion on stool 14\" step :30x3 14\" step :30x3 :30x3 14\" step   :30x3 :30x3   Seated HS stretch :30x3   :30x3 ea  :30x3     Reviewed HEP                                     Ther Activity    RB  5' lvl3  5' lvl3    5' partial ROM SciFit 5' full ROM SciFit   U/l LP Seat6  70/ 3x10  Seat6  70/ 3x10    Seat6  70/ 3x10 ea Seat6  70/ 4x10 ea   B/l calf press Seat7  70/ 3x10  Seat6  70/ 3x10    Seat7 b/l calf press   110/ 3x10  Seat7   110/ 3x10   STS      1 foam 3x10    CP         PB squat   3x10       Education   8' educated on knee/hip mechanics in stairs/gait                                  "

## 2024-06-03 ENCOUNTER — OFFICE VISIT (OUTPATIENT)
Dept: PHYSICAL THERAPY | Facility: CLINIC | Age: 74
End: 2024-06-03
Payer: COMMERCIAL

## 2024-06-03 DIAGNOSIS — M17.12 PRIMARY OSTEOARTHRITIS OF LEFT KNEE: Primary | ICD-10-CM

## 2024-06-03 PROCEDURE — 97530 THERAPEUTIC ACTIVITIES: CPT

## 2024-06-03 PROCEDURE — 97110 THERAPEUTIC EXERCISES: CPT

## 2024-06-03 PROCEDURE — 97140 MANUAL THERAPY 1/> REGIONS: CPT

## 2024-06-03 NOTE — PROGRESS NOTES
"Daily Note     Today's date: 6/3/2024  Patient name: Dalton Vail  : 1950  MRN: [id number]  Referring provider: Devorah Candelaria PA-C  Dx:   Encounter Diagnosis     ICD-10-CM    1. Primary osteoarthritis of left knee  M17.12                Subjective: Dalton reports he is seeing incremental progress in ROM and strength in L knee. Notes he has been \"using it more\" throughout daily activities.     Objective: See treatment diary below    Assessment: Dalton tolerated PT treatment well. Continues to benefit from passive stretching to address flexion and extension ROM deficits. Able to achieve greater flexion range with self stretching on step. Challenged with squatting and leg press activities, fatigue evident post session. Pt would benefit from continued PT to further address impairments and maximize functional level.      Plan: Continue per plan of care.      Precautions:   Past Medical History:   Diagnosis Date         Hyperplastic colon polyp     last assessed 14    Osteoarthritis of both knees 2021    Sleep apnea     last assessed 13         Manuals 5/22 5/29 5/31 6/3  5/20   PROM stretch JZ VAL HUNT JW  JZ   Pat mobs  JZ JW JZ JW  JZ                     Neuro Re-Ed 5/22 5/29 5/31 6/3  5/20   Quad set:                                                                Ther Ex 5/22 5/29 5/31 6/3  5/20   SL Abduction 2x10 ea TFL  2x10 glute med  2x10 ea TFL  2x10 glute med  3x10 ea 2x10 ea TFL  2x10 glute med   2x10 ea TFL  2x10 glute med    SLR         LAQ         Prone knee flexion stretch strap :30x3  :30x3  :30x3  :30x3  :30x3    Standing knee flexion on stool 14\" step :30x3 14\" step :30x3 :30x3 14\" step  14\" :30x3   :30x3   Seated HS stretch :30x3   :30x3 ea :30x3 ea     Reviewed HEP                                     Ther Activity 5/22 5/29 5/31 6/3  5/20   RB  5' lvl3  5' lvl3     5' full ROM SciFit   U/l LP Seat6  70/ 3x10  Seat6  70/ 3x10   Seat6  70/ 3x10   Seat6  70/ 4x10 ea   B/l calf press " Seat7  70/ 3x10  Seat6  70/ 3x10   Seat6  70/ 3x10   Seat7   110/ 3x10   STS      1 foam 3x10    CP         PB squat   3x10  3x10      Education   8' educated on knee/hip mechanics in stairs/gait

## 2024-06-05 ENCOUNTER — OFFICE VISIT (OUTPATIENT)
Dept: PHYSICAL THERAPY | Facility: CLINIC | Age: 74
End: 2024-06-05
Payer: COMMERCIAL

## 2024-06-05 DIAGNOSIS — M17.12 PRIMARY OSTEOARTHRITIS OF LEFT KNEE: Primary | ICD-10-CM

## 2024-06-05 PROCEDURE — 97530 THERAPEUTIC ACTIVITIES: CPT

## 2024-06-05 PROCEDURE — 97110 THERAPEUTIC EXERCISES: CPT

## 2024-06-05 PROCEDURE — 97140 MANUAL THERAPY 1/> REGIONS: CPT

## 2024-06-05 NOTE — PROGRESS NOTES
"Daily Note     Today's date: 2024  Patient name: Dalton Vail  : 1950  MRN: [id number]  Referring provider: Devorah Candelaria PA-C  Dx:   Encounter Diagnosis     ICD-10-CM    1. Primary osteoarthritis of left knee  M17.12                  Subjective: Dalton denies L knee soreness following last PT session. Has follow up with Ortho tomorrow.     Objective: See treatment diary below    Assessment: Dalton tolerated PT treatment well. Continues to benefit from passive stretching to address flexion ROM deficits, extension ROM is WFL. Advanced weight with u/l leg press activities, fatigue evident by third set. Pt would benefit from continued PT to further address impairments and maximize functional level.      Plan: Continue per plan of care.      Precautions:   Past Medical History:   Diagnosis Date         Hyperplastic colon polyp     last assessed 14    Osteoarthritis of both knees 2021    Sleep apnea     last assessed 13         Manuals 5/22 5/29 5/31 6/3 6/5    PROM stretch JZ JW JDIONICIO JW JW    Pat mobs  JZ JW JZ JW JW                      Neuro Re-Ed 5/22 5/29 5/31 6/3 6/5    Quad set:                                                                Ther Ex 5/22 5/29 5/31 6/3 6/5    SL Abduction 2x10 ea TFL  2x10 glute med  2x10 ea TFL  2x10 glute med  3x10 ea 2x10 ea TFL  2x10 glute med  2x10 ea TFL  2x10 glute med     SLR         LAQ         Prone knee flexion stretch strap :30x3  :30x3  :30x3  :30x3 :30x3     Standing knee flexion on stool 14\" step :30x3 14\" step :30x3 :30x3 14\" step  14\" :30x3  14\" :#0x3     Seated HS stretch :30x3   :30x3 ea :30x3 ea :30x3 ea     Reviewed HEP                                     Ther Activity 5/22 5/29 5/31 6/3 6/5    RB  5' lvl3  5' lvl3        U/l LP Seat6  70/ 3x10  Seat6  70/ 3x10   Seat6  70/ 3x10  Seat 6 80/ 3x10     B/l calf press Seat7  70/ 3x10  Seat6  70/ 3x10   Seat6  70/ 3x10  Seat 6 80/ 3x10     STS         CP         PB squat   3x10  3x10  3x10   "   Education   8' educated on knee/hip mechanics in stairs/gait

## 2024-06-10 ENCOUNTER — OFFICE VISIT (OUTPATIENT)
Dept: PHYSICAL THERAPY | Facility: CLINIC | Age: 74
End: 2024-06-10
Payer: COMMERCIAL

## 2024-06-10 DIAGNOSIS — M17.12 PRIMARY OSTEOARTHRITIS OF LEFT KNEE: Primary | ICD-10-CM

## 2024-06-10 PROCEDURE — 97110 THERAPEUTIC EXERCISES: CPT | Performed by: PHYSICAL THERAPIST

## 2024-06-10 PROCEDURE — 97140 MANUAL THERAPY 1/> REGIONS: CPT | Performed by: PHYSICAL THERAPIST

## 2024-06-10 PROCEDURE — 97530 THERAPEUTIC ACTIVITIES: CPT | Performed by: PHYSICAL THERAPIST

## 2024-06-10 NOTE — PROGRESS NOTES
"Daily Note     Today's date: 6/10/2024  Patient name: Dalton Vail  : 1950  MRN: [id number]  Referring provider: Devorah Candelaria PA-C  Dx:   Encounter Diagnosis     ICD-10-CM    1. Primary osteoarthritis of left knee  M17.12              Subjective: Pt reported that he had his follow up appointment with his surgeon, who was content with his current level of progress.     Objective: See treatment diary below    Assessment: Tolerated treatment well. Patient demonstrated fatigue post treatment, exhibited good technique with therapeutic exercises, and would benefit from continued PT    Plan: Continue per plan of care.      Precautions:   Past Medical History:   Diagnosis Date         Hyperplastic colon polyp     last assessed 14    Osteoarthritis of both knees 2021    Sleep apnea     last assessed 13         Manuals 5/22 5/29 5/31 6/3 6/5 6/10   PROM stretch JZ JW JZ JW JW JZ   Pat mobs  JZ JW JZ JW JW JZ                     Neuro Re-Ed 5/22 5/29 5/31 6/3 6/5 6/10   Quad set:                                                                Ther Ex 5/22 5/29 5/31 6/3 6/5 6/10   SL Abduction 2x10 ea TFL  2x10 glute med  2x10 ea TFL  2x10 glute med  3x10 ea 2x10 ea TFL  2x10 glute med  2x10 ea TFL  2x10 glute med  2x10 ea TFL  2x10 glute med    SLR         LAQ         Prone knee flexion stretch strap :30x3  :30x3  :30x3  :30x3 :30x3  :30x3 ea   Standing knee flexion on stool 14\" step :30x3 14\" step :30x3 :30x3 14\" step  14\" :30x3  14\" :30x3  14\" :30x3    Seated HS stretch :30x3   :30x3 ea :30x3 ea :30x3 ea     Reviewed HEP                                     Ther Activity 5/22 5/29 5/31 6/3 6/5 6/10   RB  5' lvl3  5' lvl3     5' lvl3   U/l LP Seat6  70/ 3x10  Seat6  70/ 3x10   Seat6  70/ 3x10  Seat 6 80/ 3x10  Seat6   80/ 3x10   B/l calf press Seat7  70/ 3x10  Seat6  70/ 3x10   Seat6  70/ 3x10  Seat 6 80/ 3x10  Seat 6   80/ 3x10    STS         CP         PB squat   3x10  3x10  3x10  3x10    Education  "  8' educated on knee/hip mechanics in stairs/gait

## 2024-06-12 ENCOUNTER — OFFICE VISIT (OUTPATIENT)
Dept: PHYSICAL THERAPY | Facility: CLINIC | Age: 74
End: 2024-06-12
Payer: COMMERCIAL

## 2024-06-12 DIAGNOSIS — M17.12 PRIMARY OSTEOARTHRITIS OF LEFT KNEE: Primary | ICD-10-CM

## 2024-06-12 PROCEDURE — 97110 THERAPEUTIC EXERCISES: CPT

## 2024-06-12 PROCEDURE — 97530 THERAPEUTIC ACTIVITIES: CPT

## 2024-06-12 PROCEDURE — 97140 MANUAL THERAPY 1/> REGIONS: CPT

## 2024-06-12 NOTE — PROGRESS NOTES
"Daily Note     Today's date: 2024  Patient name: Dalton Vail  : 1950  MRN: [id number]  Referring provider: Devorah Candelaria PA-C  Dx:   Encounter Diagnosis     ICD-10-CM    1. Primary osteoarthritis of left knee  M17.12              Subjective: Dalton offer no new complaints upon arrival. Gradually seeing progress in ROM and strength.     Objective: See treatment diary below    Assessment: Dalton tolerated PT treatment well. Passive stretching performed, tightness remains nearing end range flexion and extension. Pt is approprietly challenged with current exercise program, he displays good technique throughout. Emphasized pause at end of pball squat. Pt would benefit from continued PT to further address impairments and maximize functional level.      Plan: Continue per plan of care.      Precautions:   Past Medical History:   Diagnosis Date         Hyperplastic colon polyp     last assessed 14    Osteoarthritis of both knees 2021    Sleep apnea     last assessed 13         Manuals 6/12  5/31 6/3 6/5 6/10   PROM stretch JW  JDIONICIO NEAL JZ   Pat mobs    MARILEE NEAL JW JZ                     Neuro Re-Ed 6/12  5/31 6/3 6/5 6/10   Quad set:                                                                Ther Ex 6/12  5/31 6/3 6/5 6/10   SL Abduction 3x10 ea TFL  3x10 glute med   3x10 ea 2x10 ea TFL  2x10 glute med  2x10 ea TFL  2x10 glute med  2x10 ea TFL  2x10 glute med    SLR         LAQ         Prone knee flexion stretch strap :30x3 ea   :30x3  :30x3 :30x3  :30x3 ea   Standing knee flexion on stool 14\" :30x3   :30x3 14\" step  14\" :30x3  14\" :30x3  14\" :30x3    Seated HS stretch   :30x3 ea :30x3 ea :30x3 ea     Reviewed HEP                                     Ther Activity 6/12  5/31 6/3 6/5 6/10   RB  Lvl 3 5'      5' lvl3   U/l LP Seat6   80/ 3x10   Seat6  70/ 3x10  Seat 6 80/ 3x10  Seat6   80/ 3x10   B/l calf press Seat6   80/ 3x10   Seat6  70/ 3x10  Seat 6 80/ 3x10  Seat 6   80/ 3x10    STS       "   CP         PB squat 3x10   3x10  3x10  3x10  3x10    Education   8' educated on knee/hip mechanics in stairs/gait

## 2024-06-17 ENCOUNTER — OFFICE VISIT (OUTPATIENT)
Dept: PHYSICAL THERAPY | Facility: CLINIC | Age: 74
End: 2024-06-17
Payer: COMMERCIAL

## 2024-06-17 DIAGNOSIS — M17.12 PRIMARY OSTEOARTHRITIS OF LEFT KNEE: Primary | ICD-10-CM

## 2024-06-17 PROCEDURE — 97530 THERAPEUTIC ACTIVITIES: CPT

## 2024-06-17 PROCEDURE — 97140 MANUAL THERAPY 1/> REGIONS: CPT

## 2024-06-17 PROCEDURE — 97110 THERAPEUTIC EXERCISES: CPT

## 2024-06-17 NOTE — PROGRESS NOTES
"Daily Note     Today's date: 2024  Patient name: Dalton Vail  : 1950  MRN: [id number]  Referring provider: Devorah Candelaria PA-C  Dx:   Encounter Diagnosis     ICD-10-CM    1. Primary osteoarthritis of left knee  M17.12              Subjective: Dalton denies L knee soreness following PT sessions. Continues to note improvement in ROM and strength.     Objective: See treatment diary below    Assessment: Dalton tolerated PT treatment well. Benefits from passive stretching to address end range tightness into flexion and extension. Pt is approprietly challenged with current exercise program, he displays good technique throughout. Pt would benefit from continued PT to further address impairments and maximize functional level.      Plan: Continue per plan of care.      Precautions:   Past Medical History:   Diagnosis Date         Hyperplastic colon polyp     last assessed 14    Osteoarthritis of both knees 2021    Sleep apnea     last assessed 13         Manuals 6/12 6/17  6/3 6/5 6/10   PROM stretch JW JW  JW JW JZ   Pat mobs  [facility]  JW JW JZ                     Neuro Re-Ed 6/12 6/17  6/3 6/5 6/10   Quad set:                                                                Ther Ex 6/12 6/17  6/3 6/5 6/10   SL Abduction 3x10 ea TFL  3x10 glute med  3x10 ea TFL  3x10 glute med   2x10 ea TFL  2x10 glute med  2x10 ea TFL  2x10 glute med  2x10 ea TFL  2x10 glute med    SLR         LAQ         Prone knee flexion stretch strap :30x3 ea  :30x3 ea  :30x3 :30x3  :30x3 ea   Standing knee flexion on stool 14\" :30x3  14\" :30x3   14\" :30x3  14\" :30x3  14\" :30x3    Seated HS stretch    :30x3 ea :30x3 ea     Reviewed HEP                                     Ther Activity 6/12 6/17  6/3 6/5 6/10   RB  Lvl 3 5'  Lvl 3 5'     5' lvl3   U/l LP Seat6   80/ 3x10 Seat6   80/ 3x10  Seat6  70/ 3x10  Seat 6 80/ 3x10  Seat6   80/ 3x10   B/l calf press Seat6   80/ 3x10 Seat6   80/ 3x10  Seat6  70/ 3x10  Seat 6 80/ 3x10  Seat 6 "   80/ 3x10    STS         CP         PB squat 3x10  3x10   3x10  3x10  3x10    Education

## 2024-06-19 ENCOUNTER — OFFICE VISIT (OUTPATIENT)
Dept: PHYSICAL THERAPY | Facility: CLINIC | Age: 74
End: 2024-06-19
Payer: COMMERCIAL

## 2024-06-19 DIAGNOSIS — M17.12 PRIMARY OSTEOARTHRITIS OF LEFT KNEE: Primary | ICD-10-CM

## 2024-06-19 PROCEDURE — 97110 THERAPEUTIC EXERCISES: CPT | Performed by: PHYSICAL THERAPIST

## 2024-06-19 PROCEDURE — 97140 MANUAL THERAPY 1/> REGIONS: CPT | Performed by: PHYSICAL THERAPIST

## 2024-06-19 PROCEDURE — 97530 THERAPEUTIC ACTIVITIES: CPT | Performed by: PHYSICAL THERAPIST

## 2024-06-19 PROCEDURE — 97112 NEUROMUSCULAR REEDUCATION: CPT | Performed by: PHYSICAL THERAPIST

## 2024-06-19 NOTE — PROGRESS NOTES
"Daily Note     Today's date: 2024  Patient name: Dalton Vail  : 1950  MRN: [id number]  Referring provider: Devorah Candelaria PA-C  Dx:   Encounter Diagnosis     ICD-10-CM    1. Primary osteoarthritis of left knee  M17.12              Subjective: Pt reported that he has been able to begin light hopping activities without increased pain.     Objective: See treatment diary below    Assessment: Tolerated treatment well. Patient demonstrated fatigue post treatment, exhibited good technique with therapeutic exercises, and would benefit from continued PT    Plan: Continue per plan of care.      Precautions:   Past Medical History:   Diagnosis Date         Hyperplastic colon polyp     last assessed 14    Osteoarthritis of both knees 2021    Sleep apnea     last assessed 13         Manuals 6/12 6/17 6/19  6/5 6/10   PROM stretch JW JW JZ  JW JZ   Pat mobs  JW JW JZ  JW JZ                     Neuro Re-Ed 6/12 6/17 6/19  6/5 6/10            Lunge kneel   2UE support - 1 foam   2x5 ea      SLS   :30x3 ea      Tandem foam    :30x2 ea                                 Ther Ex  610   SL Abduction 3x10 ea TFL  3x10 glute med  3x10 ea TFL  3x10 glute med    2x10 ea TFL  2x10 glute med  2x10 ea TFL  2x10 glute med    Heel raise step   3x10       LAQ         Prone knee flexion stretch strap :30x3 ea  :30x3 ea :30x3 ea  :30x3  :30x3 ea   Standing knee flexion on stool 14\" :30x3  14\" :30x3  14\" :30x3 ea  14\" :30x3  14\" :30x3    Seated HS stretch     :30x3 ea     Reviewed HEP                                     Ther Activity  6/10   RB  Lvl 3 5'  Lvl 3 5'  Lvl3 5'    5' lvl3   U/l LP Seat6   80/ 3x10 Seat6   80/ 3x10 Seat6  80/ 3x10 ea  Seat 6 80/ 3x10  Seat6   80/ 3x10   B/l calf press Seat6   80/ 3x10 Seat6   80/ 3x10 Seat6  80/ 3x10 ea  Seat 6 80/ 3x10  Seat 6   80/ 3x10    STS                  PB squat 3x10  3x10  3x10   3x10  3x10                    "

## 2024-06-24 ENCOUNTER — OFFICE VISIT (OUTPATIENT)
Dept: PHYSICAL THERAPY | Facility: CLINIC | Age: 74
End: 2024-06-24
Payer: COMMERCIAL

## 2024-06-24 DIAGNOSIS — M17.12 PRIMARY OSTEOARTHRITIS OF LEFT KNEE: Primary | ICD-10-CM

## 2024-06-24 PROCEDURE — 97110 THERAPEUTIC EXERCISES: CPT

## 2024-06-24 PROCEDURE — 97530 THERAPEUTIC ACTIVITIES: CPT

## 2024-06-24 PROCEDURE — 97140 MANUAL THERAPY 1/> REGIONS: CPT

## 2024-06-24 NOTE — PROGRESS NOTES
"Daily Note     Today's date: 2024  Patient name: Dalton Vail  : 1950  MRN: [id number]  Referring provider: Devorah Candelaria PA-C  Dx:   Encounter Diagnosis     ICD-10-CM    1. Primary osteoarthritis of left knee  M17.12                Subjective: Dalton reports \"I am very tired today, didn't sleep well last night\".     Objective: See treatment diary below    Assessment: Dalton tolerated PT treatment well. Continues to benefit from passive stretching to address end range tightness into flexion. Advanced weight with leg press activities, fatigue evident by third set. Instability present with static balance training, hip strategy utilized. Pt would benefit from continued PT to further address impairments and maximize functional level.    Plan: Continue per plan of care.      Precautions:   Past Medical History:   Diagnosis Date         Hyperplastic colon polyp     last assessed 14    Osteoarthritis of both knees 2021    Sleep apnea     last assessed 13         Manuals 6/12 6/17 6/19 6/24  6/10   PROM stretch JW VAL HUNT   Pat mobs  JW VAL LEEZ                     Neuro Re-Ed 6/12 6/17 6/19 6/24  6/10            Lunge kneel   2UE support - 1 foam   2x5 ea      SLS   :30x3 ea :30x3 ea     Tandem foam    :30x2 ea :30x2 ea                                 Ther Ex   6/10   SL Abduction 3x10 ea TFL  3x10 glute med  3x10 ea TFL  3x10 glute med     2x10 ea TFL  2x10 glute med    Heel raise step   3x10  3x10     LAQ         Prone knee flexion stretch strap :30x3 ea  :30x3 ea :30x3 ea :30x3 ea  :30x3 ea   Standing knee flexion on stool 14\" :30x3  14\" :30x3  14\" :30x3 ea 14\" :30x3 ea  14\" :30x3    Seated HS stretch         Reviewed HEP                                     Ther Activity   6/10   RB  Lvl 3 5'  Lvl 3 5'  Lvl3 5'  Lvl 3 5'   5' lvl3   U/l LP Seat6   80/ 3x10 Seat6   80/ 3x10 Seat6  80/ 3x10 ea Seat6  90/ 3x10 ea  Seat6   80/ 3x10   B/l calf press " Seat6   80/ 3x10 Seat6   80/ 3x10 Seat6  80/ 3x10 ea Seat6  90/ 3x10 ea  Seat 6   80/ 3x10    STS                  PB squat 3x10  3x10  3x10  3x10   3x10                                              66

## 2024-06-26 ENCOUNTER — OFFICE VISIT (OUTPATIENT)
Dept: PHYSICAL THERAPY | Facility: CLINIC | Age: 74
End: 2024-06-26
Payer: COMMERCIAL

## 2024-06-26 DIAGNOSIS — M17.12 PRIMARY OSTEOARTHRITIS OF LEFT KNEE: Primary | ICD-10-CM

## 2024-06-26 PROCEDURE — 97530 THERAPEUTIC ACTIVITIES: CPT

## 2024-06-26 PROCEDURE — 97140 MANUAL THERAPY 1/> REGIONS: CPT

## 2024-06-26 PROCEDURE — 97110 THERAPEUTIC EXERCISES: CPT

## 2024-06-26 NOTE — PROGRESS NOTES
"Daily Note     Today's date: 2024  Patient name: Dalton Vail  : 1950  MRN: [id number]  Referring provider: Devorah Candelaria PA-C  Dx:   Encounter Diagnosis     ICD-10-CM    1. Primary osteoarthritis of left knee  M17.12                Subjective: Dalton reports he is noticing a great improvement in L knee flexibility and strength as PT session progress. Notes getting in/out of car and putting shoes on has become easier.     Objective: See treatment diary below    Assessment: Dalton tolerated PT treatment well. Knee ROM is WFL at this time. Greatest challenge observed with forward lunge, significant UE support required. Occasional LOB occurring with static balance, hip and ankle strategy used. Pt would benefit from continued PT to further address impairments and maximize functional level.    Plan: Continue per plan of care.      Precautions:   Past Medical History:   Diagnosis Date         Hyperplastic colon polyp     last assessed 14    Osteoarthritis of both knees 2021    Sleep apnea     last assessed 13         Manuals     PROM stretch JW JW JZ JW JW    Pat mobs  JW JW JZ JW JW                      Neuro Re-Ed                       Lunge kneel   2UE support - 1 foam   2x5 ea  2UE 2 foam x10 ea     SLS   :30x3 ea :30x3 ea :30x2 ea    Tandem foam    :30x2 ea :30x2 ea  :30x2 ea                                Ther Ex     SL Abduction 3x10 ea TFL  3x10 glute med  3x10 ea TFL  3x10 glute med        Heel raise step   3x10  3x10 U/l 3x10 ea    LAQ         Prone knee flexion stretch strap :30x3 ea  :30x3 ea :30x3 ea :30x3 ea :30x3 ea     Standing knee flexion on stool 14\" :30x3  14\" :30x3  14\" :30x3 ea 14\" :30x3 ea 14\" :30x3 ea    Seated HS stretch         Reviewed HEP                                     Ther Activity     RB  Lvl 3 5'  Lvl 3 5'  Lvl3 5'  Lvl 3 5'  Lvl 3 5'    U/l LP Seat6   80/ 3x10 " Seat6   80/ 3x10 Seat6  80/ 3x10 ea Seat6  90/ 3x10 ea Seat6  90/ 3x10 ea    B/l calf press Seat6   80/ 3x10 Seat6   80/ 3x10 Seat6  80/ 3x10 ea Seat6  90/ 3x10 ea Seat6  90/ 3x10 ea    STS                  PB squat 3x10  3x10  3x10  3x10  3x19                                3

## 2024-07-01 ENCOUNTER — OFFICE VISIT (OUTPATIENT)
Dept: PHYSICAL THERAPY | Facility: CLINIC | Age: 74
End: 2024-07-01
Payer: COMMERCIAL

## 2024-07-01 DIAGNOSIS — M17.12 PRIMARY OSTEOARTHRITIS OF LEFT KNEE: Primary | ICD-10-CM

## 2024-07-01 PROCEDURE — 97530 THERAPEUTIC ACTIVITIES: CPT

## 2024-07-01 PROCEDURE — 97140 MANUAL THERAPY 1/> REGIONS: CPT

## 2024-07-01 PROCEDURE — 97110 THERAPEUTIC EXERCISES: CPT

## 2024-07-01 NOTE — PROGRESS NOTES
"Daily Note     Today's date: 2024  Patient name: Dalton Vail  : 1950  MRN: [id number]  Referring provider: Devorah Candelaria PA-C  Dx:   Encounter Diagnosis     ICD-10-CM    1. Primary osteoarthritis of left knee  M17.12                Subjective: Dalton reports \"my knee is getting better\".     Objective: See treatment diary below    Assessment: Dalton tolerated PT treatment well. Knee ROM is WFL at this time. Greater neuromuscular control observed with SL balance training. Displayed good technique throughout therapeutic exercises. Pt would benefit from continued PT to further address impairments and maximize functional level.    Plan: Continue per plan of care.      Precautions:   Past Medical History:   Diagnosis Date         Hyperplastic colon polyp     last assessed 14    Osteoarthritis of both knees 2021    Sleep apnea     last assessed 13         Manuals    PROM stretch JW JW JZ JW JW JW   Pat mobs  JW JW JZ JW JW JW                     Neuro Re-Ed                      Lunge kneel   2UE support - 1 foam   2x5 ea  2UE 2 foam x10 ea  2x10 ea    SLS   :30x3 ea :30x3 ea :30x2 ea :30x2 ea   Tandem foam    :30x2 ea :30x2 ea  :30x2 ea                                Ther Ex    SL Abduction 3x10 ea TFL  3x10 glute med  3x10 ea TFL  3x10 glute med        Heel raise step   3x10  3x10 U/l 3x10 ea U/l 3x10 ea   LAQ         Prone knee flexion stretch strap :30x3 ea  :30x3 ea :30x3 ea :30x3 ea :30x3 ea  :30x3 ea   Standing knee flexion on stool 14\" :30x3  14\" :30x3  14\" :30x3 ea 14\" :30x3 ea 14\" :30x3 ea 14\" :30x3 ea   Seated HS stretch         Reviewed HEP                                     Ther Activity    RB  Lvl 3 5'  Lvl 3 5'  Lvl3 5'  Lvl 3 5'  Lvl 3 5' Lvl 3 5'   U/l LP Seat6   80/ 3x10 Seat6   80/ 3x10 Seat6  80/ 3x10 ea Seat6  90/ 3x10 ea Seat6  90/ 3x10 ea Seat6  90/ 3x10 ea   B/l " calf press Seat6   80/ 3x10 Seat6   80/ 3x10 Seat6  80/ 3x10 ea Seat6  90/ 3x10 ea Seat6  90/ 3x10 ea Seat6  90/ 3x10 ea   STS                  PB squat 3x10  3x10  3x10  3x10  3x10 3x10                               3

## 2024-07-03 ENCOUNTER — OFFICE VISIT (OUTPATIENT)
Dept: PHYSICAL THERAPY | Facility: CLINIC | Age: 74
End: 2024-07-03
Payer: COMMERCIAL

## 2024-07-03 DIAGNOSIS — M17.12 PRIMARY OSTEOARTHRITIS OF LEFT KNEE: Primary | ICD-10-CM

## 2024-07-03 PROCEDURE — 97140 MANUAL THERAPY 1/> REGIONS: CPT | Performed by: PHYSICAL THERAPIST

## 2024-07-03 PROCEDURE — 97110 THERAPEUTIC EXERCISES: CPT | Performed by: PHYSICAL THERAPIST

## 2024-07-03 PROCEDURE — 97530 THERAPEUTIC ACTIVITIES: CPT | Performed by: PHYSICAL THERAPIST

## 2024-07-03 NOTE — PROGRESS NOTES
PT Re-Evaluation     Today's date: 7/3/2024  Patient name: Dalton Vail  : 1950  MRN: [id number]  Referring provider: Ronn Palmer DO  Dx:   Encounter Diagnosis     ICD-10-CM    1. Left knee pain     M25.562    2. Primary osteoarthritis of right knee  M17.11                                                             Assessment  Assessment details:   Since starting PT post op L TKA Dalton reports GROC improvement of 75%. ROM assessment demonstrate slight improvement in knee flexion. MMT demonstrated no significant change. Balance assessment demonstrated good single leg balance. Functional outcome measures and subjective reports demonstrated improved functional ability. Despite improvements he continues to present with impairments, resulting in functional restrictions including stair negotiation, squatting, lunging, and yard work. Pt would benefit from further skilled outpatient PT in order to maximize his functional ability post surgery.  Patient would benefit from skilled physical therapy services to address their aforementioned functional limitations and progress towards prior level of function and independence with home exercise program.     Improvements: No longer swings leg into car, don/doff shoe/socks better.   Persisting deficits: normal gait with stairs, knee flexion, deep squat/lunge, yard work.       Impairments: abnormal gait, abnormal or restricted ROM, activity intolerance, impaired balance, impaired physical strength, lacks appropriate home exercise program, pain with function, weight-bearing intolerance, poor posture  and poor body mechanics  Functional limitations: walk/stand, STS, stair negotiation, yard work, walk uneven surfaces   Prognosis: good  Prognosis details: + factors: high motivation levels, active lifestyle  - factors: chronic pain, BMI    Goals  Short Term Goals to be accomplished by 24:  STG1: Pt will be I with HEP. Achieved.   STG2: Pt will demo 0-130 degrees in knee  AROM to improve gait. (0-115 achieved).   STG3: Pt will demo 5/5 MMT strength in knee to improve stair negotiation. (4+ achieved).  STG4: Pt will amb community distance without gait deviates due to pain. Achieved.      Long Term Goals to be accomplished by 8/30/24:   LTG1: Pt will be able to descend stairs with reciprocal gait pattern without use of handrail and with good mechanics. (Still prevents with some valgus).   LTG2: Pt will be able to walk on uneven surfaces on property to do yard work. (Progressing toward).   LTG3: Pt will be able to perform STS with normal mechanics without pain. (Presents with trunk flexion compensation).       Plan  Plan details: HEP development, stretching, strengthening, A/AA/PROM, joint mobilizations, posture education, STM/MI as needed to reduce muscle tension, muscle reeducation, PLOC discussed and agreed upon with patient.      Patient would benefit from: PT eval and skilled physical therapy  Planned modality interventions: cryotherapy, thermotherapy: hydrocollator packs and unattended electrical stimulation  Planned therapy interventions: manual therapy, neuromuscular re-education, self care, therapeutic activities, therapeutic exercise, home exercise program, patient education, joint mobilization, balance, strengthening, stretching, therapeutic training and flexibility  Frequency: 2x week    Plan of Care beginning date: 7/3/2024  Plan of Care expiration date: 8/30/2024  Treatment plan discussed with: patient      Subjective Evaluation    History of Present Illness  Mechanism of injury: Pt is a 71 y/o male who presents to PT s/p R TKA performed on 7/24/23. Had a surgery on March 1st to have hardware removed from his knee from previous ORIF. Then he had the TKA on 7/24/23 Stated that the day of the surgery there was complications with bleeding and the surgeon had to add additional staples.   Had home care PT for some visits which improved his symptoms.   Since then he has been  improving. Notes that he has been walking around his home without AD.   Improved symptoms from ice and elevation.   Patient Goals  Patient goals for therapy: increased motion, improved balance, decreased pain, decreased edema, increased strength and independence with ADLs/IADLs  Patient goal: walk/stand, STS, stair negotiation, yard work, walk uneven surfaces  Pain  Current pain ratin  At best pain ratin  At worst pain ratin  Location: R TKA and left knee   Quality: tight, discomfort and pulling  Aggravating factors: stair climbing, walking, standing and lifting    Treatments  Current treatment: physical therapy      Objective     Observations     Additional Observation Details  Incision: covered with bandage still     Tenderness     Additional Tenderness Details  TTP over R ITB, calf, adductors, HS    Active Range of Motion     Right Knee   Flexion: 115 degrees   Extension: 0 degrees     Left knee:  Flexion: 110 degrees   Extension: 10 degrees     Passive Range of Motion     Right Knee   Flexion: 120 degrees   Extension: 0 degrees     Left Knee   Flexion: 115 degrees   Extension: 0 degrees     Prone knee flexion:   R: 95 degrees   L: 90 degrees     Strength/Myotome Testing     Right Knee   Flexion: 4+  Extension: 4+    Left knee  Flexion: 4+  Extension: 4+      Additional Strength Details  U/l heel raise:   R: 20 reps  L: 20 reps       Tests     Right Knee   Negative valgus stress test at 30 degrees and varus stress test at 30 degrees.     Additional Tests Details  TU sec     SLS:   R: 30, 30 sec  L: 30, 30 sec       Precautions:   Past Medical History:   Diagnosis Date    Hyperplastic colon polyp     last assessed 14    Osteoarthritis of both knees 2021    Sleep apnea     last assessed 13     Manuals 7/3  6/19 6/24 6/26 7/1   PROM stretch JDIONICIO HOLD - focus on exercise MARILEE NEAL   Pat mobs   HOLD MARILEE NEAL                     Neuro Re-Ed 7/3  6/19 6/24 6/26 7/1                    "  Lunge kneel   2UE support - 1 foam   2x5 ea  2UE 2 foam x10 ea  2x10 ea    SLS   :30x3 ea :30x3 ea :30x2 ea :30x2 ea   Tandem foam    :30x2 ea :30x2 ea  :30x2 ea                                Ther Ex 7/3  6/19 6/24 6/26 7/1   Seated heel slide slider knee flexion :30x3 ea                          Prone knee flexion stretch strap :30x3ea  :30x3 ea :30x3 ea :30x3 ea  :30x3 ea   Standing knee flexion on 18\" box :30x3 ea  14\" :30x3 ea 14\" :30x3 ea 14\" :30x3 ea 14\" :30x3 ea   Seated HS stretch :30x3 ea                          Reviewed HEP  10'                 Ther Activity 7/3  6/19 6/24 6/26 7/1   RB  Lvl3 5'   Lvl3 5'  Lvl 3 5'  Lvl 3 5' Lvl 3 5'   Forward step down  *       Lateral step down   *       Partial squat - hands behind head - hips dont touch chair 2x10                  PB squat - hands behind head 2 sec hold 3x10   3x10  3x10  3x10 3x10                       "

## 2024-07-03 NOTE — LETTER
July 3, 2024    Devorah Candelaria PA-C  [address] [facility], [address] [place] [id number]    Patient: Dalton Vail   YOB: 1950   Date of Visit: 7/3/2024     Encounter Diagnosis     ICD-10-CM    1. Primary osteoarthritis of left knee  M17.12           Dear Dr. Candelaria:    Thank you for your recent referral of Dalton Vail. Please review the attached evaluation summary from Daltno's recent visit.     Please verify that you agree with the plan of care by signing the attached order.     If you have any questions or concerns, please do not hesitate to call.     I sincerely appreciate the opportunity to share in the care of one of your patients and hope to have another opportunity to work with you in the near future.       Sincerely,    Giacomo Yao, PT      Referring Provider:      I certify that I have read the below Plan of Care and certify the need for these services furnished under this plan of treatment while under my care.                    Devorah Candelaria PA-C  [address] [facility], [address] [address]  Via Fax: [phone]          PT Re-Evaluation     Today's date: 7/3/2024  Patient name: Dalton Vail  : 1950  MRN: [id number]  Referring provider: Ronn Palmer DO  Dx:   Encounter Diagnosis     ICD-10-CM    1. Left knee pain     M25.562    2. Primary osteoarthritis of right knee  M17.11                                                             Assessment  Assessment details:   Since starting PT post op L TKA Dalton reports GROC improvement of 75%. ROM assessment demonstrate slight improvement in knee flexion. MMT demonstrated no significant change. Balance assessment demonstrated good single leg balance. Functional outcome measures and subjective reports demonstrated improved functional ability. Despite improvements he continues to present with impairments, resulting in functional restrictions including stair negotiation, squatting, lunging, and yard work. Pt would  benefit from further skilled outpatient PT in order to maximize his functional ability post surgery.  Patient would benefit from skilled physical therapy services to address their aforementioned functional limitations and progress towards prior level of function and independence with home exercise program.     Improvements: No longer swings leg into car, don/doff shoe/socks better.   Persisting deficits: normal gait with stairs, knee flexion, deep squat/lunge, yard work.       Impairments: abnormal gait, abnormal or restricted ROM, activity intolerance, impaired balance, impaired physical strength, lacks appropriate home exercise program, pain with function, weight-bearing intolerance, poor posture  and poor body mechanics  Functional limitations: walk/stand, STS, stair negotiation, yard work, walk uneven surfaces   Prognosis: good  Prognosis details: + factors: high motivation levels, active lifestyle  - factors: chronic pain, BMI    Goals  Short Term Goals to be accomplished by 7/25/24:  STG1: Pt will be I with HEP. Achieved.   STG2: Pt will demo 0-130 degrees in knee AROM to improve gait. (0-115 achieved).   STG3: Pt will demo 5/5 MMT strength in knee to improve stair negotiation. (4+ achieved).  STG4: Pt will amb community distance without gait deviates due to pain. Achieved.      Long Term Goals to be accomplished by 8/30/24:   LTG1: Pt will be able to descend stairs with reciprocal gait pattern without use of handrail and with good mechanics. (Still prevents with some valgus).   LTG2: Pt will be able to walk on uneven surfaces on property to do yard work. (Progressing toward).   LTG3: Pt will be able to perform STS with normal mechanics without pain. (Presents with trunk flexion compensation).       Plan  Plan details: HEP development, stretching, strengthening, A/AA/PROM, joint mobilizations, posture education, STM/MI as needed to reduce muscle tension, muscle reeducation, PLOC discussed and agreed upon with  patient.      Patient would benefit from: PT eval and skilled physical therapy  Planned modality interventions: cryotherapy, thermotherapy: hydrocollator packs and unattended electrical stimulation  Planned therapy interventions: manual therapy, neuromuscular re-education, self care, therapeutic activities, therapeutic exercise, home exercise program, patient education, joint mobilization, balance, strengthening, stretching, therapeutic training and flexibility  Frequency: 2x week    Plan of Care beginning date: 7/3/2024  Plan of Care expiration date: 2024  Treatment plan discussed with: patient      Subjective Evaluation    History of Present Illness  Mechanism of injury: Pt is a 73 y/o male who presents to PT s/p R TKA performed on 23. Had a surgery on  to have hardware removed from his knee from previous ORIF. Then he had the TKA on 23 Stated that the day of the surgery there was complications with bleeding and the surgeon had to add additional staples.   Had home care PT for some visits which improved his symptoms.   Since then he has been improving. Notes that he has been walking around his home without AD.   Improved symptoms from ice and elevation.   Patient Goals  Patient goals for therapy: increased motion, improved balance, decreased pain, decreased edema, increased strength and independence with ADLs/IADLs  Patient goal: walk/stand, STS, stair negotiation, yard work, walk uneven surfaces  Pain  Current pain ratin  At best pain ratin  At worst pain ratin  Location: R TKA and left knee   Quality: tight, discomfort and pulling  Aggravating factors: stair climbing, walking, standing and lifting    Treatments  Current treatment: physical therapy      Objective     Observations     Additional Observation Details  Incision: covered with bandage still     Tenderness     Additional Tenderness Details  TTP over R ITB, calf, adductors, HS    Active Range of Motion     Right Knee  "  Flexion: 115 degrees   Extension: 0 degrees     Left knee:  Flexion: 110 degrees   Extension: 10 degrees     Passive Range of Motion     Right Knee   Flexion: 120 degrees   Extension: 0 degrees     Left Knee   Flexion: 115 degrees   Extension: 0 degrees     Prone knee flexion:   R: 95 degrees   L: 90 degrees     Strength/Myotome Testing     Right Knee   Flexion: 4+  Extension: 4+    Left knee  Flexion: 4+  Extension: 4+      Additional Strength Details  U/l heel raise:   R: 20 reps  L: 20 reps       Tests     Right Knee   Negative valgus stress test at 30 degrees and varus stress test at 30 degrees.     Additional Tests Details  TU sec     SLS:   R: 30, 30 sec  L: 30, 30 sec       Precautions:   Past Medical History:   Diagnosis Date   • Hyperplastic colon polyp     last assessed 14   • Osteoarthritis of both knees 2021   • Sleep apnea     last assessed 13     Manuals 7/3  6/19 6/24 6/26 7/1   PROM stretch JZ HOLD - focus on exercise JZ VAL NEAL JW   Pat mobs   HOLD JZ VAL JW JW                     Neuro Re-Ed 7/3  6/19 6/24 6/26 7/1                     Lunge kneel   2UE support - 1 foam   2x5 ea  2UE 2 foam x10 ea  2x10 ea    SLS   :30x3 ea :30x3 ea :30x2 ea :30x2 ea   Tandem foam    :30x2 ea :30x2 ea  :30x2 ea                                Ther Ex 7/3  6/19 6/24 6/26 7/1   Seated heel slide slider knee flexion :30x3 ea                          Prone knee flexion stretch strap :30x3ea  :30x3 ea :30x3 ea :30x3 ea  :30x3 ea   Standing knee flexion on 18\" box :30x3 ea  14\" :30x3 ea 14\" :30x3 ea 14\" :30x3 ea 14\" :30x3 ea   Seated HS stretch :30x3 ea                          Reviewed HEP  10'                 Ther Activity 7/3  6/19 6/24 6/26 7/1   RB  Lvl3 5'   Lvl3 5'  Lvl 3 5'  Lvl 3 5' Lvl 3 5'   Forward step down  *       Lateral step down   *       Partial squat - hands behind head - hips dont touch chair 2x10                  PB squat - hands behind head 2 sec hold 3x10   3x10  3x10  3x10 3x10    "

## 2024-07-08 ENCOUNTER — OFFICE VISIT (OUTPATIENT)
Dept: PHYSICAL THERAPY | Facility: CLINIC | Age: 74
End: 2024-07-08
Payer: COMMERCIAL

## 2024-07-08 DIAGNOSIS — M17.12 PRIMARY OSTEOARTHRITIS OF LEFT KNEE: Primary | ICD-10-CM

## 2024-07-08 PROCEDURE — 97530 THERAPEUTIC ACTIVITIES: CPT

## 2024-07-08 PROCEDURE — 97110 THERAPEUTIC EXERCISES: CPT

## 2024-07-08 NOTE — PROGRESS NOTES
"Daily Note     Today's date: 2024  Patient name: Dalton Vail  : 1950  MRN: [id number]  Referring provider: Devorah Candelaria PA-C  Dx:   Encounter Diagnosis     ICD-10-CM    1. Primary osteoarthritis of left knee  M17.12                      Subjective: Dalton reports he was very busy over the weekend doing yard work, knee tolerated going up/down ladder well.       Objective: See treatment diary below      Assessment: Dalton tolerated PT treatment well, pt is challenged with current exercise program. Added lateral and forward step down to diary, limited eccentric quad control therefore fatigues fairly quickly. Requires UEA with lunging activities. Continue to challenge quad strength. Pt would benefit from continued PT to further address impairments and maximize functional level.      Plan: Continue per plan of care.      Precautions:   Past Medical History:   Diagnosis Date         Hyperplastic colon polyp     last assessed 14    Osteoarthritis of both knees 2021    Sleep apnea     last assessed 13           Manuals 7/3 7/8  6/24 6/26 7/1   PROM stretch JZ HOLD - focus on exercise  JW JW JW   Pat mobs   HOLD  JW JW JW                     Neuro Re-Ed 7/3 7/8  6/24 6/26 7/1                     Lunge kneel  2x10 ea   2UE 2 foam x10 ea  2x10 ea    SLS    :30x3 ea :30x2 ea :30x2 ea   Tandem foam     :30x2 ea  :30x2 ea                                Ther Ex 7/3 7/8  6/24 6/26 7/1   Seated heel slide slider knee flexion :30x3 ea :30x3 e                          Prone knee flexion stretch strap :30x3ea :30x3 ea  :30x3 ea :30x3 ea  :30x3 ea   Standing knee flexion on 18\" box :30x3 ea :30x3 ea  14\" :30x3 ea 14\" :30x3 ea 14\" :30x3 ea   Seated HS stretch :30x3 ea :30x3 ea                          Reviewed HEP  10'                 Ther Activity 7/3 7/8  6/24 6/26 7/1   RB  Lvl3 5'  Lvl3 5'   Lvl 3 5'  Lvl 3 5' Lvl 3 5'   Forward step down  4\" 2x10 ea       Lateral step down   6\" 2x10 ea        Partial " squat - hands behind head - hips dont touch chair 2x10  2x10                PB squat - hands behind head 2 sec hold 3x10    3x10  3x10 3x10   LP/CP  U/l 90/ 3x10 ea

## 2024-07-10 ENCOUNTER — OFFICE VISIT (OUTPATIENT)
Dept: PHYSICAL THERAPY | Facility: CLINIC | Age: 74
End: 2024-07-10
Payer: COMMERCIAL

## 2024-07-10 DIAGNOSIS — M17.12 PRIMARY OSTEOARTHRITIS OF LEFT KNEE: Primary | ICD-10-CM

## 2024-07-10 PROCEDURE — 97110 THERAPEUTIC EXERCISES: CPT

## 2024-07-10 PROCEDURE — 97112 NEUROMUSCULAR REEDUCATION: CPT

## 2024-07-10 PROCEDURE — 97530 THERAPEUTIC ACTIVITIES: CPT

## 2024-07-10 NOTE — PROGRESS NOTES
"Daily Note     Today's date: 7/10/2024  Patient name: Dalton Vail  : 1950  MRN: [id number]  Referring provider: Devorah Candelaria PA-C  Dx:   Encounter Diagnosis     ICD-10-CM    1. Primary osteoarthritis of left knee  M17.12                      Subjective: Dalton denies soreness following last PT session. Notes L rib has been bothersome since tree limb hitting his side, difficulty sleep d/t pain.       Objective: See treatment diary below      Assessment: Dalton tolerated PT treatment well, pt is challenged with current exercise program. He displays good technique throughout therapeutic exercises. Hip strategy present with SL balance activities to avoid LOB. Fatigue evident posts session. Pt would benefit from continued PT to further address impairments and maximize functional level.      Plan: Continue per plan of care.      Precautions:   Past Medical History:   Diagnosis Date         Hyperplastic colon polyp     last assessed 14    Osteoarthritis of both knees 2021    Sleep apnea     last assessed 13           Manuals 7/3 7/8 7/10  6/26 7/1   PROM stretch JZ HOLD - focus on exercise   JW JW   Pat mobs   HOLD   JW JW                     Neuro Re-Ed 7/3 7/8 7/10  6/26 7/1                     Lunge kneel  2x10 ea 3x10 ea  2UE 2 foam x10 ea  2x10 ea    SLS   :30x3 ea  :30x2 ea :30x2 ea   Tandem foam      :30x2 ea                                Ther Ex 7/3 7/8 7/10  6/26 7/1   Seated heel slide slider knee flexion :30x3 ea :30x3 e  :30x3                        Prone knee flexion stretch strap :30x3ea :30x3 ea :30x3 ea  :30x3 ea  :30x3 ea   Standing knee flexion on 18\" box :30x3 ea :30x3 ea :30x3 ea  14\" :30x3 ea 14\" :30x3 ea   Seated HS stretch :30x3 ea :30x3 ea  :30x3 ea                        Reviewed HEP  10'                 Ther Activity 7/3 7/8 7/10  6/26 7/1   RB  Lvl3 5'  Lvl3 5'  Lvl3 5'   Lvl 3 5' Lvl 3 5'   Forward step down  4\" 2x10 ea 4\" 2x10 ea      Lateral step down   6\" 2x10 ea  6\" " 3x10 ea      Partial squat - hands behind head - hips dont touch chair 2x10  2x10 2x10               PB squat - hands behind head 2 sec hold 3x10   2 sec hold 3x10   3x10 3x10   LP/CP  U/l 90/ 3x10 ea

## 2024-07-11 ENCOUNTER — OFFICE VISIT (OUTPATIENT)
Dept: FAMILY MEDICINE CLINIC | Facility: CLINIC | Age: 74
End: 2024-07-11
Payer: COMMERCIAL

## 2024-07-11 VITALS
SYSTOLIC BLOOD PRESSURE: 136 MMHG | HEART RATE: 77 BPM | WEIGHT: 227 LBS | HEIGHT: 73 IN | OXYGEN SATURATION: 97 % | TEMPERATURE: 96.4 F | BODY MASS INDEX: 30.09 KG/M2 | DIASTOLIC BLOOD PRESSURE: 82 MMHG

## 2024-07-11 DIAGNOSIS — Z11.59 NEED FOR HEPATITIS C SCREENING TEST: ICD-10-CM

## 2024-07-11 DIAGNOSIS — E11.40 TYPE 2 DIABETES MELLITUS WITH DIABETIC NEUROPATHY, WITHOUT LONG-TERM CURRENT USE OF INSULIN (HCC): Primary | ICD-10-CM

## 2024-07-11 LAB — SL AMB POCT HEMOGLOBIN AIC: 6.9 (ref ?–6.5)

## 2024-07-11 PROCEDURE — 83036 HEMOGLOBIN GLYCOSYLATED A1C: CPT | Performed by: FAMILY MEDICINE

## 2024-07-11 PROCEDURE — 99213 OFFICE O/P EST LOW 20 MIN: CPT | Performed by: FAMILY MEDICINE

## 2024-07-11 NOTE — PROGRESS NOTES
"     Subjective:   Chief Complaint   Patient presents with    Follow-up     Diabetic check         Patient ID: Dalton Vail is a 73 y.o. male.    Here for DM check up        The following portions of the patient's history were reviewed and updated as appropriate: allergies, current medications, past family history, past medical history, past social history, past surgical history and problem list.    Review of Systems   Constitutional:  Negative for activity change, appetite change, chills, diaphoresis, fatigue and unexpected weight change.   HENT:  Negative for congestion, ear discharge, ear pain, hearing loss, nosebleeds and rhinorrhea.    Eyes:  Negative for pain, redness, itching and visual disturbance.   Respiratory:  Negative for cough, choking, chest tightness and shortness of breath.    Cardiovascular:  Negative for chest pain and leg swelling.   Gastrointestinal:  Negative for abdominal pain, blood in stool, constipation, diarrhea and nausea.   Endocrine: Negative for cold intolerance, polydipsia and polyphagia.   Genitourinary:  Negative for dysuria, frequency, hematuria and urgency.   Musculoskeletal:  Negative for arthralgias, back pain, gait problem, joint swelling, neck pain and neck stiffness.   Skin:  Negative for color change and rash.   Allergic/Immunologic: Negative for environmental allergies and food allergies.   Neurological:  Negative for dizziness, tremors, seizures, speech difficulty, numbness and headaches.   Hematological:  Negative for adenopathy. Does not bruise/bleed easily.   Psychiatric/Behavioral:  Negative for behavioral problems, dysphoric mood, hallucinations and self-injury.              Objective:  Vitals:    07/11/24 1301   BP: 136/82   Pulse: 77   Temp: (!) 96.4 °F (35.8 °C)   TempSrc: Tympanic   SpO2: 97%   Weight: 103 kg (227 lb)   Height: 6' 1.23\" (1.86 m)      Physical Exam  Constitutional:       General: He is not in acute distress.     Appearance: He is well-developed. He " is not diaphoretic.   HENT:      Head: Normocephalic and atraumatic.      Right Ear: External ear normal.      Left Ear: External ear normal.      Nose: Nose normal.      Mouth/Throat:      Pharynx: No oropharyngeal exudate.   Eyes:      General: No scleral icterus.        Right eye: No discharge.         Left eye: No discharge.      Conjunctiva/sclera: Conjunctivae normal.      Pupils: Pupils are equal, round, and reactive to light.   Neck:      Thyroid: No thyromegaly.   Cardiovascular:      Rate and Rhythm: Normal rate and regular rhythm.      Heart sounds: Normal heart sounds. No murmur heard.  Pulmonary:      Effort: Pulmonary effort is normal.      Breath sounds: Normal breath sounds. No wheezing or rales.   Abdominal:      General: Bowel sounds are normal.      Palpations: Abdomen is soft. There is no mass.      Tenderness: There is no abdominal tenderness. There is no guarding.   Musculoskeletal:         General: No tenderness. Normal range of motion.      Cervical back: Normal range of motion and neck supple.   Lymphadenopathy:      Cervical: No cervical adenopathy.   Skin:     General: Skin is warm and dry.   Neurological:      Mental Status: He is alert and oriented to person, place, and time.      Deep Tendon Reflexes: Reflexes are normal and symmetric.   Psychiatric:         Thought Content: Thought content normal.         Judgment: Judgment normal.           Assessment/Plan:    No problem-specific Assessment & Plan notes found for this encounter.       Diagnoses and all orders for this visit:    Type 2 diabetes mellitus with diabetic neuropathy, without long-term current use of insulin (HCC)    Need for hepatitis C screening test      Continue current Rx

## 2024-07-12 LAB
ALBUMIN/CREAT UR: NORMAL MG/G CREAT
CREAT UR-MCNC: 61 MG/DL (ref 20–320)
MICROALBUMIN UR-MCNC: <0.2 MG/DL

## 2024-07-17 ENCOUNTER — APPOINTMENT (OUTPATIENT)
Dept: PHYSICAL THERAPY | Facility: CLINIC | Age: 74
End: 2024-07-17
Payer: COMMERCIAL

## 2024-07-18 ENCOUNTER — APPOINTMENT (OUTPATIENT)
Dept: PHYSICAL THERAPY | Facility: CLINIC | Age: 74
End: 2024-07-18
Payer: COMMERCIAL

## 2024-07-23 ENCOUNTER — OFFICE VISIT (OUTPATIENT)
Dept: PHYSICAL THERAPY | Facility: CLINIC | Age: 74
End: 2024-07-23
Payer: COMMERCIAL

## 2024-07-23 DIAGNOSIS — M17.12 PRIMARY OSTEOARTHRITIS OF LEFT KNEE: Primary | ICD-10-CM

## 2024-07-23 PROCEDURE — 97112 NEUROMUSCULAR REEDUCATION: CPT

## 2024-07-23 PROCEDURE — 97110 THERAPEUTIC EXERCISES: CPT

## 2024-07-23 PROCEDURE — 97530 THERAPEUTIC ACTIVITIES: CPT

## 2024-07-23 NOTE — PROGRESS NOTES
"Daily Note     Today's date: 2024  Patient name: Dalton Vail  : 1950  MRN: [id number]  Referring provider: Devorah Candelaria PA-C  Dx:   Encounter Diagnosis     ICD-10-CM    1. Primary osteoarthritis of left knee  M17.12             Start Time: 0900  Stop Time: 0945  Total time in clinic (min): 45 minutes    Subjective: Dalton states that L knee is doing well, and denies soreness from LV. He does continue with some stiffness with prolonged seated. He states rib is healing, and no longer bothering him.      Objective: See treatment diary below      Assessment: Dalton continues with positive response to POC. No inc sxs with activities this session. Good eccentric control demonstrated with step downs. Fatigue noted post session with inc challenge with SLS at end of session but no LOB. Patient would benefit continued PT to improve L knee mobility.      Plan: Continue per plan of care.  Progress to taller step after x10 reps of smaller step NV. Monitor response to program.     Precautions:   Past Medical History:   Diagnosis Date         Hyperplastic colon polyp     last assessed 14    Osteoarthritis of both knees 2021    Sleep apnea     last assessed 13           Manuals 7/3 7/8 7/10 7/23  7/1   PROM stretch JZ HOLD - focus on exercise    JW   Pat mobs   HOLD    JW                     Neuro Re-Ed 7/3 7/8 7/10 7/23  7/1                     Lunge kneel  2x10 ea 3x10 ea 3x10ea  2x10 ea    SLS   :30x3 ea :30x3 ea  :30x2 ea   Tandem foam                                     Ther Ex 7/3 7/8 7/10   7/1   Seated heel slide slider knee flexion :30x3 ea :30x3 e  :30x3 :30x3                       Prone knee flexion stretch strap :30x3ea :30x3 ea :30x3 ea :30x3 ea  :30x3 ea   Standing knee flexion on 18\" box :30x3 ea :30x3 ea :30x3 ea :30x3 ea  14\" :30x3 ea   Seated HS stretch :30x3 ea :30x3 ea  :30x3 ea :30x3 ea                       Reviewed HEP  10'                 Ther Activity 7/3 7/8 7/10 Lvl3 '    " "  RB  Lvl3 5'  Lvl3 5'  Lvl3 5'  Lvl3 5'   Lvl 3 5'   Forward step down  4\" 2x10 ea 4\" 2x10 ea 4\" 3x10 ea INC?    Lateral step down   6\" 2x10 ea  6\" 3x10 ea 6\" 3x10 ea INC?    Partial squat - hands behind head - hips dont touch chair 2x10  2x10 2x10 2x10              PB squat - hands behind head 2 sec hold 3x10   2 sec hold 3x10  2 sec hold 3x10   3x10   LP/CP  U/l 90/ 3x10 ea                                "

## 2024-07-31 ENCOUNTER — APPOINTMENT (OUTPATIENT)
Dept: PHYSICAL THERAPY | Facility: CLINIC | Age: 74
End: 2024-07-31
Payer: COMMERCIAL

## 2024-08-02 ENCOUNTER — OFFICE VISIT (OUTPATIENT)
Dept: PHYSICAL THERAPY | Facility: CLINIC | Age: 74
End: 2024-08-02
Payer: COMMERCIAL

## 2024-08-02 DIAGNOSIS — M17.12 PRIMARY OSTEOARTHRITIS OF LEFT KNEE: Primary | ICD-10-CM

## 2024-08-02 PROCEDURE — 97530 THERAPEUTIC ACTIVITIES: CPT | Performed by: PHYSICAL THERAPIST

## 2024-08-02 PROCEDURE — 97110 THERAPEUTIC EXERCISES: CPT | Performed by: PHYSICAL THERAPIST

## 2024-08-02 PROCEDURE — 97112 NEUROMUSCULAR REEDUCATION: CPT | Performed by: PHYSICAL THERAPIST

## 2024-08-02 NOTE — PROGRESS NOTES
"Daily Note     Today's date: 2024  Patient name: Dalton Vail  : 1950  MRN: [id number]  Referring provider: Devorah Candelaria PA-C  Dx:   Encounter Diagnosis     ICD-10-CM    1. Primary osteoarthritis of left knee  M17.12              Subjective: Pt reported that he continues to improve with his flexibility and strength.     Objective: See treatment diary below    Assessment: Tolerated treatment well. Patient demonstrated fatigue post treatment, exhibited good technique with therapeutic exercises, and would benefit from continued PT    Plan: Continue per plan of care.      Precautions:   Past Medical History:   Diagnosis Date         Hyperplastic colon polyp     last assessed 14    Osteoarthritis of both knees 2021    Sleep apnea     last assessed 13          Manuals 7/3 7/8 7/10 7/23 8/2    PROM stretch JZ HOLD - focus on exercise       Pat mobs   HOLD                         Neuro Re-Ed 7/3 7/8 7/10 7/23 8/2             Lateral lunge      2x10 ea    Lunge kneel  2x10 ea 3x10 ea 3x10ea 2x10 ea    SLS   :30x3 ea :30x3 ea :30x3 ea    Tandem foam                                     Ther Ex 7/3 7/8 7/10  8/2    Seated heel slide slider knee flexion :30x3 ea :30x3 e  :30x3 :30x3 :30x3                       Prone knee flexion stretch strap :30x3ea :30x3 ea :30x3 ea :30x3 ea :30x3 ea    Standing knee flexion on 18\" box :30x3 ea :30x3 ea :30x3 ea :30x3 ea :30x3 ea    Seated HS stretch :30x3 ea :30x3 ea  :30x3 ea :30x3 ea :30x3 ea                      Reviewed HEP  10'                 Ther Activity 7/3 7/8 7/10 Lvl3 5'      RB  Lvl3 5'  Lvl3 5'  Lvl3 5'  Lvl3 5'  Lvl3 5'     Forward step down  4\" 2x10 ea 4\" 2x10 ea 4\" 3x10 ea 6\" 3x10 ea    Lateral step down   6\" 2x10 ea  6\" 3x10 ea 6\" 3x10 ea 6\" 3x10 ea    Partial squat - hands behind head - hips dont touch chair 2x10  2x10 2x10 2x10              PB squat - hands behind head 2 sec hold 3x10   2 sec hold 3x10  2 sec hold 3x10      LP/CP  U/l 90/ " 3x10 ea

## 2024-08-05 ENCOUNTER — OFFICE VISIT (OUTPATIENT)
Dept: PHYSICAL THERAPY | Facility: CLINIC | Age: 74
End: 2024-08-05
Payer: COMMERCIAL

## 2024-08-05 DIAGNOSIS — M17.12 PRIMARY OSTEOARTHRITIS OF LEFT KNEE: Primary | ICD-10-CM

## 2024-08-05 PROCEDURE — 97530 THERAPEUTIC ACTIVITIES: CPT

## 2024-08-05 PROCEDURE — 97112 NEUROMUSCULAR REEDUCATION: CPT

## 2024-08-05 PROCEDURE — 97110 THERAPEUTIC EXERCISES: CPT

## 2024-08-05 NOTE — PROGRESS NOTES
"Daily Note     Today's date: 2024  Patient name: Dalton Vail  : 1950  MRN: [id number]  Referring provider: Devorah Candelaria PA-C  Dx:   Encounter Diagnosis     ICD-10-CM    1. Primary osteoarthritis of left knee  M17.12              Subjective: Dalton reports he has no L knee pain throughout all daily activities. Feels confident going up/down steps leading with LLE.     Objective: See treatment diary below    Assessment: Dalton tolerated PT treatment well. Pt is approprietly challenged with current exercise program. Most difficulty observed with 6\" step down leading with RLE, quad fatigue following. Denied L knee provocation throughout and post session. Pt would benefit from continued PT to further address impairments and maximize functional level.          Plan: Continue per plan of care.      Precautions:   Past Medical History:   Diagnosis Date         Hyperplastic colon polyp     last assessed 14    Osteoarthritis of both knees 2021    Sleep apnea     last assessed 13          Manuals 7/3 7/8 7/10 7/23 8/2 8   PROM stretch JZ HOLD - focus on exercise       Pat mobs   HOLD                         Neuro Re-Ed 7/3 7/8 7/10 7/23 8/2 8            Lateral lunge      2x10 ea 2x10 ea    Lunge kneel  2x10 ea 3x10 ea 3x10ea 2x10 ea 2x10 ea    SLS   :30x3 ea :30x3 ea :30x3 ea :30x3 ea   Tandem foam                                     Ther Ex 7/3 7/8 7/10  8/2 8/   Seated heel slide slider knee flexion :30x3 ea :30x3 e  :30x3 :30x3 :30x3                       Prone knee flexion stretch strap :30x3ea :30x3 ea :30x3 ea :30x3 ea :30x3 ea :30x3 ea   Standing knee flexion on 18\" box :30x3 ea :30x3 ea :30x3 ea :30x3 ea :30x3 ea :30x3 ea chair   Seated HS stretch :30x3 ea :30x3 ea  :30x3 ea :30x3 ea :30x3 ea :30x3 ea                     Reviewed HEP  10'                 Ther Activity 7/3 7/8 7/10 Lvl3 5'  8/2    RB  Lvl3 5'  Lvl3 5'  Lvl3 5'  Lvl3 5'  Lvl3 5'  Lvl3 5'    Forward step down  4\" 2x10 ea " "4\" 2x10 ea 4\" 3x10 ea 6\" 3x10 ea 6\" 3x10 ea   Lateral step down   6\" 2x10 ea  6\" 3x10 ea 6\" 3x10 ea 6\" 3x10 ea 6\" 3x10 ea   Partial squat - hands behind head - hips dont touch chair 2x10  2x10 2x10 2x10              PB squat - hands behind head 2 sec hold 3x10   2 sec hold 3x10  2 sec hold 3x10   2 sec hold 3x10    LP/CP  U/l 90/ 3x10 ea                   "

## 2024-08-07 ENCOUNTER — OFFICE VISIT (OUTPATIENT)
Dept: PHYSICAL THERAPY | Facility: CLINIC | Age: 74
End: 2024-08-07
Payer: COMMERCIAL

## 2024-08-07 DIAGNOSIS — M17.12 PRIMARY OSTEOARTHRITIS OF LEFT KNEE: Primary | ICD-10-CM

## 2024-08-07 PROCEDURE — 97112 NEUROMUSCULAR REEDUCATION: CPT

## 2024-08-07 PROCEDURE — 97530 THERAPEUTIC ACTIVITIES: CPT

## 2024-08-07 PROCEDURE — 97110 THERAPEUTIC EXERCISES: CPT

## 2024-08-07 NOTE — PROGRESS NOTES
"Daily Note     Today's date: 2024  Patient name: Dalton Vail  : 1950  MRN: [id number]  Referring provider: Devorah Candelaria PA-C  Dx:   Encounter Diagnosis     ICD-10-CM    1. Primary osteoarthritis of left knee  M17.12              Subjective: Dalton reports he feels ready to be discharged today, able to complete most daily activities without L knee pain. Overall feeling much stronger and more mobile.     Objective: See treatment diary below    Assessment: Dalton tolerated PT treatment well. Pt displays independence with all exercises, reports no L knee pain/discomfort, and has met all goals for physical therapy pt will be D/C to HEP per PT review.         Plan: Pt to be D/C to [facility] pending PT review.      Precautions:   Past Medical History:   Diagnosis Date         Hyperplastic colon polyp     last assessed 14    Osteoarthritis of both knees 2021    Sleep apnea     last assessed 13          Manuals 8/7  7/10 7/23 8/2 8/5   PROM stretch         Pat mobs                            Neuro Re-Ed 8/7  7/10 7/23 8/2 8/5            Lateral lunge      2x10 ea 2x10 ea    Lunge kneel 2x10   3x10 ea 3x10ea 2x10 ea 2x10 ea    SLS HEP  :30x3 ea :30x3 ea :30x3 ea :30x3 ea   Tandem foam  HEP                                   Ther Ex   7/10  8/2 8/5   Seated heel slide slider knee flexion   :30x3 :30x3 :30x3                       Prone knee flexion stretch strap :30x3 ea  :30x3 ea :30x3 ea :30x3 ea :30x3 ea   Standing knee flexion on 18\" box :30x3 ea  :30x3 ea :30x3 ea :30x3 ea :30x3 ea chair   Seated HS stretch :30x3 ea   :30x3 ea :30x3 ea :30x3 ea :30x3 ea                     Reviewed HEP                   Ther Activity 8/7  7/10 Lvl3 5'  /2    RB  Lvl 3 5'  Lvl3 5'  Lvl3 5'  Lvl3 5'  Lvl3 5'    Forward step down 6\" 3x10 ea  4\" 2x10 ea 4\" 3x10 ea 6\" 3x10 ea 6\" 3x10 ea   Lateral step down  6\" 3x10 ea   6\" 3x10 ea 6\" 3x10 ea 6\" 3x10 ea 6\" 3x10 ea   Partial squat - hands behind head - hips dont touch chair  "  2x10 2x10              PB squat - hands behind head 2 sec hold 3x10  2 sec hold 3x10  2 sec hold 3x10   2 sec hold 3x10    LP/CP

## 2024-08-09 DIAGNOSIS — E11.9 TYPE 2 DIABETES MELLITUS WITHOUT COMPLICATION, WITHOUT LONG-TERM CURRENT USE OF INSULIN (HCC): ICD-10-CM

## 2024-08-09 RX ORDER — LANCETS 30 GAUGE
EACH MISCELLANEOUS
Qty: 200 EACH | Refills: 1 | Status: SHIPPED | OUTPATIENT
Start: 2024-08-09

## 2024-08-29 DIAGNOSIS — E11.9 TYPE 2 DIABETES MELLITUS WITHOUT COMPLICATION, WITHOUT LONG-TERM CURRENT USE OF INSULIN (HCC): ICD-10-CM

## 2024-10-28 DIAGNOSIS — E11.40 TYPE 2 DIABETES MELLITUS WITH DIABETIC NEUROPATHY, WITHOUT LONG-TERM CURRENT USE OF INSULIN (HCC): ICD-10-CM

## 2024-10-28 RX ORDER — BLOOD SUGAR DIAGNOSTIC
1 STRIP MISCELLANEOUS 2 TIMES DAILY
Qty: 200 EACH | Refills: 1 | Status: SHIPPED | OUTPATIENT
Start: 2024-10-28

## 2024-10-28 NOTE — TELEPHONE ENCOUNTER
Reason for call:   [x] Refill   [] Prior Auth  [] Other:     Office:   [x] PCP/Provider - : Paul Palmer, DO   [] Specialty/Provider -     Medication:     OneTouch Ultra test strip       Dose/Frequency:     Use 1 each 2 (two) times a day       Quantity: 200    Pharmacy: [id number] - [address] [place] [phone]     Does the patient have enough for 3 days?   [] Yes   [x] No - Send as HP to POD

## 2024-11-14 ENCOUNTER — OFFICE VISIT (OUTPATIENT)
Dept: FAMILY MEDICINE CLINIC | Facility: CLINIC | Age: 74
End: 2024-11-14
Payer: COMMERCIAL

## 2024-11-14 VITALS
DIASTOLIC BLOOD PRESSURE: 68 MMHG | BODY MASS INDEX: 30.35 KG/M2 | OXYGEN SATURATION: 96 % | TEMPERATURE: 98.2 F | HEIGHT: 73 IN | SYSTOLIC BLOOD PRESSURE: 114 MMHG | WEIGHT: 229 LBS | HEART RATE: 75 BPM

## 2024-11-14 DIAGNOSIS — E78.49 OTHER HYPERLIPIDEMIA: ICD-10-CM

## 2024-11-14 DIAGNOSIS — I10 BENIGN ESSENTIAL HYPERTENSION: ICD-10-CM

## 2024-11-14 DIAGNOSIS — E11.40 TYPE 2 DIABETES MELLITUS WITH DIABETIC NEUROPATHY, WITHOUT LONG-TERM CURRENT USE OF INSULIN (HCC): ICD-10-CM

## 2024-11-14 DIAGNOSIS — R35.0 URINARY FREQUENCY: ICD-10-CM

## 2024-11-14 DIAGNOSIS — E78.2 MIXED HYPERLIPIDEMIA: ICD-10-CM

## 2024-11-14 DIAGNOSIS — Z00.00 WELL ADULT EXAM: Primary | ICD-10-CM

## 2024-11-14 PROBLEM — H35.373 EPIRETINAL MEMBRANE (ERM) OF BOTH EYES: Status: ACTIVE | Noted: 2022-06-29

## 2024-11-14 PROBLEM — H43.819 VITREOUS DEGENERATION: Status: ACTIVE | Noted: 2020-09-09

## 2024-11-14 PROBLEM — IMO0002 DIABETES MELLITUS TYPE II, UNCONTROLLED: Status: RESOLVED | Noted: 2017-02-15 | Resolved: 2024-11-14

## 2024-11-14 PROBLEM — D31.32 BENIGN NEOPLASM OF LEFT CHOROID: Status: ACTIVE | Noted: 2020-09-09

## 2024-11-14 PROBLEM — H25.13 AGE-RELATED NUCLEAR CATARACT OF BOTH EYES: Status: ACTIVE | Noted: 2020-09-09

## 2024-11-14 PROBLEM — Z01.818 PRE-OP EXAMINATION: Status: RESOLVED | Noted: 2024-03-28 | Resolved: 2024-11-14

## 2024-11-14 LAB — SL AMB POCT HEMOGLOBIN AIC: 6.7 (ref ?–6.5)

## 2024-11-14 PROCEDURE — 99213 OFFICE O/P EST LOW 20 MIN: CPT | Performed by: FAMILY MEDICINE

## 2024-11-14 PROCEDURE — 99397 PER PM REEVAL EST PAT 65+ YR: CPT | Performed by: FAMILY MEDICINE

## 2024-11-14 PROCEDURE — 83036 HEMOGLOBIN GLYCOSYLATED A1C: CPT | Performed by: FAMILY MEDICINE

## 2024-11-14 NOTE — PATIENT INSTRUCTIONS
"Schedule eye exam     Patient Education     Routine physical for adults   The Basics   Written by the doctors and editors at [facility]   What is a physical? -- A physical is a routine visit, or \"check-up,\" with your doctor. You might also hear it called a \"wellness visit\" or \"preventive visit.\"  During each visit, the doctor will:   Ask about your physical and mental health   Ask about your habits, behaviors, and lifestyle   Do an exam   Give you vaccines if needed   Talk to you about any medicines you take   Give advice about your health   Answer your questions  Getting regular check-ups is an important part of taking care of your health. It can help your doctor find and treat any problems you have. But it's also important for preventing health problems.  A routine physical is different from a \"sick visit.\" A sick visit is when you see a doctor because of a health concern or problem. Since physicals are scheduled ahead of time, you can think about what you want to ask the doctor.  How often should I get a physical? -- It depends on your age and health. In general, for people age 21 years and older:   If you are younger than 50 years, you might be able to get a physical every 3 years.   If you are 50 years or older, your doctor might recommend a physical every year.  If you have an ongoing health condition, like diabetes or high blood pressure, your doctor will probably want to see you more often.  What happens during a physical? -- In general, each visit will include:   Physical exam - The doctor or nurse will check your height, weight, heart rate, and blood pressure. They will also look at your eyes and ears. They will ask about how you are feeling and whether you have any symptoms that bother you.   Medicines - It's a good idea to bring a list of all the medicines you take to each doctor visit. Your doctor will talk to you about your medicines and answer any questions. Tell them if you are having any side effects " "that bother you. You should also tell them if you are having trouble paying for any of your medicines.   Habits and behaviors - This includes:   Your diet   Your exercise habits   Whether you smoke, drink alcohol, or use drugs   Whether you are sexually active   Whether you feel safe at home  Your doctor will talk to you about things you can do to improve your health and lower your risk of health problems. They will also offer help and support. For example, if you want to quit smoking, they can give you advice and might prescribe medicines. If you want to improve your diet or get more physical activity, they can help you with this, too.   Lab tests, if needed - The tests you get will depend on your age and situation. For example, your doctor might want to check your:   Cholesterol   Blood sugar   Iron level   Vaccines - The recommended vaccines will depend on your age, health, and what vaccines you already had. Vaccines are very important because they can prevent certain serious or deadly infections.   Discussion of screening - \"Screening\" means checking for diseases or other health problems before they cause symptoms. Your doctor can recommend screening based on your age, risk, and preferences. This might include tests to check for:   Cancer, such as breast, prostate, cervical, ovarian, colorectal, prostate, lung, or skin cancer   Sexually transmitted infections, such as chlamydia and gonorrhea   Mental health conditions like depression and anxiety  Your doctor will talk to you about the different types of screening tests. They can help you decide which screenings to have. They can also explain what the results might mean.   Answering questions - The physical is a good time to ask the doctor or nurse questions about your health. If needed, they can refer you to other doctors or specialists, too.  Adults older than 65 years often need other care, too. As you get older, your doctor will talk to you about:   How to " prevent falling at home   Hearing or vision tests   Memory testing   How to take your medicines safely   Making sure that you have the help and support you need at home  All topics are updated as new evidence becomes available and our peer review process is complete.  This topic retrieved from [employer] on: May 02, 2024.  Topic 272093 Version 1.0  Release: 32.4.3 - C32.122  © 2024 UpToDate, Inc. and/or its affiliates. All rights reserved.  Consumer Information Use and Disclaimer   Disclaimer: This generalized information is a limited summary of diagnosis, treatment, and/or medication information. It is not meant to be comprehensive and should be used as a tool to help the user understand and/or assess potential diagnostic and treatment options. It does NOT include all information about conditions, treatments, medications, side effects, or risks that may apply to a specific patient. It is not intended to be medical advice or a substitute for the medical advice, diagnosis, or treatment of a health care provider based on the health care provider's examination and assessment of a patient's specific and unique circumstances. Patients must speak with a health care provider for complete information about their health, medical questions, and treatment options, including any risks or benefits regarding use of medications. This information does not endorse any treatments or medications as safe, effective, or approved for treating a specific patient. UpToDate, Inc. and its affiliates disclaim any warranty or liability relating to this information or the use thereof.The use of this information is governed by the Terms of Use, available at https://www.wolterskluwer.com/en/know/clinical-effectiveness-terms. 2024© UpToDate, Inc. and its affiliates and/or licensors. All rights reserved.  Copyright   © 2024 UpToDate, Inc. and/or its affiliates. All rights reserved.

## 2024-11-14 NOTE — ASSESSMENT & PLAN NOTE
Lab Results   Component Value Date    HGBA1C 6.7 (A) 11/14/2024   Controlled, continue metformin. Recheck 3 months  Schedule diabetic eye exam

## 2024-11-14 NOTE — PROGRESS NOTES
Diabetic Foot Exam    Patient's shoes and socks removed.    Right Foot/Ankle   Right Foot Inspection  Skin Exam: skin normal and skin intact. No dry skin, no warmth, no callus, no erythema, no maceration, no abnormal color, no pre-ulcer, no ulcer and no callus.     Toe Exam: ROM and strength within normal limits.     Sensory   Monofilament testing: intact    Vascular  Capillary refills: < 3 seconds  The right DP pulse is 2+. The right PT pulse is 2+.     Left Foot/Ankle  Left Foot Inspection  Skin Exam: skin normal and skin intact. No dry skin, no warmth, no erythema, no maceration, normal color, no pre-ulcer, no ulcer and no callus.     Toe Exam: ROM and strength within normal limits.     Sensory   Monofilament testing: intact    Vascular  Capillary refills: < 3 seconds  The left DP pulse is 2+. The left PT pulse is 2+.     Assign Risk Category  No deformity present  No loss of protective sensation  No weak pulses  Risk: 0  ADULT ANNUAL PHYSICAL  [facility]    NAME: Dalton Vail  AGE: 73 y.o. SEX: male  : 1950     DATE: 2024     Assessment and Plan:     1. Well adult exam  2. Type 2 diabetes mellitus with diabetic neuropathy, without long-term current use of insulin ([facility])  Assessment & Plan:    Lab Results   Component Value Date    HGBA1C 6.7 (A) 2024   Controlled, continue metformin. Recheck 3 months  Schedule diabetic eye exam  Orders:  -     POCT hemoglobin A1c  3. Benign essential hypertension  Assessment & Plan:  Controlled on current medication  4. Mixed hyperlipidemia  5. Urinary frequency  -     PSA, total and free; Future  -     PSA, total and free  6. Other hyperlipidemia  -     Lipid Panel with Direct LDL reflex      Immunizations and preventive care screenings were discussed with patient today. Appropriate education was printed on patient's after visit summary.    Counseling:  Dental Health: discussed importance of regular tooth  brushing, flossing, and dental visits.  Exercise: the importance of regular exercise/physical activity was discussed. Recommend exercise 3-5 times per week for at least 30 minutes.       Depression Screening and Follow-up Plan: Patient was screened for depression during today's encounter. They screened negative with a PHQ-2 score of 0.        No follow-ups on file.     Chief Complaint:     Chief Complaint   Patient presents with    Physical Exam     physical      History of Present Illness:     Pt is here for a physical today. States he is doing ok. No chest pain or shortness of breasth. Bms normal.           Review of Systems:     Review of Systems   Constitutional: Negative.  Negative for fatigue and fever.   HENT: Negative.     Eyes: Negative.    Respiratory: Negative.  Negative for cough.    Cardiovascular: Negative.    Gastrointestinal: Negative.    Endocrine: Negative.    Genitourinary: Negative.    Musculoskeletal: Negative.    Skin: Negative.    Allergic/Immunologic: Negative.    Neurological: Negative.    Psychiatric/Behavioral: Negative.        Past Medical History:     Past Medical History:   Diagnosis Date    Hyperplastic colon polyp     last assessed 4/26/14    Osteoarthritis of both knees 6/30/2021    Sleep apnea     last assessed 4/13/13      Past Surgical History:     Past Surgical History:   Procedure Laterality Date    ARTHROSCOPY KNEE Right     KNEE SURGERY  3/1/2023      Social History:     Social History     Socioeconomic History    Marital status: /Civil Union     Spouse name: None    Number of children: None    Years of education: None    Highest education level: None   Occupational History    None   Tobacco Use    Smoking status: Never    Smokeless tobacco: Never   Vaping Use    Vaping status: Never Used   Substance and Sexual Activity    Alcohol use: No    Drug use: No    Sexual activity: None   Other Topics Concern    None   Social History Narrative    None     Social Drivers of  "Health     Financial Resource Strain: Not on file   Food Insecurity: Not on file   Transportation Needs: Not on file   Physical Activity: Not on file   Stress: Not on file   Social Connections: Not on file   Intimate Partner Violence: Not on file   Housing Stability: Not on file      Family History:     Family History   Problem Relation Age of Onset    No Known Problems Father       Current Medications:     Current Outpatient Medications   Medication Sig Dispense Refill    ascorbic acid (VITAMIN C) 500 mg tablet Take 500 mg by mouth daily      Cholecalciferol 125 MCG (5000 UT) TABS Take by mouth      Lancets (OneTouch Delica Plus [id number]) MISC fro bid blood sugar monitoring 200 each 1    Liver Extract (LIVER PO) Take by mouth      metFORMIN (GLUCOPHAGE) 500 mg tablet take 2 tablets by mouth twice a day 360 tablet 1    Misc Natural Products (PROSTATE HEALTH PO) Take by mouth [employer]: supports prostate health      Multiple Vitamins-Minerals (VISION HEALTH PO) Take 2 capsules by mouth Bold Vision Supplement      olmesartan-hydrochlorothiazide (BENICAR HCT) 20-12.5 MG per tablet take 1 tablet by mouth once daily 90 tablet 3    Omega-3 Fatty Acids (OMEGA-3 FISH OIL PO) Take 2 capsules by mouth      OneTouch Ultra test strip Use 1 each 2 (two) times a day 200 each 1    PREBIOTIC PRODUCT PO Take by mouth daily      Probiotic Product (PROBIOTIC-10 PO) Take by mouth      Red Yeast Rice 600 MG CAPS Take by mouth daily      THYROID PO Take 4 capsules by mouth \"Thyroid Activator\"       No current facility-administered medications for this visit.      Allergies:     No Known Allergies   Physical Exam:     /68   Pulse 75   Temp 98.2 °F (36.8 °C) (Tympanic)   Ht 6' 1.23\" (1.86 m)   Wt 104 kg (229 lb)   SpO2 96%   BMI 30.03 kg/m²     Physical Exam  Vitals and nursing note reviewed.   Constitutional:       Appearance: He is well-developed. He is obese.   HENT:      Head: Normocephalic and atraumatic. "   Cardiovascular:      Rate and Rhythm: Normal rate and regular rhythm.      Pulses: no weak pulses.           Dorsalis pedis pulses are 2+ on the right side and 2+ on the left side.        Posterior tibial pulses are 2+ on the right side and 2+ on the left side.      Heart sounds: Normal heart sounds.   Pulmonary:      Effort: Pulmonary effort is normal.      Breath sounds: Normal breath sounds.   Abdominal:      General: Bowel sounds are normal.      Palpations: Abdomen is soft.   Feet:      Right foot:      Skin integrity: No ulcer, skin breakdown, erythema, warmth, callus or dry skin.      Left foot:      Skin integrity: No ulcer, skin breakdown, erythema, warmth, callus or dry skin.   Skin:     General: Skin is warm and dry.   Neurological:      Mental Status: He is alert and oriented to person, place, and time.   Psychiatric:         Behavior: Behavior normal.         Thought Content: Thought content normal.         Judgment: Judgment normal.          [facility]

## 2024-11-22 LAB
CHOLEST SERPL-MCNC: 180 MG/DL
CHOLEST/HDLC SERPL: 3.1 (CALC)
HDLC SERPL-MCNC: 59 MG/DL
LDLC SERPL CALC-MCNC: 107 MG/DL (CALC)
NONHDLC SERPL-MCNC: 121 MG/DL (CALC)
PSA FREE MFR SERPL: 16 % (CALC)
PSA FREE SERPL-MCNC: 0.6 NG/ML
PSA SERPL-MCNC: 3.8 NG/ML
TRIGL SERPL-MCNC: 59 MG/DL

## 2024-12-13 DIAGNOSIS — I10 ESSENTIAL HYPERTENSION: ICD-10-CM

## 2024-12-13 RX ORDER — OLMESARTAN MEDOXOMIL AND HYDROCHLOROTHIAZIDE 20/12.5 20; 12.5 MG/1; MG/1
1 TABLET ORAL DAILY
Qty: 90 TABLET | Refills: 1 | Status: SHIPPED | OUTPATIENT
Start: 2024-12-13

## 2024-12-14 PROBLEM — Z00.00 WELL ADULT EXAM: Status: RESOLVED | Noted: 2024-11-14 | Resolved: 2024-12-14

## 2025-02-21 DIAGNOSIS — E11.9 TYPE 2 DIABETES MELLITUS WITHOUT COMPLICATION, WITHOUT LONG-TERM CURRENT USE OF INSULIN (HCC): ICD-10-CM

## 2025-02-21 DIAGNOSIS — E11.40 TYPE 2 DIABETES MELLITUS WITH DIABETIC NEUROPATHY, WITHOUT LONG-TERM CURRENT USE OF INSULIN (HCC): ICD-10-CM

## 2025-02-21 RX ORDER — LANCETS 30 GAUGE
EACH MISCELLANEOUS
Qty: 200 EACH | Refills: 1 | Status: SHIPPED | OUTPATIENT
Start: 2025-02-21

## 2025-02-21 NOTE — TELEPHONE ENCOUNTER
Reason for call:   [x] Refill   [] Prior Auth  [] Other:     Office:   [x] PCP/Provider - Sophia Carvajal   [] Specialty/Provider -     Medication:     Lancets (OneTouch Delica Plus [id number]) MISC       Dose/Frequency: fro bid blood sugar monitoring     Quantity: 200    Pharmacy:  [id number] [address]     Does the patient have enough for 3 days?   [] Yes   [x] No - Send as HP to POD      Patient called requesting refill for OneTouch Ultra Test Strips. Patient made aware medication was refilled on 10/28/24 for 200 with 1 refills to [facility]. Patient instructed to contact the pharmacy to obtain refills of medication. Patient verbalized understanding.

## 2025-02-21 NOTE — TELEPHONE ENCOUNTER
Reason for call:   [x] Refill   [] Prior Auth  [] Other:     Office:   [x] PCP/Provider -   [] Specialty/Provider -     Medication: OneTouch Ultra test strip    Dose/Frequency: Use 1 each 2 (two) times a day,     Quantity: 200 each    Pharmacy: [id number] - [place] -     Does the patient have enough for 3 days?   [x] Yes   [] No - Send as HP to POD

## 2025-02-24 RX ORDER — BLOOD SUGAR DIAGNOSTIC
1 STRIP MISCELLANEOUS 2 TIMES DAILY
Qty: 200 EACH | Refills: 0 | OUTPATIENT
Start: 2025-02-24

## 2025-02-25 DIAGNOSIS — E11.40 TYPE 2 DIABETES MELLITUS WITH DIABETIC NEUROPATHY, WITHOUT LONG-TERM CURRENT USE OF INSULIN (HCC): ICD-10-CM

## 2025-02-25 RX ORDER — BLOOD SUGAR DIAGNOSTIC
1 STRIP MISCELLANEOUS 2 TIMES DAILY
Qty: 200 EACH | Refills: 1 | Status: SHIPPED | OUTPATIENT
Start: 2025-02-25

## 2025-02-25 NOTE — TELEPHONE ENCOUNTER
NOT A DUPLICATE   Patient talked to the pharmacy and they told him they don't have any refills on file. Please resend new prescription.    Reason for call:   [x] Refill   [] Prior Auth  [] Other:     Office:   [x] PCP/Provider -   [] Specialty/Provider -     Medication: OneTouch Ultra Test Strips     Dose/Frequency: Use 1 each 2x daily     Quantity: 200    Pharm[facility]: [id number] [address] [facility] [phone]     Does the patient have enough for 3 days?   [] Yes   [x] No - Send as HP to POD

## 2025-03-17 ENCOUNTER — OFFICE VISIT (OUTPATIENT)
Dept: FAMILY MEDICINE CLINIC | Facility: CLINIC | Age: 75
End: 2025-03-17
Payer: COMMERCIAL

## 2025-03-17 VITALS
DIASTOLIC BLOOD PRESSURE: 84 MMHG | OXYGEN SATURATION: 96 % | SYSTOLIC BLOOD PRESSURE: 132 MMHG | HEART RATE: 64 BPM | WEIGHT: 228 LBS | TEMPERATURE: 96.2 F | HEIGHT: 73 IN | BODY MASS INDEX: 30.22 KG/M2

## 2025-03-17 DIAGNOSIS — Z11.59 NEED FOR HEPATITIS C SCREENING TEST: ICD-10-CM

## 2025-03-17 DIAGNOSIS — Z12.5 SCREENING FOR PROSTATE CANCER: ICD-10-CM

## 2025-03-17 DIAGNOSIS — E11.40 TYPE 2 DIABETES MELLITUS WITH DIABETIC NEUROPATHY, WITHOUT LONG-TERM CURRENT USE OF INSULIN (HCC): Primary | ICD-10-CM

## 2025-03-17 DIAGNOSIS — I10 BENIGN ESSENTIAL HYPERTENSION: ICD-10-CM

## 2025-03-17 DIAGNOSIS — Z12.11 ENCOUNTER FOR SCREENING COLONOSCOPY: ICD-10-CM

## 2025-03-17 LAB — SL AMB POCT HEMOGLOBIN AIC: 6.8 (ref ?–6.5)

## 2025-03-17 PROCEDURE — 99214 OFFICE O/P EST MOD 30 MIN: CPT | Performed by: FAMILY MEDICINE

## 2025-03-17 PROCEDURE — 83036 HEMOGLOBIN GLYCOSYLATED A1C: CPT | Performed by: FAMILY MEDICINE

## 2025-03-17 NOTE — ASSESSMENT & PLAN NOTE
Controlled on omesartan/hctz  Orders:    Comprehensive metabolic panel; Future    CBC and differential; Future

## 2025-03-17 NOTE — PROGRESS NOTES
"Name: Dalton Vail      : 1950      MRN: [id number]  Encounter Provider: Sophia Carvajal DO  Encounter Date: 3/17/2025   Encounter department: [facility]  :  Assessment & Plan  Type 2 diabetes mellitus with diabetic neuropathy, without long-term current use of insulin (HCC)  Controlled , continue metformin 500mg 2 tab twice a day   Repeat in 3 months.   Due for diabetic eye exam  Lab Results   Component Value Date    HGBA1C 6.8 (A) 2025       Orders:    Comprehensive metabolic panel; Future    CBC and differential; Future    POCT hemoglobin A1c    Benign essential hypertension  Controlled on omesartan/hctz  Orders:    Comprehensive metabolic panel; Future    CBC and differential; Future    Encounter for screening colonoscopy  Schedule colonoscopy- last  with 5 year follow up   Orders:    Ambulatory Referral to Gastroenterology; Future    Screening for prostate cancer  Blood work   Orders:    PSA, total and free; Future    Need for hepatitis C screening test  screen  Orders:    Hepatitis C RNA, Quantitative PCR; Future          Depression Screening and Follow-up Plan: Patient was screened for depression during today's encounter. They screened negative with a PHQ-2 score of 0.        History of Present Illness   Pt is here for follow up on diabetes today. Pt feeling tired - has been busy since December. Hba1c 6.8 today.       Review of Systems   Constitutional:  Positive for fatigue. Negative for fever.   HENT: Negative.     Eyes: Negative.    Respiratory: Negative.  Negative for cough.    Cardiovascular: Negative.    Gastrointestinal: Negative.    Endocrine: Negative.    Genitourinary: Negative.    Musculoskeletal: Negative.    Skin: Negative.    Allergic/Immunologic: Negative.    Neurological: Negative.    Psychiatric/Behavioral: Negative.         Objective   /84   Pulse 64   Temp (!) 96.2 °F (35.7 °C) (Tympanic)   Ht 6' 1\" (1.854 m)   Wt 103 kg (228 lb)   SpO2 96%   " BMI 30.08 kg/m²      Physical Exam  Vitals and nursing note reviewed.   Constitutional:       Appearance: He is well-developed. He is obese.   HENT:      Head: Normocephalic and atraumatic.   Cardiovascular:      Rate and Rhythm: Normal rate and regular rhythm.      Heart sounds: Normal heart sounds.   Pulmonary:      Effort: Pulmonary effort is normal.      Breath sounds: Normal breath sounds.   Abdominal:      General: Bowel sounds are normal.      Palpations: Abdomen is soft.   Skin:     General: Skin is warm and dry.   Neurological:      Mental Status: He is alert and oriented to person, place, and time.   Psychiatric:         Behavior: Behavior normal.         Thought Content: Thought content normal.         Judgment: Judgment normal.

## 2025-03-17 NOTE — ASSESSMENT & PLAN NOTE
Controlled , continue metformin 500mg 2 tab twice a day   Repeat in 3 months.   Due for diabetic eye exam  Lab Results   Component Value Date    HGBA1C 6.8 (A) 03/17/2025       Orders:    Comprehensive metabolic panel; Future    CBC and differential; Future    POCT hemoglobin A1c

## 2025-03-19 ENCOUNTER — TELEPHONE (OUTPATIENT)
Age: 75
End: 2025-03-19

## 2025-03-19 ENCOUNTER — PREP FOR PROCEDURE (OUTPATIENT)
Age: 75
End: 2025-03-19

## 2025-03-19 DIAGNOSIS — Z86.0100 HISTORY OF COLON POLYPS: Primary | ICD-10-CM

## 2025-03-19 NOTE — TELEPHONE ENCOUNTER
03/19/25  Screened by: Татьяна Lam    Referring Provider     Pre- Screening:     There is no height or weight on file to calculate BMI.  Has patient been referred for a routine screening Colonoscopy? yes  Is the patient between 45-75 years old? yes      Previous Colonoscopy yes   If yes:    Date: 4/17/2019    Facility: [facility]    Reason: hx of pylops        Does the patient want to see a Gastroenterologist prior to their procedure OR are they having any GI symptoms? no    Has the patient been hospitalized or had abdominal surgery in the past 6 months? no    Does the patient use supplemental oxygen? no    Does the patient take Coumadin, Lovenox, Plavix, Elliquis, Xarelto, or other blood thinning medication? no    Has the patient had a stroke, cardiac event, or stent placed in the past year? no        If patient is between 45yrs - 49yrs, please advise patient that we will have to confirm benefits & coverage with their insurance company for a routine screening colonoscopy.

## 2025-03-19 NOTE — TELEPHONE ENCOUNTER
Pt would like his chart updated. Pt stated he got covid and flu shot on 2/24 at [facility] in [place]. Pt stated his mychart stated his due for colonoscopy and annual eye check. Both are scheduled.

## 2025-03-19 NOTE — TELEPHONE ENCOUNTER
Scheduled date of colonoscopy (as of today):4/17/2025  Physician performing colonoscopy:   Location of colonoscopy: [facility]  Bowel prep reviewed with patient: Татьяна Lam  Instructions reviewed with patient by: Татьяна Lam  Clearances: N/A      Carlos Alberto Dul prep sent via Testt

## 2025-03-19 NOTE — TELEPHONE ENCOUNTER
Patient called stating he saw in [facility] he is due to Kidney health evaluation bloodwork and also for a colonoscopy.  He would like PCP to order both.  Please send labs to [facility] if ordered.  Please advise.

## 2025-03-23 PROBLEM — Z12.11 ENCOUNTER FOR SCREENING COLONOSCOPY: Status: ACTIVE | Noted: 2024-11-14

## 2025-03-26 ENCOUNTER — TELEPHONE (OUTPATIENT)
Age: 75
End: 2025-03-26

## 2025-03-26 NOTE — TELEPHONE ENCOUNTER
Dalton called to verify to follow the latest set of lab orders placed and disregard the order for the same thing form a year ago. Confirmed with Dalton to complete the newest set of labs.

## 2025-04-03 ENCOUNTER — ANESTHESIA EVENT (OUTPATIENT)
Dept: ANESTHESIOLOGY | Facility: AMBULATORY SURGERY CENTER | Age: 75
End: 2025-04-03

## 2025-04-03 ENCOUNTER — ANESTHESIA (OUTPATIENT)
Dept: ANESTHESIOLOGY | Facility: AMBULATORY SURGERY CENTER | Age: 75
End: 2025-04-03

## 2025-04-15 ENCOUNTER — TELEPHONE (OUTPATIENT)
Age: 75
End: 2025-04-15

## 2025-04-16 ENCOUNTER — ANESTHESIA EVENT (OUTPATIENT)
Dept: ANESTHESIOLOGY | Facility: AMBULATORY SURGERY CENTER | Age: 75
End: 2025-04-16

## 2025-04-16 ENCOUNTER — ANESTHESIA (OUTPATIENT)
Dept: ANESTHESIOLOGY | Facility: AMBULATORY SURGERY CENTER | Age: 75
End: 2025-04-16

## 2025-04-17 ENCOUNTER — ANESTHESIA (OUTPATIENT)
Dept: GASTROENTEROLOGY | Facility: AMBULATORY SURGERY CENTER | Age: 75
End: 2025-04-17

## 2025-04-17 ENCOUNTER — ANESTHESIA EVENT (OUTPATIENT)
Dept: GASTROENTEROLOGY | Facility: AMBULATORY SURGERY CENTER | Age: 75
End: 2025-04-17

## 2025-04-17 ENCOUNTER — HOSPITAL ENCOUNTER (OUTPATIENT)
Dept: GASTROENTEROLOGY | Facility: AMBULATORY SURGERY CENTER | Age: 75
Discharge: HOME | End: 2025-04-17
Payer: COMMERCIAL

## 2025-04-17 VITALS
WEIGHT: 230 LBS | RESPIRATION RATE: 19 BRPM | HEIGHT: 73 IN | DIASTOLIC BLOOD PRESSURE: 70 MMHG | TEMPERATURE: 97 F | SYSTOLIC BLOOD PRESSURE: 115 MMHG | OXYGEN SATURATION: 95 % | BODY MASS INDEX: 30.48 KG/M2 | HEART RATE: 71 BPM

## 2025-04-17 DIAGNOSIS — Z86.0100 HISTORY OF COLON POLYPS: ICD-10-CM

## 2025-04-17 PROCEDURE — G0105 COLORECTAL SCRN; HI RISK IND: HCPCS | Performed by: INTERNAL MEDICINE

## 2025-04-17 RX ORDER — SODIUM CHLORIDE, SODIUM LACTATE, POTASSIUM CHLORIDE, CALCIUM CHLORIDE 600; 310; 30; 20 MG/100ML; MG/100ML; MG/100ML; MG/100ML
50 INJECTION, SOLUTION INTRAVENOUS CONTINUOUS
Status: DISCONTINUED | OUTPATIENT
Start: 2025-04-17 | End: 2025-04-21 | Stop reason: HOSPADM

## 2025-04-17 RX ORDER — PROPOFOL 10 MG/ML
INJECTION, EMULSION INTRAVENOUS AS NEEDED
Status: DISCONTINUED | OUTPATIENT
Start: 2025-04-17 | End: 2025-04-17

## 2025-04-17 RX ORDER — SODIUM CHLORIDE, SODIUM LACTATE, POTASSIUM CHLORIDE, CALCIUM CHLORIDE 600; 310; 30; 20 MG/100ML; MG/100ML; MG/100ML; MG/100ML
INJECTION, SOLUTION INTRAVENOUS CONTINUOUS PRN
Status: DISCONTINUED | OUTPATIENT
Start: 2025-04-17 | End: 2025-04-17

## 2025-04-17 RX ADMIN — PROPOFOL 50 MG: 10 INJECTION, EMULSION INTRAVENOUS at 07:28

## 2025-04-17 RX ADMIN — PROPOFOL 50 MG: 10 INJECTION, EMULSION INTRAVENOUS at 07:31

## 2025-04-17 RX ADMIN — PROPOFOL 50 MG: 10 INJECTION, EMULSION INTRAVENOUS at 07:34

## 2025-04-17 RX ADMIN — PROPOFOL 150 MG: 10 INJECTION, EMULSION INTRAVENOUS at 07:26

## 2025-04-17 RX ADMIN — SODIUM CHLORIDE, SODIUM LACTATE, POTASSIUM CHLORIDE, CALCIUM CHLORIDE: 600; 310; 30; 20 INJECTION, SOLUTION INTRAVENOUS at 07:22

## 2025-04-17 RX ADMIN — SODIUM CHLORIDE, SODIUM LACTATE, POTASSIUM CHLORIDE, CALCIUM CHLORIDE 50 ML/HR: 600; 310; 30; 20 INJECTION, SOLUTION INTRAVENOUS at 07:12

## 2025-04-17 NOTE — ANESTHESIA POSTPROCEDURE EVALUATION
Post-Op Assessment Note    CV Status:  Stable  Pain Score: 0    Pain management: adequate       Mental Status:  Arousable and sleepy   Hydration Status:  Stable   PONV Controlled:  Controlled   Airway Patency:  Patent     Post Op Vitals Reviewed: Yes    No anethesia notable event occurred.    Staff: CRNA           Last Filed PACU Vitals:  Vitals Value Taken Time   Temp     Pulse 77    /72    Resp 14    SpO2 99

## 2025-04-17 NOTE — H&P
"History and Physical - [facility]  Dalton Vail 74 y.o. male MRN: [id number]    HPI: Dalton Vail is a 74 y.o. year old male who presents for colonoscopy secondary personal history of polyps    REVIEW OF SYSTEMS: Per the HPI, and otherwise unremarkable.    Historical Information   Past Medical History:   Diagnosis Date    Diabetes mellitus (HCC)     Hyperlipidemia     Hyperplastic colon polyp     last assessed 4/26/14    Hypertension     Osteoarthritis of both knees 06/30/2021    Sleep apnea     last assessed 4/13/13     Past Surgical History:   Procedure Laterality Date    ARTHROSCOPY KNEE Right     COLONOSCOPY      JOINT REPLACEMENT Bilateral     knees    KNEE SURGERY  3/1/2023     Social History   Social History     Substance and Sexual Activity   Alcohol Use No     Social History     Substance and Sexual Activity   Drug Use No     Social History     Tobacco Use   Smoking Status Never    Passive exposure: Never   Smokeless Tobacco Never     Family History   Problem Relation Age of Onset    No Known Problems Father        Meds/Allergies       Current Outpatient Medications:     ascorbic acid (VITAMIN C) 500 mg tablet    Cholecalciferol 125 MCG (5000 UT) TABS    Liver Extract (LIVER PO)    metFORMIN (GLUCOPHAGE) 500 mg tablet    Misc Natural Products (PROSTATE HEALTH PO)    Multiple Vitamins-Minerals (VISION HEALTH PO)    olmesartan-hydrochlorothiazide (BENICAR HCT) 20-12.5 MG per tablet    Omega-3 Fatty Acids (OMEGA-3 FISH OIL PO)    PREBIOTIC PRODUCT PO    Probiotic Product (PROBIOTIC-10 PO)    Red Yeast Rice 600 MG CAPS    THYROID PO    Lancets (OneTouch Delica Plus [id number]) MISC    OneTouch Ultra test strip    Current Facility-Administered Medications:     lactated ringers infusion, 50 mL/hr, Intravenous, Continuous, 50 mL/hr at 04/17/25 0712    No Known Allergies    Objective     /82   Pulse 70   Temp (!) 97 °F (36.1 °C) (Temporal)   Resp 13   Ht 6' 1\" (1.854 m)   Wt 104 kg " (230 lb)   SpO2 96%   BMI 30.34 kg/m²     PHYSICAL EXAM    Gen: NAD AAOx3  Head: Normocephalic, Atraumatic  CV: S1S2 RRR no m/r/g  CHEST: Clear b/l no c/r/w  ABD: soft, +BS NT/ND  EXT: no edema    ASSESSMENT/PLAN:  This is a 74 y.o. year old male here for colonoscopy secondary personal history of polyps, and he is stable and optimized for his procedure.

## 2025-04-17 NOTE — ANESTHESIA POSTPROCEDURE EVALUATION
Post-Op Assessment Note    CV Status:  Stable  Pain Score: 0    Pain management: adequate       Mental Status:  Alert and awake   Hydration Status:  Euvolemic   PONV Controlled:  Controlled   Airway Patency:  Patent     Post Op Vitals Reviewed: Yes    No anethesia notable event occurred.    Staff: Anesthesiologist           Last Filed PACU Vitals:  Vitals Value Taken Time   Temp     Pulse 71 04/17/25 0757   /70 04/17/25 0757   Resp 19 04/17/25 0749   SpO2 95 % 04/17/25 0757       Modified Shane:     Vitals Value Taken Time   Activity 2 04/17/25 0749   Respiration 2 04/17/25 0749   Circulation 2 04/17/25 0749   Consciousness 2 04/17/25 0749   Oxygen Saturation 2 04/17/25 0749     Modified Shane Score: 10

## 2025-04-17 NOTE — ANESTHESIA PREPROCEDURE EVALUATION
Procedure:  COLONOSCOPY    Relevant Problems   CARDIO   (+) Benign essential hypertension   (+) Hyperlipidemia      MUSCULOSKELETAL   (+) Osteoarthritis of both knees   (+) Primary osteoarthritis of first carpometacarpal joint of right hand   (+) Primary osteoarthritis of left knee      NEURO/PSYCH   (+) Diabetic neuropathy, type II diabetes mellitus (HCC)   [id number] this am     Physical Exam    Airway    Mallampati score: II  TM Distance: >3 FB  Neck ROM: full     Dental   Comment: Veneers     Cardiovascular      Pulmonary      Other Findings        Anesthesia Plan  ASA Score- 3     Anesthesia Type- IV sedation with anesthesia with ASA Monitors.         Additional Monitors:     Airway Plan:            Plan Factors-Exercise tolerance (METS): >4 METS.    Chart reviewed.    Patient summary reviewed.    Patient is not a current smoker.              Induction- intravenous.    Postoperative Plan-         Informed Consent- Anesthetic plan and risks discussed with patient.  I personally reviewed this patient with the CRNA. Discussed and agreed on the Anesthesia Plan with the CRNA..      NPO Status:  No vitals data found for the desired time range.

## 2025-05-09 DIAGNOSIS — E11.9 TYPE 2 DIABETES MELLITUS WITHOUT COMPLICATION, WITHOUT LONG-TERM CURRENT USE OF INSULIN (HCC): ICD-10-CM

## 2025-06-13 LAB
LEFT EYE DIABETIC RETINOPATHY: NORMAL
RIGHT EYE DIABETIC RETINOPATHY: NORMAL

## 2025-06-28 DIAGNOSIS — I10 ESSENTIAL HYPERTENSION: ICD-10-CM

## 2025-07-01 ENCOUNTER — RA CDI HCC (OUTPATIENT)
Dept: OTHER | Facility: HOSPITAL | Age: 75
End: 2025-07-01

## 2025-07-01 RX ORDER — OLMESARTAN MEDOXOMIL AND HYDROCHLOROTHIAZIDE 20/12.5 20; 12.5 MG/1; MG/1
1 TABLET ORAL DAILY
Qty: 30 TABLET | Refills: 0 | Status: SHIPPED | OUTPATIENT
Start: 2025-07-01

## 2025-07-04 LAB
ALBUMIN SERPL-MCNC: 4.6 G/DL (ref 3.6–5.1)
ALBUMIN/GLOB SERPL: 1.8 (CALC) (ref 1–2.5)
ALP SERPL-CCNC: 61 U/L (ref 35–144)
ALT SERPL-CCNC: 32 U/L (ref 9–46)
AST SERPL-CCNC: 71 U/L (ref 10–35)
BASOPHILS # BLD AUTO: 50 CELLS/UL (ref 0–200)
BASOPHILS NFR BLD AUTO: 1 %
BILIRUB SERPL-MCNC: 0.8 MG/DL (ref 0.2–1.2)
BUN SERPL-MCNC: 26 MG/DL (ref 7–25)
BUN/CREAT SERPL: 20 (CALC) (ref 6–22)
CALCIUM SERPL-MCNC: 10 MG/DL (ref 8.6–10.3)
CHLORIDE SERPL-SCNC: 100 MMOL/L (ref 98–110)
CO2 SERPL-SCNC: 28 MMOL/L (ref 20–32)
CREAT SERPL-MCNC: 1.28 MG/DL (ref 0.7–1.28)
EOSINOPHIL # BLD AUTO: 120 CELLS/UL (ref 15–500)
EOSINOPHIL NFR BLD AUTO: 2.4 %
ERYTHROCYTE [DISTWIDTH] IN BLOOD BY AUTOMATED COUNT: 12.9 % (ref 11–15)
GFR/BSA.PRED SERPLBLD CYS-BASED-ARV: 59 ML/MIN/1.73M2
GLOBULIN SER CALC-MCNC: 2.6 G/DL (CALC) (ref 1.9–3.7)
GLUCOSE SERPL-MCNC: 112 MG/DL (ref 65–99)
HCT VFR BLD AUTO: 50.4 % (ref 38.5–50)
HCV RNA SERPL NAA+PROBE-ACNC: NORMAL IU/ML
HCV RNA SERPL NAA+PROBE-LOG IU: NORMAL LOG IU/ML
HGB BLD-MCNC: 16.8 G/DL (ref 13.2–17.1)
LYMPHOCYTES # BLD AUTO: 1365 CELLS/UL (ref 850–3900)
LYMPHOCYTES NFR BLD AUTO: 27.3 %
MCH RBC QN AUTO: 33.9 PG (ref 27–33)
MCHC RBC AUTO-ENTMCNC: 33.3 G/DL (ref 32–36)
MCV RBC AUTO: 101.6 FL (ref 80–100)
MONOCYTES # BLD AUTO: 425 CELLS/UL (ref 200–950)
MONOCYTES NFR BLD AUTO: 8.5 %
NEUTROPHILS # BLD AUTO: 3040 CELLS/UL (ref 1500–7800)
NEUTROPHILS NFR BLD AUTO: 60.8 %
PLATELET # BLD AUTO: 175 THOUSAND/UL (ref 140–400)
PMV BLD REES-ECKER: 12.7 FL (ref 7.5–12.5)
POTASSIUM SERPL-SCNC: 4.2 MMOL/L (ref 3.5–5.3)
PROT SERPL-MCNC: 7.2 G/DL (ref 6.1–8.1)
PSA FREE MFR SERPL: 14 % (CALC)
PSA FREE SERPL-MCNC: 0.5 NG/ML
PSA SERPL-MCNC: 3.6 NG/ML
RBC # BLD AUTO: 4.96 MILLION/UL (ref 4.2–5.8)
SODIUM SERPL-SCNC: 138 MMOL/L (ref 135–146)
WBC # BLD AUTO: 5 THOUSAND/UL (ref 3.8–10.8)

## 2025-07-07 ENCOUNTER — OFFICE VISIT (OUTPATIENT)
Dept: FAMILY MEDICINE CLINIC | Facility: CLINIC | Age: 75
End: 2025-07-07
Payer: COMMERCIAL

## 2025-07-07 VITALS
HEART RATE: 76 BPM | WEIGHT: 221 LBS | DIASTOLIC BLOOD PRESSURE: 64 MMHG | HEIGHT: 73 IN | TEMPERATURE: 97 F | SYSTOLIC BLOOD PRESSURE: 106 MMHG | OXYGEN SATURATION: 95 % | BODY MASS INDEX: 29.29 KG/M2

## 2025-07-07 DIAGNOSIS — R74.01 ELEVATED TRANSAMINASE LEVEL: ICD-10-CM

## 2025-07-07 DIAGNOSIS — N28.9 RENAL INSUFFICIENCY: ICD-10-CM

## 2025-07-07 DIAGNOSIS — I10 BENIGN ESSENTIAL HYPERTENSION: ICD-10-CM

## 2025-07-07 DIAGNOSIS — E78.2 MIXED HYPERLIPIDEMIA: Primary | ICD-10-CM

## 2025-07-07 DIAGNOSIS — E11.40 TYPE 2 DIABETES MELLITUS WITH DIABETIC NEUROPATHY, WITHOUT LONG-TERM CURRENT USE OF INSULIN (HCC): ICD-10-CM

## 2025-07-07 PROBLEM — E11.9 DIABETES MELLITUS WITHOUT COMPLICATION (HCC): Status: ACTIVE | Noted: 2020-09-09

## 2025-07-07 LAB — SL AMB POCT HEMOGLOBIN AIC: 6.3 (ref ?–6.5)

## 2025-07-07 PROCEDURE — 99214 OFFICE O/P EST MOD 30 MIN: CPT | Performed by: FAMILY MEDICINE

## 2025-07-07 PROCEDURE — 83036 HEMOGLOBIN GLYCOSYLATED A1C: CPT | Performed by: FAMILY MEDICINE

## 2025-07-07 NOTE — PROGRESS NOTES
Name: Dalton Vail      : 1950      MRN: [id number]  Encounter Provider: Sophia Carvajal DO  Encounter Date: 2025   Encounter department: [facility]  :  Assessment & Plan  Type 2 diabetes mellitus with diabetic neuropathy, without long-term current use of insulin (HCC)  Controlled on current medication metformin 1000mg twice a day   Testing blood sugars in the am 110s  Urine micro today  Eye exam up to date   Lab Results   Component Value Date    HGBA1C 6.3 2025       Orders:    POCT hemoglobin A1c    Albumin / creatinine urine ratio; Future    Mixed hyperlipidemia  Continue red yeast rice and omega 3 - lipid panel due after    Orders:    Lipid Panel with Direct LDL reflex; Future    Renal insufficiency  Kidney function decreased- reveiwed last blood work. Pt only drinking 16 ounces of water daily, even in the heat of the summer. Will increase to 64 ounces and recheck in 3 months.        Elevated transaminase level  Pt states he had been drinking alcohol but stopped, maybe once a week.   Recheck liver function with no alcohol and increased water intake   Orders:    Comprehensive metabolic panel; Future    Benign essential hypertension  Controlled on current medication, olmesartan-hctz               Depression Screening and Follow-up Plan: Patient was screened for depression during today's encounter. They screened negative with a PHQ-2 score of 0.        History of Present Illness   Pt is here for a follow up on diabetes. Hba1c 6.3 today. Down from 6.8 in march. Started 2 weeks ago with cardio and weights and changed diet. Has lost 7 pounds since march   Had colonoscopy in 2025 with a  7 year follow up.  Seen by eye doctor and good report         Review of Systems   Constitutional: Negative.  Negative for fatigue and fever.   HENT: Negative.     Eyes: Negative.    Respiratory: Negative.  Negative for cough.    Cardiovascular: Negative.    Gastrointestinal: Negative.   "  Endocrine: Negative.    Genitourinary: Negative.    Musculoskeletal: Negative.    Skin: Negative.    Allergic/Immunologic: Negative.    Neurological: Negative.    Psychiatric/Behavioral: Negative.         Objective   /64   Pulse 76   Temp (!) 97 °F (36.1 °C) (Tympanic)   Ht 6' 1\" (1.854 m)   Wt 100 kg (221 lb)   SpO2 95%   BMI 29.16 kg/m²      Physical Exam  Vitals and nursing note reviewed.   Constitutional:       Appearance: He is well-developed.   HENT:      Head: Normocephalic and atraumatic.     Cardiovascular:      Rate and Rhythm: Normal rate and regular rhythm.      Heart sounds: Normal heart sounds.   Pulmonary:      Effort: Pulmonary effort is normal.      Breath sounds: Normal breath sounds.   Abdominal:      General: Bowel sounds are normal.      Palpations: Abdomen is soft.     Skin:     General: Skin is warm and dry.     Neurological:      Mental Status: He is alert and oriented to person, place, and time.     Psychiatric:         Behavior: Behavior normal.         Thought Content: Thought content normal.         Judgment: Judgment normal.         "

## 2025-07-07 NOTE — ASSESSMENT & PLAN NOTE
Controlled on current medication metformin 1000mg twice a day   Testing blood sugars in the am 110s  Urine micro today  Eye exam up to date   Lab Results   Component Value Date    HGBA1C 6.3 07/07/2025       Orders:    POCT hemoglobin A1c    Albumin / creatinine urine ratio; Future

## 2025-07-07 NOTE — ASSESSMENT & PLAN NOTE
Continue red yeast rice and omega 3 - lipid panel due after 11/21   Orders:    Lipid Panel with Direct LDL reflex; Future

## 2025-07-08 ENCOUNTER — RESULTS FOLLOW-UP (OUTPATIENT)
Dept: FAMILY MEDICINE CLINIC | Facility: CLINIC | Age: 75
End: 2025-07-08

## 2025-07-08 LAB
ALBUMIN/CREAT UR: 1 MG/G CREAT
CREAT UR-MCNC: 150 MG/DL (ref 20–320)
MICROALBUMIN UR-MCNC: 0.2 MG/DL

## 2025-07-08 NOTE — TELEPHONE ENCOUNTER
----- Message from Sophia Carvajal DO sent at 7/8/2025  1:59 PM EDT -----  Your urine test is normal. Repeat in 1 year  ----- Message -----  From: [facility] Lab Results In  Sent: 7/8/2025   8:45 AM EDT  To: Sophia Carvajal DO

## 2025-07-25 ENCOUNTER — OFFICE VISIT (OUTPATIENT)
Dept: FAMILY MEDICINE CLINIC | Facility: CLINIC | Age: 75
End: 2025-07-25
Payer: COMMERCIAL

## 2025-07-25 VITALS
HEIGHT: 73 IN | HEART RATE: 90 BPM | SYSTOLIC BLOOD PRESSURE: 100 MMHG | TEMPERATURE: 98.4 F | BODY MASS INDEX: 28.23 KG/M2 | OXYGEN SATURATION: 95 % | WEIGHT: 213 LBS | DIASTOLIC BLOOD PRESSURE: 60 MMHG

## 2025-07-25 DIAGNOSIS — R05.9 COUGH, UNSPECIFIED TYPE: Primary | ICD-10-CM

## 2025-07-25 DIAGNOSIS — J02.0 PHARYNGITIS DUE TO STREPTOCOCCUS SPECIES: ICD-10-CM

## 2025-07-25 DIAGNOSIS — J02.9 SORE THROAT: ICD-10-CM

## 2025-07-25 DIAGNOSIS — R07.81 RIB PAIN: ICD-10-CM

## 2025-07-25 LAB — S PYO AG THROAT QL: POSITIVE

## 2025-07-25 PROCEDURE — 87880 STREP A ASSAY W/OPTIC: CPT

## 2025-07-25 PROCEDURE — 99213 OFFICE O/P EST LOW 20 MIN: CPT

## 2025-07-25 RX ORDER — AMOXICILLIN 500 MG/1
500 CAPSULE ORAL EVERY 12 HOURS SCHEDULED
Qty: 20 CAPSULE | Refills: 0 | Status: SHIPPED | OUTPATIENT
Start: 2025-07-25 | End: 2025-08-04

## 2025-07-25 RX ORDER — PROMETHAZINE HYDROCHLORIDE AND CODEINE PHOSPHATE 6.25; 1 MG/5ML; MG/5ML
5 SYRUP ORAL EVERY 4 HOURS PRN
Qty: 240 ML | Refills: 1 | Status: SHIPPED | OUTPATIENT
Start: 2025-07-25

## 2025-07-25 RX ORDER — PROMETHAZINE HYDROCHLORIDE AND CODEINE PHOSPHATE 6.25; 1 MG/5ML; MG/5ML
5 SYRUP ORAL EVERY 4 HOURS PRN
Qty: 240 ML | Refills: 1 | Status: SHIPPED | OUTPATIENT
Start: 2025-07-25 | End: 2025-07-25

## 2025-07-25 RX ORDER — KETOROLAC TROMETHAMINE 10 MG/1
10 TABLET, FILM COATED ORAL EVERY 6 HOURS PRN
Qty: 20 TABLET | Refills: 0 | Status: SHIPPED | OUTPATIENT
Start: 2025-07-25